# Patient Record
Sex: MALE | NOT HISPANIC OR LATINO | URBAN - METROPOLITAN AREA
[De-identification: names, ages, dates, MRNs, and addresses within clinical notes are randomized per-mention and may not be internally consistent; named-entity substitution may affect disease eponyms.]

---

## 2023-06-05 ENCOUNTER — PATIENT OUTREACH (OUTPATIENT)
Dept: ONCOLOGY | Facility: CLINIC | Age: 38
End: 2023-06-05

## 2023-06-05 ENCOUNTER — TRANSCRIBE ORDERS (OUTPATIENT)
Dept: OTHER | Age: 38
End: 2023-06-05

## 2023-06-05 DIAGNOSIS — C76.0 HEAD AND NECK CANCER (H): Primary | ICD-10-CM

## 2023-06-05 DIAGNOSIS — R59.0 CERVICAL LYMPHADENOPATHY: Primary | ICD-10-CM

## 2023-06-05 NOTE — PROGRESS NOTES
[facility]: Cancer Care                                                                   Hem/Onc  Referral reviewed  Referred By  Referred To   Pt's cousin    Diagnoses: Cervical lymphadenopathy   Order: Adult Oncology/Hematology  Referral    Medical Oncology   Comment: Ref called in per Pt's cousin Bryant/ 2nd opinion/ have paper records from  in [facility] (Telugu and English), uploading to [employer] once created/ per records, spreading quickly, needs to be seen quickly/Bx done 5/28/23/ sent caller to Fin counselor, LVM for Fin Counselor      ASSESSMENT      Clinical History (per Nurse review of records provided):    37 year old male patient referred by her cousin, see above  Records Location: No records received       INTERVENTION(S)                                                      [employer] message to pt re: need for records.    PLAN                                                        TBD    Melia Wood, RN, BSN, OCN  Hematology/Oncology New Patient Nurse Navigator   [facility]  [phone]

## 2023-06-05 NOTE — PROGRESS NOTES
I sent an IB to the ENT staff (Mimi TOVAR, RNCC, Patience () and the RNCC pool to see if they can arrange for pt to be seen/evaluated by the ENT surgeons to determine if surgery is an option.  Explained where records could be found and pointed out lack of insurance, but financial counselors have been notified.

## 2023-06-07 ENCOUNTER — PATIENT OUTREACH (OUTPATIENT)
Dept: ONCOLOGY | Facility: CLINIC | Age: 38
End: 2023-06-07

## 2023-06-07 NOTE — TELEPHONE ENCOUNTER
FUTURE VISIT INFORMATION:      FUTURE VISIT INFORMATION:    Date: 6/12/23    Time: 3:40 PM    Location: [facility]  REFERRAL INFORMATION:    Referring provider:     Referring providers clinic:     Reason for visit/diagnosis:  diagnosed head/neck cancer    RECORDS REQUESTED FROM:       Clinic name Comments Records Status Imaging Status     Records are in Media tab                          * Bx done 5/28/23  * 2nd opinion/ [facility] ([place]oven[place] and English)

## 2023-06-07 NOTE — PROGRESS NOTES
Urgent medical oncology referral rec'd yesterday for what appears to be a H&N cancer.  Pt's cousin called referral in:  Bryant:[phone] Bryant speaks English    I have left a voicemail and re-sent an IB message to ENT and Suman in Financial asking for an update. It is my understanding we cannot schedule a patient unless they have the financial piece sorted out, however, he needs to see ENT prior to medical or radiation oncology so I will let the ENT group decide.    I have asked that Suman reach out today to Bryant benton with an update.

## 2023-06-07 NOTE — PROGRESS NOTES
[facility]: Cancer Care       Follow up                                                                        Assessment:   - see prior NN intake note and Mychart message from pt, NN note re: ENT referral    Intervention(s):   - Conference call placed to pt's cousin Bryant with [email or web address] [phone]  Bryant merged our call with Thierno on his new mobile line to discuss that we do not have ENT appt yet for patient as ENT dept still has the request under escalated dept review, no updates as of now.I offered to connect him to [facility] financial counselor ([phone]) to discuss further. Pt states that his symptoms are getting much worse, described as : pain in the neck and it is swelling in the left part and I have swelling on my left ear, sore throat, night sweats every night, all sx are worsening since biopsy in [facility]. + cough, SOB, fever ocassionally or chills. Quit smoking 1 month ago. Pt states he wants to go to the hospital and his cousin wants to take him to [facility] at [address]. Pt's only medication is a BP medication. He saw an oncologist in [facility] who advised he get tx in [place]. They applied for county insurance this am with our financial counselor. Pt / cousin wants to pay for consult OOP asap.   Pt also states he would like for the scheduling and financial teams to call Bryant first and he can get pt on the phone quickly.   Chart updated with Thierno's new phone number: [phone]. Bryant is authorized to take calls as back-up as well. Pt's mother is in [facility] and Bryant is helping update the family.     Evaluation/Plan/Recommendations:  - Explained to pt that I will update our FC team that he would like to pay OOP for outpatient ENT consult asap if needed. He states that he will otherwise likely go to the [facility] ED soon for urgent evaluation.    Signature:  Melia Wood, RN, BSN, OCN  Hematology/Oncology Nurse Navigator  [facility]  [phone] /  [phone]      28 minute phone call

## 2023-06-12 ENCOUNTER — OFFICE VISIT (OUTPATIENT)
Dept: OTOLARYNGOLOGY | Facility: CLINIC | Age: 38
End: 2023-06-12

## 2023-06-12 ENCOUNTER — THERAPY VISIT (OUTPATIENT)
Dept: SPEECH THERAPY | Facility: CLINIC | Age: 38
End: 2023-06-12

## 2023-06-12 ENCOUNTER — PRE VISIT (OUTPATIENT)
Dept: OTOLARYNGOLOGY | Facility: CLINIC | Age: 38
End: 2023-06-12

## 2023-06-12 VITALS
SYSTOLIC BLOOD PRESSURE: 142 MMHG | DIASTOLIC BLOOD PRESSURE: 87 MMHG | BODY MASS INDEX: 33.32 KG/M2 | HEART RATE: 81 BPM | WEIGHT: 238 LBS | HEIGHT: 71 IN

## 2023-06-12 DIAGNOSIS — C76.0 HEAD AND NECK CANCER (H): ICD-10-CM

## 2023-06-12 DIAGNOSIS — C11.9 NASOPHARYNGEAL CANCER (H): Primary | ICD-10-CM

## 2023-06-12 DIAGNOSIS — R59.1 LYMPHADENOPATHY: Primary | ICD-10-CM

## 2023-06-12 DIAGNOSIS — C11.9 NASOPHARYNGEAL CANCER (H): ICD-10-CM

## 2023-06-12 PROCEDURE — 88305 TISSUE EXAM BY PATHOLOGIST: CPT | Mod: 26 | Performed by: PATHOLOGY

## 2023-06-12 PROCEDURE — 88365 INSITU HYBRIDIZATION (FISH): CPT | Mod: 26 | Performed by: PATHOLOGY

## 2023-06-12 PROCEDURE — 88305 TISSUE EXAM BY PATHOLOGIST: CPT | Mod: TC | Performed by: STUDENT IN AN ORGANIZED HEALTH CARE EDUCATION/TRAINING PROGRAM

## 2023-06-12 PROCEDURE — 88173 CYTOPATH EVAL FNA REPORT: CPT | Mod: 26 | Performed by: PATHOLOGY

## 2023-06-12 PROCEDURE — 99205 OFFICE O/P NEW HI 60 MIN: CPT | Mod: 25 | Performed by: STUDENT IN AN ORGANIZED HEALTH CARE EDUCATION/TRAINING PROGRAM

## 2023-06-12 PROCEDURE — 10005 FNA BX W/US GDN 1ST LES: CPT | Performed by: STUDENT IN AN ORGANIZED HEALTH CARE EDUCATION/TRAINING PROGRAM

## 2023-06-12 PROCEDURE — 88342 IMHCHEM/IMCYTCHM 1ST ANTB: CPT | Mod: 26 | Performed by: PATHOLOGY

## 2023-06-12 PROCEDURE — 31575 DIAGNOSTIC LARYNGOSCOPY: CPT | Performed by: STUDENT IN AN ORGANIZED HEALTH CARE EDUCATION/TRAINING PROGRAM

## 2023-06-12 PROCEDURE — 99207 PR NO BILLABLE SERVICE THIS VISIT: CPT | Performed by: SPEECH-LANGUAGE PATHOLOGIST

## 2023-06-12 ASSESSMENT — PAIN SCALES - GENERAL: PAINLEVEL: MODERATE PAIN (4)

## 2023-06-12 NOTE — PATIENT INSTRUCTIONS
1. Please schedule PET scan asap.   2. Please call the ENT clinic with any questions,concerns, new or worsening symptoms.    -Clinic number is [phone]   - Mimi's direct line (Dr. Fisher's nurse) [phone]  3. You will be called to schedule medical and radiation consults.   4. You will also be called to arrange dental consult.

## 2023-06-12 NOTE — LETTER
"6/12/2023       RE: Thierno Vega  [address] [address]     Dear Colleague,    Thank you for referring your patient, Thierno Vega, to the [facility]SE [facility] [place] at [facility]. Please see a copy of my visit note below.    Head and Neck Surgery  6/12/23    37M with diagnosis of nasopharyngeal carcinoma in [facility] presents for a second opinion. He noticed a left neck mass in the fall and initially had a FNA that was benign. The mass perissted and then in April underwent repeat FNA showing undifferentiated carcinoma. I do not see that EBV was tested. He has undergone neck US, MRI head and neck. We do not have these scans.     He has no symptoms asides from the neck mass.    PMH:  HTN    PSH:  None    Medications:  Antihypertensive    Social history:   smokes cigarettes  occasional etoh   No drugs    Family history:  None    ROS: 10 pt review of systems performed and negative asides from HPI    Physical Examination:  BP (!) 142/87 (BP Location: Right arm, Patient Position: Sitting, Cuff Size: Adult Large)   Pulse 81   Ht 1.81 m (5' 11.26\")   Wt 108 kg (238 lb)   BMI 32.95 kg/m    Alert, NAD  Palpable left neck adenopathy in lvl 2,3, 5. No skin involvement.  EOM intact  No lesions in oral cavity or oropharynx  CN II-XII intact    Procedure:  Fiberoptic laryngoscopy was performed. Submucosal fullness in left fossa of rosenmuller. Remainder of exam normal    US guided FNA was performed of left neck node. 23 gauge needle was used to obtain multiple specimens    A/P:  37M presenting with nasopharyngeal carcinoma diagnosed in [facility]. We do not have the imaging. I do have his pathology results. He says his MRI is arriving on a CD in the coming days. We will arrange for a PET to complete his staging. FNA was performed today for EBV status.     He will be treated with no surgical techniques. Once we understand his staging " we can discuss his treatment options. We will get him in to see dental, med onc, rad onc, SLP.     Navin Fisher MD      60 minutes spent on the date of the encounter in chart review, patient visit, review of tests, documentation and/or discussion with other providers about the issues documented above asides from time spent doing flexible laryngoscopy and US guided FNA.             Again, thank you for allowing me to participate in the care of your patient.      Sincerely,    Navin Fisher MD

## 2023-06-12 NOTE — PROGRESS NOTES
Thierno Vega was seen for allied health care provider visit for introduction of self and SLP services. Provided information on trajectory of care and benefits of SLP services during and after chemoradiation for nasopharynx tumor. Speaking is function. Swallowing is reported to be functional. Formal video swallow study evaluation indicated prior to the onset of treatment. All questions answered within scope of practice for pt and his cousin. Encouraged pt to reach out with any questions or concerns. Time spent with patient 5 minutes. No billable services provided today during our encounter.       Azucena Aranda MS, CCC-SLP  Speech-Language Pathology  [facility] [place]  Department of Otolaryngology/D&T - 4th floor  Phone: [phone]  Email: [email or web address]

## 2023-06-13 ENCOUNTER — HOSPITAL ENCOUNTER (OUTPATIENT)
Dept: PET IMAGING | Facility: CLINIC | Age: 38
Discharge: HOME | End: 2023-06-13
Attending: STUDENT IN AN ORGANIZED HEALTH CARE EDUCATION/TRAINING PROGRAM

## 2023-06-13 ENCOUNTER — PATIENT OUTREACH (OUTPATIENT)
Dept: ONCOLOGY | Facility: CLINIC | Age: 38
End: 2023-06-13

## 2023-06-13 DIAGNOSIS — C11.9 NASOPHARYNGEAL CANCER (H): ICD-10-CM

## 2023-06-13 LAB
CREAT BLD-MCNC: 0.8 MG/DL (ref 0.7–1.3)
GFR SERPL CREATININE-BSD FRML MDRD: >60 ML/MIN/1.73M2

## 2023-06-13 PROCEDURE — 70491 CT SOFT TISSUE NECK W/DYE: CPT

## 2023-06-13 PROCEDURE — 78815 PET IMAGE W/CT SKULL-THIGH: CPT | Mod: 26 | Performed by: RADIOLOGY

## 2023-06-13 PROCEDURE — A9552 F18 FDG: HCPCS | Performed by: STUDENT IN AN ORGANIZED HEALTH CARE EDUCATION/TRAINING PROGRAM

## 2023-06-13 PROCEDURE — 71260 CT THORAX DX C+: CPT | Mod: 26 | Performed by: RADIOLOGY

## 2023-06-13 PROCEDURE — 70491 CT SOFT TISSUE NECK W/DYE: CPT | Mod: 26 | Performed by: RADIOLOGY

## 2023-06-13 PROCEDURE — 343N000001 HC RX 343: Performed by: STUDENT IN AN ORGANIZED HEALTH CARE EDUCATION/TRAINING PROGRAM

## 2023-06-13 PROCEDURE — 74177 CT ABD & PELVIS W/CONTRAST: CPT | Mod: 26 | Performed by: RADIOLOGY

## 2023-06-13 PROCEDURE — 250N000011 HC RX IP 250 OP 636: Performed by: STUDENT IN AN ORGANIZED HEALTH CARE EDUCATION/TRAINING PROGRAM

## 2023-06-13 PROCEDURE — 82565 ASSAY OF CREATININE: CPT

## 2023-06-13 PROCEDURE — 74177 CT ABD & PELVIS W/CONTRAST: CPT

## 2023-06-13 PROCEDURE — 78815 PET IMAGE W/CT SKULL-THIGH: CPT | Mod: PI

## 2023-06-13 RX ORDER — IOPAMIDOL 755 MG/ML
50-135 INJECTION, SOLUTION INTRAVASCULAR ONCE
Status: COMPLETED | OUTPATIENT
Start: 2023-06-13 | End: 2023-06-13

## 2023-06-13 RX ADMIN — IOPAMIDOL 122 ML: 755 INJECTION, SOLUTION INTRAVENOUS at 09:35

## 2023-06-13 RX ADMIN — FLUDEOXYGLUCOSE F-18 14 MILLICURIE: 500 INJECTION, SOLUTION INTRAVENOUS at 08:19

## 2023-06-13 NOTE — PROGRESS NOTES
"Cousin Bryant called.  He is asking to discuss the results of the scans.  I explained we do not have all three results back yet.    I said that Dr. Parada will review on FRI at his rad onc appointment.  He is asking for someone to call today, please.  Again, I explained not all of the scans are back and that I thought someone from ENT would call to discuss the results once they are all in.  Further explained I am not sure of Dr. Fisher's schedule--he may be in surgery today?    He is still hoping someone can call so \"Murray-Ahmed\" (name he goes by) can sleep better tonight.    Sent the above message urgently to ENT.  "

## 2023-06-13 NOTE — PROGRESS NOTES
CousinBryant called to say that his dentist office cannot see and/or fit patient into their schedule.  It would be months.    I sent an urgent IB to RAPHAEL Le.

## 2023-06-13 NOTE — PROGRESS NOTES
I called and spoke with cousin, Bryant (pt with him).  I explained my role and purpose of my call.  We went over the appointment details.  I am still trying to see if I can get a sooner appointment with the medical oncology team, either Dr. Duran or Eduardo. Answered many questions.  They needed to leave to  a truck so Bryant took down the number for NPS (new patient scheduling) team to finalize the appointments.  I also gave him my direct contact information in case they need anything prior to these appointments.  I explained they will meet/connect with the doctor's RNCC's after the initial consults.  I asked and Bryant is working currently with a dentist to arrange an appointment to get pt cleared for radiation.        New Patient Oncology Nurse Navigator Note     Referring provider: Dr. Fisher     Referring Clinic/Organization: [facility] ENT     Referred to (specialty): Radiation & Medical Oncology    [facility]     Date Referral Received: 2023     Evaluation for : nasopharyngeal cancer     Clinical History (per Nurse review of records provided):    **BOOK MARKED**   NOTES:  2023:  ENT consult w/Dr. Fisher    IMAGIN2023:  CT c/a/p, PET CT & CT Soft Tissue Neck    PATHOLOGY:  2023:  Pending  5/3/2023: (located in media tab)      Clinical Assessment / Barriers to Care (Per Nurse): none noted       Records Location (Care Everywhere, Media, etc.): Epic, records from [facility]     Records Needed: working on dental evaluation     Additional testing needed prior to consult: none

## 2023-06-14 NOTE — PROGRESS NOTES
I called and spoke to Bryant about an hour ago.  I offered to move his cousin's medical oncology appointment to tomorrow due to a cancellation.  He isn't sure and was going to call me back in thirty minutes.  It has been > than an hour and I have another pt that would like tomorrow's opening.    Called Bryant but had to leave a vm.  I let him know I will leave [facility] on for TH 6/22.

## 2023-06-14 NOTE — TELEPHONE ENCOUNTER
MEDICAL RECORDS REQUEST   Radiation Oncology  [address] [place] [address]  Fax: [phone]          FUTURE VISIT INFORMATION                                                   Thierno Vega, : 1985 scheduled for future visit at [facility] Radiation Oncology    RECORDS REQUESTED FOR VISIT                                                     HEAD & NECK     OFFICE NOTE from ENT Epic 23: Dr. Navin Fisher   FIBEROPTIC ENDOSCOPY REPORT External (report in media tab): 23   MEDICATION LIST [facility]    LABS     PATHOLOGY REPORTS Report in EPIC FNA:  23: [id number]    External (report in media tab):  FNA: 23   ANYTHING RELATED TO DIAGNOSIS Epic Most recent 23   IMAGING (NEED IMAGES & REPORT)     CT SCANS PACS 23: CT CAP  23: CT Soft Tissue Neck    External (report in media tab):  23: CT CAP   MRI Report only External (report in media tab):  23: MR Nasopharynx   PET PACS 23: PET Onc Eyes to Thighs

## 2023-06-15 ENCOUNTER — PATIENT OUTREACH (OUTPATIENT)
Dept: OTOLARYNGOLOGY | Facility: CLINIC | Age: 38
End: 2023-06-15

## 2023-06-15 LAB
PATH REPORT.COMMENTS IMP SPEC: ABNORMAL
PATH REPORT.COMMENTS IMP SPEC: YES
PATH REPORT.FINAL DX SPEC: ABNORMAL
PATH REPORT.GROSS SPEC: ABNORMAL
PATH REPORT.MICROSCOPIC SPEC OTHER STN: ABNORMAL
PATH REPORT.RELEVANT HX SPEC: ABNORMAL

## 2023-06-15 NOTE — TELEPHONE ENCOUNTER
Called and spoke with patient's friend Bryant regarding the following PET scan results:                                                                     IMPRESSION: In this patient with biopsy-proven nasopharyngeal  carcinoma and cervical lymphadenopathy:  1. No suspicious uptake in the chest, abdomen, and pelvis to suggest  metastatic disease.  2. Marked focal uptake along right upper inner thigh with skin  thickening, which may represent a cutaneous infection/inflammation.  Recommend direct visualization.  3. Hypermetabolic nonenlarged right inguinal lymph node with  additional bilateral mildly hypermetabolic nonenlarged inguinal lymph  nodes, suggestive of reactive adenopathy.  4. Please see dedicated head and neck PET/CT report for head and neck  Findings    Reviewed that head/neck portion of PET is not final yet but we will call once that is completed. Reviewed with bryant that patient should see someone to take a look at the right upper thigh as there may be a skin inflammation or infection at that site. They will see PCP. Advised we will continue to monitor the right inguinal lymph nodes as they are likely reactive.     Patient will proceed with medication and radiation consults scheduled. Patient/friend advised on dental clinic phone number and they will call to arrange clearance appt.     Bryant was encouraged to call with further questions or concerns.     Mimi Maria, RN, BSN

## 2023-06-16 ENCOUNTER — PRE VISIT (OUTPATIENT)
Dept: RADIATION ONCOLOGY | Facility: CLINIC | Age: 38
End: 2023-06-16

## 2023-06-16 ENCOUNTER — OFFICE VISIT (OUTPATIENT)
Dept: RADIATION ONCOLOGY | Facility: CLINIC | Age: 38
End: 2023-06-16
Attending: STUDENT IN AN ORGANIZED HEALTH CARE EDUCATION/TRAINING PROGRAM

## 2023-06-16 VITALS
DIASTOLIC BLOOD PRESSURE: 80 MMHG | BODY MASS INDEX: 32.95 KG/M2 | SYSTOLIC BLOOD PRESSURE: 127 MMHG | HEART RATE: 68 BPM | WEIGHT: 238 LBS

## 2023-06-16 DIAGNOSIS — C11.9 NASOPHARYNGEAL CANCER (H): ICD-10-CM

## 2023-06-16 PROCEDURE — G0463 HOSPITAL OUTPT CLINIC VISIT: HCPCS | Performed by: RADIOLOGY

## 2023-06-16 PROCEDURE — 99205 OFFICE O/P NEW HI 60 MIN: CPT | Performed by: RADIOLOGY

## 2023-06-16 PROCEDURE — 99417 PROLNG OP E/M EACH 15 MIN: CPT | Performed by: RADIOLOGY

## 2023-06-16 ASSESSMENT — ENCOUNTER SYMPTOMS
NEUROLOGICAL NEGATIVE: 1
RESPIRATORY NEGATIVE: 1
EYES NEGATIVE: 1
PSYCHIATRIC NEGATIVE: 1
CARDIOVASCULAR NEGATIVE: 1
CONSTITUTIONAL NEGATIVE: 1
MUSCULOSKELETAL NEGATIVE: 1
GASTROINTESTINAL NEGATIVE: 1

## 2023-06-16 NOTE — PROGRESS NOTES
HPI    INITIAL PATIENT ASSESSMENT    Diagnosis: Cancer    Prior radiation therapy: None    Prior chemotherapy: None    Prior hormonal therapy:No    Pain Eval:  Denies    Psychosocial  Living arrangements: Lives with familyindependentFall Risk: independent   referral needs: Not needed    Advanced Directive: No  Implantable Cardiac Device? No    Review of Systems   Constitutional: Negative.    HENT: Positive for ear pain.    Eyes: Negative.    Respiratory: Negative.    Cardiovascular: Negative.    Gastrointestinal: Negative.    Genitourinary: Negative.    Musculoskeletal: Negative.    Neurological: Negative.    Endo/Heme/Allergies: Negative.    Psychiatric/Behavioral: Negative.

## 2023-06-16 NOTE — LETTER
6/16/2023         RE: Thierno Vega  [address] [address]        Dear Colleague,    Thank you for referring your patient, Thierno Vega, to the [facility] RADIATION ONCOLOGY. Please see a copy of my visit note below.    RADIATION ONCOLOGY CONSULTATION  DATE OF VISIT: Jun 16, 2023    Thierno Vega  MRN: [id number]    PROBLEM:   Mr. Jose Vega was seen for initial consultation in the Department of Radiation Oncology at the request of Dr. Fisher for newly diagnosed nasopharyngeal carcinoma.    HISTORY OF PRESENT ILLNESS:   Mr. Vega is a 37-year-old man, Romansh-speaking, who presented to his physician in [facility] in April 2023 with cervical adenopathy.  An ultrasound on April 30 demonstrated multiple bilateral necrotic nodes.  A fine-needle aspiration was performed on 5/3/2023 which demonstrated undifferentiated carcinoma.  A CT scan of the chest abdomen and pelvis on May 4, 2023 demonstrated a 7 mm pulmonary nodule without evidence of tumor metastasis.  An MRI reportedly showed bilateral cervical lymphadenopathy and retropharyngeal adenopathy.    He transferred his care to [place].  Dr. Fisher performed an FNA of the left neck node on 6/12/2023 which was hypocellular with scant clusters of atypical squamous cells suspicious for malignancy, p40 positive, GALLITO negative.  A PET CT scan on 6/13/2023 demonstrated intense uptake in the left Rosenmuller fossa with increased fullness (SUV 18.8) as well as milder FDG uptake (SUV 6.1) by the prominent right Rosenmuller fossa.  There was indeterminate bilateral intense palatine tonsillar uptake with associated fullness seen on CT scan representing either inflammatory versus infectious versus tumor.  The palatine tonsillar uptake was discontinuous with the nasopharyngeal mass.  Multiple bilateral cervical lymph nodes were involved as well as the retropharyngeal nodes.  There was a 0.3 cm nodule on the right major fissure, 0.8 cm right  inguinal node (SUV 3.9) with additional nonenlarged nodes with minimal uptake suggesting a reactive adenopathy.    Today, he describes otalgia and nasal stuffiness.  He is not experiencing any headaches, nausea, vomiting, or visual changes.  He describes minimal weight loss.  He has not had an audiogram yet.    PAST MEDICAL HISTORY:   Past Medical History:   Diagnosis Date    HTN (hypertension)      PAST SURGICAL HISTORY:   History reviewed. No pertinent surgical history.    CHEMOTHERAPY HISTORY: None    PAST RADIATION THERAPY HISTORY: None    IMPLANTABLE CARDIAC DEVICE: None    MEDICATIONS:   Current Outpatient Medications   Medication Sig Dispense Refill    amLODIPine 5 MG TABS 5 mg, valsartan 160 MG TABS 160 mg Take by mouth daily        ALLERGIES:   Allergies as of 2023    (No Known Allergies)     SOCIAL HISTORY:   He has a history of tobacco use is cut down since his cancer diagnosis.  He currently smokes approximately 1 pack/day.  He drinks alcohol occasionally.  He has 3 children, 8 years old and 4-year-old twins.    FAMILY HISTORY:   There is no known family history of cancer.    REVIEW OF SYMPTOMS:  A full 12-point review of systems was performed. All pertinent positives and negatives are noted in HPI.    FUNCTIONAL STATUS: ECO    PHYSICAL EXAMINATION:    /80   Pulse 68   Wt 108 kg (238 lb)   BMI 32.95 kg/m    Exam:  General: Alert, oriented, in no acute distress  HEENT: Normocephalic, atraumatic, boggy nasal turbinates and oropharynx without visible mass  Neck: Bilateral palpable cervical lymphadenopathy most prominent in the left levels 2 and 3  Respiratory: Breathing comfortably on room air, no wheezing  Cardiovascular: Regular rate, extremities warm, well-perfused  Abdomen: Nondistended  Extremities: Normal muscle bulk and tone  Neuro: Cranial nerves II through XII are grossly intact, hearing not tested, gait normal    IMAGING/PATH: Please refer to history of present  illness.    ASSESSMENT AND PLAN:   Mr. 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  By my review of his PET CT scan, it appears that the most inferior aspect of the left cervical lymphadenopathy ends at the level of the bottom of the cricoid cartilage thus explaining the N2 category.  He presents today with his cousin, Bryant, who acts as his .  We reviewed the status of his disease so far including the PET CT scan results.  Given the more advanced stage of his disease, I discussed the expected course of treatment inclusive of neoadjuvant chemotherapy followed by chemoradiation as definitive treatment for his nasopharyngeal carcinoma.  We will discuss his case in multidisciplinary tumor conference and finalize recommendations.  In the meantime, he is scheduled to see Dr. Clark in Medical Oncology.  If he does proceed with neoadjuvant chemotherapy, I will plan to see him back after he completes 3 cycles of chemotherapy.  I reviewed the rationale for recommending chemotherapy followed by chemoradiation as well as the expected course of radiation, the anticipated acute side effects, the potential long-term risks and expected outcome from treatment.  The patient had several questions which were answered such that he verbalized understanding.  We also discussed that he will need a dental evaluation prior to his treatment planning simulation.    We appreciate the opportunity to participate in this patient's care.  Please feel free to call with any questions or concerns.    110 minutes spent by me on the date of the encounter doing chart review, history and exam, documentation and further activities per the note.    Yessica Parada MD  Department of Radiation Oncology  [facility]    CC  Patient Care Team:  No Ref-Primary, Physician as PCP - Isaias Flores MD as Physician  (Hematology & Oncology)  Catrachita Clark MD as MD (Hematology & Oncology)  Yessica Parada MD as MD (Radiation Oncology)  Catrachita Clark MD as MD (Hematology & Oncology)  Marisa Travis AuD as Audiologist (Audiology)  Izabela Powell MD as Assigned Surgical Provider  Jamil Fitzpatrick RN as Specialty Care Coordinator (Hematology & Oncology)  IZABELA POWELL        HPI    INITIAL PATIENT ASSESSMENT    Diagnosis: Cancer    Prior radiation therapy: None    Prior chemotherapy: None    Prior hormonal therapy:No    Pain Eval:  Denies    Psychosocial  Living arrangements: Lives with familyindependentFall Risk: independent   referral needs: Not needed    Advanced Directive: No  Implantable Cardiac Device? No    Review of Systems   Constitutional: Negative.    HENT: Positive for ear pain.    Eyes: Negative.    Respiratory: Negative.    Cardiovascular: Negative.    Gastrointestinal: Negative.    Genitourinary: Negative.    Musculoskeletal: Negative.    Neurological: Negative.    Endo/Heme/Allergies: Negative.    Psychiatric/Behavioral: Negative.                    Again, thank you for allowing me to participate in the care of your patient.        Sincerely,        Yessica Parada MD

## 2023-06-16 NOTE — PROGRESS NOTES
RADIATION ONCOLOGY CONSULTATION  DATE OF VISIT: Jun 16, 2023    Thierno Vega  MRN: [id number]    PROBLEM:   Mr. Jose Vega was seen for initial consultation in the Department of Radiation Oncology at the request of Dr. Fisher for newly diagnosed nasopharyngeal carcinoma.    HISTORY OF PRESENT ILLNESS:   Mr. Vega is a 37-year-old man, Faroese-speaking, who presented to his physician in [facility] in April 2023 with cervical adenopathy.  An ultrasound on April 30 demonstrated multiple bilateral necrotic nodes.  A fine-needle aspiration was performed on 5/3/2023 which demonstrated undifferentiated carcinoma.  A CT scan of the chest abdomen and pelvis on May 4, 2023 demonstrated a 7 mm pulmonary nodule without evidence of tumor metastasis.  An MRI reportedly showed bilateral cervical lymphadenopathy and retropharyngeal adenopathy.    He transferred his care to [place].  Dr. Fisher performed an FNA of the left neck node on 6/12/2023 which was hypocellular with scant clusters of atypical squamous cells suspicious for malignancy, p40 positive, GALLITO negative.  A PET CT scan on 6/13/2023 demonstrated intense uptake in the left Rosenmuller fossa with increased fullness (SUV 18.8) as well as milder FDG uptake (SUV 6.1) by the prominent right Rosenmuller fossa.  There was indeterminate bilateral intense palatine tonsillar uptake with associated fullness seen on CT scan representing either inflammatory versus infectious versus tumor.  The palatine tonsillar uptake was discontinuous with the nasopharyngeal mass.  Multiple bilateral cervical lymph nodes were involved as well as the retropharyngeal nodes.  There was a 0.3 cm nodule on the right major fissure, 0.8 cm right inguinal node (SUV 3.9) with additional nonenlarged nodes with minimal uptake suggesting a reactive adenopathy.    Today, he describes otalgia and nasal stuffiness.  He is not experiencing any headaches, nausea, vomiting, or visual changes.  He describes minimal  weight loss.  He has not had an audiogram yet.    PAST MEDICAL HISTORY:   Past Medical History:   Diagnosis Date     HTN (hypertension)      PAST SURGICAL HISTORY:   History reviewed. No pertinent surgical history.    CHEMOTHERAPY HISTORY: None    PAST RADIATION THERAPY HISTORY: None    IMPLANTABLE CARDIAC DEVICE: None    MEDICATIONS:   Current Outpatient Medications   Medication Sig Dispense Refill     amLODIPine 5 MG TABS 5 mg, valsartan 160 MG TABS 160 mg Take by mouth daily        ALLERGIES:   Allergies as of 2023     (No Known Allergies)     SOCIAL HISTORY:   He has a history of tobacco use is cut down since his cancer diagnosis.  He currently smokes approximately 1 pack/day.  He drinks alcohol occasionally.  He has 3 children, 8 years old and 4-year-old twins.    FAMILY HISTORY:   There is no known family history of cancer.    REVIEW OF SYMPTOMS:  A full 12-point review of systems was performed. All pertinent positives and negatives are noted in HPI.    FUNCTIONAL STATUS: ECO    PHYSICAL EXAMINATION:    /80   Pulse 68   Wt 108 kg (238 lb)   BMI 32.95 kg/m    Exam:  General: Alert, oriented, in no acute distress  HEENT: Normocephalic, atraumatic, boggy nasal turbinates and oropharynx without visible mass  Neck: Bilateral palpable cervical lymphadenopathy most prominent in the left levels 2 and 3  Respiratory: Breathing comfortably on room air, no wheezing  Cardiovascular: Regular rate, extremities warm, well-perfused  Abdomen: Nondistended  Extremities: Normal muscle bulk and tone  Neuro: Cranial nerves II through XII are grossly intact, hearing not tested, gait normal    IMAGING/PATH: Please refer to history of present illness.    ASSESSMENT AND PLAN:   Mr. 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  By my review of his PET CT scan, it appears that the most inferior aspect of the left  cervical lymphadenopathy ends at the level of the bottom of the cricoid cartilage thus explaining the N2 category.  He presents today with his cousin, Bryant, who acts as his .  We reviewed the status of his disease so far including the PET CT scan results.  Given the more advanced stage of his disease, I discussed the expected course of treatment inclusive of neoadjuvant chemotherapy followed by chemoradiation as definitive treatment for his nasopharyngeal carcinoma.  We will discuss his case in multidisciplinary tumor conference and finalize recommendations.  In the meantime, he is scheduled to see Dr. Clark in Medical Oncology.  If he does proceed with neoadjuvant chemotherapy, I will plan to see him back after he completes 3 cycles of chemotherapy.  I reviewed the rationale for recommending chemotherapy followed by chemoradiation as well as the expected course of radiation, the anticipated acute side effects, the potential long-term risks and expected outcome from treatment.  The patient had several questions which were answered such that he verbalized understanding.  We also discussed that he will need a dental evaluation prior to his treatment planning simulation.    We appreciate the opportunity to participate in this patient's care.  Please feel free to call with any questions or concerns.    110 minutes spent by me on the date of the encounter doing chart review, history and exam, documentation and further activities per the note.    Yessica Parada MD  Department of Radiation Oncology  [facility]    CC  Patient Care Team:  No Ref-Primary, Physician as PCP - General  Isaias Larsen MD as Physician (Hematology & Oncology)  Catrachita Clark MD as MD (Hematology & Oncology)  Yessica Parada MD as MD (Radiation Oncology)  Catrachita Clark MD as MD (Hematology & Oncology)  Marisa Travis AuD as Audiologist (Audiology)  Navin Fisher MD as Assigned Surgical  Provider  Jamil Fitzpatrick, RN as Specialty Care Coordinator (Hematology & Oncology)  IZABELA POWELL

## 2023-06-18 NOTE — PROGRESS NOTES
"Head and Neck Surgery  6/12/23    37M with diagnosis of nasopharyngeal carcinoma in [facility] presents for a second opinion. He noticed a left neck mass in the fall and initially had a FNA that was benign. The mass perissted and then in April underwent repeat FNA showing undifferentiated carcinoma. I do not see that EBV was tested. He has undergone neck US, MRI head and neck. We do not have these scans.     He has no symptoms asides from the neck mass.    PMH:  HTN    PSH:  None    Medications:  Antihypertensive    Social history:   smokes cigarettes  occasional etoh   No drugs    Family history:  None    ROS: 10 pt review of systems performed and negative asides from HPI    Physical Examination:  BP (!) 142/87 (BP Location: Right arm, Patient Position: Sitting, Cuff Size: Adult Large)   Pulse 81   Ht 1.81 m (5' 11.26\")   Wt 108 kg (238 lb)   BMI 32.95 kg/m    Alert, NAD  Palpable left neck adenopathy in lvl 2,3, 5. No skin involvement.  EOM intact  No lesions in oral cavity or oropharynx  CN II-XII intact    Procedure:  Fiberoptic laryngoscopy was performed. Submucosal fullness in left fossa of rosenmuller. Remainder of exam normal    US guided FNA was performed of left neck node. 23 gauge needle was used to obtain multiple specimens    A/P:  37M presenting with nasopharyngeal carcinoma diagnosed in [facility]. We do not have the imaging. I do have his pathology results. He says his MRI is arriving on a CD in the coming days. We will arrange for a PET to complete his staging. FNA was performed today for EBV status.     He will be treated with no surgical techniques. Once we understand his staging we can discuss his treatment options. We will get him in to see dental, med onc, rad onc, SLP.     Navin Fisher MD      60 minutes spent on the date of the encounter in chart review, patient visit, review of tests, documentation and/or discussion with other providers about the issues documented above asides from time " spent doing flexible laryngoscopy and US guided FNA.

## 2023-06-22 ENCOUNTER — PRE VISIT (OUTPATIENT)
Dept: ONCOLOGY | Facility: CLINIC | Age: 38
End: 2023-06-22

## 2023-06-22 ENCOUNTER — ONCOLOGY VISIT (OUTPATIENT)
Dept: ONCOLOGY | Facility: CLINIC | Age: 38
End: 2023-06-22
Attending: STUDENT IN AN ORGANIZED HEALTH CARE EDUCATION/TRAINING PROGRAM

## 2023-06-22 ENCOUNTER — PATIENT OUTREACH (OUTPATIENT)
Dept: ONCOLOGY | Facility: CLINIC | Age: 38
End: 2023-06-22

## 2023-06-22 VITALS
RESPIRATION RATE: 16 BRPM | HEIGHT: 72 IN | DIASTOLIC BLOOD PRESSURE: 75 MMHG | BODY MASS INDEX: 32.37 KG/M2 | OXYGEN SATURATION: 100 % | HEART RATE: 71 BPM | SYSTOLIC BLOOD PRESSURE: 114 MMHG | TEMPERATURE: 99.1 F | WEIGHT: 239 LBS

## 2023-06-22 DIAGNOSIS — E83.42 HYPOMAGNESEMIA: ICD-10-CM

## 2023-06-22 DIAGNOSIS — C11.9 NASOPHARYNGEAL CANCER (H): ICD-10-CM

## 2023-06-22 DIAGNOSIS — Z13.29 SCREENING FOR HYPOTHYROIDISM: Primary | ICD-10-CM

## 2023-06-22 LAB
ALBUMIN SERPL BCG-MCNC: 4.6 G/DL (ref 3.5–5.2)
ALP SERPL-CCNC: 65 U/L (ref 40–129)
ALT SERPL W P-5'-P-CCNC: 20 U/L (ref 0–70)
ANION GAP SERPL CALCULATED.3IONS-SCNC: 9 MMOL/L (ref 7–15)
AST SERPL W P-5'-P-CCNC: 15 U/L (ref 0–45)
BASOPHILS # BLD AUTO: 0.1 10E3/UL (ref 0–0.2)
BASOPHILS NFR BLD AUTO: 1 %
BILIRUB SERPL-MCNC: 0.2 MG/DL
BUN SERPL-MCNC: 12.1 MG/DL (ref 6–20)
CALCIUM SERPL-MCNC: 9.4 MG/DL (ref 8.6–10)
CHLORIDE SERPL-SCNC: 105 MMOL/L (ref 98–107)
CREAT SERPL-MCNC: 0.68 MG/DL (ref 0.67–1.17)
DEPRECATED HCO3 PLAS-SCNC: 25 MMOL/L (ref 22–29)
EOSINOPHIL # BLD AUTO: 0.2 10E3/UL (ref 0–0.7)
EOSINOPHIL NFR BLD AUTO: 3 %
ERYTHROCYTE [DISTWIDTH] IN BLOOD BY AUTOMATED COUNT: 12.5 % (ref 10–15)
GFR SERPL CREATININE-BSD FRML MDRD: >90 ML/MIN/1.73M2
GLUCOSE SERPL-MCNC: 90 MG/DL (ref 70–99)
HCT VFR BLD AUTO: 44.5 % (ref 40–53)
HGB BLD-MCNC: 14.6 G/DL (ref 13.3–17.7)
IMM GRANULOCYTES # BLD: 0 10E3/UL
IMM GRANULOCYTES NFR BLD: 0 %
LYMPHOCYTES # BLD AUTO: 3 10E3/UL (ref 0.8–5.3)
LYMPHOCYTES NFR BLD AUTO: 33 %
MCH RBC QN AUTO: 28.3 PG (ref 26.5–33)
MCHC RBC AUTO-ENTMCNC: 32.8 G/DL (ref 31.5–36.5)
MCV RBC AUTO: 86 FL (ref 78–100)
MONOCYTES # BLD AUTO: 0.6 10E3/UL (ref 0–1.3)
MONOCYTES NFR BLD AUTO: 7 %
NEUTROPHILS # BLD AUTO: 5 10E3/UL (ref 1.6–8.3)
NEUTROPHILS NFR BLD AUTO: 56 %
NRBC # BLD AUTO: 0 10E3/UL
NRBC BLD AUTO-RTO: 0 /100
PLATELET # BLD AUTO: 313 10E3/UL (ref 150–450)
POTASSIUM SERPL-SCNC: 3.9 MMOL/L (ref 3.4–5.3)
PROT SERPL-MCNC: 7.6 G/DL (ref 6.4–8.3)
RBC # BLD AUTO: 5.16 10E6/UL (ref 4.4–5.9)
SODIUM SERPL-SCNC: 139 MMOL/L (ref 136–145)
TSH SERPL DL<=0.005 MIU/L-ACNC: 0.88 UIU/ML (ref 0.3–4.2)
WBC # BLD AUTO: 9 10E3/UL (ref 4–11)

## 2023-06-22 PROCEDURE — 36415 COLL VENOUS BLD VENIPUNCTURE: CPT | Performed by: INTERNAL MEDICINE

## 2023-06-22 PROCEDURE — 80053 COMPREHEN METABOLIC PANEL: CPT | Performed by: INTERNAL MEDICINE

## 2023-06-22 PROCEDURE — 99205 OFFICE O/P NEW HI 60 MIN: CPT | Performed by: INTERNAL MEDICINE

## 2023-06-22 PROCEDURE — 85025 COMPLETE CBC W/AUTO DIFF WBC: CPT | Performed by: INTERNAL MEDICINE

## 2023-06-22 PROCEDURE — G0463 HOSPITAL OUTPT CLINIC VISIT: HCPCS | Performed by: INTERNAL MEDICINE

## 2023-06-22 PROCEDURE — 87799 DETECT AGENT NOS DNA QUANT: CPT | Performed by: INTERNAL MEDICINE

## 2023-06-22 PROCEDURE — 84443 ASSAY THYROID STIM HORMONE: CPT | Performed by: INTERNAL MEDICINE

## 2023-06-22 ASSESSMENT — PAIN SCALES - GENERAL: PAINLEVEL: NO PAIN (0)

## 2023-06-22 NOTE — NURSING NOTE
"Oncology Rooming Note    June 22, 2023 1:12 PM   Thierno Vega is a 37 year old male who presents for:    Chief Complaint   Patient presents with     Oncology Clinic Visit     Nasopharyngeal cancer      Initial Vitals: /75   Pulse 71   Temp 99.1  F (37.3  C) (Oral)   Resp 16   Ht 1.83 m (6' 0.05\")   Wt 108.4 kg (239 lb)   SpO2 100%   BMI 32.37 kg/m   Estimated body mass index is 32.37 kg/m  as calculated from the following:    Height as of this encounter: 1.83 m (6' 0.05\").    Weight as of this encounter: 108.4 kg (239 lb). Body surface area is 2.35 meters squared.  No Pain (0) Comment: Data Unavailable   No LMP for male patient.  Allergies reviewed: Yes  Medications reviewed: Yes    Medications: Medication refills not needed today.  Pharmacy name entered into Pharmacopeia: [id number] - [place], [place] - [address] AT [facility] [address]    Clinical concerns: No new clinical concerns other than reason for visit today.    Alfreda Uribe, EMT    "

## 2023-06-22 NOTE — LETTER
6/22/2023         RE: Thierno Vega  [address] [facility] [address]        Dear Colleague,    Thank you for referring your patient, Thierno Vega, to the [facility]. Please see a copy of my visit note below.      [facility]    PATIENT NAME: Thierno Vega  MRN # [id number]   DATE OF VISIT: June 22, 2023  YOB: 1985     Otolaryngology: Dr. Navin Fisher  Radiation Oncology:  Dr. Yessica Praada    CANCER TYPE: SCC nasopharynx, GALLITO -jeanna  STAGE: cT2N2 vs N3 (III vs MARYSOL)  ECOG PS: 0    PD-L1:  NGS:     SUMMARY  4/30/23  US. Bilateral necrotic nodes  5/3/23  FNA cervical adenopathy - undifferentiated carcinoma  5/4/23   CT CAP. 7 mm pulmonary nodule, no metastases  5/5/23  MRI bilateral cervical and retropharyngeal adenopathy    Came to US  6/12/23  US guided FNA L neck node in clinic (Dr. Fisher). Path: hypocellular with scant clusters of atypical squamous cells suspicious for malignancy, p40 +jeanna, GALLITO -jeanna  6/13/23  PET/CT. Intense uptake L Rosenmuller fossa with increased fullness (SUV 18.8). Skull base intact. Milder FDG uptake (SUV 6.1) by the prominent R Rosenmuller fossa, inflammatory vs malignant. Indeterminate bilateral intense palatine tonsillar uptake with fullness on CT, inflammatroy vs infectious vs tumor, discontinuous with the nasopharyngeal abnormality. Bilateral nodes, including level 2a, 2b, 3, 3/5, R 1.8 cm 2A, another level 2/3 nodes, FDG avid retropharyngeal nodes. 0.3 cm nodule on the R major fissure. 0.8 cm R inguinal node (SUV 3.9) with additional nonenlarged LNs with minimal uptake, suggestive of reactive adenopathy. Marked focal uptake along the R upper inner thigh with skin thickening (SUV 16.8), which may represent cutaneous infection/inflammation, recommend direct visualization    ASSESSMENT AND PLAN  Nasopharyngeal carcinoma, cT2N2 (III): He has a good understanding of the course to date. Met with Dr. Parada earlier  in the week. Discussed typical recommendation of induction chemotherapy followed by chemoradiation. EBV was pending at the time of our visit but is detectable in the blood. Some debate about cisplatin gemcitabine as the most appropriate regimen for non-EBV-related nasopharyngeal carcinoma. Recommended cisplatin gemcitabine induction x 3 cycles, then chemoradiation with either weekly cisplatin or HD cisplatin, with curative intent. Would restage after 2 cycles. Reviewed potential side effects. Teaching was done today. Restage after 2 cycles. Discussed that overall treatment and the start of recovery would be a 5-6 month process. [place] to stay [place] the [place] for [place] duration.     Hearing changes/tinnitus: Suspect mostly driven by the cancer - didn't have prior to a couple of months ago. Audiology exam asap prior to cisplatin    Skin inflammation: No symptoms but will ask more specifically.     Fertility preservation: Will discuss in the future.     [email or web address] iPad  used throughout     60 minutes spent by me on the date of the encounter doing chart review, history and exam, documentation and further activities per the note     Catrachita Clark MD  Associate Professor of Medicine  Hematology, Oncology and Transplantation      SUBJECTIVE  Mr. Vega is a 38 yo male who presents today for newly diagnosed nasopharyngeal carcinoma. He is alone at today's visit. Doing ok physically, actually feels well. No dysphagia, pain with swallowing, pain in general. Started having some ringing in the L ear a couple of weeks ago, no other hearing changes and no hearing changes at baseline. No numbness/tingling. Breathing fine. No other problems.     PAST MEDICAL HISTORY  Nasopharyngeal carcinoma as above  HTN    CURRENT OUTPATIENT MEDICATIONS  Current Outpatient Medications   Medication Sig Dispense Refill    amLODIPine 5 MG TABS 5 mg, valsartan 160 MG TABS 160 mg Take by mouth daily       ALLERGIES  No Known Allergies  "    SOCIAL HISTORY: smokes cigarettes, has cut down since cancer diagnosis, about 1 1/2 ppd   3 children - 8, twin girls, 5 yo  occasional etoh     FAMILY HISTORY:   Family History   Problem Relation Age of Onset    Cancer No family hx of       REVIEW OF SYSTEMS  As above in the HPI, o/w complete 12-point ROS was negative.    PHYSICAL EXAM  /75   Pulse 71   Temp 99.1  F (37.3  C) (Oral)   Resp 16   Ht 1.83 m (6' 0.05\")   Wt 108.4 kg (239 lb)   SpO2 100%   BMI 32.37 kg/m    GEN: NAD  HEENT: EOMI, no icterus, injection or pallor  EXT: no edema  NEURO: alert    LABORATORY AND IMAGING STUDIES   06/22/23 14:33   Sodium 139   Potassium 3.9   Chloride 105   Carbon Dioxide (CO2) 25   Urea Nitrogen 12.1   Creatinine 0.68   GFR Estimate >90   Calcium 9.4   Anion Gap 9   Albumin 4.6   Protein Total 7.6   Alkaline Phosphatase 65   ALT 20   AST 15   Bilirubin Total 0.2   Glucose 90   TSH 0.88   WBC 9.0   Hemoglobin 14.6   Hematocrit 44.5   Platelet Count 313   RBC Count 5.16   MCV 86   MCH 28.3   MCHC 32.8   RDW 12.5   % Neutrophils 56   % Lymphocytes 33   % Monocytes 7   % Eosinophils 3   % Basophils 1   Absolute Basophils 0.1   Absolute Eosinophils 0.2   Absolute Immature Granulocytes 0.0   Absolute Lymphocytes 3.0   Absolute Monocytes 0.6   % Immature Granulocytes 0   Absolute Neutrophils 5.0   Absolute NRBCs 0.0   NRBCs per 100 WBC 0   EBV DNA Copies/mL 1,948 (H)   EBV DNA Log of Copies 3.3     Labs were independently reviewed and interpreted by me    CT Soft Tissue Neck w Contrast  Narrative: PET CT fusion examination 6/13/2023 9:44 AM  1. Neck CT with contrast  2. PET study of the neck  3. PET CT fusion study of the neck    History: 37-year-old male with history of biopsy-proven nasopharyngeal  carcinoma with cervical lymphadenopathy on 5/3/23 in [facility] who  presents for staging PET/CT.    Comparison: None.    Technique: Please refer to the accompanying whole body PET-CT for  report of the dose and whole body " PET-CT findings.  Regarding the neck, axial images were obtained after nonionic  intravenous contrast administration, with sagittal and coronal  reconstructions performed. Neck CT images were reviewed in bone, soft  tissue, and lung windows, with review of the fused PET-CT images as  well in multiple planes.    Findings:  Intense uptake along the left Rosenmuller fossa with area of increased  fullness, max SUV 18.8. This represents pathology proven  Nasopharyngeal carcinoma. Skull base is intact. Milder FDG uptake (max  SUV 6.1) by the prominent right Rosenmuller fossa, could be  inflammatory versus possibly part of the neoplasm.  In addition there is bilateral intense palatine tonsillar  uptake with  fullness on CT. This is indeterminate, possibly infiltrated by the  tumor or more likely inflammatory/infectious process. These are  discontinuous with the nasopharyngeal region abnormality.    Bilateral central hypoattenuating and peripherally enhancing  hypermetabolic lateral retropharyngeal nodes, where the left lateral  retropharyngeal node contains an eccentric peripheral enhancing  component (series 1, image 65) with SUV max 18.5.    There are multiple hypermetabolic left cervical lymph nodes some of  which are centrally hypoattenuating, including:  - 2.3 cm long axis diameter left level 2a node (series 1, image 83)  with SUV max 5.9  - 2.0 cm left level 2b node (series 1, image 82) with SUV max 6.6  - 1.7 cm left level 3 node (series 1, image 92) with SUV max 18.8  - 1.9 cm left level 3/5 node at the lateral margin of the  sternocleidomastoid muscle (series 1, image 95) with SUV max 18.8  - 1.3 cm left level 3 node (series 1, image 100) with SUV max 7.6    There is a 1.8 cm hypermetabolic right level 2A node (series 1, image  88) with SUV max 5.7. Another mildly FDG avid nodule in the right  level 2/3    The tongue base appears normal. The major salivary glands and thyroid  gland appear normal.    Limited  evaluation of the cervical vertebral column demonstrates no  evident spinal canal stenosis. Left greater than right moderate  mucosal thickening in the maxillary sinuses. Mild mucosal thickening  in the right posterior ethmoid air cell. The mastoid air cells are  clear. The major vascular structures in the neck are patent.    The visualized lung apices appear clear.  Impression: Impression:   1. Marked uptake in the left Rosenmuller pharyngeal recess with area  of increased fullness representing pathology proven Nasopharyngeal  carcinoma.   2. Bilateral hypermetabolic metastatic necrotic lateral  retropharyngeal nodes and bilateral hypermetabolic level 2a nodes.  Multiple hypermetabolic metastatic left cervical lymph nodes some of  which are necrotic extending from levels 2a, 2b, and 3/5.   3. Please refer to the whole body PET CT performed as a separate  report, for the findings of the remainder of the body.    I have personally reviewed the examination and initial interpretation  and I agree with the findings.    RUBIN BUENO MD         SYSTEM ID:  [id number]     Imaging was personally reviewed and interpreted by me         Catrachita Clark MD

## 2023-06-22 NOTE — NURSING NOTE
Labs completed via venipuncture, patient discharged.    See flow sheets.    Josie Samuels CMA ([facility])

## 2023-06-22 NOTE — PROGRESS NOTES
[facility] CANCER CLINIC    PATIENT NAME: Thierno Vega  MRN # [id number]   DATE OF VISIT: June 22, 2023  YOB: 1985     Otolaryngology: Dr. Navin Fisher  Radiation Oncology:  Dr. Yessica Parada    CANCER TYPE: SCC nasopharynx, GALLITO -jeanna  STAGE: cT2N2 vs N3 (III vs MARYSOL)  ECOG PS: 0    PD-L1:  NGS:     SUMMARY  4/30/23  US. Bilateral necrotic nodes  5/3/23  FNA cervical adenopathy - undifferentiated carcinoma  5/4/23   CT CAP. 7 mm pulmonary nodule, no metastases  5/5/23  MRI bilateral cervical and retropharyngeal adenopathy    Came to US  6/12/23  US guided FNA L neck node in clinic (Dr. Fisher). Path: hypocellular with scant clusters of atypical squamous cells suspicious for malignancy, p40 +jeanna, GALLITO -jeanna  6/13/23  PET/CT. Intense uptake L Rosenmuller fossa with increased fullness (SUV 18.8). Skull base intact. Milder FDG uptake (SUV 6.1) by the prominent R Rosenmuller fossa, inflammatory vs malignant. Indeterminate bilateral intense palatine tonsillar uptake with fullness on CT, inflammatroy vs infectious vs tumor, discontinuous with the nasopharyngeal abnormality. Bilateral nodes, including level 2a, 2b, 3, 3/5, R 1.8 cm 2A, another level 2/3 nodes, FDG avid retropharyngeal nodes. 0.3 cm nodule on the R major fissure. 0.8 cm R inguinal node (SUV 3.9) with additional nonenlarged LNs with minimal uptake, suggestive of reactive adenopathy. Marked focal uptake along the R upper inner thigh with skin thickening (SUV 16.8), which may represent cutaneous infection/inflammation, recommend direct visualization    ASSESSMENT AND PLAN  Nasopharyngeal carcinoma, cT2N2 (III): He has a good understanding of the course to date. Met with Dr. Parada earlier in the week. Discussed typical recommendation of induction chemotherapy followed by chemoradiation. EBV was pending at the time of our visit but is detectable in the blood. Some debate about cisplatin gemcitabine as the most appropriate regimen for  non-EBV-related nasopharyngeal carcinoma. Recommended cisplatin gemcitabine induction x 3 cycles, then chemoradiation with either weekly cisplatin or HD cisplatin, with curative intent. Would restage after 2 cycles. Reviewed potential side effects. Teaching was done today. Restage after 2 cycles. Discussed that overall treatment and the start of recovery would be a 5-6 month process. [place] stay [place] the [place] for [place] duration.     Hearing changes/tinnitus: Suspect mostly driven by the cancer - didn't have prior to a couple of months ago. Audiology exam asap prior to cisplatin    Skin inflammation: No symptoms but will ask more specifically.     Fertility preservation: Will discuss in the future.     [employer] iPad  used throughout     60 minutes spent by me on the date of the encounter doing chart review, history and exam, documentation and further activities per the note     Catrachita Clark MD  Associate Professor of Medicine  Hematology, Oncology and Transplantation      SUBJECTIVE  Mr. Vega is a 36 yo male who presents today for newly diagnosed nasopharyngeal carcinoma. He is alone at today's visit. Doing ok physically, actually feels well. No dysphagia, pain with swallowing, pain in general. Started having some ringing in the L ear a couple of weeks ago, no other hearing changes and no hearing changes at baseline. No numbness/tingling. Breathing fine. No other problems.     PAST MEDICAL HISTORY  Nasopharyngeal carcinoma as above  HTN    CURRENT OUTPATIENT MEDICATIONS  Current Outpatient Medications   Medication Sig Dispense Refill     amLODIPine 5 MG TABS 5 mg, valsartan 160 MG TABS 160 mg Take by mouth daily       ALLERGIES  No Known Allergies     SOCIAL HISTORY: smokes cigarettes, has cut down since cancer diagnosis, about 1 1/2 ppd   3 children - 8, twin girls, 5 yo  occasional etoh     FAMILY HISTORY:   Family History   Problem Relation Age of Onset     Cancer No family hx of      "  REVIEW OF SYSTEMS  As above in the HPI, o/w complete 12-point ROS was negative.    PHYSICAL EXAM  /75   Pulse 71   Temp 99.1  F (37.3  C) (Oral)   Resp 16   Ht 1.83 m (6' 0.05\")   Wt 108.4 kg (239 lb)   SpO2 100%   BMI 32.37 kg/m    GEN: NAD  HEENT: EOMI, no icterus, injection or pallor  EXT: no edema  NEURO: alert    LABORATORY AND IMAGING STUDIES   06/22/23 14:33   Sodium 139   Potassium 3.9   Chloride 105   Carbon Dioxide (CO2) 25   Urea Nitrogen 12.1   Creatinine 0.68   GFR Estimate >90   Calcium 9.4   Anion Gap 9   Albumin 4.6   Protein Total 7.6   Alkaline Phosphatase 65   ALT 20   AST 15   Bilirubin Total 0.2   Glucose 90   TSH 0.88   WBC 9.0   Hemoglobin 14.6   Hematocrit 44.5   Platelet Count 313   RBC Count 5.16   MCV 86   MCH 28.3   MCHC 32.8   RDW 12.5   % Neutrophils 56   % Lymphocytes 33   % Monocytes 7   % Eosinophils 3   % Basophils 1   Absolute Basophils 0.1   Absolute Eosinophils 0.2   Absolute Immature Granulocytes 0.0   Absolute Lymphocytes 3.0   Absolute Monocytes 0.6   % Immature Granulocytes 0   Absolute Neutrophils 5.0   Absolute NRBCs 0.0   NRBCs per 100 WBC 0   EBV DNA Copies/mL 1,948 (H)   EBV DNA Log of Copies 3.3     Labs were independently reviewed and interpreted by me    CT Soft Tissue Neck w Contrast  Narrative: PET CT fusion examination 6/13/2023 9:44 AM  1. Neck CT with contrast  2. PET study of the neck  3. PET CT fusion study of the neck    History: 37-year-old male with history of biopsy-proven nasopharyngeal  carcinoma with cervical lymphadenopathy on 5/3/23 in [facility] who  presents for staging PET/CT.    Comparison: None.    Technique: Please refer to the accompanying whole body PET-CT for  report of the dose and whole body PET-CT findings.  Regarding the neck, axial images were obtained after nonionic  intravenous contrast administration, with sagittal and coronal  reconstructions performed. Neck CT images were reviewed in bone, soft  tissue, and lung windows, " with review of the fused PET-CT images as  well in multiple planes.    Findings:  Intense uptake along the left Rosenmuller fossa with area of increased  fullness, max SUV 18.8. This represents pathology proven  Nasopharyngeal carcinoma. Skull base is intact. Milder FDG uptake (max  SUV 6.1) by the prominent right Rosenmuller fossa, could be  inflammatory versus possibly part of the neoplasm.  In addition there is bilateral intense palatine tonsillar  uptake with  fullness on CT. This is indeterminate, possibly infiltrated by the  tumor or more likely inflammatory/infectious process. These are  discontinuous with the nasopharyngeal region abnormality.    Bilateral central hypoattenuating and peripherally enhancing  hypermetabolic lateral retropharyngeal nodes, where the left lateral  retropharyngeal node contains an eccentric peripheral enhancing  component (series 1, image 65) with SUV max 18.5.    There are multiple hypermetabolic left cervical lymph nodes some of  which are centrally hypoattenuating, including:  - 2.3 cm long axis diameter left level 2a node (series 1, image 83)  with SUV max 5.9  - 2.0 cm left level 2b node (series 1, image 82) with SUV max 6.6  - 1.7 cm left level 3 node (series 1, image 92) with SUV max 18.8  - 1.9 cm left level 3/5 node at the lateral margin of the  sternocleidomastoid muscle (series 1, image 95) with SUV max 18.8  - 1.3 cm left level 3 node (series 1, image 100) with SUV max 7.6    There is a 1.8 cm hypermetabolic right level 2A node (series 1, image  88) with SUV max 5.7. Another mildly FDG avid nodule in the right  level 2/3    The tongue base appears normal. The major salivary glands and thyroid  gland appear normal.    Limited evaluation of the cervical vertebral column demonstrates no  evident spinal canal stenosis. Left greater than right moderate  mucosal thickening in the maxillary sinuses. Mild mucosal thickening  in the right posterior ethmoid air cell. The mastoid  air cells are  clear. The major vascular structures in the neck are patent.    The visualized lung apices appear clear.  Impression: Impression:   1. Marked uptake in the left Rosenmuller pharyngeal recess with area  of increased fullness representing pathology proven Nasopharyngeal  carcinoma.   2. Bilateral hypermetabolic metastatic necrotic lateral  retropharyngeal nodes and bilateral hypermetabolic level 2a nodes.  Multiple hypermetabolic metastatic left cervical lymph nodes some of  which are necrotic extending from levels 2a, 2b, and 3/5.   3. Please refer to the whole body PET CT performed as a separate  report, for the findings of the remainder of the body.    I have personally reviewed the examination and initial interpretation  and I agree with the findings.    RUBIN BUENO MD         SYSTEM ID:  [id number]     Imaging was personally reviewed and interpreted by me

## 2023-06-22 NOTE — PROGRESS NOTES
[facility]: Cancer Care Plan of Care Education Note                                    Discussion with Patient:                                                      I met with the patient following his consultation with Dr. Clark to provide chemotherapy education.    Assessment:                                                      Assessment completed with:: Patient    Current living arrangement  I live in a private home with family    Plan of Care Education   Yearly learning assessment completed?: Yes (see Education tab)  Diagnosis:: Nasopharyngeal Cancer  Does patient understand diagnosis?: Yes  Tx plan/regimen:: Gemzar/Cisplatin  Does patient understand treatment plan/regimen?: Yes  Preparing for treatment:: Reviewed treatment preparation information with patient (vascular access, day of chemo, visitor policy, what to bring, etc.)  Vascular access:: Peripheral IV  Side effect education:: Diarrhea/Constipation;Fatigue;Hair loss;Infection;Lab value monitoring (anemia, neutropenia, thrombocytopenia);Mouth sores;Mylosuppression;Nausea/Vomiting;Neuropathy;Urinary  Transportation means:: Regular car  Safety/self care at home reviewed with patient:: Yes  Informal Support system:: Family (Cousin Bryant)  Coping - concerns/fears reviewed with patient:: Yes  Plan of Care:: Treatment schedule  When to call provider:: Bleeding;Increased shortness of breath;New/worsening pain;Shaking chills;Temperature >100.4F;Uncontrolled diarrhea/constipation;Uncontrolled nausea/vomiting  Reasons for deferring treatment reviewed with patient:: Yes    Evaluation of Learning  Patient Education Provided: Yes  Readiness:: Acceptance  Method:: Literature;Explanation  Response:: Verbalizes understanding    No assessment indicated    Intervention/Education provided during outreach:                                                       I reviewed the common side effects, logistics and when to contact the care team while receiving  gemcitabine/cisplatin.    PLAN    Review plan at Tumor Board tomorrow    Start therapy ASAP as long as Tumor Board agrees    Obtain Audiogram    Patient to follow up as scheduled at next appt    Patient to call/zoomsquarehart message with updates    Confirmed patient has clinic and triage numbers    Signature:  Jamil Fitzpatrick DNP, RN, OCN  RN Care Coordinator   Dr. Stephanie Ashby and Dr. Catrachita Clark  [facility]

## 2023-06-23 ENCOUNTER — OFFICE VISIT (OUTPATIENT)
Dept: AUDIOLOGY | Facility: CLINIC | Age: 38
End: 2023-06-23
Attending: INTERNAL MEDICINE

## 2023-06-23 ENCOUNTER — TUMOR CONFERENCE (OUTPATIENT)
Dept: ONCOLOGY | Facility: CLINIC | Age: 38
End: 2023-06-23

## 2023-06-23 DIAGNOSIS — C11.9 NASOPHARYNGEAL CANCER (H): ICD-10-CM

## 2023-06-23 PROCEDURE — 92550 TYMPANOMETRY & REFLEX THRESH: CPT

## 2023-06-23 PROCEDURE — 92588 EVOKED AUDITORY TST COMPLETE: CPT

## 2023-06-23 PROCEDURE — 92553 AUDIOMETRY AIR & BONE: CPT

## 2023-06-23 NOTE — TUMOR CONFERENCE
Patient will proceed as planned for induction chemo followed by chemo/rads.       Mimi Maria, RN, BSN

## 2023-06-23 NOTE — TUMOR CONFERENCE
Head & Neck Tumor Conference Note        Status: New  Staff: Dr. Fisher    Tumor Site: nasopharynx   Tumor Pathology: NPC  Tumor Stage: T1N2c  Tumor Treatment: n/a    Reason for Review: Review imaging, path, and POC    Brief History: 37M with diagnosis of nasopharyngeal carcinoma in [facility] presents for a second opinion. He noticed a left neck mass in the fall and initially had a FNA that was benign. The mass perissted and then in April underwent repeat FNA showing undifferentiated carcinoma. I do not see that EBV was tested. He has undergone neck US, MRI head and neck. Scope exam showed Submucosal fullness in left fossa of rosenmuller.      He has no symptoms asides from the neck mass.     PMH:  HTN     PSH:  None     Medications:  Antihypertensive     Social history:   smokes cigarettes  occasional etoh   No drugs    Pertinent PMH:   Past Medical History:   Diagnosis Date     HTN (hypertension)         Smoking Hx:   Social History     Tobacco Use     Smoking status: Every Day     Types: Cigarettes     Smokeless tobacco: Never   Substance Use Topics     Alcohol use: Not Currently     Drug use: Never     Imaging:   PET CT:                                                                    IMPRESSION: In this patient with biopsy-proven nasopharyngeal  carcinoma and cervical lymphadenopathy:  1. No suspicious uptake in the chest, abdomen, and pelvis to suggest  metastatic disease.  2. Marked focal uptake along right upper inner thigh with skin  thickening, which may represent a cutaneous infection/inflammation.  Recommend direct visualization.  3. Hypermetabolic nonenlarged right inguinal lymph node with  additional bilateral mildly hypermetabolic nonenlarged inguinal lymph  nodes, suggestive of reactive adenopathy.  4. Please see dedicated head and neck PET/CT report for head and neck  findings.     I have personally reviewed the examination and initial interpretation  and I agree with the findings.     ERICH  MD KULWINDER     Pathology:   A. LYMPH NODE, LEFT LEVEL 5, FINE NEEDLE ASPIRATION:                 Interpretation:                   - Scant squamous cell groups suspicious for malignancy (see comment)                 Adequacy:                 Satisfactory for evaluation    Tumor Board Recommendation:   Discussion:Review of PET scan shows bilateral retropharyngeal nodes and multiple bilateral lymph nodes that are positive. Does not look like there is skull base invasion. There are some very small inguinal lymph nodes that we sometimes see in this area, possibly just inflammation, low concern for tumor.     Plan: induction chemotherapy     Darlene Hanson MD PGY-3  Otolaryngology- Head and Neck Surgery    Documentation / Disclaimer Cancer Tumor Board Note  Cancer tumor board recommendations do not override what is determined to be reasonable care and treatment, which is dependent on the circumstances of a patient's case; the patient's medical, social, and personal concerns; and the clinical judgment of the oncologist [physician].

## 2023-06-23 NOTE — PROGRESS NOTES
AUDIOLOGY REPORT    SUBJECTIVE:  Thierno Vega is a 37 year old male who was seen in the Audiology Clinic at the [facility] for audiologic evaluation, referred by Catrachita Clark M.D. The patient is here for a baseline audiogram as he will be receiving chemotherapy for nasopharyngeal cancer in two weeks. The patient denies concerns for hearing, dizziness, bilateral otalgia, bilateral drainage, bilateral aural fullness, family history of hearing loss, history of ear surgeries and history of noise exposure. Thierno does report a new onset of left-sided tinnitus that began in April. The patient notes difficulty with communication in a variety of listening situations. An  over the phone was present     OBJECTIVE:    Otoscopic exam indicated ears are clear of cerumen bilaterally     Pure Tone Thresholds assessed using conventional audiometry with good, reliability from 250-8000 Hz bilaterally using insert earphones and circumaural headphones     RIGHT:  normal hearing sensitivity at frequencies tested    LEFT:   normal hearing sensitivity at frequencies tested    High frequency audiometry from 9,000-20,000 Hz was performed and was mostly present with the exception of 18,000 & 20,000 Hz in both ears, which is within aged norms.    Distortion product otoacoustic emissions were performed from 500-75543 Hz and were present from 8702-9351 Hz & 9000 Hz in the right ear and 5748-1942 Hz & 9000 Hz in the left ear.      Tympanogram:    RIGHT: normal eardrum mobility    LEFT:  normal eardrum mobility    Reflexes (reported by stimulus ear):  RIGHT: Ipsilateral: present at normal levels  RIGHT: Contralateral: present at normal levels  LEFT:   Ipsilateral: present at normal levels  LEFT:   Contralateral: present at normal levels    Speech was not tested due to lack of in-person       ASSESSMENT:   Normal hearing was found today. Today s results were discussed with the patient in detail.      PLAN:  Patient was counseled regarding hearing loss and impact on communication. It is recommended that the patient return for testing based on physician protocol and recommendation.  Please call this clinic with questions regarding these results or recommendations.      Robert Mares, CCC-A  Licensed Audiologist  [place] #[id number]      06/23/23

## 2023-06-26 ENCOUNTER — TELEPHONE (OUTPATIENT)
Dept: ONCOLOGY | Facility: CLINIC | Age: 38
End: 2023-06-26

## 2023-06-26 LAB
EBV DNA COPIES/ML, INSTRUMENT: 1948 COPIES/ML
EBV DNA SPEC NAA+PROBE-LOG#: 3.3 {LOG_COPIES}/ML

## 2023-06-26 RX ORDER — ONDANSETRON 4 MG/1
8 TABLET, FILM COATED ORAL ONCE
Status: CANCELLED
Start: 2023-06-26 | End: 2023-06-26

## 2023-06-26 RX ORDER — DIPHENHYDRAMINE HYDROCHLORIDE 50 MG/ML
50 INJECTION INTRAMUSCULAR; INTRAVENOUS
Status: CANCELLED
Start: 2023-06-26

## 2023-06-26 RX ORDER — METHYLPREDNISOLONE SODIUM SUCCINATE 125 MG/2ML
125 INJECTION, POWDER, LYOPHILIZED, FOR SOLUTION INTRAMUSCULAR; INTRAVENOUS
Status: CANCELLED
Start: 2023-06-26

## 2023-06-26 RX ORDER — LORAZEPAM 2 MG/ML
0.5 INJECTION INTRAMUSCULAR EVERY 4 HOURS PRN
Status: CANCELLED | OUTPATIENT
Start: 2023-06-26

## 2023-06-26 RX ORDER — MEPERIDINE HYDROCHLORIDE 25 MG/ML
25 INJECTION INTRAMUSCULAR; INTRAVENOUS; SUBCUTANEOUS EVERY 30 MIN PRN
Status: CANCELLED | OUTPATIENT
Start: 2023-06-26

## 2023-06-26 RX ORDER — ONDANSETRON 2 MG/ML
8 INJECTION INTRAMUSCULAR; INTRAVENOUS ONCE
Status: CANCELLED | OUTPATIENT
Start: 2023-06-29

## 2023-06-26 RX ORDER — MEPERIDINE HYDROCHLORIDE 25 MG/ML
25 INJECTION INTRAMUSCULAR; INTRAVENOUS; SUBCUTANEOUS EVERY 30 MIN PRN
Status: CANCELLED | OUTPATIENT
Start: 2023-06-29

## 2023-06-26 RX ORDER — PALONOSETRON 0.05 MG/ML
0.25 INJECTION, SOLUTION INTRAVENOUS ONCE
Status: CANCELLED | OUTPATIENT
Start: 2023-06-26

## 2023-06-26 RX ORDER — DIPHENHYDRAMINE HYDROCHLORIDE 50 MG/ML
50 INJECTION INTRAMUSCULAR; INTRAVENOUS
Status: CANCELLED
Start: 2023-06-29

## 2023-06-26 RX ORDER — EPINEPHRINE 1 MG/ML
0.3 INJECTION, SOLUTION INTRAMUSCULAR; SUBCUTANEOUS EVERY 5 MIN PRN
Status: CANCELLED | OUTPATIENT
Start: 2023-06-26

## 2023-06-26 RX ORDER — ALBUTEROL SULFATE 0.83 MG/ML
2.5 SOLUTION RESPIRATORY (INHALATION)
Status: CANCELLED | OUTPATIENT
Start: 2023-06-29

## 2023-06-26 RX ORDER — ALBUTEROL SULFATE 90 UG/1
1-2 AEROSOL, METERED RESPIRATORY (INHALATION)
Status: CANCELLED
Start: 2023-06-26

## 2023-06-26 RX ORDER — ALBUTEROL SULFATE 0.83 MG/ML
2.5 SOLUTION RESPIRATORY (INHALATION)
Status: CANCELLED | OUTPATIENT
Start: 2023-06-26

## 2023-06-26 RX ORDER — ALBUTEROL SULFATE 90 UG/1
1-2 AEROSOL, METERED RESPIRATORY (INHALATION)
Status: CANCELLED
Start: 2023-06-29

## 2023-06-26 RX ORDER — EPINEPHRINE 1 MG/ML
0.3 INJECTION, SOLUTION INTRAMUSCULAR; SUBCUTANEOUS EVERY 5 MIN PRN
Status: CANCELLED | OUTPATIENT
Start: 2023-06-29

## 2023-06-26 RX ORDER — LORAZEPAM 2 MG/ML
0.5 INJECTION INTRAMUSCULAR EVERY 4 HOURS PRN
Status: CANCELLED | OUTPATIENT
Start: 2023-06-29

## 2023-06-26 RX ORDER — METHYLPREDNISOLONE SODIUM SUCCINATE 125 MG/2ML
125 INJECTION, POWDER, LYOPHILIZED, FOR SOLUTION INTRAMUSCULAR; INTRAVENOUS
Status: CANCELLED
Start: 2023-06-29

## 2023-06-26 NOTE — TELEPHONE ENCOUNTER
Documentation that chemotherapy is medically necessary and delay will contribute to worse outcomes so do not recommend treatment be delayed.     Catrachita Clark MD

## 2023-06-27 ENCOUNTER — INFUSION THERAPY VISIT (OUTPATIENT)
Dept: ONCOLOGY | Facility: CLINIC | Age: 38
End: 2023-06-27
Attending: INTERNAL MEDICINE

## 2023-06-27 ENCOUNTER — APPOINTMENT (OUTPATIENT)
Dept: LAB | Facility: CLINIC | Age: 38
End: 2023-06-27
Attending: INTERNAL MEDICINE

## 2023-06-27 VITALS
DIASTOLIC BLOOD PRESSURE: 87 MMHG | HEART RATE: 84 BPM | OXYGEN SATURATION: 99 % | BODY MASS INDEX: 32.01 KG/M2 | RESPIRATION RATE: 16 BRPM | TEMPERATURE: 97.9 F | WEIGHT: 236.3 LBS | SYSTOLIC BLOOD PRESSURE: 133 MMHG

## 2023-06-27 DIAGNOSIS — C11.9 NASOPHARYNGEAL CANCER (H): Primary | ICD-10-CM

## 2023-06-27 LAB
ALBUMIN SERPL BCG-MCNC: 4.6 G/DL (ref 3.5–5.2)
ALP SERPL-CCNC: 68 U/L (ref 40–129)
ALT SERPL W P-5'-P-CCNC: 21 U/L (ref 0–70)
ANION GAP SERPL CALCULATED.3IONS-SCNC: 11 MMOL/L (ref 7–15)
AST SERPL W P-5'-P-CCNC: 16 U/L (ref 0–45)
BASOPHILS # BLD AUTO: 0.1 10E3/UL (ref 0–0.2)
BASOPHILS NFR BLD AUTO: 1 %
BILIRUB SERPL-MCNC: 0.4 MG/DL
BUN SERPL-MCNC: 12.1 MG/DL (ref 6–20)
CALCIUM SERPL-MCNC: 9.3 MG/DL (ref 8.6–10)
CHLORIDE SERPL-SCNC: 104 MMOL/L (ref 98–107)
CREAT SERPL-MCNC: 0.66 MG/DL (ref 0.67–1.17)
DEPRECATED HCO3 PLAS-SCNC: 23 MMOL/L (ref 22–29)
EOSINOPHIL # BLD AUTO: 0.3 10E3/UL (ref 0–0.7)
EOSINOPHIL NFR BLD AUTO: 3 %
ERYTHROCYTE [DISTWIDTH] IN BLOOD BY AUTOMATED COUNT: 12.5 % (ref 10–15)
GFR SERPL CREATININE-BSD FRML MDRD: >90 ML/MIN/1.73M2
GLUCOSE SERPL-MCNC: 115 MG/DL (ref 70–99)
HCT VFR BLD AUTO: 44.4 % (ref 40–53)
HGB BLD-MCNC: 14.7 G/DL (ref 13.3–17.7)
IMM GRANULOCYTES # BLD: 0 10E3/UL
IMM GRANULOCYTES NFR BLD: 0 %
LYMPHOCYTES # BLD AUTO: 2.9 10E3/UL (ref 0.8–5.3)
LYMPHOCYTES NFR BLD AUTO: 31 %
MAGNESIUM SERPL-MCNC: 2.2 MG/DL (ref 1.7–2.3)
MCH RBC QN AUTO: 28.4 PG (ref 26.5–33)
MCHC RBC AUTO-ENTMCNC: 33.1 G/DL (ref 31.5–36.5)
MCV RBC AUTO: 86 FL (ref 78–100)
MONOCYTES # BLD AUTO: 0.9 10E3/UL (ref 0–1.3)
MONOCYTES NFR BLD AUTO: 9 %
NEUTROPHILS # BLD AUTO: 5.2 10E3/UL (ref 1.6–8.3)
NEUTROPHILS NFR BLD AUTO: 56 %
NRBC # BLD AUTO: 0 10E3/UL
NRBC BLD AUTO-RTO: 0 /100
PLATELET # BLD AUTO: 289 10E3/UL (ref 150–450)
POTASSIUM SERPL-SCNC: 3.8 MMOL/L (ref 3.4–5.3)
PROT SERPL-MCNC: 7.4 G/DL (ref 6.4–8.3)
RBC # BLD AUTO: 5.18 10E6/UL (ref 4.4–5.9)
SODIUM SERPL-SCNC: 138 MMOL/L (ref 136–145)
WBC # BLD AUTO: 9.3 10E3/UL (ref 4–11)

## 2023-06-27 PROCEDURE — 96367 TX/PROPH/DG ADDL SEQ IV INF: CPT

## 2023-06-27 PROCEDURE — 36415 COLL VENOUS BLD VENIPUNCTURE: CPT | Performed by: INTERNAL MEDICINE

## 2023-06-27 PROCEDURE — 96413 CHEMO IV INFUSION 1 HR: CPT

## 2023-06-27 PROCEDURE — 80053 COMPREHEN METABOLIC PANEL: CPT | Performed by: INTERNAL MEDICINE

## 2023-06-27 PROCEDURE — 83735 ASSAY OF MAGNESIUM: CPT | Performed by: INTERNAL MEDICINE

## 2023-06-27 PROCEDURE — 85025 COMPLETE CBC W/AUTO DIFF WBC: CPT | Performed by: INTERNAL MEDICINE

## 2023-06-27 PROCEDURE — 96417 CHEMO IV INFUS EACH ADDL SEQ: CPT

## 2023-06-27 PROCEDURE — 999N000248 HC STATISTIC IV INSERT WITH US BY RN

## 2023-06-27 PROCEDURE — 96415 CHEMO IV INFUSION ADDL HR: CPT

## 2023-06-27 PROCEDURE — 250N000011 HC RX IP 250 OP 636: Mod: JZ | Performed by: INTERNAL MEDICINE

## 2023-06-27 PROCEDURE — 258N000003 HC RX IP 258 OP 636: Performed by: INTERNAL MEDICINE

## 2023-06-27 RX ORDER — DEXAMETHASONE 4 MG/1
4 TABLET ORAL 2 TIMES DAILY WITH MEALS
Qty: 6 TABLET | Refills: 2 | Status: SHIPPED | OUTPATIENT
Start: 2023-06-28 | End: 2023-08-28

## 2023-06-27 RX ORDER — PROCHLORPERAZINE MALEATE 10 MG
10 TABLET ORAL EVERY 6 HOURS PRN
Qty: 30 TABLET | Refills: 5 | Status: SHIPPED | OUTPATIENT
Start: 2023-06-27 | End: 2023-10-30

## 2023-06-27 RX ORDER — ONDANSETRON 8 MG/1
8 TABLET, ORALLY DISINTEGRATING ORAL ONCE
Status: COMPLETED | OUTPATIENT
Start: 2023-06-27 | End: 2023-06-27

## 2023-06-27 RX ORDER — ONDANSETRON 8 MG/1
8 TABLET, FILM COATED ORAL EVERY 8 HOURS PRN
Qty: 30 TABLET | Refills: 5 | Status: SHIPPED | OUTPATIENT
Start: 2023-06-27 | End: 2023-10-30

## 2023-06-27 RX ADMIN — FOSAPREPITANT: 150 INJECTION, POWDER, LYOPHILIZED, FOR SOLUTION INTRAVENOUS at 08:49

## 2023-06-27 RX ADMIN — CISPLATIN 190 MG: 1 INJECTION, SOLUTION INTRAVENOUS at 10:24

## 2023-06-27 RX ADMIN — ONDANSETRON 8 MG: 8 TABLET, ORALLY DISINTEGRATING ORAL at 08:49

## 2023-06-27 RX ADMIN — MAGNESIUM SULFATE HEPTAHYDRATE: 500 INJECTION, SOLUTION INTRAMUSCULAR; INTRAVENOUS at 10:28

## 2023-06-27 RX ADMIN — GEMCITABINE 2400 MG: 38 INJECTION, SOLUTION INTRAVENOUS at 09:47

## 2023-06-27 RX ADMIN — SODIUM CHLORIDE 1000 ML: 9 INJECTION, SOLUTION INTRAVENOUS at 08:21

## 2023-06-27 ASSESSMENT — PAIN SCALES - GENERAL: PAINLEVEL: NO PAIN (0)

## 2023-06-27 NOTE — PATIENT INSTRUCTIONS
[facility] Triage and after hours / weekends / holidays:  [phone]    Please call the triage or after hours line if you experience a temperature greater than or equal to 100.5, shaking chills, have uncontrolled nausea, vomiting and/or diarrhea, dizziness, shortness of breath, chest pain, bleeding, unexplained bruising, or if you have any other new/concerning symptoms, questions or concerns.      If you are having any concerning symptoms or wish to speak to a provider before your next infusion visit, please call your care coordinator or triage to notify them so we can adequately serve you.     If you need a refill on a narcotic prescription or other medication, please call before your infusion appointment.      Medications:  -Zofran: antinausea. Take 1 pill twice a day for the next 4 days (Wednesday, Thursday, Friday, Saturday). Then as needed for nausea every 8 hours.    -Dexamethasone: steroid. Take 1 pill twice a day for the next 3 days (Wednesday, Thursday, Friday)

## 2023-06-27 NOTE — PROGRESS NOTES
Infusion Nursing Note:  Thierno Vega presents today for cycle 1 day 1 gemzar, cisplatin.    Patient seen by provider today: No   present during visit today: Yes, Language: Uruguayan via phone.     Note:   Patient is new to the infusion room today and is receiving gemzar and cisplatin for the first time.  Patient oriented to infusion room, location of bathrooms and nutrition stations, and call light.  Verified that patient recieved written chemotherapy information previously.  Verbally reviewed chemotherapy teaching, side effects, take-home medications, and follow-up schedule with patient.     Patient instructed to call triage with any questions or if he experiences a temperature >100.5, shaking chills, uncontrolled nausea/vomiting/diarrhea, dizziness, shortness of breath, bleeding not relieved with pressure, or with any other concerns.     Patient reports mild fatigue, and intermittent tinnitus in left ear which Dr. Clark is aware of. Patient denies fevers, chills, SOB, chest pain, nausea, diarrhea, and pain.    Intravenous Access:  Peripheral IV placed. Initial PIV infiltrated while flushing prior to starting IVF. New PIV placed by vascular access.    Treatment Conditions:  Lab Results   Component Value Date    HGB 14.7 06/27/2023    WBC 9.3 06/27/2023    ANEUTAUTO 5.2 06/27/2023     06/27/2023      Lab Results   Component Value Date     06/27/2023    POTASSIUM 3.8 06/27/2023    MAG 2.2 06/27/2023    CR 0.66 (L) 06/27/2023    SOTO 9.3 06/27/2023    BILITOTAL 0.4 06/27/2023    ALBUMIN 4.6 06/27/2023    ALT 21 06/27/2023    AST 16 06/27/2023     Results reviewed, labs MET treatment parameters, ok to proceed with treatment.      Post Infusion Assessment:  Patient tolerated infusion without incident.  Blood return noted pre and post infusion.  Site patent and intact, free from redness, edema or discomfort.  No evidence of extravasations.  Access discontinued per protocol.       Discharge Plan:    Prescription refills given for dexamethasone, zofran, compazine.  Discharge instructions reviewed with: Patient.  Patient and/or family verbalized understanding of discharge instructions and all questions answered.  Copy of AVS reviewed with patient and/or family.  Patient will return 7/4 for next appointment.  Patient discharged in stable condition accompanied by: self.  Departure Mode: Ambulatory.      Liset Barksdale RN

## 2023-06-27 NOTE — NURSING NOTE
Chief Complaint   Patient presents with     Blood Draw     Labs drawn with PIV placed by RN. Vitals taken.      Labs drawn from PIV placed by RN. Line flushed with saline. Vitals taken. Pt checked in for appointment(s).

## 2023-06-28 ENCOUNTER — DOCUMENTATION ONLY (OUTPATIENT)
Dept: LAB | Facility: CLINIC | Age: 38
End: 2023-06-28

## 2023-06-28 DIAGNOSIS — C11.9 NASOPHARYNGEAL CANCER (H): Primary | ICD-10-CM

## 2023-06-28 NOTE — PROGRESS NOTES
patient coming in on Monday 7/3/23 for oncology labs, the orders in epic have expected date of 8/7/23 per lab policy we cannot draw for any labs greater than 2 weeks from expected date.  Please place orders for his lab appointment on 7/3/23 thank you.    Future Appointments   Date Time Provider Department Center   7/3/2023  9:15 AM FK LAB [facility]   7/4/2023  9:00 AM  ONC INFUSION NURSE [facility]

## 2023-06-30 ENCOUNTER — PATIENT OUTREACH (OUTPATIENT)
Dept: ONCOLOGY | Facility: CLINIC | Age: 38
End: 2023-06-30

## 2023-06-30 NOTE — PROGRESS NOTES
[facility]: Cancer Care                                                                                          I called and spoke to the patient with his cousin Bryant to see how he was feeling since his first dose of chemo. Overall, he is feeling well. He has some irritation/pain at the lymph nodes area where the care was found but very mild. He does think they are smaller than before.  He is struggling with constipation. He has not has a bowel movement for 3 days. He is passing flatus. No abdominal pain or bloating. He is eating and drinking normally.    PLAN    Start Miralax daily today    Call triage on Sunday if no BM by then    Labs on Monday 7/3    Infusion on 7/4 pending labs    Signature:  Jamil Fitzpatrick, NITZA, RN, OCN  RN Care Coordinator   Dr. Stephanie Ashby and Dr. Catrachita Clark  [facility]

## 2023-07-03 ENCOUNTER — LAB (OUTPATIENT)
Dept: LAB | Facility: CLINIC | Age: 38
End: 2023-07-03

## 2023-07-03 DIAGNOSIS — C11.9 NASOPHARYNGEAL CANCER (H): ICD-10-CM

## 2023-07-03 LAB
ALBUMIN SERPL BCG-MCNC: 4.5 G/DL (ref 3.5–5.2)
ALP SERPL-CCNC: 57 U/L (ref 40–129)
ALT SERPL W P-5'-P-CCNC: 30 U/L (ref 0–70)
ANION GAP SERPL CALCULATED.3IONS-SCNC: 11 MMOL/L (ref 7–15)
AST SERPL W P-5'-P-CCNC: 19 U/L (ref 0–45)
BASOPHILS # BLD AUTO: 0 10E3/UL (ref 0–0.2)
BASOPHILS NFR BLD AUTO: 1 %
BILIRUB SERPL-MCNC: 0.6 MG/DL
BUN SERPL-MCNC: 16.8 MG/DL (ref 6–20)
CALCIUM SERPL-MCNC: 9.7 MG/DL (ref 8.6–10)
CHLORIDE SERPL-SCNC: 102 MMOL/L (ref 98–107)
CREAT SERPL-MCNC: 0.84 MG/DL (ref 0.67–1.17)
DEPRECATED HCO3 PLAS-SCNC: 24 MMOL/L (ref 22–29)
EOSINOPHIL # BLD AUTO: 0.1 10E3/UL (ref 0–0.7)
EOSINOPHIL NFR BLD AUTO: 2 %
ERYTHROCYTE [DISTWIDTH] IN BLOOD BY AUTOMATED COUNT: 11.6 % (ref 10–15)
GFR SERPL CREATININE-BSD FRML MDRD: >90 ML/MIN/1.73M2
GLUCOSE SERPL-MCNC: 90 MG/DL (ref 70–99)
HCT VFR BLD AUTO: 42.8 % (ref 40–53)
HGB BLD-MCNC: 14.5 G/DL (ref 13.3–17.7)
IMM GRANULOCYTES # BLD: 0 10E3/UL
IMM GRANULOCYTES NFR BLD: 0 %
LYMPHOCYTES # BLD AUTO: 2.8 10E3/UL (ref 0.8–5.3)
LYMPHOCYTES NFR BLD AUTO: 46 %
MAGNESIUM SERPL-MCNC: 2.2 MG/DL (ref 1.7–2.3)
MCH RBC QN AUTO: 28.5 PG (ref 26.5–33)
MCHC RBC AUTO-ENTMCNC: 33.9 G/DL (ref 31.5–36.5)
MCV RBC AUTO: 84 FL (ref 78–100)
MONOCYTES # BLD AUTO: 0.2 10E3/UL (ref 0–1.3)
MONOCYTES NFR BLD AUTO: 4 %
NEUTROPHILS # BLD AUTO: 2.9 10E3/UL (ref 1.6–8.3)
NEUTROPHILS NFR BLD AUTO: 48 %
PLATELET # BLD AUTO: 256 10E3/UL (ref 150–450)
POTASSIUM SERPL-SCNC: 4.1 MMOL/L (ref 3.4–5.3)
PROT SERPL-MCNC: 7.3 G/DL (ref 6.4–8.3)
RBC # BLD AUTO: 5.08 10E6/UL (ref 4.4–5.9)
SODIUM SERPL-SCNC: 137 MMOL/L (ref 136–145)
WBC # BLD AUTO: 6.1 10E3/UL (ref 4–11)

## 2023-07-03 PROCEDURE — 36415 COLL VENOUS BLD VENIPUNCTURE: CPT

## 2023-07-03 PROCEDURE — 80053 COMPREHEN METABOLIC PANEL: CPT

## 2023-07-03 PROCEDURE — 85025 COMPLETE CBC W/AUTO DIFF WBC: CPT

## 2023-07-03 PROCEDURE — 83735 ASSAY OF MAGNESIUM: CPT

## 2023-07-04 ENCOUNTER — INFUSION THERAPY VISIT (OUTPATIENT)
Dept: ONCOLOGY | Facility: CLINIC | Age: 38
End: 2023-07-04
Attending: INTERNAL MEDICINE

## 2023-07-04 VITALS
TEMPERATURE: 99.2 F | OXYGEN SATURATION: 98 % | SYSTOLIC BLOOD PRESSURE: 110 MMHG | RESPIRATION RATE: 16 BRPM | HEART RATE: 65 BPM | DIASTOLIC BLOOD PRESSURE: 69 MMHG

## 2023-07-04 DIAGNOSIS — C11.9 NASOPHARYNGEAL CANCER (H): Primary | ICD-10-CM

## 2023-07-04 PROCEDURE — 96375 TX/PRO/DX INJ NEW DRUG ADDON: CPT

## 2023-07-04 PROCEDURE — 96413 CHEMO IV INFUSION 1 HR: CPT

## 2023-07-04 PROCEDURE — 250N000011 HC RX IP 250 OP 636: Mod: JZ | Performed by: INTERNAL MEDICINE

## 2023-07-04 PROCEDURE — 258N000003 HC RX IP 258 OP 636: Performed by: INTERNAL MEDICINE

## 2023-07-04 RX ORDER — ONDANSETRON 4 MG/1
8 TABLET, FILM COATED ORAL ONCE
Status: CANCELLED
Start: 2023-07-04 | End: 2023-07-04

## 2023-07-04 RX ORDER — ONDANSETRON 2 MG/ML
8 INJECTION INTRAMUSCULAR; INTRAVENOUS ONCE
Status: COMPLETED | OUTPATIENT
Start: 2023-07-04 | End: 2023-07-04

## 2023-07-04 RX ADMIN — SODIUM CHLORIDE 250 ML: 9 INJECTION, SOLUTION INTRAVENOUS at 09:55

## 2023-07-04 RX ADMIN — ONDANSETRON 8 MG: 2 INJECTION INTRAMUSCULAR; INTRAVENOUS at 09:56

## 2023-07-04 RX ADMIN — GEMCITABINE 2400 MG: 38 INJECTION, SOLUTION INTRAVENOUS at 09:58

## 2023-07-04 NOTE — PROGRESS NOTES
Infusion Nursing Note:  Thierno Vega presents today for Cycle 1, day 8 Gemzar.    Patient seen by provider today: No  Indonesian  utilized    Note: Patient presents to clinic today feeling well with no questions.  Pt did not request or require any intervention for pain today.    Intravenous Access:  Peripheral IV placed.    Treatment Conditions:  Lab Results   Component Value Date    HGB 14.5 07/03/2023    WBC 6.1 07/03/2023    ANEUTAUTO 2.9 07/03/2023     07/03/2023      Lab Results   Component Value Date     07/03/2023    POTASSIUM 4.1 07/03/2023    MAG 2.2 07/03/2023    CR 0.84 07/03/2023    SOTO 9.7 07/03/2023    BILITOTAL 0.6 07/03/2023    ALBUMIN 4.5 07/03/2023    ALT 30 07/03/2023    AST 19 07/03/2023     Results reviewed, labs MET treatment parameters, ok to proceed with treatment.    Post Infusion Assessment:  Patient tolerated infusion without incident.  Blood return noted pre and post infusion.  Site patent and intact, free from redness, edema or discomfort.  No evidence of extravasations.  Access discontinued per protocol.    Discharge Plan:   Patient declined prescription refills.  Discharge instructions reviewed with: Patient.  Patient and/or family verbalized understanding of discharge instructions and all questions answered.  AVS to patient via [employer].  Patient will return 7/19/2023 for next appointment.   Patient discharged in stable condition accompanied by: self.  Departure Mode: Ambulatory.    Monica Rogel RN

## 2023-07-18 ENCOUNTER — VIRTUAL VISIT (OUTPATIENT)
Dept: ONCOLOGY | Facility: CLINIC | Age: 38
End: 2023-07-18
Attending: REGISTERED NURSE
Payer: MEDICAID

## 2023-07-18 VITALS — HEIGHT: 71 IN | WEIGHT: 228 LBS | BODY MASS INDEX: 31.92 KG/M2

## 2023-07-18 DIAGNOSIS — R11.0 CHEMOTHERAPY-INDUCED NAUSEA: ICD-10-CM

## 2023-07-18 DIAGNOSIS — T45.1X5A CHEMOTHERAPY-INDUCED NAUSEA: ICD-10-CM

## 2023-07-18 DIAGNOSIS — C11.9 NASOPHARYNGEAL CANCER (H): Primary | ICD-10-CM

## 2023-07-18 PROCEDURE — 99215 OFFICE O/P EST HI 40 MIN: CPT | Mod: 95 | Performed by: REGISTERED NURSE

## 2023-07-18 RX ORDER — ONDANSETRON 4 MG/1
8 TABLET, FILM COATED ORAL ONCE
Status: CANCELLED
Start: 2023-07-20 | End: 2023-07-20

## 2023-07-18 RX ORDER — MEPERIDINE HYDROCHLORIDE 25 MG/ML
25 INJECTION INTRAMUSCULAR; INTRAVENOUS; SUBCUTANEOUS EVERY 30 MIN PRN
Status: CANCELLED | OUTPATIENT
Start: 2023-07-20

## 2023-07-18 RX ORDER — ALBUTEROL SULFATE 0.83 MG/ML
2.5 SOLUTION RESPIRATORY (INHALATION)
Status: CANCELLED | OUTPATIENT
Start: 2023-07-18

## 2023-07-18 RX ORDER — ONDANSETRON 2 MG/ML
8 INJECTION INTRAMUSCULAR; INTRAVENOUS ONCE
Status: CANCELLED | OUTPATIENT
Start: 2023-07-20

## 2023-07-18 RX ORDER — DIPHENHYDRAMINE HYDROCHLORIDE 50 MG/ML
50 INJECTION INTRAMUSCULAR; INTRAVENOUS
Status: CANCELLED
Start: 2023-07-18

## 2023-07-18 RX ORDER — METHYLPREDNISOLONE SODIUM SUCCINATE 125 MG/2ML
125 INJECTION, POWDER, LYOPHILIZED, FOR SOLUTION INTRAMUSCULAR; INTRAVENOUS
Status: CANCELLED
Start: 2023-07-20

## 2023-07-18 RX ORDER — ONDANSETRON 4 MG/1
8 TABLET, FILM COATED ORAL ONCE
Status: CANCELLED
Start: 2023-07-18 | End: 2023-07-18

## 2023-07-18 RX ORDER — EPINEPHRINE 1 MG/ML
0.3 INJECTION, SOLUTION INTRAMUSCULAR; SUBCUTANEOUS EVERY 5 MIN PRN
Status: CANCELLED | OUTPATIENT
Start: 2023-07-20

## 2023-07-18 RX ORDER — LORAZEPAM 2 MG/ML
0.5 INJECTION INTRAMUSCULAR EVERY 4 HOURS PRN
Status: CANCELLED | OUTPATIENT
Start: 2023-07-20

## 2023-07-18 RX ORDER — LORAZEPAM 2 MG/ML
0.5 INJECTION INTRAMUSCULAR EVERY 4 HOURS PRN
Status: CANCELLED | OUTPATIENT
Start: 2023-07-18

## 2023-07-18 RX ORDER — ALBUTEROL SULFATE 90 UG/1
1-2 AEROSOL, METERED RESPIRATORY (INHALATION)
Status: CANCELLED
Start: 2023-07-18

## 2023-07-18 RX ORDER — ALBUTEROL SULFATE 0.83 MG/ML
2.5 SOLUTION RESPIRATORY (INHALATION)
Status: CANCELLED | OUTPATIENT
Start: 2023-07-20

## 2023-07-18 RX ORDER — METHYLPREDNISOLONE SODIUM SUCCINATE 125 MG/2ML
125 INJECTION, POWDER, LYOPHILIZED, FOR SOLUTION INTRAMUSCULAR; INTRAVENOUS
Status: CANCELLED
Start: 2023-07-18

## 2023-07-18 RX ORDER — EPINEPHRINE 1 MG/ML
0.3 INJECTION, SOLUTION INTRAMUSCULAR; SUBCUTANEOUS EVERY 5 MIN PRN
Status: CANCELLED | OUTPATIENT
Start: 2023-07-18

## 2023-07-18 RX ORDER — MEPERIDINE HYDROCHLORIDE 25 MG/ML
25 INJECTION INTRAMUSCULAR; INTRAVENOUS; SUBCUTANEOUS EVERY 30 MIN PRN
Status: CANCELLED | OUTPATIENT
Start: 2023-07-18

## 2023-07-18 RX ORDER — DIPHENHYDRAMINE HYDROCHLORIDE 50 MG/ML
50 INJECTION INTRAMUSCULAR; INTRAVENOUS
Status: CANCELLED
Start: 2023-07-20

## 2023-07-18 RX ORDER — ALBUTEROL SULFATE 90 UG/1
1-2 AEROSOL, METERED RESPIRATORY (INHALATION)
Status: CANCELLED
Start: 2023-07-20

## 2023-07-18 ASSESSMENT — PAIN SCALES - GENERAL: PAINLEVEL: NO PAIN (0)

## 2023-07-18 NOTE — PROGRESS NOTES
Virtual Visit Details    Type of service:  Video Visit     Originating Location (pt. Location): Home    Distant Location (provider location):  On-site  Platform used for Video Visit: [facility]    PATIENT NAME: Thierno Vega  MRN # [id number]   DATE OF VISIT: July 18, 2023  YOB: 1985     Otolaryngology: Dr. Navin Fisher  Radiation Oncology:  Dr. Yessica Parada    CANCER TYPE: SCC nasopharynx, GALLITO -jeanna  STAGE: cT2N2 vs N3 (III vs MARYSOL)  ECOG PS: 0    PD-L1:  NGS:     SUMMARY  4/30/23  US. Bilateral necrotic nodes  5/3/23  FNA ctastases  5/5/23  MRI bilateral cervical and retropharyngeervical adenopathy - undifferentiated carcinoma  5/4/23   CT CAP. 7 mm pulmonary nodule, no meal adenopathy    Came to US  6/12/23  US guided FNA L neck node in clinic (Dr. Fisher). Path: hypocellular with scant clusters of atypical squamous cells suspicious for malignancy, p40 +jeanna, GALLITO -jeanna  6/13/23  PET/CT. Intense uptake L Rosenmuller fossa with increased fullness (SUV 18.8). Skull base intact. Milder FDG uptake (SUV 6.1) by the prominent R Rosenmuller fossa, inflammatory vs malignant. Indeterminate bilateral intense palatine tonsillar uptake with fullness on CT, inflammatroy vs infectious vs tumor, discontinuous with the nasopharyngeal abnormality. Bilateral nodes, including level 2a, 2b, 3, 3/5, R 1.8 cm 2A, another level 2/3 nodes, FDG avid retropharyngeal nodes. 0.3 cm nodule on the R major fissure. 0.8 cm R inguinal node (SUV 3.9) with additional nonenlarged LNs with minimal uptake, suggestive of reactive adenopathy. Marked focal uptake along the R upper inner thigh with skin thickening (SUV 16.8), which may represent cutaneous infection/inflammation, recommend direct visualization  6/27/23-present Induction chemotherapy with gemcitabine + cisplatin     ASSESSMENT AND PLAN  Nasopharyngeal carcinoma, cT2N2 (III): Began induction chemotherapy with cisplatin/gemzar 6/27/23. Tolerated  first cycle well with minimal side effects. Confirmed he should again take dex course x 3 days starting day 2 with C2. Plan for repeat imaging following 2 cycles followed by tentative 3rd treatment cycle. He would like to go back to [facility] briefly between completion of induction and starting chemorads. Will discuss with Dr. Clark.  -Proceed with C2D1 cis/gem 7/19 pending labs  -Visit with Dr. Clark following repeat imaging requested    CASSIUS: Mild. Continue dexamethasone 4 mg BID days 2 and 3. Zofran 8 mg BID scheduled days 2-5. No aloxi, high cost d/t no insurance coverage.     Skin inflammation/thickening. R upper inner thigh, noted on PET 6/13. No subjective concerns. Recommend in-person assessment. Will try to stop by infusion tomorrow for evaluation.     Hearing changes/tinnitus: Audiogram 6/23 with normal hearing. Suspect mostly driven by the cancer - didn't have a couple of months ago.    Fertility preservation: Not discussed today.    Professional Spanish  used for visit    45 minutes spent on the date of the encounter doing chart review, review of test results, interpretation of tests, patient visit and documentation     Janis Cordova CNP    SUBJECTIVE  Patient is seen in follow-up of nasopharyngeal carcinoma. He began induction chemotherapy with cisplatin/gemcitabine 6/27/23. He feels the first cycle went well. Notes fatigue for the first few days following treatment. He confirms he took dexamethasone for 3 days starting day 2 as prescribed as well as Zofran PRN. Nausea was minimal. Denies decreased appetite. Drinks fluids frequently. Denies tinnitus or hearing changes, worsening ear pain or changes to taste/smell. Inquires about returning home to see his children for ~10-15 days between completion of induction chemotherapy and initiation of chemoradiation.    PAST MEDICAL HISTORY  Nasopharyngeal carcinoma as above  HTN    CURRENT OUTPATIENT MEDICATIONS  Current Outpatient Medications  "  Medication Sig Dispense Refill     amLODIPine 5 MG TABS 5 mg, valsartan 160 MG TABS 160 mg Take by mouth daily       dexamethasone (DECADRON) 4 MG tablet Take 1 tablet (4 mg) by mouth 2 times daily (with meals) for 2 days Take 2 tablets (8 mg) daily for 3 days. Start on Day 2 after chemotherapy. 6 tablet 2     ondansetron (ZOFRAN) 8 MG tablet Take 1 tablet (8 mg) by mouth every 8 hours as needed for nausea (vomiting) (Patient not taking: Reported on 7/18/2023) 30 tablet 5     prochlorperazine (COMPAZINE) 10 MG tablet Take 1 tablet (10 mg) by mouth every 6 hours as needed for nausea or vomiting (Patient not taking: Reported on 7/18/2023) 30 tablet 5     ALLERGIES  No Known Allergies     SOCIAL HISTORY: smokes cigarettes, has cut down since cancer diagnosis, about 1 1/2 ppd   3 children - 8, twin girls, 3 yo  occasional etoh     FAMILY HISTORY:   Family History   Problem Relation Age of Onset     Cancer No family hx of       REVIEW OF SYSTEMS  As above in the HPI, o/w complete 12-point ROS was negative.    PHYSICAL EXAM  Ht 1.81 m (5' 11.26\")   Wt 103.4 kg (228 lb)   BMI 31.57 kg/m      Video physical exam  General: Patient appears well in no acute distress.   Skin: No visualized rash or lesions on visualized skin  Eyes: EOMI, no erythema, sclera icterus or discharge noted  Resp: Appears to be breathing comfortably without accessory muscle usage, speaking in full sentences, no cough  MSK: Appears to have normal range of motion based on visualized movements  Neurologic: No apparent tremors, facial movements symmetric  Psych: affect pleasant, alert and oriented    LABORATORY AND IMAGING STUDIES  Most Recent 3 CBC's:  Recent Labs   Lab Test 07/03/23  0855 06/27/23  0714 06/22/23  1433   WBC 6.1 9.3 9.0   HGB 14.5 14.7 14.6   MCV 84 86 86    289 313   ANEUTAUTO 2.9 5.2 5.0    Most Recent 3 BMP's:  Recent Labs   Lab Test 07/03/23  0855 06/27/23  0714 06/22/23  1433    138 139   POTASSIUM 4.1 3.8 3.9 "   CHLORIDE 102 104 105   CO2 24 23 25   BUN 16.8 12.1 12.1   CR 0.84 0.66* 0.68   ANIONGAP 11 11 9   SOTO 9.7 9.3 9.4   GLC 90 115* 90   PROTTOTAL 7.3 7.4 7.6   ALBUMIN 4.5 4.6 4.6    Most Recent 2 LFT's:  Recent Labs   Lab Test 07/03/23  0855 06/27/23  0714   AST 19 16   ALT 30 21   ALKPHOS 57 68   BILITOTAL 0.6 0.4    Most Recent TSH and T4:  Recent Labs   Lab Test 06/22/23  1433   TSH 0.88     Phos/Mag:  Lab Results   Component Value Date    MAG 2.2 07/03/2023    MAG 2.2 06/27/2023      Labs were independently reviewed and interpreted by me

## 2023-07-18 NOTE — Clinical Note
7/18/2023         RE: Thierno Vega  [address]  [place] [address]        Dear Colleague,    Thank you for referring your patient, Thierno Vega, to the [facility]. Please see a copy of my visit note below.    Virtual Visit Details    Type of service:  Video Visit     Originating Location (pt. Location): Home  {PROVIDER LOCATION On-site should be selected for visits conducted from your clinic location or adjoining [facility] hospital, academic office, or other nearby [facility] building. Off-site should be selected for all other provider locations, including home:[id number]}  Distant Location (provider location):  On-site  Platform used for Video Visit: [facility]    PATIENT NAME: Thierno Vega  MRN # [id number]   DATE OF VISIT: July 18, 2023  YOB: 1985     Otolaryngology: Dr. Navin Fisher  Radiation Oncology:  Dr. Yessica Parada    CANCER TYPE: SCC nasopharynx, GALLITO -jeanna  STAGE: cT2N2 vs N3 (III vs MARYSOL)  ECOG PS: 0    PD-L1:  NGS:     SUMMARY  4/30/23  US. Bilateral necrotic nodes  5/3/23  FNA ctastases  5/5/23  MRI bilateral cervical and retropharyngeervical adenopathy - undifferentiated carcinoma  5/4/23   CT CAP. 7 mm pulmonary nodule, no meal adenopathy    Came to US  6/12/23  US guided FNA L neck node in clinic (Dr. Fisher). Path: hypocellular with scant clusters of atypical squamous cells suspicious for malignancy, p40 +jeanna, GALLITO -jeanna  6/13/23  PET/CT. Intense uptake L Rosenmuller fossa with increased fullness (SUV 18.8). Skull base intact. Milder FDG uptake (SUV 6.1) by the prominent R Rosenmuller fossa, inflammatory vs malignant. Indeterminate bilateral intense palatine tonsillar uptake with fullness on CT, inflammatroy vs infectious vs tumor, discontinuous with the nasopharyngeal abnormality. Bilateral nodes, including level 2a, 2b, 3, 3/5, R 1.8 cm 2A, another level 2/3 nodes, FDG avid retropharyngeal nodes. 0.3 cm nodule  on the R major fissure. 0.8 cm R inguinal node (SUV 3.9) with additional nonenlarged LNs with minimal uptake, suggestive of reactive adenopathy. Marked focal uptake along the R upper inner thigh with skin thickening (SUV 16.8), which may represent cutaneous infection/inflammation, recommend direct visualization  6/27/23-present Induction chemotherapy with gemcitabine + cisplatin     ASSESSMENT AND PLAN  Nasopharyngeal carcinoma, cT2N2 (III): Began induction chemotherapy with cisplatin/gemzar 6/27/23. Tolerated first cycle well with minimal side effects. Confirmed he should again take dex course x 3 days starting day 2 with C2. Plan for repeat imaging following 2 cycles followed by tentative 3rd treatment cycle. He would like to go back to [facility] briefly between completion of induction and starting chemorads. Will discuss with Dr. Clark.  -Proceed with C2D1 cis/gem 7/19 pending labs  -Visit with Dr. Clark following repeat imaging requested    Skin inflammation:     Hearing changes/tinnitus: Audiogram 6/23 with normal hearing. Suspect mostly driven by the cancer - didn't have a couple of months ago.    Fertility preservation: Not discussed today.    Professional Greek  used for visit    *** minutes spent on the date of the encounter doing {2021 E&M time in:329887}     Janis Cordova CNP    SUBJECTIVE  Patient is seen in follow-up of nasopharyngeal carcinoma. He began induction chemotherapy with cisplatin/gemcitabine 6/27/23. He feels the first cycle went well. Notes fatigue for the first few days following treatment. He confirms he took dexamethasone for 3 days starting day 2 as prescribed as well as Zofran PRN. Nausea was minimal. Denies decreased appetite. Drinks fluids frequently. Denies tinnitus or hearing changes. Inquires about returning home to see his children for ~10-15 days between completion of induction chemotherapy and initiation of chemoradiation.    PAST MEDICAL HISTORY  Nasopharyngeal  "carcinoma as above  HTN    CURRENT OUTPATIENT MEDICATIONS  Current Outpatient Medications   Medication Sig Dispense Refill     amLODIPine 5 MG TABS 5 mg, valsartan 160 MG TABS 160 mg Take by mouth daily       dexamethasone (DECADRON) 4 MG tablet Take 1 tablet (4 mg) by mouth 2 times daily (with meals) for 2 days Take 2 tablets (8 mg) daily for 3 days. Start on Day 2 after chemotherapy. 6 tablet 2     ondansetron (ZOFRAN) 8 MG tablet Take 1 tablet (8 mg) by mouth every 8 hours as needed for nausea (vomiting) (Patient not taking: Reported on 7/18/2023) 30 tablet 5     prochlorperazine (COMPAZINE) 10 MG tablet Take 1 tablet (10 mg) by mouth every 6 hours as needed for nausea or vomiting (Patient not taking: Reported on 7/18/2023) 30 tablet 5     ALLERGIES  No Known Allergies     SOCIAL HISTORY: smokes cigarettes, has cut down since cancer diagnosis, about 1 1/2 ppd   3 children - 8, twin girls, 3 yo  occasional etoh     FAMILY HISTORY:   Family History   Problem Relation Age of Onset     Cancer No family hx of       REVIEW OF SYSTEMS  As above in the HPI, o/w complete 12-point ROS was negative.    PHYSICAL EXAM  Ht 1.81 m (5' 11.26\")   Wt 103.4 kg (228 lb)   BMI 31.57 kg/m      Video physical exam  General: Patient appears well in no acute distress.   Skin: No visualized rash or lesions on visualized skin  Eyes: EOMI, no erythema, sclera icterus or discharge noted  Resp: Appears to be breathing comfortably without accessory muscle usage, speaking in full sentences, no cough  MSK: Appears to have normal range of motion based on visualized movements  Neurologic: No apparent tremors, facial movements symmetric  Psych: affect pleasant, alert and oriented    LABORATORY AND IMAGING STUDIES  Most Recent 3 CBC's:  Recent Labs   Lab Test 07/03/23  0855 06/27/23  0714 06/22/23  1433   WBC 6.1 9.3 9.0   HGB 14.5 14.7 14.6   MCV 84 86 86    289 313   ANEUTAUTO 2.9 5.2 5.0    Most Recent 3 BMP's:  Recent Labs   Lab Test " 07/03/23  0855 06/27/23  0714 06/22/23  1433    138 139   POTASSIUM 4.1 3.8 3.9   CHLORIDE 102 104 105   CO2 24 23 25   BUN 16.8 12.1 12.1   CR 0.84 0.66* 0.68   ANIONGAP 11 11 9   SOTO 9.7 9.3 9.4   GLC 90 115* 90   PROTTOTAL 7.3 7.4 7.6   ALBUMIN 4.5 4.6 4.6    Most Recent 2 LFT's:  Recent Labs   Lab Test 07/03/23  0855 06/27/23  0714   AST 19 16   ALT 30 21   ALKPHOS 57 68   BILITOTAL 0.6 0.4    Most Recent TSH and T4:  Recent Labs   Lab Test 06/22/23  1433   TSH 0.88     Phos/Mag:  Lab Results   Component Value Date    MAG 2.2 07/03/2023    MAG 2.2 06/27/2023      Labs were independently reviewed and interpreted by me         Again, thank you for allowing me to participate in the care of your patient.        Sincerely,        Janis Cordova, CNP

## 2023-07-18 NOTE — NURSING NOTE
Is the patient currently in the state of [place]? YES    Visit mode:VIDEO    If the visit is dropped, the patient can be reconnected by: VIDEO VISIT: Text to cell phone: [phone]    Will anyone else be joining the visit? NO      How would you like to obtain your AVS? MyChart    Are changes needed to the allergy or medication list? NO    Reason for visit: MONTEZ KIM, VF

## 2023-07-19 ENCOUNTER — INFUSION THERAPY VISIT (OUTPATIENT)
Dept: ONCOLOGY | Facility: CLINIC | Age: 38
End: 2023-07-19
Attending: INTERNAL MEDICINE

## 2023-07-19 ENCOUNTER — APPOINTMENT (OUTPATIENT)
Dept: LAB | Facility: CLINIC | Age: 38
End: 2023-07-19
Attending: INTERNAL MEDICINE

## 2023-07-19 VITALS
OXYGEN SATURATION: 100 % | BODY MASS INDEX: 32.07 KG/M2 | RESPIRATION RATE: 16 BRPM | TEMPERATURE: 99.6 F | WEIGHT: 231.6 LBS | HEART RATE: 64 BPM | SYSTOLIC BLOOD PRESSURE: 134 MMHG | DIASTOLIC BLOOD PRESSURE: 74 MMHG

## 2023-07-19 DIAGNOSIS — C11.9 NASOPHARYNGEAL CANCER (H): Primary | ICD-10-CM

## 2023-07-19 LAB
ALBUMIN SERPL BCG-MCNC: 4.5 G/DL (ref 3.5–5.2)
ALP SERPL-CCNC: 64 U/L (ref 40–129)
ALT SERPL W P-5'-P-CCNC: 22 U/L (ref 0–70)
ANION GAP SERPL CALCULATED.3IONS-SCNC: 12 MMOL/L (ref 7–15)
AST SERPL W P-5'-P-CCNC: 16 U/L (ref 0–45)
BASOPHILS # BLD AUTO: 0.1 10E3/UL (ref 0–0.2)
BASOPHILS NFR BLD AUTO: 1 %
BILIRUB SERPL-MCNC: 0.3 MG/DL
BUN SERPL-MCNC: 11.3 MG/DL (ref 6–20)
CALCIUM SERPL-MCNC: 9.5 MG/DL (ref 8.6–10)
CHLORIDE SERPL-SCNC: 105 MMOL/L (ref 98–107)
CREAT SERPL-MCNC: 0.81 MG/DL (ref 0.67–1.17)
DEPRECATED HCO3 PLAS-SCNC: 23 MMOL/L (ref 22–29)
EOSINOPHIL # BLD AUTO: 0.2 10E3/UL (ref 0–0.7)
EOSINOPHIL NFR BLD AUTO: 3 %
ERYTHROCYTE [DISTWIDTH] IN BLOOD BY AUTOMATED COUNT: 13.5 % (ref 10–15)
GFR SERPL CREATININE-BSD FRML MDRD: >90 ML/MIN/1.73M2
GLUCOSE SERPL-MCNC: 131 MG/DL (ref 70–99)
HCT VFR BLD AUTO: 41.8 % (ref 40–53)
HGB BLD-MCNC: 13.9 G/DL (ref 13.3–17.7)
IMM GRANULOCYTES # BLD: 0 10E3/UL
IMM GRANULOCYTES NFR BLD: 0 %
LYMPHOCYTES # BLD AUTO: 2.7 10E3/UL (ref 0.8–5.3)
LYMPHOCYTES NFR BLD AUTO: 40 %
MAGNESIUM SERPL-MCNC: 2.1 MG/DL (ref 1.7–2.3)
MCH RBC QN AUTO: 28.5 PG (ref 26.5–33)
MCHC RBC AUTO-ENTMCNC: 33.3 G/DL (ref 31.5–36.5)
MCV RBC AUTO: 86 FL (ref 78–100)
MONOCYTES # BLD AUTO: 0.8 10E3/UL (ref 0–1.3)
MONOCYTES NFR BLD AUTO: 12 %
NEUTROPHILS # BLD AUTO: 2.9 10E3/UL (ref 1.6–8.3)
NEUTROPHILS NFR BLD AUTO: 44 %
NRBC # BLD AUTO: 0 10E3/UL
NRBC BLD AUTO-RTO: 0 /100
PLATELET # BLD AUTO: 415 10E3/UL (ref 150–450)
POTASSIUM SERPL-SCNC: 3.7 MMOL/L (ref 3.4–5.3)
PROT SERPL-MCNC: 7.2 G/DL (ref 6.4–8.3)
RBC # BLD AUTO: 4.88 10E6/UL (ref 4.4–5.9)
SODIUM SERPL-SCNC: 140 MMOL/L (ref 136–145)
WBC # BLD AUTO: 6.7 10E3/UL (ref 4–11)

## 2023-07-19 PROCEDURE — 96417 CHEMO IV INFUS EACH ADDL SEQ: CPT

## 2023-07-19 PROCEDURE — 85025 COMPLETE CBC W/AUTO DIFF WBC: CPT | Performed by: NURSE PRACTITIONER

## 2023-07-19 PROCEDURE — 250N000011 HC RX IP 250 OP 636: Performed by: REGISTERED NURSE

## 2023-07-19 PROCEDURE — 258N000003 HC RX IP 258 OP 636: Performed by: REGISTERED NURSE

## 2023-07-19 PROCEDURE — 96367 TX/PROPH/DG ADDL SEQ IV INF: CPT

## 2023-07-19 PROCEDURE — 96413 CHEMO IV INFUSION 1 HR: CPT

## 2023-07-19 PROCEDURE — 36415 COLL VENOUS BLD VENIPUNCTURE: CPT | Performed by: NURSE PRACTITIONER

## 2023-07-19 PROCEDURE — 96415 CHEMO IV INFUSION ADDL HR: CPT

## 2023-07-19 PROCEDURE — 83735 ASSAY OF MAGNESIUM: CPT | Performed by: NURSE PRACTITIONER

## 2023-07-19 PROCEDURE — 80053 COMPREHEN METABOLIC PANEL: CPT | Performed by: NURSE PRACTITIONER

## 2023-07-19 RX ORDER — ONDANSETRON 8 MG/1
8 TABLET, ORALLY DISINTEGRATING ORAL ONCE
Status: COMPLETED | OUTPATIENT
Start: 2023-07-19 | End: 2023-07-19

## 2023-07-19 RX ADMIN — SODIUM CHLORIDE 1000 ML: 9 INJECTION, SOLUTION INTRAVENOUS at 07:53

## 2023-07-19 RX ADMIN — FOSAPREPITANT: 150 INJECTION, POWDER, LYOPHILIZED, FOR SOLUTION INTRAVENOUS at 08:09

## 2023-07-19 RX ADMIN — GEMCITABINE 2400 MG: 38 INJECTION, SOLUTION INTRAVENOUS at 08:56

## 2023-07-19 RX ADMIN — MAGNESIUM SULFATE HEPTAHYDRATE: 500 INJECTION, SOLUTION INTRAMUSCULAR; INTRAVENOUS at 09:50

## 2023-07-19 RX ADMIN — CISPLATIN 190 MG: 1 INJECTION, SOLUTION INTRAVENOUS at 09:47

## 2023-07-19 RX ADMIN — ONDANSETRON 8 MG: 8 TABLET, ORALLY DISINTEGRATING ORAL at 07:53

## 2023-07-19 ASSESSMENT — PAIN SCALES - GENERAL: PAINLEVEL: NO PAIN (0)

## 2023-07-19 NOTE — PATIENT INSTRUCTIONS
[facility] Triage and after hours / weekends / holidays:  [phone]    Please call the triage or after hours line if you experience a temperature greater than or equal to 100.4, shaking chills, have uncontrolled nausea, vomiting and/or diarrhea, dizziness, shortness of breath, chest pain, bleeding, unexplained bruising, or if you have any other new/concerning symptoms, questions or concerns.      If you are having any concerning symptoms or wish to speak to a provider before your next infusion visit, please call triage to notify them so we can adequately serve you.     If you need a refill on a narcotic prescription or other medication, please call before your infusion appointment.

## 2023-07-19 NOTE — NURSING NOTE
Chief Complaint   Patient presents with     Blood Draw     Labs drawn via PIV by RN in lab. VS taken.      Labs drawn via peripheral IV. Vital signs taken. Checked into next appointment.   Jennifer Dove RN

## 2023-07-19 NOTE — PROGRESS NOTES
Infusion Nursing Note:  Thierno Vega presents today for Cycle 2 Day 1 Gemzar and Cisplatin.    Patient seen by provider today: No   present during visit today: Cape Verdean via phone    Note: No new issues or concerns this morning; pt had a visit with JORDIN Cordova yesterday.       Intravenous Access:  Peripheral IV placed.    Treatment Conditions:  Lab Results   Component Value Date    HGB 13.9 07/19/2023    WBC 6.7 07/19/2023    ANEUTAUTO 2.9 07/19/2023     07/19/2023      Lab Results   Component Value Date     07/19/2023    POTASSIUM 3.7 07/19/2023    MAG 2.1 07/19/2023    CR 0.81 07/19/2023    SOTO 9.5 07/19/2023    BILITOTAL 0.3 07/19/2023    ALBUMIN 4.5 07/19/2023    ALT 22 07/19/2023    AST 16 07/19/2023     Results reviewed, labs MET treatment parameters, ok to proceed with treatment.      Post Infusion Assessment:  Patient tolerated infusion without incident.  Blood return noted pre and post infusion.  No evidence of extravasations.  Access discontinued per protocol.       Discharge Plan:   Prescription refills given for Decadron.  Discharge instructions reviewed with: Patient.  Copy of AVS reviewed with patient and/or family.  Patient will return 7/26 for next appointment.  Patient discharged in stable condition accompanied by: self.  Departure Mode: Ambulatory.      Stephanie Mace RN

## 2023-07-25 ENCOUNTER — DOCUMENTATION ONLY (OUTPATIENT)
Dept: SURGERY | Facility: CLINIC | Age: 38
End: 2023-07-25

## 2023-07-25 NOTE — PROGRESS NOTES
Dental Service Clearance Letter  Thierno Vega is cleared for radiation therapy after having received care at the [facility]. He had received treatments extractionx1, scaling and root planning, Restorations. Fluoride trays were delivered with instructions for use. [He/ She] has been given oral hygiene instructions and oral health management products to help with any potential adverse effects of [his/ her] future treatments. We do not have any time constraints in which his radiation would need to be delayed. The following medications have been prescribed for xerostomia and dental caries prevention:  -fluoride tooth paste   -peridex ( oral rinse)  -Biotene gel ( over the counter)  We will provide hin with follow-up care to assist with any concerns that may arise.  He is also notified of the existing interproximal caries at #3,4 and 20. He would like to follow up for caries control at his dentists.  Recommended to follow up every 3 months for periodontal-maintenance at his dentist [place].  Thank you for allowing us to participate in the comprehensive care of Thierno Vega.  For any questions or concerns, please contact me or the dental care coordinator.    [facility]  [facility]  [address] [place] [address]  Phone:[phone]      Matthew Hyde  PGY 2  Pager: [phone]

## 2023-07-26 ENCOUNTER — APPOINTMENT (OUTPATIENT)
Dept: LAB | Facility: CLINIC | Age: 38
End: 2023-07-26
Attending: INTERNAL MEDICINE

## 2023-07-26 ENCOUNTER — INFUSION THERAPY VISIT (OUTPATIENT)
Dept: ONCOLOGY | Facility: CLINIC | Age: 38
End: 2023-07-26
Attending: INTERNAL MEDICINE

## 2023-07-26 VITALS
SYSTOLIC BLOOD PRESSURE: 110 MMHG | BODY MASS INDEX: 31.79 KG/M2 | OXYGEN SATURATION: 100 % | DIASTOLIC BLOOD PRESSURE: 65 MMHG | WEIGHT: 229.6 LBS | TEMPERATURE: 99 F | RESPIRATION RATE: 18 BRPM | HEART RATE: 63 BPM

## 2023-07-26 DIAGNOSIS — C11.9 NASOPHARYNGEAL CANCER (H): Primary | ICD-10-CM

## 2023-07-26 DIAGNOSIS — E83.42 HYPOMAGNESEMIA: ICD-10-CM

## 2023-07-26 LAB
ALBUMIN SERPL BCG-MCNC: 4.5 G/DL (ref 3.5–5.2)
ALP SERPL-CCNC: 59 U/L (ref 40–129)
ALT SERPL W P-5'-P-CCNC: 32 U/L (ref 0–70)
ANION GAP SERPL CALCULATED.3IONS-SCNC: 10 MMOL/L (ref 7–15)
AST SERPL W P-5'-P-CCNC: 17 U/L (ref 0–45)
BASOPHILS # BLD AUTO: 0.1 10E3/UL (ref 0–0.2)
BASOPHILS NFR BLD AUTO: 2 %
BILIRUB SERPL-MCNC: 0.2 MG/DL
BUN SERPL-MCNC: 13.1 MG/DL (ref 6–20)
CALCIUM SERPL-MCNC: 9.7 MG/DL (ref 8.6–10)
CHLORIDE SERPL-SCNC: 102 MMOL/L (ref 98–107)
CREAT SERPL-MCNC: 0.81 MG/DL (ref 0.67–1.17)
DEPRECATED HCO3 PLAS-SCNC: 24 MMOL/L (ref 22–29)
EOSINOPHIL # BLD AUTO: 0.1 10E3/UL (ref 0–0.7)
EOSINOPHIL NFR BLD AUTO: 1 %
ERYTHROCYTE [DISTWIDTH] IN BLOOD BY AUTOMATED COUNT: 13 % (ref 10–15)
GFR SERPL CREATININE-BSD FRML MDRD: >90 ML/MIN/1.73M2
GLUCOSE SERPL-MCNC: 88 MG/DL (ref 70–99)
HCT VFR BLD AUTO: 41.5 % (ref 40–53)
HGB BLD-MCNC: 14.1 G/DL (ref 13.3–17.7)
IMM GRANULOCYTES # BLD: 0.1 10E3/UL
IMM GRANULOCYTES NFR BLD: 2 %
LYMPHOCYTES # BLD AUTO: 3.5 10E3/UL (ref 0.8–5.3)
LYMPHOCYTES NFR BLD AUTO: 42 %
MAGNESIUM SERPL-MCNC: 2 MG/DL (ref 1.7–2.3)
MCH RBC QN AUTO: 28.5 PG (ref 26.5–33)
MCHC RBC AUTO-ENTMCNC: 34 G/DL (ref 31.5–36.5)
MCV RBC AUTO: 84 FL (ref 78–100)
MONOCYTES # BLD AUTO: 0.8 10E3/UL (ref 0–1.3)
MONOCYTES NFR BLD AUTO: 10 %
NEUTROPHILS # BLD AUTO: 3.7 10E3/UL (ref 1.6–8.3)
NEUTROPHILS NFR BLD AUTO: 43 %
NRBC # BLD AUTO: 0 10E3/UL
NRBC BLD AUTO-RTO: 0 /100
PLATELET # BLD AUTO: 289 10E3/UL (ref 150–450)
POTASSIUM SERPL-SCNC: 4.5 MMOL/L (ref 3.4–5.3)
PROT SERPL-MCNC: 6.9 G/DL (ref 6.4–8.3)
RBC # BLD AUTO: 4.95 10E6/UL (ref 4.4–5.9)
SODIUM SERPL-SCNC: 136 MMOL/L (ref 136–145)
WBC # BLD AUTO: 8.2 10E3/UL (ref 4–11)

## 2023-07-26 PROCEDURE — 36415 COLL VENOUS BLD VENIPUNCTURE: CPT | Performed by: INTERNAL MEDICINE

## 2023-07-26 PROCEDURE — 96375 TX/PRO/DX INJ NEW DRUG ADDON: CPT

## 2023-07-26 PROCEDURE — 96413 CHEMO IV INFUSION 1 HR: CPT

## 2023-07-26 PROCEDURE — 999N000127 HC STATISTIC PERIPHERAL IV START W US GUIDANCE

## 2023-07-26 PROCEDURE — 258N000003 HC RX IP 258 OP 636: Performed by: REGISTERED NURSE

## 2023-07-26 PROCEDURE — 80053 COMPREHEN METABOLIC PANEL: CPT

## 2023-07-26 PROCEDURE — 83735 ASSAY OF MAGNESIUM: CPT

## 2023-07-26 PROCEDURE — 250N000011 HC RX IP 250 OP 636: Mod: JZ | Performed by: REGISTERED NURSE

## 2023-07-26 PROCEDURE — 85025 COMPLETE CBC W/AUTO DIFF WBC: CPT | Performed by: INTERNAL MEDICINE

## 2023-07-26 RX ORDER — ONDANSETRON 2 MG/ML
8 INJECTION INTRAMUSCULAR; INTRAVENOUS ONCE
Status: COMPLETED | OUTPATIENT
Start: 2023-07-26 | End: 2023-07-26

## 2023-07-26 RX ADMIN — ONDANSETRON 8 MG: 2 INJECTION INTRAMUSCULAR; INTRAVENOUS at 14:01

## 2023-07-26 RX ADMIN — SODIUM CHLORIDE 250 ML: 9 INJECTION, SOLUTION INTRAVENOUS at 13:45

## 2023-07-26 RX ADMIN — GEMCITABINE 2400 MG: 38 INJECTION, SOLUTION INTRAVENOUS at 14:13

## 2023-07-26 ASSESSMENT — PAIN SCALES - GENERAL: PAINLEVEL: NO PAIN (0)

## 2023-07-26 NOTE — PROGRESS NOTES
Infusion Nursing Note:  Thierno Vega presents today for cycle 2 day 8 gemzar.    Patient seen by provider today: No   present during visit today: Yes, Language: Latvian via phone.    Note:   Patient reports he's had acid reflux. He has taken a liquid OTC antacid medication which has helped (it's a white liquid antacid that he got at [employer]). He has also taken compazine with relief.    Patient reports intermittent ringing in his left ear, it occurs about every 3 days and lasts for about a minute. He reports intermittent SOB with activity.    He reports ongoing tiredness. He denies fevers, chills, chest pain, nausea, diarrhea, and pain.    Intravenous Access:  Peripheral IV placed. First PIV painful upon flushing, no blood return. New PIV placed by vascular access.    Treatment Conditions:  Lab Results   Component Value Date    HGB 14.1 07/26/2023    WBC 8.2 07/26/2023    ANEUTAUTO 3.7 07/26/2023     07/26/2023        Lab Results   Component Value Date     07/26/2023    POTASSIUM 4.5 07/26/2023    MAG 2.0 07/26/2023    CR 0.81 07/26/2023    SOTO 9.7 07/26/2023    BILITOTAL 0.2 07/26/2023    ALBUMIN 4.5 07/26/2023    ALT 32 07/26/2023    AST 17 07/26/2023     Results reviewed, labs MET treatment parameters, ok to proceed with treatment.      Post Infusion Assessment:  Patient tolerated infusion without incident.  Blood return noted pre and post infusion.  Site patent and intact, free from redness, edema or discomfort.  No evidence of extravasations.  Access discontinued per protocol.       Discharge Plan:   Patient declined prescription refills.  Discharge instructions reviewed with: Patient.  Patient and/or family verbalized understanding of discharge instructions and all questions answered.  Copy of AVS reviewed with patient and/or family.  Patient will return 8/11 for next appointment.   Patient discharged in stable condition accompanied by: self.  Departure Mode: Ambulatory.      Liset MUSA  Shamir, RN

## 2023-07-26 NOTE — PROGRESS NOTES
Chief Complaint   Patient presents with     Blood Draw     Labs drawn with PIV start by RN. Vitals taken.     Labs drawn with PIV start by RN. Pt tolerated well. Vitals taken. Pt checked into next appointment.    Shauna Lord RN

## 2023-07-26 NOTE — PATIENT INSTRUCTIONS
[facility] Triage and after hours / weekends / holidays:  [phone]    Please call the triage or after hours line if you experience a temperature greater than or equal to 100.5, shaking chills, have uncontrolled nausea, vomiting and/or diarrhea, dizziness, shortness of breath, chest pain, bleeding, unexplained bruising, or if you have any other new/concerning symptoms, questions or concerns.      If you are having any concerning symptoms or wish to speak to a provider before your next infusion visit, please call your care coordinator or triage to notify them so we can adequately serve you.     If you need a refill on a narcotic prescription or other medication, please call before your infusion appointment.

## 2023-08-04 DIAGNOSIS — C11.9 NASOPHARYNGEAL CANCER (H): Primary | ICD-10-CM

## 2023-08-07 ENCOUNTER — ANCILLARY PROCEDURE (OUTPATIENT)
Dept: CT IMAGING | Facility: CLINIC | Age: 38
End: 2023-08-07
Attending: INTERNAL MEDICINE
Payer: COMMERCIAL

## 2023-08-07 DIAGNOSIS — C11.9 NASOPHARYNGEAL CANCER (H): ICD-10-CM

## 2023-08-07 PROCEDURE — 74177 CT ABD & PELVIS W/CONTRAST: CPT | Performed by: RADIOLOGY

## 2023-08-07 PROCEDURE — 70491 CT SOFT TISSUE NECK W/DYE: CPT | Mod: GC | Performed by: RADIOLOGY

## 2023-08-07 PROCEDURE — 71260 CT THORAX DX C+: CPT | Performed by: RADIOLOGY

## 2023-08-07 RX ORDER — IOPAMIDOL 755 MG/ML
112 INJECTION, SOLUTION INTRAVASCULAR ONCE
Status: COMPLETED | OUTPATIENT
Start: 2023-08-07 | End: 2023-08-07

## 2023-08-07 RX ADMIN — IOPAMIDOL 112 ML: 755 INJECTION, SOLUTION INTRAVASCULAR at 10:05

## 2023-08-07 NOTE — DISCHARGE INSTRUCTIONS

## 2023-08-07 NOTE — DISCHARGE INSTRUCTIONS

## 2023-08-09 ENCOUNTER — ONCOLOGY VISIT (OUTPATIENT)
Dept: ONCOLOGY | Facility: CLINIC | Age: 38
End: 2023-08-09
Attending: INTERNAL MEDICINE
Payer: COMMERCIAL

## 2023-08-09 VITALS
TEMPERATURE: 98.8 F | DIASTOLIC BLOOD PRESSURE: 81 MMHG | BODY MASS INDEX: 32.27 KG/M2 | RESPIRATION RATE: 18 BRPM | WEIGHT: 233.1 LBS | SYSTOLIC BLOOD PRESSURE: 127 MMHG | OXYGEN SATURATION: 100 % | HEART RATE: 67 BPM

## 2023-08-09 DIAGNOSIS — E83.42 HYPOMAGNESEMIA: ICD-10-CM

## 2023-08-09 DIAGNOSIS — C11.9 NASOPHARYNGEAL CANCER (H): Primary | ICD-10-CM

## 2023-08-09 PROCEDURE — 99215 OFFICE O/P EST HI 40 MIN: CPT | Performed by: INTERNAL MEDICINE

## 2023-08-09 PROCEDURE — G0463 HOSPITAL OUTPT CLINIC VISIT: HCPCS | Performed by: INTERNAL MEDICINE

## 2023-08-09 RX ORDER — MEPERIDINE HYDROCHLORIDE 25 MG/ML
25 INJECTION INTRAMUSCULAR; INTRAVENOUS; SUBCUTANEOUS EVERY 30 MIN PRN
Status: CANCELLED | OUTPATIENT
Start: 2023-08-18

## 2023-08-09 RX ORDER — ALBUTEROL SULFATE 0.83 MG/ML
2.5 SOLUTION RESPIRATORY (INHALATION)
Status: CANCELLED | OUTPATIENT
Start: 2023-08-18

## 2023-08-09 RX ORDER — LORAZEPAM 2 MG/ML
0.5 INJECTION INTRAMUSCULAR EVERY 4 HOURS PRN
Status: CANCELLED | OUTPATIENT
Start: 2023-08-18

## 2023-08-09 RX ORDER — ALBUTEROL SULFATE 0.83 MG/ML
2.5 SOLUTION RESPIRATORY (INHALATION)
Status: CANCELLED | OUTPATIENT
Start: 2023-08-11

## 2023-08-09 RX ORDER — ALBUTEROL SULFATE 90 UG/1
1-2 AEROSOL, METERED RESPIRATORY (INHALATION)
Status: CANCELLED
Start: 2023-08-18

## 2023-08-09 RX ORDER — METHYLPREDNISOLONE SODIUM SUCCINATE 125 MG/2ML
125 INJECTION, POWDER, LYOPHILIZED, FOR SOLUTION INTRAMUSCULAR; INTRAVENOUS
Status: CANCELLED
Start: 2023-08-11

## 2023-08-09 RX ORDER — EPINEPHRINE 1 MG/ML
0.3 INJECTION, SOLUTION INTRAMUSCULAR; SUBCUTANEOUS EVERY 5 MIN PRN
Status: CANCELLED | OUTPATIENT
Start: 2023-08-18

## 2023-08-09 RX ORDER — EPINEPHRINE 1 MG/ML
0.3 INJECTION, SOLUTION INTRAMUSCULAR; SUBCUTANEOUS EVERY 5 MIN PRN
Status: CANCELLED | OUTPATIENT
Start: 2023-08-11

## 2023-08-09 RX ORDER — DIPHENHYDRAMINE HYDROCHLORIDE 50 MG/ML
50 INJECTION INTRAMUSCULAR; INTRAVENOUS
Status: CANCELLED
Start: 2023-08-18

## 2023-08-09 RX ORDER — LORAZEPAM 2 MG/ML
0.5 INJECTION INTRAMUSCULAR EVERY 4 HOURS PRN
Status: CANCELLED | OUTPATIENT
Start: 2023-08-11

## 2023-08-09 RX ORDER — METHYLPREDNISOLONE SODIUM SUCCINATE 125 MG/2ML
125 INJECTION, POWDER, LYOPHILIZED, FOR SOLUTION INTRAMUSCULAR; INTRAVENOUS
Status: CANCELLED
Start: 2023-08-18

## 2023-08-09 RX ORDER — ALBUTEROL SULFATE 90 UG/1
1-2 AEROSOL, METERED RESPIRATORY (INHALATION)
Status: CANCELLED
Start: 2023-08-11

## 2023-08-09 RX ORDER — DIPHENHYDRAMINE HYDROCHLORIDE 50 MG/ML
50 INJECTION INTRAMUSCULAR; INTRAVENOUS
Status: CANCELLED
Start: 2023-08-11

## 2023-08-09 RX ORDER — MEPERIDINE HYDROCHLORIDE 25 MG/ML
25 INJECTION INTRAMUSCULAR; INTRAVENOUS; SUBCUTANEOUS EVERY 30 MIN PRN
Status: CANCELLED | OUTPATIENT
Start: 2023-08-11

## 2023-08-09 RX ORDER — ONDANSETRON 2 MG/ML
8 INJECTION INTRAMUSCULAR; INTRAVENOUS ONCE
Status: CANCELLED | OUTPATIENT
Start: 2023-08-18

## 2023-08-09 ASSESSMENT — PAIN SCALES - GENERAL: PAINLEVEL: NO PAIN (0)

## 2023-08-09 NOTE — NURSING NOTE
"Oncology Rooming Note    August 9, 2023 2:23 PM   Thierno Vega is a 37 year old male who presents for:    Chief Complaint   Patient presents with    Oncology Clinic Visit     RTN for Nasopharyngeal Cancer     Initial Vitals: Blood Pressure 127/81   Pulse 67   Temperature 98.8  F (37.1  C) (Oral)   Respiration 18   Weight 105.7 kg (233 lb 1.6 oz)   Oxygen Saturation 100%   Body Mass Index 32.27 kg/m   Estimated body mass index is 32.27 kg/m  as calculated from the following:    Height as of 7/18/23: 1.81 m (5' 11.26\").    Weight as of this encounter: 105.7 kg (233 lb 1.6 oz). Body surface area is 2.31 meters squared.  No Pain (0) Comment: Data Unavailable   No LMP for male patient.  Allergies reviewed: Yes  Medications reviewed: Yes    Medications: Medication refills not needed today.  Pharmacy name entered into [facility]: [id number] - [place], [place] - [address] [facility] [address]    Clinical concerns: none       Nelly Matias MA             "

## 2023-08-09 NOTE — LETTER
8/9/2023         RE: Thierno Vega  [address]  [address]  [facility] [address]        Dear Colleague,    Thank you for referring your patient, Thierno Vega, to the [facility]CER [facility]. Please see a copy of my visit note below.       [facility] CANCER [facility]    PATIENT NAME: Thierno Vega  MRN # [id number]   DATE OF VISIT: August 9, 2023  YOB: 1985     Otolaryngology: Dr. Navin Fisher  Radiation Oncology:  Dr. Yessica Parada    CANCER TYPE: SCC nasopharynx, GALLITO -jeanna  STAGE: cT2N2 vs N3 (III vs MARYSOL)  ECOG PS: 0    PD-L1:  NGS:     SUMMARY  4/30/23  US. Bilateral necrotic nodes  5/3/23  FNA cervical adenopathy - undifferentiated carcinoma  5/4/23   CT CAP. 7 mm pulmonary nodule, no metastases  5/5/23  MRI bilateral cervical and retropharyngeal adenopathy    Came to US  6/12/23  US guided FNA L neck node in clinic (Dr. Fisher). Path: hypocellular with scant clusters of atypical squamous cells suspicious for malignancy, p40 +jeanna, GALLITO -jeanna  6/13/23  PET/CT. Intense uptake L Rosenmuller fossa with increased fullness (SUV 18.8). Skull base intact. Milder FDG uptake (SUV 6.1) by the prominent R Rosenmuller fossa, inflammatory vs malignant. Indeterminate bilateral intense palatine tonsillar uptake with fullness on CT, inflammatroy vs infectious vs tumor, discontinuous with the nasopharyngeal abnormality. Bilateral nodes, including level 2a, 2b, 3, 3/5, R 1.8 cm 2A, another level 2/3 nodes, FDG avid retropharyngeal nodes. 0.3 cm nodule on the R major fissure. 0.8 cm R inguinal node (SUV 3.9) with additional nonenlarged LNs with minimal uptake, suggestive of reactive adenopathy. Marked focal uptake along the R upper inner thigh with skin thickening (SUV 16.8), which may represent cutaneous infection/inflammation, recommend direct visualization  6/27/23~curr Cisplatin gemcitabine.    ASSESSMENT AND PLAN  Nasopharyngeal carcinoma, cT2N2 (III): Essentially SD after 2 cycles of  cisplatin gemcitabine. Tolerating with the expected toxicities. Will start C3 later this week. Had wanted to go home for a few weeks but with the lack of response, I'd rather not delay chemoradiation that much. If he can't go for a few weeks, he'd rather not go until treatment is done. Scheduled for [facility] 8/14. I'll ask Dr. Parada if we can start 8/21 or 8/22. If we can, then we'll skip D8 gemcitabine. Will use weekly cisplatin with the radiation. If radiation won't start until 8/28, then we'll go ahead and give D8 gem.     Hearing changes/tinnitus: No changes since starting cisplatin. Had normal hearing test prior to starting. Continue to monitor.     Chemo-induced nausea: Manageable    Professional Icelandic iPad  used throughout     60 minutes spent by me on the date of the encounter doing chart review, history and exam, documentation and further activities per the note     Catrachita Clark MD  Associate Professor of Medicine  Hematology, Oncology and Transplantation      SUBJECTIVE  Mr. Vega returns for follow up after 2 cycles of cisplatin gemcitabine   Doing ok  Tired for about 4 days after C2D1.   Some mild nausea  O/w ok  No numbness/tingling  Will plan to stay and go home in Nov instead - aiming for Nov 5.   Needs letter for work - name, dates of treatment, that he'll be unable to work, anticipated to not feel well enough to work for about a month after treatment is done.     PAST MEDICAL HISTORY  Nasopharyngeal carcinoma as above  HTN    CURRENT OUTPATIENT MEDICATIONS  Reviewed    ALLERGIES  No Known Allergies     PHYSICAL EXAM  There were no vitals taken for this visit.    GEN: NAD  HEENT: EOMI, no icterus, injection or pallor. Oropharynx is clear.  NECK: no cervical or supraclavicular lymphadenopathy  LUNGS: clear bilaterally  CV: regular, no murmurs, rubs, or gallops  ABDOMEN: soft, non-tender, non-distended, normal bowel sounds  EXT: warm, well perfused, no edema  NEURO: alert  SKIN: no  franc    LABORATORY AND IMAGING STUDIES    Labs since last visit with me were all independently reviewed and interpreted by me    CT Soft Tissue Neck w Contrast  Narrative: CT SOFT TISSUE NECK W CONTRAST 8/7/2023 10:23 AM    History:  nasopharyngeal carcinoma, restaging after 2 cycles of  cisplatin gemcitabine; Nasopharyngeal cancer (H)  ICD-10: Nasopharyngeal cancer (H)      Comparison:  Neck PET CT 6/13/2023, outside MRI 5/5/2023      Technique: Following intravenous administration of nonionic iodinated  contrast medium, thin section helical CT images were obtained from the  skull base down to the level of the aortic arch. Axial, coronal and  sagittal reformations were performed with 2-3 mm slice thickness  reconstruction. Images were reviewed in soft tissue, lung and bone  windows.    Contrast: Isovue 370 112cc    Findings:   Evaluation of the mucosal space demonstrates slight decrease in  masslike thickening along the left Rosenmuller pharyngeal recess.  There are multilevel necrotic lymph nodes, including bilateral  retropharyngeal nodes, such as on the right measuring 13 x 12 mm,  previously 17 x 14 mm. The left level 2A node also appears necrotic  measuring up to 2.4 x 1.9 cm, previously 2.6 x 1.7 cm. The necrotic  component appears larger. There is also increased necrotic appearance  of left level 3 and 5 lymph nodes, without significant change in  overall size. The tongue base appears normal. The major salivary  glands are unremarkable. The thyroid gland appears normal.    The fascial spaces in the neck are intact bilaterally. Mild  atherosclerotic plaque of the proximal right internal carotid artery.    Evaluation of the osseous structures demonstrate no worrisome lytic or  sclerotic lesion. No overt spinal canal or neuroforaminal stenosis.  Mild left maxillary and right ethmoidal air cell mucosal thickening.  Minimal left mastoid effusion. Clear right mastoid air cells.    Partially visualized small  thin-walled cyst in the subpleural right  upper lobe.   Impression: Impression:  1. Slightly decreased extent of nasopharyngeal carcinoma centered  along the left Rosenmuller pharyngeal recess.  2. Increased necrosis of multiple cervical lymph nodes, with overall  size not significantly changed.       I have personally reviewed the examination and initial interpretation  and I agree with the findings.    RANJAN TA MD         SYSTEM ID:  [id number]  CT Chest/Abdomen/Pelvis w Contrast  Narrative: EXAMINATION: CT CHEST/ABDOMEN/PELVIS W CONTRAST, 8/7/2023 10:23 AM    INDICATION: nasopharyngeal carcinoma, restaging after 2 cycles of  cisplatin gemcitabine; Nasopharyngeal cancer (H)    COMPARISON STUDY: PET/CT dated 6/13/2023.    TECHNIQUE: CT scan of the chest, abdomen and pelvis was performed on  multidetector CT scanner using volumetric acquisition technique and  images were reconstructed in multiple planes with variable thickness  and reviewed on dedicated workstations.     CONTRAST: Isovue 370 112cc injected IV with oral contrast    CT scan radiation dose is optimized to minimum requisite dose using  automated dose modulation techniques.    FINDINGS:    Thyroid gland: Unremarkable    Mediastinum: Normal cardiac size. No pericardial effusion. Normal  caliber of thoracic aorta and pulmonary artery. Subtle hypodensity in  the right lower lobe segmental pulmonary artery is favored to  represent flow artifact (4/190). No axillary or mediastinal  adenopathy.    Lungs: No suspicious pulmonary nodules. Unchanged 0.3 cm nodule along  the right major fissure likely represents an intrafissural node.    Pleura: No pleural effusion or pneumothorax.    Chest wall: Unremarkable.    Abdomen and pelvis:    Liver: No mass. No intrahepatic biliary ductal dilation.    Biliary System: Normal gallbladder. No extrahepatic biliary ductal  dilation.    Pancreas: No mass or pancreatic ductal dilation.    Adrenal glands: No mass or nodules      Spleen: Normal.     Kidneys: No suspicious mass, obstructing calculus or hydronephrosis.    Gastrointestinal tract :Normal appendix. Normal caliber small bowel.    Mesentery/peritoneum/retroperitoneum: No mass. No free fluid or air.    Lymph nodes: No significant lymphadenopathy.    Vasculature: Patent major abdominal vasculature.    Pelvis: Urinary bladder is normal.    Osseous structures: No aggressive or acute osseous lesion.       Soft tissues: Tiny fat-containing umbilical hernia.  Impression: IMPRESSION:   No evidence of metastatic disease in the chest, abdomen or pelvis.     ERENDIRA BORDEN MD       SYSTEM ID:  [id number]     Imaging was personally reviewed and interpreted by christopher Clark MD

## 2023-08-09 NOTE — LETTER
8/9/2023       RE: Thierno Vega  [address]  [address]  [facility] [address]     To Whom It May Concern,     I am writing this letter to certify that Mr. Mannie Vega, cannot work due to nasopharyngeal carcinoma and the treatments required to treat the disease. He has been unable to work since starting treatment 27-JUNE-2023. His treatment course will continue through mid-October 2023, and on average, it takes at least 4 additional weeks once treatment is completed for people to feel recovered enough to start working. We can readdress the duration of his leave at that time and provide another letter if needed.     Please do not hesitate to contact me with questions or concerns.    Sincerely,          Catrachita Clark MD

## 2023-08-09 NOTE — PROGRESS NOTES
[facility] CANCER CLINIC    PATIENT NAME: Thierno Vega  MRN # [id number]   DATE OF VISIT: August 9, 2023  YOB: 1985     Otolaryngology: Dr. Navin Fisher  Radiation Oncology:  Dr. Yessica Parada    CANCER TYPE: SCC nasopharynx, GALLITO -jeanna  STAGE: cT2N2 vs N3 (III vs MARYSOL)  ECOG PS: 0    PD-L1:  NGS:     SUMMARY  4/30/23  US. Bilateral necrotic nodes  5/3/23  FNA cervical adenopathy - undifferentiated carcinoma  5/4/23   CT CAP. 7 mm pulmonary nodule, no metastases  5/5/23  MRI bilateral cervical and retropharyngeal adenopathy    Came to US  6/12/23  US guided FNA L neck node in clinic (Dr. Fisher). Path: hypocellular with scant clusters of atypical squamous cells suspicious for malignancy, p40 +jeanna, GALLITO -jeanna  6/13/23  PET/CT. Intense uptake L Rosenmuller fossa with increased fullness (SUV 18.8). Skull base intact. Milder FDG uptake (SUV 6.1) by the prominent R Rosenmuller fossa, inflammatory vs malignant. Indeterminate bilateral intense palatine tonsillar uptake with fullness on CT, inflammatroy vs infectious vs tumor, discontinuous with the nasopharyngeal abnormality. Bilateral nodes, including level 2a, 2b, 3, 3/5, R 1.8 cm 2A, another level 2/3 nodes, FDG avid retropharyngeal nodes. 0.3 cm nodule on the R major fissure. 0.8 cm R inguinal node (SUV 3.9) with additional nonenlarged LNs with minimal uptake, suggestive of reactive adenopathy. Marked focal uptake along the R upper inner thigh with skin thickening (SUV 16.8), which may represent cutaneous infection/inflammation, recommend direct visualization  6/27/23~curr Cisplatin gemcitabine.    ASSESSMENT AND PLAN  Nasopharyngeal carcinoma, cT2N2 (III): Essentially SD after 2 cycles of cisplatin gemcitabine. Tolerating with the expected toxicities. Will start C3 later this week. Had wanted to go home for a few weeks but with the lack of response, I'd rather not delay chemoradiation that much. If he can't go for a few weeks, he'd rather not go until  treatment is done. Scheduled [facility] 8/14. I'll ask Dr. Parada if we can start 8/21 or 8/22. If we can, then we'll skip D8 gemcitabine. Will use weekly cisplatin with the radiation. If radiation won't start until 8/28, then we'll go ahead and give D8 gem.     Hearing changes/tinnitus: No changes since starting cisplatin. Had normal hearing test prior to starting. Continue to monitor.     Chemo-induced nausea: Manageable    Professional Italian iPad  used throughout     60 minutes spent by me on the date of the encounter doing chart review, history and exam, documentation and further activities per the note     Catrachita Clark MD  Associate Professor of Medicine  Hematology, Oncology and Transplantation      SUBJECTIVE  Mr. Vega returns for follow up after 2 cycles of cisplatin gemcitabine   Doing ok  Tired for about 4 days after C2D1.   Some mild nausea  O/w ok  No numbness/tingling  Will plan to stay and go home in Nov instead - aiming for Nov 5.   Needs letter for work - name, dates of treatment, that he'll be unable to work, anticipated to not feel well enough to work for about a month after treatment is done.     PAST MEDICAL HISTORY  Nasopharyngeal carcinoma as above  HTN    CURRENT OUTPATIENT MEDICATIONS  Reviewed    ALLERGIES  No Known Allergies     PHYSICAL EXAM  There were no vitals taken for this visit.    GEN: NAD  HEENT: EOMI, no icterus, injection or pallor. Oropharynx is clear.  NECK: no cervical or supraclavicular lymphadenopathy  LUNGS: clear bilaterally  CV: regular, no murmurs, rubs, or gallops  ABDOMEN: soft, non-tender, non-distended, normal bowel sounds  EXT: warm, well perfused, no edema  NEURO: alert  SKIN: no rashes    LABORATORY AND IMAGING STUDIES    Labs since last visit with me were all independently reviewed and interpreted by me    CT Soft Tissue Neck w Contrast  Narrative: CT SOFT TISSUE NECK W CONTRAST 8/7/2023 10:23 AM    History:  nasopharyngeal carcinoma, restaging  after 2 cycles of  cisplatin gemcitabine; Nasopharyngeal cancer (H)  ICD-10: Nasopharyngeal cancer (H)      Comparison:  Neck PET CT 6/13/2023, outside MRI 5/5/2023      Technique: Following intravenous administration of nonionic iodinated  contrast medium, thin section helical CT images were obtained from the  skull base down to the level of the aortic arch. Axial, coronal and  sagittal reformations were performed with 2-3 mm slice thickness  reconstruction. Images were reviewed in soft tissue, lung and bone  windows.    Contrast: Isovue 370 112cc    Findings:   Evaluation of the mucosal space demonstrates slight decrease in  masslike thickening along the left Rosenmuller pharyngeal recess.  There are multilevel necrotic lymph nodes, including bilateral  retropharyngeal nodes, such as on the right measuring 13 x 12 mm,  previously 17 x 14 mm. The left level 2A node also appears necrotic  measuring up to 2.4 x 1.9 cm, previously 2.6 x 1.7 cm. The necrotic  component appears larger. There is also increased necrotic appearance  of left level 3 and 5 lymph nodes, without significant change in  overall size. The tongue base appears normal. The major salivary  glands are unremarkable. The thyroid gland appears normal.    The fascial spaces in the neck are intact bilaterally. Mild  atherosclerotic plaque of the proximal right internal carotid artery.    Evaluation of the osseous structures demonstrate no worrisome lytic or  sclerotic lesion. No overt spinal canal or neuroforaminal stenosis.  Mild left maxillary and right ethmoidal air cell mucosal thickening.  Minimal left mastoid effusion. Clear right mastoid air cells.    Partially visualized small thin-walled cyst in the subpleural right  upper lobe.   Impression: Impression:  1. Slightly decreased extent of nasopharyngeal carcinoma centered  along the left Rosenmuller pharyngeal recess.  2. Increased necrosis of multiple cervical lymph nodes, with overall  size not  significantly changed.       I have personally reviewed the examination and initial interpretation  and I agree with the findings.    RANJAN TA MD         SYSTEM ID:  [id number]  CT Chest/Abdomen/Pelvis w Contrast  Narrative: EXAMINATION: CT CHEST/ABDOMEN/PELVIS W CONTRAST, 8/7/2023 10:23 AM    INDICATION: nasopharyngeal carcinoma, restaging after 2 cycles of  cisplatin gemcitabine; Nasopharyngeal cancer (H)    COMPARISON STUDY: PET/CT dated 6/13/2023.    TECHNIQUE: CT scan of the chest, abdomen and pelvis was performed on  multidetector CT scanner using volumetric acquisition technique and  images were reconstructed in multiple planes with variable thickness  and reviewed on dedicated workstations.     CONTRAST: Isovue 370 112cc injected IV with oral contrast    CT scan radiation dose is optimized to minimum requisite dose using  automated dose modulation techniques.    FINDINGS:    Thyroid gland: Unremarkable    Mediastinum: Normal cardiac size. No pericardial effusion. Normal  caliber of thoracic aorta and pulmonary artery. Subtle hypodensity in  the right lower lobe segmental pulmonary artery is favored to  represent flow artifact (4/190). No axillary or mediastinal  adenopathy.    Lungs: No suspicious pulmonary nodules. Unchanged 0.3 cm nodule along  the right major fissure likely represents an intrafissural node.    Pleura: No pleural effusion or pneumothorax.    Chest wall: Unremarkable.    Abdomen and pelvis:    Liver: No mass. No intrahepatic biliary ductal dilation.    Biliary System: Normal gallbladder. No extrahepatic biliary ductal  dilation.    Pancreas: No mass or pancreatic ductal dilation.    Adrenal glands: No mass or nodules     Spleen: Normal.     Kidneys: No suspicious mass, obstructing calculus or hydronephrosis.    Gastrointestinal tract :Normal appendix. Normal caliber small bowel.    Mesentery/peritoneum/retroperitoneum: No mass. No free fluid or air.    Lymph nodes: No significant  lymphadenopathy.    Vasculature: Patent major abdominal vasculature.    Pelvis: Urinary bladder is normal.    Osseous structures: No aggressive or acute osseous lesion.       Soft tissues: Tiny fat-containing umbilical hernia.  Impression: IMPRESSION:   No evidence of metastatic disease in the chest, abdomen or pelvis.     ERENDIRA BORDEN MD         SYSTEM ID:  [id number]       Imaging was personally reviewed and interpreted by me

## 2023-08-10 ENCOUNTER — MYC MEDICAL ADVICE (OUTPATIENT)
Dept: ONCOLOGY | Facility: CLINIC | Age: 38
End: 2023-08-10
Payer: COMMERCIAL

## 2023-08-11 ENCOUNTER — INFUSION THERAPY VISIT (OUTPATIENT)
Dept: ONCOLOGY | Facility: CLINIC | Age: 38
End: 2023-08-11
Attending: INTERNAL MEDICINE
Payer: COMMERCIAL

## 2023-08-11 ENCOUNTER — APPOINTMENT (OUTPATIENT)
Dept: LAB | Facility: CLINIC | Age: 38
End: 2023-08-11
Attending: INTERNAL MEDICINE
Payer: COMMERCIAL

## 2023-08-11 VITALS
HEART RATE: 72 BPM | BODY MASS INDEX: 31.72 KG/M2 | WEIGHT: 234.2 LBS | HEIGHT: 72 IN | DIASTOLIC BLOOD PRESSURE: 82 MMHG | SYSTOLIC BLOOD PRESSURE: 127 MMHG | OXYGEN SATURATION: 98 % | RESPIRATION RATE: 16 BRPM | TEMPERATURE: 98.3 F

## 2023-08-11 DIAGNOSIS — E83.42 HYPOMAGNESEMIA: Primary | ICD-10-CM

## 2023-08-11 DIAGNOSIS — C11.9 NASOPHARYNGEAL CANCER (H): ICD-10-CM

## 2023-08-11 LAB
ALBUMIN SERPL BCG-MCNC: 4.5 G/DL (ref 3.5–5.2)
ALP SERPL-CCNC: 59 U/L (ref 40–129)
ALT SERPL W P-5'-P-CCNC: 16 U/L (ref 0–70)
ANION GAP SERPL CALCULATED.3IONS-SCNC: 13 MMOL/L (ref 7–15)
AST SERPL W P-5'-P-CCNC: 14 U/L (ref 0–45)
BASOPHILS # BLD AUTO: 0.1 10E3/UL (ref 0–0.2)
BASOPHILS NFR BLD AUTO: 1 %
BILIRUB SERPL-MCNC: 0.2 MG/DL
BUN SERPL-MCNC: 13.5 MG/DL (ref 6–20)
CALCIUM SERPL-MCNC: 9.4 MG/DL (ref 8.6–10)
CHLORIDE SERPL-SCNC: 104 MMOL/L (ref 98–107)
CREAT SERPL-MCNC: 0.78 MG/DL (ref 0.67–1.17)
DEPRECATED HCO3 PLAS-SCNC: 23 MMOL/L (ref 22–29)
EOSINOPHIL # BLD AUTO: 0.3 10E3/UL (ref 0–0.7)
EOSINOPHIL NFR BLD AUTO: 4 %
ERYTHROCYTE [DISTWIDTH] IN BLOOD BY AUTOMATED COUNT: 15 % (ref 10–15)
GFR SERPL CREATININE-BSD FRML MDRD: >90 ML/MIN/1.73M2
GLUCOSE SERPL-MCNC: 124 MG/DL (ref 70–99)
HCT VFR BLD AUTO: 41.6 % (ref 40–53)
HGB BLD-MCNC: 13.9 G/DL (ref 13.3–17.7)
IMM GRANULOCYTES # BLD: 0.1 10E3/UL
IMM GRANULOCYTES NFR BLD: 1 %
LYMPHOCYTES # BLD AUTO: 3.7 10E3/UL (ref 0.8–5.3)
LYMPHOCYTES NFR BLD AUTO: 43 %
MAGNESIUM SERPL-MCNC: 1.8 MG/DL (ref 1.7–2.3)
MCH RBC QN AUTO: 29.1 PG (ref 26.5–33)
MCHC RBC AUTO-ENTMCNC: 33.4 G/DL (ref 31.5–36.5)
MCV RBC AUTO: 87 FL (ref 78–100)
MONOCYTES # BLD AUTO: 0.9 10E3/UL (ref 0–1.3)
MONOCYTES NFR BLD AUTO: 10 %
NEUTROPHILS # BLD AUTO: 3.5 10E3/UL (ref 1.6–8.3)
NEUTROPHILS NFR BLD AUTO: 41 %
NRBC # BLD AUTO: 0 10E3/UL
NRBC BLD AUTO-RTO: 0 /100
PLATELET # BLD AUTO: 305 10E3/UL (ref 150–450)
POTASSIUM SERPL-SCNC: 3.7 MMOL/L (ref 3.4–5.3)
PROT SERPL-MCNC: 6.9 G/DL (ref 6.4–8.3)
RBC # BLD AUTO: 4.77 10E6/UL (ref 4.4–5.9)
SODIUM SERPL-SCNC: 140 MMOL/L (ref 136–145)
WBC # BLD AUTO: 8.6 10E3/UL (ref 4–11)

## 2023-08-11 PROCEDURE — 85025 COMPLETE CBC W/AUTO DIFF WBC: CPT

## 2023-08-11 PROCEDURE — 96375 TX/PRO/DX INJ NEW DRUG ADDON: CPT

## 2023-08-11 PROCEDURE — 250N000011 HC RX IP 250 OP 636: Mod: JZ | Performed by: INTERNAL MEDICINE

## 2023-08-11 PROCEDURE — 36415 COLL VENOUS BLD VENIPUNCTURE: CPT

## 2023-08-11 PROCEDURE — 96415 CHEMO IV INFUSION ADDL HR: CPT

## 2023-08-11 PROCEDURE — 96417 CHEMO IV INFUS EACH ADDL SEQ: CPT

## 2023-08-11 PROCEDURE — 96367 TX/PROPH/DG ADDL SEQ IV INF: CPT

## 2023-08-11 PROCEDURE — 96361 HYDRATE IV INFUSION ADD-ON: CPT

## 2023-08-11 PROCEDURE — 258N000003 HC RX IP 258 OP 636: Performed by: INTERNAL MEDICINE

## 2023-08-11 PROCEDURE — 83735 ASSAY OF MAGNESIUM: CPT

## 2023-08-11 PROCEDURE — 96413 CHEMO IV INFUSION 1 HR: CPT

## 2023-08-11 PROCEDURE — 80053 COMPREHEN METABOLIC PANEL: CPT

## 2023-08-11 RX ORDER — ONDANSETRON 2 MG/ML
8 INJECTION INTRAMUSCULAR; INTRAVENOUS ONCE
Status: COMPLETED | OUTPATIENT
Start: 2023-08-11 | End: 2023-08-11

## 2023-08-11 RX ADMIN — DEXAMETHASONE SODIUM PHOSPHATE: 10 INJECTION, SOLUTION INTRAMUSCULAR; INTRAVENOUS at 08:03

## 2023-08-11 RX ADMIN — ONDANSETRON 8 MG: 2 INJECTION INTRAMUSCULAR; INTRAVENOUS at 08:37

## 2023-08-11 RX ADMIN — SODIUM CHLORIDE 1000 ML: 9 INJECTION, SOLUTION INTRAVENOUS at 07:33

## 2023-08-11 RX ADMIN — MAGNESIUM SULFATE HEPTAHYDRATE: 500 INJECTION, SOLUTION INTRAMUSCULAR; INTRAVENOUS at 10:20

## 2023-08-11 RX ADMIN — SODIUM CHLORIDE 190 MG: 9 INJECTION, SOLUTION INTRAVENOUS at 09:31

## 2023-08-11 RX ADMIN — GEMCITABINE 2400 MG: 38 INJECTION, SOLUTION INTRAVENOUS at 08:57

## 2023-08-11 ASSESSMENT — PAIN SCALES - GENERAL: PAINLEVEL: NO PAIN (0)

## 2023-08-11 NOTE — PATIENT INSTRUCTIONS
[facility] Triage and after hours / weekends / holidays:  [phone]    Please call the triage or after hours line if you experience a temperature greater than or equal to 100.4, shaking chills, have uncontrolled nausea, vomiting and/or diarrhea, dizziness, shortness of breath, chest pain, bleeding, unexplained bruising, or if you have any other new/concerning symptoms, questions or concerns.      If you are having any concerning symptoms or wish to speak to a provider before your next infusion visit, please call triage to notify them so we can adequately serve you.     If you need a refill on a narcotic prescription or other medication, please call before your infusion appointment.                 August 2023 Sunday Monday Tuesday Wednesday Thursday Friday Saturday             1     2     3     4     5       6     7    CT CHEST ABDOMEN W   9:40 AM   (60 min.)   [facility] [place] [address] [facility]    CT SOFT TISSUE NECK W  10:25 AM   (75 min.)   [facility] [place] [address] [facility] 8     9    RETURN CCSL   2:15 PM   (30 min.)   Catrachita Clark MD   [facility] 10     11    LAB PERIPHERAL   6:30 AM   (15 min.)   [facility] LAB DRAW   [facility]    ONC INFUSION 6 HR (360 MIN)   7:00 AM   (360 min.)    ONC INFUSION NURSE   [facility] 12  Happy Birthday!       13     14    CT SIM   2:00 PM   (60 min.)   Yessica Parada MD   [facility] Radiation Oncology    CT THERAPY CORRELATE   2:45 PM   (20 min.)   [id number]   [facility] Imaging 15     16     17     18     19       20     21     22     23     24     25     26       27     28    LAB PERIPHERAL   6:45 AM   (15 min.)   [facility] DRAW   [facility]    RETURN ACTIVE TREATMENT   6:45 AM   (45 min.)   Natalie Hull APRN CNP   [facility]  29 30 31 September 2023 Sunday Monday Tuesday Wednesday Thursday Friday Saturday                            1     2       3     4     5    LAB PERIPHERAL   8:15 AM   (15 min.)    [facility] DRAW   [facility]    RETURN ACTIVE TREATMENT   8:45 AM   (45 min.)   Janis Cordova CNP   [facility] 6     7     8     9       10     11    LAB PERIPHERAL   6:45 AM   (15 min.)   [facility] DRAW   [facility]    RETURN CCSL   7:00 AM   (45 min.)   Janis Cordova CNP   [facility]    ONC INFUSION 2 HR (120 MIN)   8:30 AM   (120 min.)   [facility] ONC INFUSION NURSE   [facility] 12     13     14     15     16       17     18    LAB PERIPHERAL   7:45 AM   (15 min.)   [facility] DRAW   [facility]    RETURN CCSL   8:00 AM   (45 min.)   Janis Cordova CNP   [facility]    ONC INFUSION 2 HR (120 MIN)  12:00 PM   (120 min.)    ONC INFUSION NURSE   [facility] 19     20     21     22     23       24     25     26     27     28     29     30                     Lab Results:  Recent Results (from the past 12 hour(s))   Comprehensive metabolic panel    Collection Time: 08/11/23  6:47 AM   Result Value Ref Range    Sodium 140 136 - 145 mmol/L    Potassium 3.7 3.4 - 5.3 mmol/L    Chloride 104 98 - 107 mmol/L    Carbon Dioxide (CO2) 23 22 - 29 mmol/L    Anion Gap 13 7 - 15 mmol/L    Urea Nitrogen 13.5 6.0 - 20.0 mg/dL    Creatinine 0.78 0.67 - 1.17 mg/dL    Calcium 9.4 8.6 - 10.0 mg/dL    Glucose 124 (H) 70 - 99 mg/dL    Alkaline Phosphatase 59 40 - 129 U/L    AST 14 0 - 45 U/L    ALT 16 0 - 70 U/L    Protein Total 6.9 6.4 - 8.3 g/dL    Albumin 4.5 3.5 - 5.2 g/dL    Bilirubin Total 0.2 <=1.2 mg/dL    GFR Estimate >90 >60 mL/min/1.73m2   Magnesium    Collection Time: 08/11/23   6:47 AM   Result Value Ref Range    Magnesium 1.8 1.7 - 2.3 mg/dL   CBC with platelets and differential    Collection Time: 08/11/23  6:47 AM   Result Value Ref Range    WBC Count 8.6 4.0 - 11.0 10e3/uL    RBC Count 4.77 4.40 - 5.90 10e6/uL    Hemoglobin 13.9 13.3 - 17.7 g/dL    Hematocrit 41.6 40.0 - 53.0 %    MCV 87 78 - 100 fL    MCH 29.1 26.5 - 33.0 pg    MCHC 33.4 31.5 - 36.5 g/dL    RDW 15.0 10.0 - 15.0 %    Platelet Count 305 150 - 450 10e3/uL    % Neutrophils 41 %    % Lymphocytes 43 %    % Monocytes 10 %    % Eosinophils 4 %    % Basophils 1 %    % Immature Granulocytes 1 %    NRBCs per 100 WBC 0 <1 /100    Absolute Neutrophils 3.5 1.6 - 8.3 10e3/uL    Absolute Lymphocytes 3.7 0.8 - 5.3 10e3/uL    Absolute Monocytes 0.9 0.0 - 1.3 10e3/uL    Absolute Eosinophils 0.3 0.0 - 0.7 10e3/uL    Absolute Basophils 0.1 0.0 - 0.2 10e3/uL    Absolute Immature Granulocytes 0.1 <=0.4 10e3/uL    Absolute NRBCs 0.0 10e3/uL

## 2023-08-11 NOTE — PROGRESS NOTES
"Infusion Nursing Note:  Thierno Vega presents today for Day 1 Cycle 3 Gemzar, Cisplatin.  Patient seen by provider today: No   present during visit today: Yes, Language: Lithuanian.     Note: Patient presents to infusion feeling ok. Patient denies acute discomfort and states no acute complaints or concerns not addressed by Dr. Clark on 8/9/2023. Specifically, pt denies s/s of infection such as fever, sore throat, cough, chest pain, shortness of breath, body aches, chills, headache, increased nasal congestion, or changes in taste/smell.    With Cycle 1 and 2, pt received Zofran with the IV Dex/Emend for pre-meds. Today its not ordered. Therefore, Dr. Clark made aware to clarify  TORB. 8/11/2023. 0830. . Roger Marie RN. Order 8mg IV Zofran if Aloxi is not approved.     Aloxi not approved for Cycle 1. Therefore IV Zofran ordered.     Per progress note from Dr. Calrk, \"Scheduled for sim 8/14. I'll ask Dr. Parada if we can start 8/21 or 8/22. If we can, then we'll skip D8 gemcitabine. Will use weekly cisplatin with the radiation. If radiation won't start until 8/28, then we'll go ahead and give D8 gem\". Reached out to Dr. Clark today to clarify next weeks plans. Per Dr. Clark, plans are \"still up in the air\" for now. Pt is on the waitlist for Day 8, 8/18 infusion. Patient aware of scheduling status for next week.     Intravenous Access:  Peripheral IV placed.    Treatment Conditions:  Lab Results   Component Value Date    HGB 13.9 08/11/2023    WBC 8.6 08/11/2023    ANEUTAUTO 3.5 08/11/2023     08/11/2023        Lab Results   Component Value Date     08/11/2023    POTASSIUM 3.7 08/11/2023    MAG 1.8 08/11/2023    CR 0.78 08/11/2023    SOTO 9.4 08/11/2023    BILITOTAL 0.2 08/11/2023    ALBUMIN 4.5 08/11/2023    ALT 16 08/11/2023    AST 14 08/11/2023     Results reviewed, labs MET treatment parameters, ok to proceed with treatment.      Post Infusion Assessment:  Patient " tolerated infusion without incident.  Pt voided pre-during-post Cisplatin without issues  Blood return noted pre and post infusion.  Site patent and intact, free from redness, edema or discomfort.  No evidence of extravasations.  Access discontinued per protocol.       Discharge Plan:   Prescription refills given for Zofran, Compazine, Dex.  Discharge instructions reviewed with: Patient.  Patient and/or family verbalized understanding of discharge instructions and all questions answered.  AVS to patient via [employer].  Patient will return 8/18 for next appointment. Pt is on the waitlist.  Patient discharged in stable condition accompanied by: self.  Departure Mode: Ambulatory.      Roger Marie RN

## 2023-08-13 ENCOUNTER — HEALTH MAINTENANCE LETTER (OUTPATIENT)
Age: 38
End: 2023-08-13

## 2023-08-14 ENCOUNTER — OFFICE VISIT (OUTPATIENT)
Dept: RADIATION ONCOLOGY | Facility: CLINIC | Age: 38
End: 2023-08-14
Attending: STUDENT IN AN ORGANIZED HEALTH CARE EDUCATION/TRAINING PROGRAM
Payer: COMMERCIAL

## 2023-08-14 ENCOUNTER — HOSPITAL ENCOUNTER (OUTPATIENT)
Dept: CT IMAGING | Facility: CLINIC | Age: 38
Discharge: HOME | End: 2023-08-14
Attending: RADIOLOGY | Admitting: RADIOLOGY
Payer: COMMERCIAL

## 2023-08-14 DIAGNOSIS — C11.9 NASOPHARYNGEAL CANCER (H): ICD-10-CM

## 2023-08-14 DIAGNOSIS — C11.9 NASOPHARYNGEAL CANCER (H): Primary | ICD-10-CM

## 2023-08-14 PROCEDURE — 77334 RADIATION TREATMENT AID(S): CPT | Performed by: RADIOLOGY

## 2023-08-14 PROCEDURE — 77014 CT THERAPY CORRELATE: CPT | Mod: TC

## 2023-08-14 PROCEDURE — 77470 SPECIAL RADIATION TREATMENT: CPT | Performed by: RADIOLOGY

## 2023-08-14 PROCEDURE — 77470 SPECIAL RADIATION TREATMENT: CPT | Mod: 26 | Performed by: RADIOLOGY

## 2023-08-14 PROCEDURE — 250N000011 HC RX IP 250 OP 636: Performed by: RADIOLOGY

## 2023-08-14 PROCEDURE — 77334 RADIATION TREATMENT AID(S): CPT | Mod: 26 | Performed by: RADIOLOGY

## 2023-08-14 RX ORDER — IOPAMIDOL 755 MG/ML
100 INJECTION, SOLUTION INTRAVASCULAR ONCE
Status: COMPLETED | OUTPATIENT
Start: 2023-08-14 | End: 2023-08-14

## 2023-08-14 RX ORDER — GABAPENTIN 300 MG/1
900 CAPSULE ORAL 3 TIMES DAILY
Qty: 270 CAPSULE | Refills: 4 | Status: SHIPPED | OUTPATIENT
Start: 2023-08-14 | End: 2024-01-24

## 2023-08-14 RX ADMIN — IOPAMIDOL 100 ML: 755 INJECTION, SOLUTION INTRAVENOUS at 15:31

## 2023-08-14 NOTE — LETTER
8/14/2023         RE: Thierno Vega  [address]  [address]  [place] [address]        Dear Colleague,    Thank you for referring your patient, Thierno Vega, to the [facility] RADIATION ONCOLOGY. Please see a copy of my visit note below.    Radiation Therapy Patient Education    Person involved with teaching: Patient and cousin    Patient educational needs for self management of treatment-related side effects assessment completed.  EPIC Patient Ed tab contains Patient Learning Assessment    Education Materials Given  Radiation Therapy for Head and Neck    Educational Topics Discussed  Side effects expected, Pain management, Skin care, Nutrition and weight loss, and When to call MD/RN    Response To Teaching  Verbalizes understanding    GYN Only  Vaginal Dilator-given and educated: N/A    Referrals sent: Dental, Speech and Swallowing, and Nutrition    Chemotherapy?  Yes: Notified medical oncology of start date 08/28/23       A radiation therapy treatment planning simulation was performed.  Please see the [facility] record for documentation.    Yessica Parada MD  Radiation Oncology       Again, thank you for allowing me to participate in the care of your patient.        Sincerely,        Yessica Parada MD

## 2023-08-17 ENCOUNTER — PATIENT OUTREACH (OUTPATIENT)
Dept: ONCOLOGY | Facility: CLINIC | Age: 38
End: 2023-08-17
Payer: COMMERCIAL

## 2023-08-17 NOTE — TELEPHONE ENCOUNTER
[facility]: Cancer Care                                                                                          Reached out to patient using French  to notify him that Dr. Clark is okay with him skipping D8 gemzar infusion tomorrow to provide him a break before starting chemo/radiation on 8/28. Patient agreeable to plan, had no further questions. Encouraged him to follow up with me as needed.    Jennifer Toney, RN, BSN  RN Care Coordinator  [facility]

## 2023-08-28 ENCOUNTER — INFUSION THERAPY VISIT (OUTPATIENT)
Dept: ONCOLOGY | Facility: CLINIC | Age: 38
End: 2023-08-28
Attending: INTERNAL MEDICINE
Payer: COMMERCIAL

## 2023-08-28 ENCOUNTER — APPOINTMENT (OUTPATIENT)
Dept: RADIATION ONCOLOGY | Facility: CLINIC | Age: 38
End: 2023-08-28
Attending: STUDENT IN AN ORGANIZED HEALTH CARE EDUCATION/TRAINING PROGRAM
Payer: COMMERCIAL

## 2023-08-28 ENCOUNTER — ONCOLOGY VISIT (OUTPATIENT)
Dept: ONCOLOGY | Facility: CLINIC | Age: 38
End: 2023-08-28
Attending: NURSE PRACTITIONER
Payer: COMMERCIAL

## 2023-08-28 ENCOUNTER — APPOINTMENT (OUTPATIENT)
Dept: LAB | Facility: CLINIC | Age: 38
End: 2023-08-28
Attending: NURSE PRACTITIONER
Payer: COMMERCIAL

## 2023-08-28 VITALS
HEART RATE: 68 BPM | WEIGHT: 236.8 LBS | BODY MASS INDEX: 32.07 KG/M2 | SYSTOLIC BLOOD PRESSURE: 118 MMHG | OXYGEN SATURATION: 98 % | RESPIRATION RATE: 16 BRPM | TEMPERATURE: 98.4 F | DIASTOLIC BLOOD PRESSURE: 75 MMHG

## 2023-08-28 DIAGNOSIS — C11.9 NASOPHARYNGEAL CANCER (H): ICD-10-CM

## 2023-08-28 DIAGNOSIS — E83.42 HYPOMAGNESEMIA: Primary | ICD-10-CM

## 2023-08-28 LAB
ALBUMIN SERPL BCG-MCNC: 4.5 G/DL (ref 3.5–5.2)
ALP SERPL-CCNC: 62 U/L (ref 40–129)
ALT SERPL W P-5'-P-CCNC: 14 U/L (ref 0–70)
ANION GAP SERPL CALCULATED.3IONS-SCNC: 13 MMOL/L (ref 7–15)
AST SERPL W P-5'-P-CCNC: 14 U/L (ref 0–45)
BASOPHILS # BLD AUTO: 0.1 10E3/UL (ref 0–0.2)
BASOPHILS NFR BLD AUTO: 1 %
BILIRUB SERPL-MCNC: 0.2 MG/DL
BUN SERPL-MCNC: 15.7 MG/DL (ref 6–20)
CALCIUM SERPL-MCNC: 9.5 MG/DL (ref 8.6–10)
CHLORIDE SERPL-SCNC: 105 MMOL/L (ref 98–107)
CREAT SERPL-MCNC: 0.82 MG/DL (ref 0.67–1.17)
DEPRECATED HCO3 PLAS-SCNC: 22 MMOL/L (ref 22–29)
EOSINOPHIL # BLD AUTO: 0.2 10E3/UL (ref 0–0.7)
EOSINOPHIL NFR BLD AUTO: 3 %
ERYTHROCYTE [DISTWIDTH] IN BLOOD BY AUTOMATED COUNT: 15.4 % (ref 10–15)
GFR SERPL CREATININE-BSD FRML MDRD: >90 ML/MIN/1.73M2
GLUCOSE SERPL-MCNC: 117 MG/DL (ref 70–99)
HCT VFR BLD AUTO: 39.1 % (ref 40–53)
HGB BLD-MCNC: 13 G/DL (ref 13.3–17.7)
IMM GRANULOCYTES # BLD: 0.1 10E3/UL
IMM GRANULOCYTES NFR BLD: 1 %
LYMPHOCYTES # BLD AUTO: 3.2 10E3/UL (ref 0.8–5.3)
LYMPHOCYTES NFR BLD AUTO: 41 %
MAGNESIUM SERPL-MCNC: 1.8 MG/DL (ref 1.7–2.3)
MCH RBC QN AUTO: 29.5 PG (ref 26.5–33)
MCHC RBC AUTO-ENTMCNC: 33.2 G/DL (ref 31.5–36.5)
MCV RBC AUTO: 89 FL (ref 78–100)
MONOCYTES # BLD AUTO: 0.9 10E3/UL (ref 0–1.3)
MONOCYTES NFR BLD AUTO: 11 %
NEUTROPHILS # BLD AUTO: 3.3 10E3/UL (ref 1.6–8.3)
NEUTROPHILS NFR BLD AUTO: 43 %
NRBC # BLD AUTO: 0 10E3/UL
NRBC BLD AUTO-RTO: 0 /100
PLATELET # BLD AUTO: 308 10E3/UL (ref 150–450)
POTASSIUM SERPL-SCNC: 3.8 MMOL/L (ref 3.4–5.3)
PROT SERPL-MCNC: 6.9 G/DL (ref 6.4–8.3)
RBC # BLD AUTO: 4.41 10E6/UL (ref 4.4–5.9)
SODIUM SERPL-SCNC: 140 MMOL/L (ref 136–145)
WBC # BLD AUTO: 7.8 10E3/UL (ref 4–11)

## 2023-08-28 PROCEDURE — 250N000011 HC RX IP 250 OP 636: Performed by: INTERNAL MEDICINE

## 2023-08-28 PROCEDURE — 96413 CHEMO IV INFUSION 1 HR: CPT

## 2023-08-28 PROCEDURE — 77332 RADIATION TREATMENT AID(S): CPT | Performed by: RADIOLOGY

## 2023-08-28 PROCEDURE — 36415 COLL VENOUS BLD VENIPUNCTURE: CPT

## 2023-08-28 PROCEDURE — 80053 COMPREHEN METABOLIC PANEL: CPT

## 2023-08-28 PROCEDURE — 99214 OFFICE O/P EST MOD 30 MIN: CPT | Performed by: NURSE PRACTITIONER

## 2023-08-28 PROCEDURE — 96375 TX/PRO/DX INJ NEW DRUG ADDON: CPT

## 2023-08-28 PROCEDURE — G0463 HOSPITAL OUTPT CLINIC VISIT: HCPCS | Performed by: NURSE PRACTITIONER

## 2023-08-28 PROCEDURE — 258N000003 HC RX IP 258 OP 636: Performed by: INTERNAL MEDICINE

## 2023-08-28 PROCEDURE — 77332 RADIATION TREATMENT AID(S): CPT | Mod: 26 | Performed by: RADIOLOGY

## 2023-08-28 PROCEDURE — 96367 TX/PROPH/DG ADDL SEQ IV INF: CPT

## 2023-08-28 PROCEDURE — 83735 ASSAY OF MAGNESIUM: CPT

## 2023-08-28 PROCEDURE — 96361 HYDRATE IV INFUSION ADD-ON: CPT

## 2023-08-28 PROCEDURE — 85025 COMPLETE CBC W/AUTO DIFF WBC: CPT

## 2023-08-28 PROCEDURE — 77386 HC IMRT TREATMENT DELIVERY, COMPLEX: CPT | Performed by: RADIOLOGY

## 2023-08-28 RX ORDER — ALBUTEROL SULFATE 0.83 MG/ML
2.5 SOLUTION RESPIRATORY (INHALATION)
Status: CANCELLED | OUTPATIENT
Start: 2023-08-28

## 2023-08-28 RX ORDER — DEXAMETHASONE 4 MG/1
4 TABLET ORAL 2 TIMES DAILY WITH MEALS
Qty: 6 TABLET | Refills: 2 | Status: SHIPPED | OUTPATIENT
Start: 2023-08-28 | End: 2023-08-28

## 2023-08-28 RX ORDER — PALONOSETRON 0.05 MG/ML
0.25 INJECTION, SOLUTION INTRAVENOUS ONCE
Status: CANCELLED | OUTPATIENT
Start: 2023-08-28

## 2023-08-28 RX ORDER — DEXAMETHASONE 4 MG/1
4 TABLET ORAL 2 TIMES DAILY WITH MEALS
Qty: 6 TABLET | Refills: 2 | Status: SHIPPED | OUTPATIENT
Start: 2023-08-28 | End: 2023-10-18

## 2023-08-28 RX ORDER — PALONOSETRON 0.05 MG/ML
0.25 INJECTION, SOLUTION INTRAVENOUS ONCE
Status: COMPLETED | OUTPATIENT
Start: 2023-08-28 | End: 2023-08-28

## 2023-08-28 RX ORDER — EPINEPHRINE 1 MG/ML
0.3 INJECTION, SOLUTION INTRAMUSCULAR; SUBCUTANEOUS EVERY 5 MIN PRN
Status: CANCELLED | OUTPATIENT
Start: 2023-08-28

## 2023-08-28 RX ORDER — ALBUTEROL SULFATE 90 UG/1
1-2 AEROSOL, METERED RESPIRATORY (INHALATION)
Status: CANCELLED
Start: 2023-08-28

## 2023-08-28 RX ORDER — DIPHENHYDRAMINE HYDROCHLORIDE 50 MG/ML
50 INJECTION INTRAMUSCULAR; INTRAVENOUS
Status: CANCELLED
Start: 2023-08-28

## 2023-08-28 RX ORDER — METHYLPREDNISOLONE SODIUM SUCCINATE 125 MG/2ML
125 INJECTION, POWDER, LYOPHILIZED, FOR SOLUTION INTRAMUSCULAR; INTRAVENOUS
Status: CANCELLED
Start: 2023-08-28

## 2023-08-28 RX ORDER — HEPARIN SODIUM (PORCINE) LOCK FLUSH IV SOLN 100 UNIT/ML 100 UNIT/ML
5 SOLUTION INTRAVENOUS
Status: CANCELLED | OUTPATIENT
Start: 2023-08-28

## 2023-08-28 RX ORDER — MEPERIDINE HYDROCHLORIDE 25 MG/ML
25 INJECTION INTRAMUSCULAR; INTRAVENOUS; SUBCUTANEOUS EVERY 30 MIN PRN
Status: CANCELLED | OUTPATIENT
Start: 2023-08-28

## 2023-08-28 RX ORDER — HEPARIN SODIUM,PORCINE 10 UNIT/ML
5-20 VIAL (ML) INTRAVENOUS DAILY PRN
Status: CANCELLED | OUTPATIENT
Start: 2023-08-28

## 2023-08-28 RX ORDER — LORAZEPAM 2 MG/ML
0.5 INJECTION INTRAMUSCULAR EVERY 4 HOURS PRN
Status: CANCELLED | OUTPATIENT
Start: 2023-08-28

## 2023-08-28 RX ADMIN — PALONOSETRON HYDROCHLORIDE 0.25 MG: 0.25 INJECTION INTRAVENOUS at 09:12

## 2023-08-28 RX ADMIN — DEXAMETHASONE SODIUM PHOSPHATE: 10 INJECTION, SOLUTION INTRAMUSCULAR; INTRAVENOUS at 09:08

## 2023-08-28 RX ADMIN — SODIUM CHLORIDE 90 MG: 9 INJECTION, SOLUTION INTRAVENOUS at 10:00

## 2023-08-28 RX ADMIN — MAGNESIUM SULFATE HEPTAHYDRATE: 500 INJECTION, SOLUTION INTRAMUSCULAR; INTRAVENOUS at 09:34

## 2023-08-28 ASSESSMENT — PAIN SCALES - GENERAL: PAINLEVEL: NO PAIN (0)

## 2023-08-28 NOTE — NURSING NOTE
Chief Complaint   Patient presents with    Blood Draw     Labs drawn with PIV start by RN. Pt tolerated well. Vitals taken. Patient checked into next appointment.       Goldie Lovett RN

## 2023-08-28 NOTE — PATIENT INSTRUCTIONS
-- No zofran until Thursday after 10 am  -- Take dexamethasone daily for 3 days starting tomorrow  -- Next chemo/radiation on Tuesday 9/5, clinic will call with appointment time        [facility] Triage and after hours / weekends / holidays:  [phone] option 5, option 2    Please call the triage or after hours line if you experience a temperature greater than or equal to 100.4, shaking chills, have uncontrolled nausea, vomiting and/or diarrhea, dizziness, shortness of breath, chest pain, bleeding, unexplained bruising, or if you have any other new/concerning symptoms, questions or concerns.      If you are having any concerning symptoms or wish to speak to a provider before your next infusion visit, please call triage to notify your care team so we can adequately serve you.     If you need a refill on a narcotic prescription or other medication, please call before your infusion appointment.

## 2023-08-28 NOTE — PROGRESS NOTES
Infusion Nursing Note:  Thierno Vega presents today for C1D1 Cisplatin.    Patient seen by provider today: Yes: Natalie Hull CNP prior to infusion   present during visit today: Yes, Language: New Zealander via phone .     Note: Thierno denied any questions or concerns following his visit with the provider prior to infusion today.     He is aware and in agreement with plan to change to weekly cisplatin with radiation therapy.      Intravenous Access:  Peripheral IV placed.    Treatment Conditions:   Latest Reference Range & Units 08/28/23 07:27   Sodium 136 - 145 mmol/L 140   Potassium 3.4 - 5.3 mmol/L 3.8   Chloride 98 - 107 mmol/L 105   Carbon Dioxide (CO2) 22 - 29 mmol/L 22   Urea Nitrogen 6.0 - 20.0 mg/dL 15.7   Creatinine 0.67 - 1.17 mg/dL 0.82   GFR Estimate >60 mL/min/1.73m2 >90   Calcium 8.6 - 10.0 mg/dL 9.5   Anion Gap 7 - 15 mmol/L 13   Magnesium 1.7 - 2.3 mg/dL 1.8   Albumin 3.5 - 5.2 g/dL 4.5   Protein Total 6.4 - 8.3 g/dL 6.9   Alkaline Phosphatase 40 - 129 U/L 62   ALT 0 - 70 U/L 14   AST 0 - 45 U/L 14   Bilirubin Total <=1.2 mg/dL 0.2   Glucose 70 - 99 mg/dL 117 (H)   WBC 4.0 - 11.0 10e3/uL 7.8   Hemoglobin 13.3 - 17.7 g/dL 13.0 (L)   Hematocrit 40.0 - 53.0 % 39.1 (L)   Platelet Count 150 - 450 10e3/uL 308   RBC Count 4.40 - 5.90 10e6/uL 4.41   MCV 78 - 100 fL 89   MCH 26.5 - 33.0 pg 29.5   MCHC 31.5 - 36.5 g/dL 33.2   RDW 10.0 - 15.0 % 15.4 (H)   % Neutrophils % 43   % Lymphocytes % 41   % Monocytes % 11   % Eosinophils % 3   % Basophils % 1   Absolute Basophils 0.0 - 0.2 10e3/uL 0.1   Absolute Eosinophils 0.0 - 0.7 10e3/uL 0.2   Absolute Immature Granulocytes <=0.4 10e3/uL 0.1   Absolute Lymphocytes 0.8 - 5.3 10e3/uL 3.2   Absolute Monocytes 0.0 - 1.3 10e3/uL 0.9   % Immature Granulocytes % 1   Absolute Neutrophils 1.6 - 8.3 10e3/uL 3.3   Absolute NRBCs 10e3/uL 0.0   NRBCs per 100 WBC <1 /100 0     Results reviewed, labs MET treatment parameters, ok to proceed with  treatment.      Post Infusion Assessment:  Patient tolerated infusion without incident.  Blood return noted pre and post infusion.  Site patent and intact, free from redness, edema or discomfort.  No evidence of extravasations.  Access discontinued per protocol.       Discharge Plan:   Prescription refills given for decadron. Patient stated that he has zofran and compazine at home and did not need refills today.  Discharge instructions reviewed with: Patient.  Patient and/or family verbalized understanding of discharge instructions and all questions answered.  Copy of AVS reviewed with patient and/or family.  Patient will return 9/5 for next appointment.  Patient discharged in stable condition accompanied by: self.  Departure Mode: Ambulatory.      Rosa Haq RN

## 2023-08-28 NOTE — PROGRESS NOTES
Reason for Visit: seen in follow-up of nasopharyngeal cancer    Oncology HPI:   CANCER TYPE: SCC nasopharynx, GALLITO -jeanna  STAGE: cT2N2 vs N3 (III vs MARYSOL)  ECOG PS: 0     PD-L1:  NGS:      SUMMARY  4/30/23               US. Bilateral necrotic nodes  5/3/23                 FNA cervical adenopathy - undifferentiated carcinoma  5/4/23                 CT CAP. 7 mm pulmonary nodule, no metastases  5/5/23                 MRI bilateral cervical and retropharyngeal adenopathy     Came to US  6/12/23               US guided FNA L neck node in clinic (Dr. Fisher). Path: hypocellular with scant clusters of atypical squamous cells suspicious for malignancy, p40 +jeanna, GALLITO -jeanna  6/13/23               PET/CT. Intense uptake L Rosenmuller fossa with increased fullness (SUV 18.8). Skull base intact. Milder FDG uptake (SUV 6.1) by the prominent R Rosenmuller fossa, inflammatory vs malignant. Indeterminate bilateral intense palatine tonsillar uptake with fullness on CT, inflammatroy vs infectious vs tumor, discontinuous with the nasopharyngeal abnormality. Bilateral nodes, including level 2a, 2b, 3, 3/5, R 1.8 cm 2A, another level 2/3 nodes, FDG avid retropharyngeal nodes. 0.3 cm nodule on the R major fissure. 0.8 cm R inguinal node (SUV 3.9) with additional nonenlarged LNs with minimal uptake, suggestive of reactive adenopathy. Marked focal uptake along the R upper inner thigh with skin thickening (SUV 16.8), which may represent cutaneous infection/inflammation, recommend direct visualization  6/27/23   Induction chemotherapy with cisplatin gemcitabine x 2 cycles with SD  8/28/23:  concurrent chemoradiation with weekly cisplatin    Interval history:   Thierno's visit is conducted with a professional . He is feeling well. Energy and appetite have been good. No nausea currently. Nausea was minimal when on prior chemotherapy with cisplatin/gemzar. Hearing is normal at this time. No neuropathy. Eating/drinking well. No fever or  infection concerns. No cough or shortness of breath. No headaches, vision changes. Bowel/bladder function is wnl.    Current Outpatient Medications   Medication Sig Dispense Refill    amLODIPine 5 MG TABS 5 mg, valsartan 160 MG TABS 160 mg Take by mouth daily      dexamethasone (DECADRON) 4 MG tablet Take 1 tablet (4 mg) by mouth 2 times daily (with meals) for 2 days Take 2 tablets (8 mg) daily for 3 days. Start on Day 2 after chemotherapy. 6 tablet 2    gabapentin (NEURONTIN) 300 MG capsule Take 3 capsules (900 mg) by mouth 3 times daily Taper dose up to 900 mg po tid per instructions given in Radiation Oncology clinic 270 capsule 4    ondansetron (ZOFRAN) 8 MG tablet Take 1 tablet (8 mg) by mouth every 8 hours as needed for nausea (vomiting) (Patient not taking: Reported on 8/9/2023) 30 tablet 5    prochlorperazine (COMPAZINE) 10 MG tablet Take 1 tablet (10 mg) by mouth every 6 hours as needed for nausea or vomiting (Patient not taking: Reported on 8/9/2023) 30 tablet 5        No Known Allergies      Exam: alert, appears well.  Blood pressure 118/75, pulse 68, temperature 98.4  F (36.9  C), temperature source Oral, resp. rate 16, weight 107.4 kg (236 lb 12.8 oz), SpO2 98 %.  Wt Readings from Last 4 Encounters:   08/11/23 106.2 kg (234 lb 3.2 oz)   08/09/23 105.7 kg (233 lb 1.6 oz)   07/26/23 104.1 kg (229 lb 9.6 oz)   07/19/23 105.1 kg (231 lb 9.6 oz)   Oropharynx is moist and without lesion. Neck supple, L cervical node 1.5 cm.  Lungs:CTA. Heart: RRR, no murmur or rub. Abdomen: soft, nontender, BS active, no masses or organomegaly.  Extremities: warm, no edema. Speech is clear. CN wnl. Gait/station wnl. Skin: no rash on exposed skin.       Labs:    Latest Reference Range & Units 08/28/23 07:27   Sodium 136 - 145 mmol/L 140   Potassium 3.4 - 5.3 mmol/L 3.8   Chloride 98 - 107 mmol/L 105   Carbon Dioxide (CO2) 22 - 29 mmol/L 22   Urea Nitrogen 6.0 - 20.0 mg/dL 15.7   Creatinine 0.67 - 1.17 mg/dL 0.82   GFR Estimate  >60 mL/min/1.73m2 >90   Calcium 8.6 - 10.0 mg/dL 9.5   Anion Gap 7 - 15 mmol/L 13   Magnesium 1.7 - 2.3 mg/dL 1.8   Albumin 3.5 - 5.2 g/dL 4.5   Protein Total 6.4 - 8.3 g/dL 6.9   Alkaline Phosphatase 40 - 129 U/L 62   ALT 0 - 70 U/L 14   AST 0 - 45 U/L 14   Bilirubin Total <=1.2 mg/dL 0.2   Glucose 70 - 99 mg/dL 117 (H)   WBC 4.0 - 11.0 10e3/uL 7.8   Hemoglobin 13.3 - 17.7 g/dL 13.0 (L)   Hematocrit 40.0 - 53.0 % 39.1 (L)   Platelet Count 150 - 450 10e3/uL 308   RBC Count 4.40 - 5.90 10e6/uL 4.41   MCV 78 - 100 fL 89   MCH 26.5 - 33.0 pg 29.5   MCHC 31.5 - 36.5 g/dL 33.2   RDW 10.0 - 15.0 % 15.4 (H)   % Neutrophils % 43   % Lymphocytes % 41   % Monocytes % 11   % Eosinophils % 3   % Basophils % 1   Absolute Basophils 0.0 - 0.2 10e3/uL 0.1   Absolute Eosinophils 0.0 - 0.7 10e3/uL 0.2   Absolute Immature Granulocytes <=0.4 10e3/uL 0.1   Absolute Lymphocytes 0.8 - 5.3 10e3/uL 3.2   Absolute Monocytes 0.0 - 1.3 10e3/uL 0.9   % Immature Granulocytes % 1   Absolute Neutrophils 1.6 - 8.3 10e3/uL 3.3   Absolute NRBCs 10e3/uL 0.0   NRBCs per 100 WBC <1 /100 0   (H): Data is abnormally high  (L): Data is abnormally low    Imaging: n/a    Impression/plan:   Nasopharyngeal carcinoma, cT2N2 (III):  with SD after 2 cycles of cisplatin/gemcitabine  -planning to start concurrent chemoradiation with weekly cisplatin today. Reviewed the plan including side effects and management  -tolerated the prior treatment well.   -he appears to have a good understanding of the plan and is well today. Labs reviewed and stable. Will proceed with treatment as planned.  -will continue weekly ALON follow-up through chemoradiation    CASSIUS  On po dex 4 mg bid on days 2 and 3, zofran 8 mg bid on days 2-5. No aloxi, high cost d/t no insurance coverage    Hearing changes/tinnitus: No changes since starting cisplatin. Had normal hearing test prior to starting. Continue to monitor.

## 2023-08-28 NOTE — NURSING NOTE
"Oncology Rooming Note    August 28, 2023 7:46 AM   Thierno Vega is a 38 year old male who presents for:    Chief Complaint   Patient presents with    Blood Draw     Labs drawn with PIV start by RN. Pt tolerated well. Vitals taken. Patient checked into next appointment.      Oncology Clinic Visit     [facility] RETURN - NASOPHARYNGEAL CANCER     Initial Vitals: /75 (BP Location: Right arm, Patient Position: Sitting, Cuff Size: Adult Regular)   Pulse 68   Temp 98.4  F (36.9  C) (Oral)   Resp 16   Wt 107.4 kg (236 lb 12.8 oz)   SpO2 98%   BMI 32.07 kg/m   Estimated body mass index is 32.07 kg/m  as calculated from the following:    Height as of 8/11/23: 1.83 m (6' 0.05\").    Weight as of this encounter: 107.4 kg (236 lb 12.8 oz). Body surface area is 2.34 meters squared.  No Pain (0) Comment: Data Unavailable   No LMP for male patient.  Allergies reviewed: Yes  Medications reviewed: Yes    Medications: Medication refills not needed today.  Pharmacy name entered into [email or web address]:    [id number] - [place], [place] - [address] AT [facility] [address]  [facility] - [place], [place] - [address] [facility]    Keith Corado LPN              "

## 2023-08-28 NOTE — LETTER
8/28/2023         RE: Thierno Vega  [address]  [address]  [place] [facility] [place] [address]        Dear Colleague,    Thank you for referring your patient, Thierno Vega, to the [facility] CANCER [facility]. Please see a copy of my visit note below.    Reason for Visit: seen in follow-up of nasopharyngeal cancer    Oncology HPI:   CANCER TYPE: SCC nasopharynx, GALLITO -jeanna  STAGE: cT2N2 vs N3 (III vs MARYSOL)  ECOG PS: 0     PD-L1:  NGS:      SUMMARY  4/30/23               US. Bilateral necrotic nodes  5/3/23                 FNA cervical adenopathy - undifferentiated carcinoma  5/4/23                 CT CAP. 7 mm pulmonary nodule, no metastases  5/5/23                 MRI bilateral cervical and retropharyngeal adenopathy     Came to US  6/12/23               US guided FNA L neck node in clinic (Dr. Fisher). Path: hypocellular with scant clusters of atypical squamous cells suspicious for malignancy, p40 +jeanna, GALLITO -jeanna  6/13/23               PET/CT. Intense uptake L Rosenmuller fossa with increased fullness (SUV 18.8). Skull base intact. Milder FDG uptake (SUV 6.1) by the prominent R Rosenmuller fossa, inflammatory vs malignant. Indeterminate bilateral intense palatine tonsillar uptake with fullness on CT, inflammatroy vs infectious vs tumor, discontinuous with the nasopharyngeal abnormality. Bilateral nodes, including level 2a, 2b, 3, 3/5, R 1.8 cm 2A, another level 2/3 nodes, FDG avid retropharyngeal nodes. 0.3 cm nodule on the R major fissure. 0.8 cm R inguinal node (SUV 3.9) with additional nonenlarged LNs with minimal uptake, suggestive of reactive adenopathy. Marked focal uptake along the R upper inner thigh with skin thickening (SUV 16.8), which may represent cutaneous infection/inflammation, recommend direct visualization  6/27/23   Induction chemotherapy with cisplatin gemcitabine x 2 cycles with SD  8/28/23:  concurrent chemoradiation with weekly cisplatin    Interval history:   Thierno's visit is  conducted with a professional . He is feeling well. Energy and appetite have been good. No nausea currently. Nausea was minimal when on prior chemotherapy with cisplatin/gemzar. Hearing is normal at this time. No neuropathy. Eating/drinking well. No fever or infection concerns. No cough or shortness of breath. No headaches, vision changes. Bowel/bladder function is wnl.    Current Outpatient Medications   Medication Sig Dispense Refill    amLODIPine 5 MG TABS 5 mg, valsartan 160 MG TABS 160 mg Take by mouth daily      dexamethasone (DECADRON) 4 MG tablet Take 1 tablet (4 mg) by mouth 2 times daily (with meals) for 2 days Take 2 tablets (8 mg) daily for 3 days. Start on Day 2 after chemotherapy. 6 tablet 2    gabapentin (NEURONTIN) 300 MG capsule Take 3 capsules (900 mg) by mouth 3 times daily Taper dose up to 900 mg po tid per instructions given in Radiation Oncology clinic 270 capsule 4    ondansetron (ZOFRAN) 8 MG tablet Take 1 tablet (8 mg) by mouth every 8 hours as needed for nausea (vomiting) (Patient not taking: Reported on 8/9/2023) 30 tablet 5    prochlorperazine (COMPAZINE) 10 MG tablet Take 1 tablet (10 mg) by mouth every 6 hours as needed for nausea or vomiting (Patient not taking: Reported on 8/9/2023) 30 tablet 5        No Known Allergies      Exam: alert, appears well.  Blood pressure 118/75, pulse 68, temperature 98.4  F (36.9  C), temperature source Oral, resp. rate 16, weight 107.4 kg (236 lb 12.8 oz), SpO2 98 %.  Wt Readings from Last 4 Encounters:   08/11/23 106.2 kg (234 lb 3.2 oz)   08/09/23 105.7 kg (233 lb 1.6 oz)   07/26/23 104.1 kg (229 lb 9.6 oz)   07/19/23 105.1 kg (231 lb 9.6 oz)   Oropharynx is moist and without lesion. Neck supple, L cervical node 1.5 cm.  Lungs:CTA. Heart: RRR, no murmur or rub. Abdomen: soft, nontender, BS active, no masses or organomegaly.  Extremities: warm, no edema. Speech is clear. CN wnl. Gait/station wnl. Skin: no rash on exposed skin.       Labs:     Latest Reference Range & Units 08/28/23 07:27   Sodium 136 - 145 mmol/L 140   Potassium 3.4 - 5.3 mmol/L 3.8   Chloride 98 - 107 mmol/L 105   Carbon Dioxide (CO2) 22 - 29 mmol/L 22   Urea Nitrogen 6.0 - 20.0 mg/dL 15.7   Creatinine 0.67 - 1.17 mg/dL 0.82   GFR Estimate >60 mL/min/1.73m2 >90   Calcium 8.6 - 10.0 mg/dL 9.5   Anion Gap 7 - 15 mmol/L 13   Magnesium 1.7 - 2.3 mg/dL 1.8   Albumin 3.5 - 5.2 g/dL 4.5   Protein Total 6.4 - 8.3 g/dL 6.9   Alkaline Phosphatase 40 - 129 U/L 62   ALT 0 - 70 U/L 14   AST 0 - 45 U/L 14   Bilirubin Total <=1.2 mg/dL 0.2   Glucose 70 - 99 mg/dL 117 (H)   WBC 4.0 - 11.0 10e3/uL 7.8   Hemoglobin 13.3 - 17.7 g/dL 13.0 (L)   Hematocrit 40.0 - 53.0 % 39.1 (L)   Platelet Count 150 - 450 10e3/uL 308   RBC Count 4.40 - 5.90 10e6/uL 4.41   MCV 78 - 100 fL 89   MCH 26.5 - 33.0 pg 29.5   MCHC 31.5 - 36.5 g/dL 33.2   RDW 10.0 - 15.0 % 15.4 (H)   % Neutrophils % 43   % Lymphocytes % 41   % Monocytes % 11   % Eosinophils % 3   % Basophils % 1   Absolute Basophils 0.0 - 0.2 10e3/uL 0.1   Absolute Eosinophils 0.0 - 0.7 10e3/uL 0.2   Absolute Immature Granulocytes <=0.4 10e3/uL 0.1   Absolute Lymphocytes 0.8 - 5.3 10e3/uL 3.2   Absolute Monocytes 0.0 - 1.3 10e3/uL 0.9   % Immature Granulocytes % 1   Absolute Neutrophils 1.6 - 8.3 10e3/uL 3.3   Absolute NRBCs 10e3/uL 0.0   NRBCs per 100 WBC <1 /100 0   (H): Data is abnormally high  (L): Data is abnormally low    Imaging: n/a    Impression/plan:   Nasopharyngeal carcinoma, cT2N2 (III):  with SD after 2 cycles of cisplatin/gemcitabine  -planning to start concurrent chemoradiation with weekly cisplatin today. Reviewed the plan including side effects and management  -tolerated the prior treatment well.   -he appears to have a good understanding of the plan and is well today. Labs reviewed and stable. Will proceed with treatment as planned.  -will continue weekly ALON follow-up through chemoradiation    CASSIUS  On po dex 4 mg bid on days 2 and 3, zofran 8 mg  bid on days 2-5. No aloxi, high cost d/t no insurance coverage    Hearing changes/tinnitus: No changes since starting cisplatin. Had normal hearing test prior to starting. Continue to monitor.        Sincerely,        SRI Koroma CNP

## 2023-08-29 ENCOUNTER — OFFICE VISIT (OUTPATIENT)
Dept: RADIATION ONCOLOGY | Facility: CLINIC | Age: 38
End: 2023-08-29
Attending: RADIOLOGY
Payer: COMMERCIAL

## 2023-08-29 VITALS — BODY MASS INDEX: 31.97 KG/M2 | WEIGHT: 236 LBS

## 2023-08-29 DIAGNOSIS — C11.9 NASOPHARYNGEAL CANCER (H): Primary | ICD-10-CM

## 2023-08-29 PROCEDURE — 77386 HC IMRT TREATMENT DELIVERY, COMPLEX: CPT | Performed by: RADIOLOGY

## 2023-08-29 NOTE — LETTER
2023         RE: Thierno Vega  [address]  [address]  [facility] [address]        Dear Colleague,    Thank you for referring your patient, Thierno Vega, to the [facility] RADIATION ONCOLOGY. Please see a copy of my visit note below.    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 Vega presents to clinic today for his weekly on-treatment visit.  He is tolerating treatment well so far.  He has no concerns today.    Nursing ROS:   Nutrition Alteration  Diet Type: Patient's Preference  Skin  Skin Reaction: 0 - No changes     ENT and Mouth Exam  Mucositis - Current: 0 - None      Gastrointestinal  Nausea: 0 - None     Pain Assessment  0-10 Pain Scale: 0    PEG Tube: No  Electronic Cardiac Implant: No    Objective:   Wt 107 kg (236 lb)   BMI 31.97 kg/m    Gen: Appears well, NAD  HEENT: No mucositis,  Skin: No erythema    Laboratory:  Lab Results   Component Value Date    WBC 7.8 2023    HGB 13.0 (L) 2023    HCT 39.1 (L) 2023    MCV 89 2023     2023     Lab Results   Component Value Date     2023    POTASSIUM 3.8 2023    CHLORIDE 105 2023    CO2 22 2023     (H) 2023     Magnesium   Date Value Ref Range Status   2023 1.8 1.7 - 2.3 mg/dL Final       Treatment-related toxicities (CTCAE v5.0):  Anorexia: Grade 0: No toxicity  Fatigue: Grade 0: No toxicity  Nausea: Grade 0: No toxicity  Pain: Grade 0: No toxicity  Dry mouth: Grade 0: No toxicity  Dysphagia:  Grade 0: No toxicity  Mucositis: Grade 0: No toxicity  Dermatitis: Grade 0: No toxicity    ED visits/Hospitalizations: None    Missed Treatments: None    Mosaiq chart and setup information reviewed  IGRT images reviewed    Medication Review  Med list reviewed with patient?: Yes  Med list printed and given: Yes    Assessment:    Mr. 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  He has received neoadjuvant chemotherapy and is now undergoing concurrent chemoradiation.  He is tolerating treatment well.    Plan:   1.  Continue treatment as planned      Yessica Parada  Department of Radiation Oncology  [facility]

## 2023-08-29 NOTE — PROGRESS NOTES
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 Vega presents to clinic today for his weekly on-treatment visit.  He is tolerating treatment well so far.  He has no concerns today.    Nursing ROS:   Nutrition Alteration  Diet Type: Patient's Preference  Skin  Skin Reaction: 0 - No changes     ENT and Mouth Exam  Mucositis - Current: 0 - None      Gastrointestinal  Nausea: 0 - None     Pain Assessment  0-10 Pain Scale: 0    PEG Tube: No  Electronic Cardiac Implant: No    Objective:   Wt 107 kg (236 lb)   BMI 31.97 kg/m    Gen: Appears well, NAD  HEENT: No mucositis,  Skin: No erythema    Laboratory:  Lab Results   Component Value Date    WBC 7.8 2023    HGB 13.0 (L) 2023    HCT 39.1 (L) 2023    MCV 89 2023     2023     Lab Results   Component Value Date     2023    POTASSIUM 3.8 2023    CHLORIDE 105 2023    CO2 22 2023     (H) 2023     Magnesium   Date Value Ref Range Status   2023 1.8 1.7 - 2.3 mg/dL Final       Treatment-related toxicities (CTCAE v5.0):  Anorexia: Grade 0: No toxicity  Fatigue: Grade 0: No toxicity  Nausea: Grade 0: No toxicity  Pain: Grade 0: No toxicity  Dry mouth: Grade 0: No toxicity  Dysphagia: Grade 0: No toxicity  Mucositis: Grade 0: No toxicity  Dermatitis: Grade 0: No toxicity    ED visits/Hospitalizations: None    Missed Treatments: None    Mosaiq chart and setup information reviewed  IGRT images reviewed    Medication Review  Med list reviewed with  patient?: Yes  Med list printed and given: Yes    Assessment:    Mr. 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  He has received neoadjuvant chemotherapy and is now undergoing concurrent chemoradiation.  He is tolerating treatment well.    Plan:   1.  Continue treatment as planned      Yessica Parada  Department of Radiation Oncology  [facility]

## 2023-08-30 ENCOUNTER — APPOINTMENT (OUTPATIENT)
Dept: RADIATION ONCOLOGY | Facility: CLINIC | Age: 38
End: 2023-08-30
Attending: RADIOLOGY
Payer: COMMERCIAL

## 2023-08-30 PROCEDURE — 77386 HC IMRT TREATMENT DELIVERY, COMPLEX: CPT | Performed by: RADIOLOGY

## 2023-08-31 ENCOUNTER — ALLIED HEALTH/NURSE VISIT (OUTPATIENT)
Dept: RADIATION ONCOLOGY | Facility: CLINIC | Age: 38
End: 2023-08-31
Attending: STUDENT IN AN ORGANIZED HEALTH CARE EDUCATION/TRAINING PROGRAM
Payer: COMMERCIAL

## 2023-08-31 ENCOUNTER — APPOINTMENT (OUTPATIENT)
Dept: RADIATION ONCOLOGY | Facility: CLINIC | Age: 38
End: 2023-08-31
Attending: RADIOLOGY
Payer: COMMERCIAL

## 2023-08-31 DIAGNOSIS — C11.9 NASOPHARYNGEAL CANCER (H): Primary | ICD-10-CM

## 2023-08-31 PROCEDURE — 77386 HC IMRT TREATMENT DELIVERY, COMPLEX: CPT | Performed by: RADIOLOGY

## 2023-08-31 PROCEDURE — 97802 MEDICAL NUTRITION INDIV IN: CPT | Mod: 59 | Performed by: DIETITIAN, REGISTERED

## 2023-08-31 NOTE — PROGRESS NOTES
"CLINICAL NUTRITION SERVICES - ASSESSMENT NOTE    Thierno Vega 38 year old referred for MNT related to nasopharyngeal cancer    Time Spent: 15 minutes  Visit Type: initial, seen in radiation clinic  Pt accompanied by: Azeri  via phone   Referring Physician: Tal  C11.9 (ICD-10-CM) - Nasopharyngeal cancer (H)     NUTRITION HISTORY  Factors affecting nutrition intake include: none at this time  Current diet/appetite: general diet/good appetite and intake  Chemotherapy: Cisplatin - started 8/28  Radiation: Started 8/28  PEG tube: No; not discussed at this visit    Thierno denies difficulty eating at this time.   He tells me that he has been eating a 'normal' diet.     Treatment Plan:  Oncology History   Nasopharyngeal cancer (H)   6/22/2023 Initial Diagnosis    Nasopharyngeal cancer (H)     6/27/2023 - 8/11/2023 Chemotherapy    OP ONC  Head and Neck Cancer - Gemcitabine / CISplatin then CISplatin with RT  Plan Provider: Catrachita Clark MD  Treatment goal: Curative  Line of treatment: [No plan line of treatment]     8/28/2023 -  Chemotherapy    OP ONC Head and Neck Cancer - (low dose) CISplatin + Radiation   Plan Provider: Catrachita Clark MD  Treatment goal: Curative  Line of treatment: First Line       Treatment plan has been reviewed.    ANTHROPOMETRICS  Height: 6'0\"  Weight: 236 lbs/107kg  BMI: 31  Weight Status:  Obesity Grade I BMI 30-34.9  IBW: 178 lbs (132%)  Weight History: stable   Wt Readings from Last 10 Encounters:   08/29/23 107 kg (236 lb)   08/28/23 107.4 kg (236 lb 12.8 oz)   08/11/23 106.2 kg (234 lb 3.2 oz)   08/09/23 105.7 kg (233 lb 1.6 oz)   07/26/23 104.1 kg (229 lb 9.6 oz)   07/19/23 105.1 kg (231 lb 9.6 oz)   07/18/23 103.4 kg (228 lb)   06/27/23 107.2 kg (236 lb 4.8 oz)   06/22/23 108.4 kg (239 lb)   06/16/23 108 kg (238 lb)     Dosing Weight: 87kg    Medications/vitamins/minerals/herbals:   Reviewed    Labs:   Labs reviewed    NUTRITION FOCUSED PHYSICAL ASSESSMENT FOR DIAGNOSING " MALNUTRITION:  Consult for education only      ASSESSED NUTRITION NEEDS:  Estimated Energy Needs: 2600 kcals (30 Kcal/Kg)  Justification: maintenance  Estimated Protein Needs: 100 grams protein (1.2 g pro/Kg)  Justification: increased needs with CRT  Estimated Fluid Needs: 7674-4412  mL   Justification: increased needs with Cisplatin    NUTRITION DIAGNOSIS:  Predicted suboptimal nutrient intake related to cancer treatment to head/neck region     INTERVENTIONS  Provided written & verbal education:     - Reviewed nutrition and hydration needs.   Advised pt to aim for at least 2600kcal and 100g protein daily.    Advised pt to aim for 8-10 cups non-caffeine containing beverages (water/electrolytes) daily.    - Discussed strategies to help fortify meals and snacks. Encouraged to focus on small, frequent meals.    - Reviewed sources of protein. Encouraged to have a protein source with each meal and snack.  Encouraged this for continuation of healing during cancer treatment.  - Reviewed common barriers to eating with cancer treatment.  Discussed ways to cope with dysphagia and odynphagia.     Provided pt with corresponding education materials/handouts on:  Academy of [employer] and Dietetics High vickie/High protein recipes, Academy of [employer] and Dietetics High protein list, Sources of Protein, Soft/moist High protein foods, Tips to increase calories in your diet    Pt verbalize understanding of materials provided during consult.   Patient Understanding: good  Expected patient engagement: godo    Goals  1.  Aim for 5-6 small frequent meals  2.  Aim for 2600kcal and 100g protein, 10 cups water electrolytes per day   3. Weight maintenance     Follow-Up Plans: Pt has RD contact information for questions.  Scheduled 2 week follow up on 9/19    MONITORING AND EVALUATION:  -Food/beverage intake  -Weight trends    Nae Hicks RD, , LD

## 2023-09-01 ENCOUNTER — APPOINTMENT (OUTPATIENT)
Dept: RADIATION ONCOLOGY | Facility: CLINIC | Age: 38
End: 2023-09-01
Attending: RADIOLOGY
Payer: COMMERCIAL

## 2023-09-01 PROCEDURE — 77386 HC IMRT TREATMENT DELIVERY, COMPLEX: CPT | Performed by: RADIOLOGY

## 2023-09-01 PROCEDURE — 77014 PR CT GUIDE FOR PLACEMENT RADIATION THERAPY FIELDS: CPT | Mod: 26 | Performed by: RADIOLOGY

## 2023-09-01 PROCEDURE — 77336 RADIATION PHYSICS CONSULT: CPT | Performed by: RADIOLOGY

## 2023-09-03 NOTE — PROGRESS NOTES
Oncology Progress Note  Sep 5, 2023    Reason for Visit: seen in follow-up of nasopharyngeal cancer    Oncology HPI:   CANCER TYPE: SCC nasopharynx, GALLITO -jeanna  STAGE: cT2N2 vs N3 (III vs MARYSOL)  ECOG PS: 0     PD-L1:  NGS:      SUMMARY  4/30/23               US. Bilateral necrotic nodes  5/3/23                 FNA cervical adenopathy - undifferentiated carcinoma  5/4/23                 CT CAP. 7 mm pulmonary nodule, no metastases  5/5/23                 MRI bilateral cervical and retropharyngeal adenopathy     Came to US  6/12/23               US guided FNA L neck node in clinic (Dr. Fisher). Path: hypocellular with scant clusters of atypical squamous cells suspicious for malignancy, p40 +jeanna, GALLITO -jeanna  6/13/23               PET/CT. Intense uptake L Rosenmuller fossa with increased fullness (SUV 18.8). Skull base intact. Milder FDG uptake (SUV 6.1) by the prominent R Rosenmuller fossa, inflammatory vs malignant. Indeterminate bilateral intense palatine tonsillar uptake with fullness on CT, inflammatroy vs infectious vs tumor, discontinuous with the nasopharyngeal abnormality. Bilateral nodes, including level 2a, 2b, 3, 3/5, R 1.8 cm 2A, another level 2/3 nodes, FDG avid retropharyngeal nodes. 0.3 cm nodule on the R major fissure. 0.8 cm R inguinal node (SUV 3.9) with additional nonenlarged LNs with minimal uptake, suggestive of reactive adenopathy. Marked focal uptake along the R upper inner thigh with skin thickening (SUV 16.8), which may represent cutaneous infection/inflammation, recommend direct visualization  6/27/23   Induction chemotherapy with cisplatin gemcitabine x 2 cycles with SD  8/28/23:  concurrent chemoradiation with weekly cisplatin    Interval history:   Moshanti's visit is conducted with a professional . He is seen today prior to day 8 weekly cisplatin.    Tolerated chemo last week well  Tinnitus in left ear flared, intermittent ringing  Eating well despite appetite and taste changes  No  nausea with using oral dex  Gabapentin 900 mg TID, hasn't been using every day. Was unclear of schedule and which days it should be taken with radiation    Current Outpatient Medications   Medication Sig Dispense Refill    amLODIPine 5 MG TABS 5 mg, valsartan 160 MG TABS 160 mg Take 5 mg by mouth daily      gabapentin (NEURONTIN) 300 MG capsule Take 3 capsules (900 mg) by mouth 3 times daily Taper dose up to 900 mg po tid per instructions given in Radiation Oncology clinic 270 capsule 4    dexAMETHasone (DECADRON) 4 MG tablet Take 1 tablet (4 mg) by mouth 2 times daily (with meals) Take 4 mg bid x 3 days Start on Day 2 after chemotherapy. 6 tablet 2    ondansetron (ZOFRAN) 8 MG tablet Take 1 tablet (8 mg) by mouth every 8 hours as needed for nausea (vomiting) 30 tablet 5    prochlorperazine (COMPAZINE) 10 MG tablet Take 1 tablet (10 mg) by mouth every 6 hours as needed for nausea or vomiting 30 tablet 5        No Known Allergies      Exam: Blood pressure 112/73, pulse 63, temperature 98.7  F (37.1  C), temperature source Oral, resp. rate 18, weight 108 kg (238 lb), SpO2 100 %.  Wt Readings from Last 4 Encounters:   09/05/23 108 kg (238 lb)   08/29/23 107 kg (236 lb)   08/28/23 107.4 kg (236 lb 12.8 oz)   08/11/23 106.2 kg (234 lb 3.2 oz)   Gen: NAD  Oropharynx is moist and without lesion. 1.5 cm L cervical LN  Lungs: CTA, no wheezes  Heart: RRR, no m/r   Extremities: warm, no edema. Speech is clear. CN wnl. Gait/station wnl.   Skin: no rash on exposed skin     Labs:   Most Recent 3 CBC's:  Recent Labs   Lab Test 09/05/23  0905 08/28/23  0727 08/11/23  0647   WBC 7.2 7.8 8.6   HGB 12.6* 13.0* 13.9   MCV 88 89 87    308 305   ANEUTAUTO 4.5 3.3 3.5    Most Recent 3 BMP's:  Recent Labs   Lab Test 09/05/23  0905 08/28/23  0727 08/11/23  0647    140 140   POTASSIUM 4.6 3.8 3.7   CHLORIDE 103 105 104   CO2 26 22 23   BUN 17.3 15.7 13.5   CR 0.87 0.82 0.78   ANIONGAP 9 13 13   SOTO 9.6 9.5 9.4   GLC 91 117* 124*    PROTTOTAL 7.0 6.9 6.9   ALBUMIN 4.5 4.5 4.5    Most Recent 2 LFT's:  Recent Labs   Lab Test 09/05/23  0905 08/28/23  0727   AST 12 14   ALT 17 14   ALKPHOS 57 62   BILITOTAL 0.2 0.2    Most Recent TSH and T4:  Recent Labs   Lab Test 06/22/23  1433   TSH 0.88     Phos/Mag:  Lab Results   Component Value Date    MAG 2.1 09/05/2023    MAG 1.8 08/28/2023    MAG 1.8 08/11/2023      I reviewed the above labs today.    Imaging: n/a    Impression/plan:   Nasopharyngeal carcinoma, cT2N2 (III):  with SD after 2 cycles of cisplatin/gemcitabine. Now on concurrent chemoradiation with weekly cisplatin, tolerating well with flare of L tinnitus. Labs stable.  -proceed with day 8 cisplatin today  -will continue weekly ALON follow-up through chemoradiation    CASSIUS  Well controlled with PO dex 4 mg bid on days 2 and 3. Continue with day 8 and reassess next week.    Hearing changes/tinnitus: Noticed flare of intermittent tinnitus after starting weekly cisplatin last week. Had normal hearing test prior to starting. Continue to monitor.      30 minutes spent on the date of the encounter doing chart review, review of test results, interpretation of tests, patient visit, and documentation     Janis Cordova CNP

## 2023-09-05 ENCOUNTER — ONCOLOGY VISIT (OUTPATIENT)
Dept: ONCOLOGY | Facility: CLINIC | Age: 38
End: 2023-09-05
Attending: REGISTERED NURSE
Payer: COMMERCIAL

## 2023-09-05 ENCOUNTER — APPOINTMENT (OUTPATIENT)
Dept: RADIATION ONCOLOGY | Facility: CLINIC | Age: 38
End: 2023-09-05
Attending: RADIOLOGY
Payer: COMMERCIAL

## 2023-09-05 ENCOUNTER — INFUSION THERAPY VISIT (OUTPATIENT)
Dept: ONCOLOGY | Facility: CLINIC | Age: 38
End: 2023-09-05
Attending: INTERNAL MEDICINE
Payer: COMMERCIAL

## 2023-09-05 ENCOUNTER — APPOINTMENT (OUTPATIENT)
Dept: LAB | Facility: CLINIC | Age: 38
End: 2023-09-05
Attending: REGISTERED NURSE
Payer: COMMERCIAL

## 2023-09-05 VITALS
OXYGEN SATURATION: 100 % | BODY MASS INDEX: 32.24 KG/M2 | DIASTOLIC BLOOD PRESSURE: 73 MMHG | WEIGHT: 238 LBS | SYSTOLIC BLOOD PRESSURE: 112 MMHG | TEMPERATURE: 98.7 F | HEART RATE: 63 BPM | RESPIRATION RATE: 18 BRPM

## 2023-09-05 DIAGNOSIS — E83.42 HYPOMAGNESEMIA: Primary | ICD-10-CM

## 2023-09-05 DIAGNOSIS — C11.9 NASOPHARYNGEAL CANCER (H): Primary | ICD-10-CM

## 2023-09-05 DIAGNOSIS — H93.12 TINNITUS, LEFT: ICD-10-CM

## 2023-09-05 DIAGNOSIS — E83.42 HYPOMAGNESEMIA: ICD-10-CM

## 2023-09-05 DIAGNOSIS — C11.9 NASOPHARYNGEAL CANCER (H): ICD-10-CM

## 2023-09-05 LAB
ALBUMIN SERPL BCG-MCNC: 4.5 G/DL (ref 3.5–5.2)
ALP SERPL-CCNC: 57 U/L (ref 40–129)
ALT SERPL W P-5'-P-CCNC: 17 U/L (ref 0–70)
ANION GAP SERPL CALCULATED.3IONS-SCNC: 9 MMOL/L (ref 7–15)
AST SERPL W P-5'-P-CCNC: 12 U/L (ref 0–45)
BASOPHILS # BLD AUTO: 0.1 10E3/UL (ref 0–0.2)
BASOPHILS NFR BLD AUTO: 1 %
BILIRUB SERPL-MCNC: 0.2 MG/DL
BUN SERPL-MCNC: 17.3 MG/DL (ref 6–20)
CALCIUM SERPL-MCNC: 9.6 MG/DL (ref 8.6–10)
CHLORIDE SERPL-SCNC: 103 MMOL/L (ref 98–107)
CREAT SERPL-MCNC: 0.87 MG/DL (ref 0.67–1.17)
DEPRECATED HCO3 PLAS-SCNC: 26 MMOL/L (ref 22–29)
EOSINOPHIL # BLD AUTO: 0.2 10E3/UL (ref 0–0.7)
EOSINOPHIL NFR BLD AUTO: 3 %
ERYTHROCYTE [DISTWIDTH] IN BLOOD BY AUTOMATED COUNT: 15.9 % (ref 10–15)
GFR SERPL CREATININE-BSD FRML MDRD: >90 ML/MIN/1.73M2
GLUCOSE SERPL-MCNC: 91 MG/DL (ref 70–99)
HCT VFR BLD AUTO: 37.4 % (ref 40–53)
HGB BLD-MCNC: 12.6 G/DL (ref 13.3–17.7)
IMM GRANULOCYTES # BLD: 0.3 10E3/UL
IMM GRANULOCYTES NFR BLD: 4 %
LYMPHOCYTES # BLD AUTO: 1.3 10E3/UL (ref 0.8–5.3)
LYMPHOCYTES NFR BLD AUTO: 18 %
MAGNESIUM SERPL-MCNC: 2.1 MG/DL (ref 1.7–2.3)
MCH RBC QN AUTO: 29.6 PG (ref 26.5–33)
MCHC RBC AUTO-ENTMCNC: 33.7 G/DL (ref 31.5–36.5)
MCV RBC AUTO: 88 FL (ref 78–100)
MONOCYTES # BLD AUTO: 0.9 10E3/UL (ref 0–1.3)
MONOCYTES NFR BLD AUTO: 13 %
NEUTROPHILS # BLD AUTO: 4.5 10E3/UL (ref 1.6–8.3)
NEUTROPHILS NFR BLD AUTO: 61 %
NRBC # BLD AUTO: 0 10E3/UL
NRBC BLD AUTO-RTO: 0 /100
PLATELET # BLD AUTO: 239 10E3/UL (ref 150–450)
POTASSIUM SERPL-SCNC: 4.6 MMOL/L (ref 3.4–5.3)
PROT SERPL-MCNC: 7 G/DL (ref 6.4–8.3)
RBC # BLD AUTO: 4.25 10E6/UL (ref 4.4–5.9)
SODIUM SERPL-SCNC: 138 MMOL/L (ref 136–145)
WBC # BLD AUTO: 7.2 10E3/UL (ref 4–11)

## 2023-09-05 PROCEDURE — 77386 HC IMRT TREATMENT DELIVERY, COMPLEX: CPT | Performed by: RADIOLOGY

## 2023-09-05 PROCEDURE — 96375 TX/PRO/DX INJ NEW DRUG ADDON: CPT

## 2023-09-05 PROCEDURE — 258N000003 HC RX IP 258 OP 636: Performed by: REGISTERED NURSE

## 2023-09-05 PROCEDURE — 96367 TX/PROPH/DG ADDL SEQ IV INF: CPT

## 2023-09-05 PROCEDURE — 85025 COMPLETE CBC W/AUTO DIFF WBC: CPT | Performed by: REGISTERED NURSE

## 2023-09-05 PROCEDURE — 999N000127 HC STATISTIC PERIPHERAL IV START W US GUIDANCE

## 2023-09-05 PROCEDURE — 83735 ASSAY OF MAGNESIUM: CPT | Performed by: REGISTERED NURSE

## 2023-09-05 PROCEDURE — 36415 COLL VENOUS BLD VENIPUNCTURE: CPT | Performed by: REGISTERED NURSE

## 2023-09-05 PROCEDURE — 96413 CHEMO IV INFUSION 1 HR: CPT

## 2023-09-05 PROCEDURE — G0463 HOSPITAL OUTPT CLINIC VISIT: HCPCS | Mod: 25 | Performed by: REGISTERED NURSE

## 2023-09-05 PROCEDURE — 250N000011 HC RX IP 250 OP 636: Performed by: REGISTERED NURSE

## 2023-09-05 PROCEDURE — 99214 OFFICE O/P EST MOD 30 MIN: CPT | Performed by: REGISTERED NURSE

## 2023-09-05 PROCEDURE — 80053 COMPREHEN METABOLIC PANEL: CPT | Performed by: REGISTERED NURSE

## 2023-09-05 RX ORDER — DIPHENHYDRAMINE HYDROCHLORIDE 50 MG/ML
50 INJECTION INTRAMUSCULAR; INTRAVENOUS
Status: CANCELLED
Start: 2023-09-05

## 2023-09-05 RX ORDER — PALONOSETRON 0.05 MG/ML
0.25 INJECTION, SOLUTION INTRAVENOUS ONCE
Status: COMPLETED | OUTPATIENT
Start: 2023-09-05 | End: 2023-09-05

## 2023-09-05 RX ORDER — ALBUTEROL SULFATE 0.83 MG/ML
2.5 SOLUTION RESPIRATORY (INHALATION)
Status: CANCELLED | OUTPATIENT
Start: 2023-09-05

## 2023-09-05 RX ORDER — PALONOSETRON 0.05 MG/ML
0.25 INJECTION, SOLUTION INTRAVENOUS ONCE
Status: CANCELLED | OUTPATIENT
Start: 2023-09-05

## 2023-09-05 RX ORDER — EPINEPHRINE 1 MG/ML
0.3 INJECTION, SOLUTION INTRAMUSCULAR; SUBCUTANEOUS EVERY 5 MIN PRN
Status: CANCELLED | OUTPATIENT
Start: 2023-09-05

## 2023-09-05 RX ORDER — LORAZEPAM 2 MG/ML
0.5 INJECTION INTRAMUSCULAR EVERY 4 HOURS PRN
Status: CANCELLED | OUTPATIENT
Start: 2023-09-05

## 2023-09-05 RX ORDER — HEPARIN SODIUM (PORCINE) LOCK FLUSH IV SOLN 100 UNIT/ML 100 UNIT/ML
5 SOLUTION INTRAVENOUS
Status: CANCELLED | OUTPATIENT
Start: 2023-09-05

## 2023-09-05 RX ORDER — HEPARIN SODIUM,PORCINE 10 UNIT/ML
5-20 VIAL (ML) INTRAVENOUS DAILY PRN
Status: CANCELLED | OUTPATIENT
Start: 2023-09-05

## 2023-09-05 RX ORDER — ALBUTEROL SULFATE 90 UG/1
1-2 AEROSOL, METERED RESPIRATORY (INHALATION)
Status: CANCELLED
Start: 2023-09-05

## 2023-09-05 RX ORDER — MEPERIDINE HYDROCHLORIDE 25 MG/ML
25 INJECTION INTRAMUSCULAR; INTRAVENOUS; SUBCUTANEOUS EVERY 30 MIN PRN
Status: CANCELLED | OUTPATIENT
Start: 2023-09-05

## 2023-09-05 RX ORDER — METHYLPREDNISOLONE SODIUM SUCCINATE 125 MG/2ML
125 INJECTION, POWDER, LYOPHILIZED, FOR SOLUTION INTRAMUSCULAR; INTRAVENOUS
Status: CANCELLED
Start: 2023-09-05

## 2023-09-05 RX ADMIN — DEXAMETHASONE SODIUM PHOSPHATE: 10 INJECTION, SOLUTION INTRAMUSCULAR; INTRAVENOUS at 10:40

## 2023-09-05 RX ADMIN — SODIUM CHLORIDE 90 MG: 9 INJECTION, SOLUTION INTRAVENOUS at 11:45

## 2023-09-05 RX ADMIN — PALONOSETRON HYDROCHLORIDE 0.25 MG: 0.25 INJECTION INTRAVENOUS at 10:42

## 2023-09-05 RX ADMIN — MAGNESIUM SULFATE HEPTAHYDRATE: 500 INJECTION, SOLUTION INTRAMUSCULAR; INTRAVENOUS at 11:00

## 2023-09-05 ASSESSMENT — PAIN SCALES - GENERAL: PAINLEVEL: NO PAIN (0)

## 2023-09-05 NOTE — NURSING NOTE
Chief Complaint   Patient presents with    Blood Draw     Labs drawn via  by RN in lab. VS taken.      Labs collected from venipuncture by RN. Vitals taken. Checked in for appointment(s).    Akosua Bangura RN

## 2023-09-05 NOTE — NURSING NOTE
"Oncology Rooming Note    September 5, 2023 9:22 AM   Thierno Vega is a 38 year old male who presents for:    Chief Complaint   Patient presents with    Blood Draw     Labs drawn via  by RN in lab. VS taken.     Oncology Clinic Visit     Nasopharyngeal Cancer     Initial Vitals: /73 (BP Location: Right arm, Patient Position: Sitting, Cuff Size: Adult Large)   Pulse 63   Temp 98.7  F (37.1  C) (Oral)   Resp 18   Wt 108 kg (238 lb)   SpO2 100%   BMI 32.24 kg/m   Estimated body mass index is 32.24 kg/m  as calculated from the following:    Height as of 8/11/23: 1.83 m (6' 0.05\").    Weight as of this encounter: 108 kg (238 lb). Body surface area is 2.34 meters squared.  No Pain (0) Comment: Data Unavailable   No LMP for male patient.  Allergies reviewed: Yes  Medications reviewed: Yes    Medications: Medication refills not needed today.  Pharmacy name entered into [facility]:    [id number] - [place], [place] - [address] AT [facility] - [place], [place] - [address] [facility]    Clinical concerns: None       Radha Duarte LPN  9/5/2023                "

## 2023-09-05 NOTE — Clinical Note
9/5/2023         RE: Thierno Vega  [address]  [address]  [facility] [address]        Dear Colleague,    Thank you for referring your patient, Thierno Vega, to the [facility]. Please see a copy of my visit note below.    Oncology Progress Note  Sep 5, 2023    Reason for Visit: seen in follow-up of nasopharyngeal cancer    Oncology HPI:   CANCER TYPE: SCC nasopharynx, GALLITO -jeanna  STAGE: cT2N2 vs N3 (III vs MARYSOL)  ECOG PS: 0     PD-L1:  NGS:      SUMMARY  4/30/23               US. Bilateral necrotic nodes  5/3/23                 FNA cervical adenopathy - undifferentiated carcinoma  5/4/23                 CT CAP. 7 mm pulmonary nodule, no metastases  5/5/23                 MRI bilateral cervical and retropharyngeal adenopathy     Came to US  6/12/23               US guided FNA L neck node in clinic (Dr. Fisher). Path: hypocellular with scant clusters of atypical squamous cells suspicious for malignancy, p40 +jeanna, GALLITO -jeanna  6/13/23               PET/CT. Intense uptake L Rosenmuller fossa with increased fullness (SUV 18.8). Skull base intact. Milder FDG uptake (SUV 6.1) by the prominent R Rosenmuller fossa, inflammatory vs malignant. Indeterminate bilateral intense palatine tonsillar uptake with fullness on CT, inflammatroy vs infectious vs tumor, discontinuous with the nasopharyngeal abnormality. Bilateral nodes, including level 2a, 2b, 3, 3/5, R 1.8 cm 2A, another level 2/3 nodes, FDG avid retropharyngeal nodes. 0.3 cm nodule on the R major fissure. 0.8 cm R inguinal node (SUV 3.9) with additional nonenlarged LNs with minimal uptake, suggestive of reactive adenopathy. Marked focal uptake along the R upper inner thigh with skin thickening (SUV 16.8), which may represent cutaneous infection/inflammation, recommend direct visualization  6/27/23   Induction chemotherapy with cisplatin gemcitabine x 2 cycles with SD  8/28/23:  concurrent chemoradiation with weekly  cisplatin    Interval history:   Thierno's visit is conducted with a professional .     Taste changes  More ringing in left ear    Current Outpatient Medications   Medication Sig Dispense Refill    amLODIPine 5 MG TABS 5 mg, valsartan 160 MG TABS 160 mg Take by mouth daily      dexAMETHasone (DECADRON) 4 MG tablet Take 1 tablet (4 mg) by mouth 2 times daily (with meals) Take 4 mg bid x 3 days Start on Day 2 after chemotherapy. 6 tablet 2    gabapentin (NEURONTIN) 300 MG capsule Take 3 capsules (900 mg) by mouth 3 times daily Taper dose up to 900 mg po tid per instructions given in Radiation Oncology clinic 270 capsule 4    ondansetron (ZOFRAN) 8 MG tablet Take 1 tablet (8 mg) by mouth every 8 hours as needed for nausea (vomiting) (Patient not taking: Reported on 8/9/2023) 30 tablet 5    prochlorperazine (COMPAZINE) 10 MG tablet Take 1 tablet (10 mg) by mouth every 6 hours as needed for nausea or vomiting (Patient not taking: Reported on 8/9/2023) 30 tablet 5        No Known Allergies      Exam: alert, appears well.  There were no vitals taken for this visit.  Wt Readings from Last 4 Encounters:   08/29/23 107 kg (236 lb)   08/28/23 107.4 kg (236 lb 12.8 oz)   08/11/23 106.2 kg (234 lb 3.2 oz)   08/09/23 105.7 kg (233 lb 1.6 oz)   Oropharynx is moist and without lesion. Neck supple, L cervical node 1.5 cm.  Lungs:CTA. Heart: RRR, no murmur or rub. Abdomen: soft, nontender, BS active, no masses or organomegaly.  Extremities: warm, no edema. Speech is clear. CN wnl. Gait/station wnl. Skin: no rash on exposed skin.       Labs:   Most Recent 3 CBC's:  Recent Labs   Lab Test 08/28/23  0727 08/11/23  0647 07/26/23  1301   WBC 7.8 8.6 8.2   HGB 13.0* 13.9 14.1   MCV 89 87 84    305 289   ANEUTAUTO 3.3 3.5 3.7    Most Recent 3 BMP's:  Recent Labs   Lab Test 08/28/23 0727 08/11/23  0647 07/26/23  1301    140 136   POTASSIUM 3.8 3.7 4.5   CHLORIDE 105 104 102   CO2 22 23 24   BUN 15.7 13.5 13.1   CR  0.82 0.78 0.81   ANIONGAP 13 13 10   SOTO 9.5 9.4 9.7   * 124* 88   PROTTOTAL 6.9 6.9 6.9   ALBUMIN 4.5 4.5 4.5    Most Recent 2 LFT's:  Recent Labs   Lab Test 08/28/23  0727 08/11/23  0647   AST 14 14   ALT 14 16   ALKPHOS 62 59   BILITOTAL 0.2 0.2    Most Recent TSH and T4:  Recent Labs   Lab Test 06/22/23  1433   TSH 0.88     Phos/Mag:  Lab Results   Component Value Date    MAG 1.8 08/28/2023    MAG 1.8 08/11/2023    MAG 2.0 07/26/2023      I reviewed the above labs today.    Imaging: n/a    Impression/plan:   Nasopharyngeal carcinoma, cT2N2 (III):  with SD after 2 cycles of cisplatin/gemcitabine  -planning to start concurrent chemoradiation with weekly cisplatin today. Reviewed the plan including side effects and management  -tolerated the prior treatment well.   -he appears to have a good understanding of the plan and is well today. Labs reviewed and stable. Will proceed with treatment as planned.  -will continue weekly ALON follow-up through chemoradiation    CASSIUS  On po dex 4 mg bid on days 2 and 3, zofran 8 mg bid on days 2-5. No aloxi, high cost d/t no insurance coverage    Hearing changes/tinnitus: No changes since starting cisplatin. Had normal hearing test prior to starting. Continue to monitor.      *** minutes spent on the date of the encounter doing {2021 E&M time in:353785}     Janis Cordova CNP      Oncology Progress Note  Sep 5, 2023    Reason for Visit: seen in follow-up of nasopharyngeal cancer    Oncology HPI:   CANCER TYPE: SCC nasopharynx, GALLITO -jeanna  STAGE: cT2N2 vs N3 (III vs MARYSOL)  ECOG PS: 0     PD-L1:  NGS:      SUMMARY  4/30/23               US. Bilateral necrotic nodes  5/3/23                 FNA cervical adenopathy - undifferentiated carcinoma  5/4/23                 CT CAP. 7 mm pulmonary nodule, no metastases  5/5/23                 MRI bilateral cervical and retropharyngeal adenopathy     Came to US  6/12/23               US guided FNA L neck node in clinic (Dr. Fisher). Path:  hypocellular with scant clusters of atypical squamous cells suspicious for malignancy, p40 +jeanna, GALLITO -jeanna  6/13/23               PET/CT. Intense uptake L Rosenmuller fossa with increased fullness (SUV 18.8). Skull base intact. Milder FDG uptake (SUV 6.1) by the prominent R Rosenmuller fossa, inflammatory vs malignant. Indeterminate bilateral intense palatine tonsillar uptake with fullness on CT, inflammatroy vs infectious vs tumor, discontinuous with the nasopharyngeal abnormality. Bilateral nodes, including level 2a, 2b, 3, 3/5, R 1.8 cm 2A, another level 2/3 nodes, FDG avid retropharyngeal nodes. 0.3 cm nodule on the R major fissure. 0.8 cm R inguinal node (SUV 3.9) with additional nonenlarged LNs with minimal uptake, suggestive of reactive adenopathy. Marked focal uptake along the R upper inner thigh with skin thickening (SUV 16.8), which may represent cutaneous infection/inflammation, recommend direct visualization  6/27/23   Induction chemotherapy with cisplatin gemcitabine x 2 cycles with SD  8/28/23:  concurrent chemoradiation with weekly cisplatin    Interval history:   Thierno's visit is conducted with a professional . He is seen today prior to day 8 weekly cisplatin.    Tolerated chemo last week well  Tinnitus in left ear flared, intermittent ringing  Eating well despite appetite and taste changes  No nausea with using oral dex  Gabapentin 900 mg TID, hasn't been using every day. Was unclear of schedule and which days it should be taken with radiation    Current Outpatient Medications   Medication Sig Dispense Refill     amLODIPine 5 MG TABS 5 mg, valsartan 160 MG TABS 160 mg Take 5 mg by mouth daily       gabapentin (NEURONTIN) 300 MG capsule Take 3 capsules (900 mg) by mouth 3 times daily Taper dose up to 900 mg po tid per instructions given in Radiation Oncology clinic 270 capsule 4     dexAMETHasone (DECADRON) 4 MG tablet Take 1 tablet (4 mg) by mouth 2 times daily (with meals) Take 4 mg  bid x 3 days Start on Day 2 after chemotherapy. 6 tablet 2     ondansetron (ZOFRAN) 8 MG tablet Take 1 tablet (8 mg) by mouth every 8 hours as needed for nausea (vomiting) 30 tablet 5     prochlorperazine (COMPAZINE) 10 MG tablet Take 1 tablet (10 mg) by mouth every 6 hours as needed for nausea or vomiting 30 tablet 5        No Known Allergies      Exam: Blood pressure 112/73, pulse 63, temperature 98.7  F (37.1  C), temperature source Oral, resp. rate 18, weight 108 kg (238 lb), SpO2 100 %.  Wt Readings from Last 4 Encounters:   09/05/23 108 kg (238 lb)   08/29/23 107 kg (236 lb)   08/28/23 107.4 kg (236 lb 12.8 oz)   08/11/23 106.2 kg (234 lb 3.2 oz)   Gen: NAD  Oropharynx is moist and without lesion. 1.5 cm L cervical LN  Lungs: CTA, no wheezes  Heart: RRR, no m/r   Extremities: warm, no edema. Speech is clear. CN wnl. Gait/station wnl.   Skin: no rash on exposed skin     Labs:   Most Recent 3 CBC's:  Recent Labs   Lab Test 09/05/23  0905 08/28/23  0727 08/11/23  0647   WBC 7.2 7.8 8.6   HGB 12.6* 13.0* 13.9   MCV 88 89 87    308 305   ANEUTAUTO 4.5 3.3 3.5    Most Recent 3 BMP's:  Recent Labs   Lab Test 08/28/23  0727 08/11/23  0647 07/26/23  1301    140 136   POTASSIUM 3.8 3.7 4.5   CHLORIDE 105 104 102   CO2 22 23 24   BUN 15.7 13.5 13.1   CR 0.82 0.78 0.81   ANIONGAP 13 13 10   SOTO 9.5 9.4 9.7   * 124* 88   PROTTOTAL 6.9 6.9 6.9   ALBUMIN 4.5 4.5 4.5    Most Recent 2 LFT's:  Recent Labs   Lab Test 08/28/23  0727 08/11/23  0647   AST 14 14   ALT 14 16   ALKPHOS 62 59   BILITOTAL 0.2 0.2    Most Recent TSH and T4:  Recent Labs   Lab Test 06/22/23  1433   TSH 0.88     Phos/Mag:  Lab Results   Component Value Date    MAG 1.8 08/28/2023    MAG 1.8 08/11/2023    MAG 2.0 07/26/2023      I reviewed the above labs today.    Imaging: n/a    Impression/plan:   Nasopharyngeal carcinoma, cT2N2 (III):  with SD after 2 cycles of cisplatin/gemcitabine. Now on concurrent chemoradiation with weekly  cisplatin, tolerating well with flare of L tinnitus. Labs stable.  -proceed with day 8 cisplatin today  -will continue weekly ALON follow-up through chemoradiation    CASSIUS  Well controlled with PO dex 4 mg bid on days 2 and 3. Continue with day 8 and reassess next week.    Hearing changes/tinnitus: Noticed flare of intermittent tinnitus after starting weekly cisplatin last week. Had normal hearing test prior to starting. Continue to monitor.      30 minutes spent on the date of the encounter doing chart review, review of test results, interpretation of tests, patient visit, and documentation     Janis Cordova CNP        Again, thank you for allowing me to participate in the care of your patient.        Sincerely,        Janis Cordova CNP

## 2023-09-05 NOTE — PROGRESS NOTES
Infusion Nursing Note:  Thierno Vega presents today for D8 C1 Cisplatin.    Patient seen by provider today: Yes: Jess TABOR   present during visit today: Yes, Language: Croatian via phone.     Note: N/A.      Intravenous Access:  Peripheral IV placed.    Treatment Conditions:  Lab Results   Component Value Date    HGB 12.6 (L) 09/05/2023    WBC 7.2 09/05/2023    ANEUTAUTO 4.5 09/05/2023     09/05/2023        Lab Results   Component Value Date     09/05/2023    POTASSIUM 4.6 09/05/2023    MAG 2.1 09/05/2023    CR 0.87 09/05/2023    SOTO 9.6 09/05/2023    BILITOTAL 0.2 09/05/2023    ALBUMIN 4.5 09/05/2023    ALT 17 09/05/2023    AST 12 09/05/2023       Results reviewed, labs MET treatment parameters, ok to proceed with treatment.      Post Infusion Assessment:  Patient tolerated infusion without incident.  Blood return noted pre and post infusion.  Site patent and intact, free from redness, edema or discomfort.  No evidence of extravasations.  Access discontinued per protocol.       Discharge Plan:   Patient declined prescription refills.  Discharge instructions reviewed with: Patient.  Patient and/or family verbalized understanding of discharge instructions and all questions answered.  Copy of AVS reviewed with patient and/or family.  Patient will return 9/11 for next provider and infusion appointment.  AVS to patient via [employer].  Patient will return 9/11 for next provider and infusion appointment.   Patient discharged in stable condition accompanied by: self.  Departure Mode: Ambulatory.      Ivonne Carson RN

## 2023-09-05 NOTE — PATIENT INSTRUCTIONS
[facility] Triage and after hours / weekends / holidays:  [phone]    Please call the triage or after hours line if you experience a temperature greater than or equal to 100.4, shaking chills, have uncontrolled nausea, vomiting and/or diarrhea, dizziness, shortness of breath, chest pain, bleeding, unexplained bruising, or if you have any other new/concerning symptoms, questions or concerns.      If you are having any concerning symptoms or wish to speak to a provider before your next infusion visit, please call triage to notify them so we can adequately serve you.     If you need a refill on a narcotic prescription or other medication, please call before your infusion appointment.                September 2023 Sunday Monday Tuesday Wednesday Thursday Friday Saturday                            1    TREATMENT  10:15 AM   (15 min.)   P RAD ONC IJEOMA   [facility] Radiation Oncology 2       3     4     5    LAB PERIPHERAL   8:15 AM   (15 min.)   [facility] LAB DRAW   [facility]    RETURN ACTIVE TREATMENT   8:45 AM   (45 min.)   Janis oCrdova CNP   [facility]    ONC INFUSION 2 HR (120 MIN)   9:30 AM   (120 min.)    ONC INFUSION NURSE   [facility] OUTPATIENT  10:15 AM   (15 min.)   VIDEO,    [facility]  Services    TREATMENT  10:15 AM   (15 min.)   P RAD ONC IJEOMA   [facility] Radiation Oncology 6    OTV  10:15 AM   (15 min.)   Yessica Parada MD   [facility] Radiation Oncology    TREATMENT  10:15 AM   (15 min.)   P RAD ONC IJEOMA   [facility] Radiation Oncology 7    TREATMENT  10:15 AM   (15 min.)   P RAD ONC IJEOMA   [facility] Radiation Oncology    [place] OUTPATIENT  11:00 AM   (15 min.)   VIDEO,    [facility]  Services 8    TREATMENT  10:15 AM   (15 min.)   P RAD ONC IEJOMA     [facility] Radiation Oncology    [place] OUTPATIENT  11:00 AM   (15 min.)   VIDEO,    [facility]  Services 9       10     11    LAB PERIPHERAL   6:45 AM   (15 min.)   [facility] LAB DRAW   [facility]    RETURN CCSL   7:00 AM   (45 min.)   Janis Cordova CNP   [facility]    ONC INFUSION 2 HR (120 MIN)   8:30 AM   (120 min.)   [facility] ONC INFUSION NURSE   [facility]    TREATMENT  10:15 AM   (15 min.)   [facility] RAD ONC IJEOMA   [facility] Radiation Oncology 12    TREATMENT  11:00 AM   (15 min.)   [facility] RAD ONC IJEOMA   [facility] Radiation Oncology    OTV  11:15 AM   (15 min.)   Yessica Parada MD   [facility] Radiation Oncology 13    TREATMENT  11:00 AM   (15 min.)   [facility] RAD ONC IJEOMA   [facility] Radiation Oncology 14    TREATMENT  11:00 AM   (15 min.)   [facility] RAD ONC IJEOMA   [facility] Radiation Oncology 15    TREATMENT   9:00 AM   (15 min.)   [facility] RAD ONC IJEOMA   [facility] Radiation Oncology    [place] OUTPATIENT  10:30 AM   (60 min.)   VIDEO,    [facility]  Services 16       17     18    LAB PERIPHERAL   7:45 AM   (15 min.)   [facility] LAB DRAW   [facility]    RETURN CCSL   8:00 AM   (45 min.)   Janis Cordova CNP   [facility]    TREATMENT  11:00 AM   (15 min.)   [facility] RAD ONC IJEOMA   [facility] Radiation Oncology 19    ONC INFUSION 2 HR (120 MIN)   7:30 AM   (120 min.)   [facility] ONC INFUSION NURSE   [facility]    UM NUTRITION VISIT  11:00 AM   (30 min.)   Nae Tafoya RD   M [facility] Radiation Oncology    TREATMENT  11:00 AM   (15 min.)   [facility] RAD ONC IJEOMA   [facility] Radiation Oncology    OTV  11:15 AM   (15 min.)   Yessica Parada MD   [facility]  Radiation Oncology 20    TREATMENT  11:00 AM   (15 min.)   [facility] RAD ONC IJEOMA   [facility] Radiation Oncology 21    TREATMENT  11:00 AM   (15 min.)   [facility] RAD ONC IJEOMA   [facility] Radiation Oncology 22    TREATMENT  11:00 AM   (15 min.)   [facility] RAD ONC IJEOMA   [facility] Radiation Oncology 23       24     25    LAB PERIPHERAL   7:00 AM   (15 min.)   [facility] LAB DRAW   [facility]    RETURN ACTIVE TREATMENT   7:15 AM   (45 min.)   Jennifer Gabriel CNP   [facility]    ONC INFUSION 2 HR (120 MIN)   8:30 AM   (120 min.)   [facility] ONC INFUSION NURSE   [facility]    TREATMENT  11:00 AM   (15 min.)   [facility] RAD ONC IJEOMA   [facility] Radiation Oncology 26    TREATMENT  11:00 AM   (15 min.)   [facility] RAD ONC IJEOMA   [facility] Radiation Oncology    OTV  11:15 AM   (15 min.)   Yessica Parada MD   [facility] Radiation Oncology 27    TREATMENT  11:00 AM   (15 min.)   [facility] RAD ONC IJEOMA   [facility] Radiation Oncology 28    TREATMENT  11:00 AM   (15 min.)   [facility] RAD ONC IJEOMA   [facility] Radiation Oncology 29    TREATMENT  11:00 AM   (15 min.)   [facility] RAD ONC IJEOMA   [facility] Radiation Oncology 30 October 2023 Sunday Monday Tuesday Wednesday Thursday Friday Saturday   1     2    LAB PERIPHERAL   7:45 AM   (15 min.)   [facility] LAB DRAW   [facility]    RETURN ACTIVE TREATMENT   8:00 AM   (45 min.)   Janis Cordova CNP   [facility]    ONC INFUSION 2 HR (120 MIN)   9:00 AM   (120 min.)   [facility] ONC INFUSION NURSE   [facility]    TREATMENT  11:00 AM   (15 min.)   [facility] RAD ONC IJEOMA   [facility] Radiation Oncology 3    OTV   9:30 AM   (15 min.)   Yessica Parada MD   [facility] Radiation Oncology    TREATMENT   9:30 AM   (15  min.)   [facility] RAD ONC IJEOMA   [facility] Radiation Oncology 4    TREATMENT  11:00 AM   (15 min.)   [facility] RAD ONC IJEOMA   [facility] Radiation Oncology 5    TREATMENT  11:00 AM   (15 min.)   [facility] RAD ONC IJEOMA   [facility] Radiation Oncology 6    TREATMENT  11:00 AM   (15 min.)   [facility] RAD ONC IJEOMA   [facility] Radiation Oncology 7       8     9    LAB PERIPHERAL   6:45 AM   (15 min.)    [facility] DRAW   [facility]    RETURN ACTIVE TREATMENT   7:00 AM   (45 min.)   Janis Cordova CNP   [facility]    ONC INFUSION 2 HR (120 MIN)   8:30 AM   (120 min.)    ONC INFUSION NURSE   [facility]    TREATMENT  11:00 AM   (15 min.)   [facility] RAD ONC IJEOMA   [facility] Radiation Oncology 10    TREATMENT  11:00 AM   (15 min.)   P RAD ONC IJEOMA   [facility] Radiation Oncology    OTV  11:15 AM   (15 min.)   Yessica Parada MD   [facility] Radiation Oncology 11    TREATMENT  11:00 AM   (15 min.)   [facility] RAD ONC IJEOMA   [facility] Radiation Oncology 12    RETURN CCSL  10:00 AM   (30 min.)   Catrachita Clark MD   [facility]    TREATMENT  11:00 AM   (15 min.)   [facility] RAD ONC IJEOMA   [facility] Radiation Oncology 13    TREATMENT  11:00 AM   (15 min.)   [facility] RAD ONC IJEOMA   [facility] Radiation Oncology 14       15     16    TREATMENT  11:00 AM   (15 min.)   [facility] RAD ONC IJEOMA   [facility] Radiation Oncology 17     18     19     20     21       22     23     24     25     26     27     28       29     30     31                                         Lab Results:  Recent Results (from the past 12 hour(s))   Comprehensive metabolic panel    Collection Time: 09/05/23  9:05 AM   Result Value Ref Range    Sodium 138 136 - 145 mmol/L    Potassium 4.6 3.4 - 5.3 mmol/L    Chloride 103 98 - 107 mmol/L    Carbon  Dioxide (CO2) 26 22 - 29 mmol/L    Anion Gap 9 7 - 15 mmol/L    Urea Nitrogen 17.3 6.0 - 20.0 mg/dL    Creatinine 0.87 0.67 - 1.17 mg/dL    Calcium 9.6 8.6 - 10.0 mg/dL    Glucose 91 70 - 99 mg/dL    Alkaline Phosphatase 57 40 - 129 U/L    AST 12 0 - 45 U/L    ALT 17 0 - 70 U/L    Protein Total 7.0 6.4 - 8.3 g/dL    Albumin 4.5 3.5 - 5.2 g/dL    Bilirubin Total 0.2 <=1.2 mg/dL    GFR Estimate >90 >60 mL/min/1.73m2   Magnesium    Collection Time: 09/05/23  9:05 AM   Result Value Ref Range    Magnesium 2.1 1.7 - 2.3 mg/dL   CBC with platelets and differential    Collection Time: 09/05/23  9:05 AM   Result Value Ref Range    WBC Count 7.2 4.0 - 11.0 10e3/uL    RBC Count 4.25 (L) 4.40 - 5.90 10e6/uL    Hemoglobin 12.6 (L) 13.3 - 17.7 g/dL    Hematocrit 37.4 (L) 40.0 - 53.0 %    MCV 88 78 - 100 fL    MCH 29.6 26.5 - 33.0 pg    MCHC 33.7 31.5 - 36.5 g/dL    RDW 15.9 (H) 10.0 - 15.0 %    Platelet Count 239 150 - 450 10e3/uL    % Neutrophils 61 %    % Lymphocytes 18 %    % Monocytes 13 %    % Eosinophils 3 %    % Basophils 1 %    % Immature Granulocytes 4 %    NRBCs per 100 WBC 0 <1 /100    Absolute Neutrophils 4.5 1.6 - 8.3 10e3/uL    Absolute Lymphocytes 1.3 0.8 - 5.3 10e3/uL    Absolute Monocytes 0.9 0.0 - 1.3 10e3/uL    Absolute Eosinophils 0.2 0.0 - 0.7 10e3/uL    Absolute Basophils 0.1 0.0 - 0.2 10e3/uL    Absolute Immature Granulocytes 0.3 <=0.4 10e3/uL    Absolute NRBCs 0.0 10e3/uL

## 2023-09-06 ENCOUNTER — OFFICE VISIT (OUTPATIENT)
Dept: RADIATION ONCOLOGY | Facility: CLINIC | Age: 38
End: 2023-09-06
Attending: RADIOLOGY
Payer: COMMERCIAL

## 2023-09-06 VITALS
DIASTOLIC BLOOD PRESSURE: 75 MMHG | WEIGHT: 238.1 LBS | HEART RATE: 64 BPM | SYSTOLIC BLOOD PRESSURE: 120 MMHG | BODY MASS INDEX: 32.25 KG/M2

## 2023-09-06 DIAGNOSIS — C11.9 NASOPHARYNGEAL CANCER (H): Primary | ICD-10-CM

## 2023-09-06 PROCEDURE — 77386 HC IMRT TREATMENT DELIVERY, COMPLEX: CPT | Performed by: RADIOLOGY

## 2023-09-06 NOTE — LETTER
2023         RE: Thierno Vega  [address]  [address]  [facility] [address]        Dear Colleague,    Thank you for referring your patient, Thierno Vega, to the [facility] RADIATION ONCOLOGY. Please see a copy of my visit note below.    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 Vega presents to clinic today for his weekly on-treatment visit.  He is tolerating treatment well so far.  He has no concerns today.    Nursing ROS:   Nutrition Alteration  Diet Type: Patient's Preference  Skin  Skin Reaction: 0 - No changes  Skin Progress: Aquaphor     ENT and Mouth Exam  Mucositis - Current: 0 - None   ENT/Mouth Note: S&S rinses     Gastrointestinal  Nausea: 0 - None     Pain Assessment  0-10 Pain Scale: 0  Pain Note: Gabapentin     PEG Tube: No  Electronic Cardiac Implant: No    Objective:   /75   Pulse 64   Wt 108 kg (238 lb 1.6 oz)   BMI 32.25 kg/m    Gen: Appears well, NAD  HEENT: No mucositis,  Skin: No erythema    Laboratory:  Lab Results   Component Value Date    WBC 7.2 2023    HGB 12.6 (L) 2023    HCT 37.4 (L) 2023    MCV 88 2023     2023     Lab Results   Component Value Date     2023    POTASSIUM 4.6 2023    CHLORIDE 103 2023    CO2 26 2023    GLC 91 2023     Magnesium   Date Value Ref Range Status   2023 2.1 1.7 - 2.3 mg/dL Final       Treatment-related toxicities (CTCAE v5.0):  Anorexia: Grade 0: No toxicity  Fatigue: Grade 0: No  toxicity  Nausea: Grade 0: No toxicity  Pain: Grade 0: No toxicity  Dry mouth: Grade 0: No toxicity  Dysphagia: Grade 0: No toxicity  Mucositis: Grade 0: No toxicity  Dermatitis: Grade 0: No toxicity    ED visits/Hospitalizations: None    Missed Treatments: None    Mosaiq chart and setup information reviewed  IGRT images reviewed    Medication Review  Med list reviewed with patient?: Yes  Med list printed and given: Yes    Assessment:    Mr. 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  He has received neoadjuvant chemotherapy and is now undergoing concurrent chemoradiation.  He is tolerating treatment well.    Plan:   1.  Continue treatment as planned  2.  Discussed placing prophylactic PEG given that his bilateral neck nodes will go to full dose and we anticipate he will be quite symptomatic with odynophagia and mucositis.  We discussed the pros and cons of a prophylactic PEG tube and, should he choose not to have one placed, what options were available later on in treatment.  He will think about it and get back to us with his decision.      Yessica Parada  Department of Radiation Oncology  [facility]

## 2023-09-06 NOTE — PROGRESS NOTES
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 Vega presents to clinic today for his weekly on-treatment visit.  He is tolerating treatment well so far.  He has no concerns today.    Nursing ROS:   Nutrition Alteration  Diet Type: Patient's Preference  Skin  Skin Reaction: 0 - No changes  Skin Progress: Aquaphor     ENT and Mouth Exam  Mucositis - Current: 0 - None   ENT/Mouth Note: S&S rinses     Gastrointestinal  Nausea: 0 - None     Pain Assessment  0-10 Pain Scale: 0  Pain Note: Gabapentin     PEG Tube: No  Electronic Cardiac Implant: No    Objective:   /75   Pulse 64   Wt 108 kg (238 lb 1.6 oz)   BMI 32.25 kg/m    Gen: Appears well, NAD  HEENT: No mucositis,  Skin: No erythema    Laboratory:  Lab Results   Component Value Date    WBC 7.2 2023    HGB 12.6 (L) 2023    HCT 37.4 (L) 2023    MCV 88 2023     2023     Lab Results   Component Value Date     2023    POTASSIUM 4.6 2023    CHLORIDE 103 2023    CO2 26 2023    GLC 91 2023     Magnesium   Date Value Ref Range Status   2023 2.1 1.7 - 2.3 mg/dL Final       Treatment-related toxicities (CTCAE v5.0):  Anorexia: Grade 0: No toxicity  Fatigue: Grade 0: No toxicity  Nausea: Grade 0: No toxicity  Pain: Grade 0: No toxicity  Dry mouth: Grade 0: No toxicity  Dysphagia: Grade 0: No toxicity  Mucositis: Grade 0: No toxicity  Dermatitis: Grade 0: No toxicity    ED visits/Hospitalizations: None    Missed Treatments: None    Mosaiq chart  and setup information reviewed  IGRT images reviewed    Medication Review  Med list reviewed with patient?: Yes  Med list printed and given: Yes    Assessment:    Mr. 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  He has received neoadjuvant chemotherapy and is now undergoing concurrent chemoradiation.  He is tolerating treatment well.    Plan:   1.  Continue treatment as planned  2.  Discussed placing prophylactic PEG given that his bilateral neck nodes will go to full dose and we anticipate he will be quite symptomatic with odynophagia and mucositis.  We discussed the pros and cons of a prophylactic PEG tube and, should he choose not to have one placed, what options were available later on in treatment.  He will think about it and get back to us with his decision.      Yessica Parada  Department of Radiation Oncology  [facility]

## 2023-09-07 ENCOUNTER — APPOINTMENT (OUTPATIENT)
Dept: RADIATION ONCOLOGY | Facility: CLINIC | Age: 38
End: 2023-09-07
Attending: RADIOLOGY
Payer: COMMERCIAL

## 2023-09-07 PROCEDURE — 77386 HC IMRT TREATMENT DELIVERY, COMPLEX: CPT | Performed by: RADIOLOGY

## 2023-09-08 ENCOUNTER — APPOINTMENT (OUTPATIENT)
Dept: RADIATION ONCOLOGY | Facility: CLINIC | Age: 38
End: 2023-09-08
Attending: RADIOLOGY
Payer: COMMERCIAL

## 2023-09-08 PROCEDURE — 77014 PR CT GUIDE FOR PLACEMENT RADIATION THERAPY FIELDS: CPT | Mod: 26 | Performed by: RADIOLOGY

## 2023-09-08 PROCEDURE — 77386 HC IMRT TREATMENT DELIVERY, COMPLEX: CPT | Performed by: RADIOLOGY

## 2023-09-10 NOTE — PROGRESS NOTES
Oncology Progress Note  Sep 11, 2023    Reason for Visit: seen in follow-up of nasopharyngeal cancer    Oncology HPI:   CANCER TYPE: SCC nasopharynx, GALLITO -jeanna  STAGE: cT2N2 vs N3 (III vs MARYSOL)  ECOG PS: 0     PD-L1:  NGS:      SUMMARY  4/30/23               US. Bilateral necrotic nodes  5/3/23                 FNA cervical adenopathy - undifferentiated carcinoma  5/4/23                 CT CAP. 7 mm pulmonary nodule, no metastases  5/5/23                 MRI bilateral cervical and retropharyngeal adenopathy     Came to US  6/12/23               US guided FNA L neck node in clinic (Dr. Fisher). Path: hypocellular with scant clusters of atypical squamous cells suspicious for malignancy, p40 +jeanna, GALLITO -jeanna  6/13/23               PET/CT. Intense uptake L Rosenmuller fossa with increased fullness (SUV 18.8). Skull base intact. Milder FDG uptake (SUV 6.1) by the prominent R Rosenmuller fossa, inflammatory vs malignant. Indeterminate bilateral intense palatine tonsillar uptake with fullness on CT, inflammatroy vs infectious vs tumor, discontinuous with the nasopharyngeal abnormality. Bilateral nodes, including level 2a, 2b, 3, 3/5, R 1.8 cm 2A, another level 2/3 nodes, FDG avid retropharyngeal nodes. 0.3 cm nodule on the R major fissure. 0.8 cm R inguinal node (SUV 3.9) with additional nonenlarged LNs with minimal uptake, suggestive of reactive adenopathy. Marked focal uptake along the R upper inner thigh with skin thickening (SUV 16.8), which may represent cutaneous infection/inflammation, recommend direct visualization  6/27/23   Induction chemotherapy with cisplatin gemcitabine x 2 cycles with SD  8/28/23:  concurrent chemoradiation with weekly cisplatin    Interval history:   Thierno's visit is conducted with a professional Ukrainian . He is seen today prior to day 15 weekly cisplatin.  -Tinnitus remains mild, intermittent. Lasts 10 seconds or less  -No nausea  -Continues to push himself to eat, dysgeusia is  limiting  -Taking gabapentin TID daily, confirmed dosing and schedule with Dr. Parada  -Increased pain to left neck, centered around enlarged LN    Current Outpatient Medications   Medication Sig Dispense Refill    amLODIPine 5 MG TABS 5 mg, valsartan 160 MG TABS 160 mg Take 5 mg by mouth daily      dexAMETHasone (DECADRON) 4 MG tablet Take 1 tablet (4 mg) by mouth 2 times daily (with meals) Take 4 mg bid x 3 days Start on Day 2 after chemotherapy. 6 tablet 2    gabapentin (NEURONTIN) 300 MG capsule Take 3 capsules (900 mg) by mouth 3 times daily Taper dose up to 900 mg po tid per instructions given in Radiation Oncology clinic 270 capsule 4    ondansetron (ZOFRAN) 8 MG tablet Take 1 tablet (8 mg) by mouth every 8 hours as needed for nausea (vomiting) (Patient not taking: Reported on 9/11/2023) 30 tablet 5    prochlorperazine (COMPAZINE) 10 MG tablet Take 1 tablet (10 mg) by mouth every 6 hours as needed for nausea or vomiting (Patient not taking: Reported on 9/11/2023) 30 tablet 5        No Known Allergies      Exam:   Blood pressure 105/69, pulse 76, temperature 98.7  F (37.1  C), temperature source Oral, resp. rate 18, weight 105.6 kg (232 lb 12.8 oz), SpO2 100 %.    Wt Readings from Last 4 Encounters:   09/11/23 105.6 kg (232 lb 12.8 oz)   09/06/23 108 kg (238 lb 1.6 oz)   09/05/23 108 kg (238 lb)   08/29/23 107 kg (236 lb)     Gen: NAD  Oropharynx is moist and without lesion. Mild erythema to posterior OP. ~1.5 cm L cervical LN  Lungs: CTA, no wheezes  Heart: RRR, no m/g/r  Extremities: warm, no edema. Speech is clear. CN wnl. Gait/station wnl.   Skin: no rash on exposed skin     Labs:   Most Recent 3 CBC's:  Recent Labs   Lab Test 09/11/23  0713 09/05/23  0905 08/28/23  0727   WBC 6.2 7.2 7.8   HGB 12.8* 12.6* 13.0*   MCV 89 88 89    239 308   ANEUTAUTO 4.0 4.5 3.3    Most Recent 3 BMP's:  Recent Labs   Lab Test 09/11/23  0713 09/05/23  0905 08/28/23  0727    138 140   POTASSIUM 4.3 4.6 3.8    CHLORIDE 102 103 105   CO2 26 26 22   BUN 14.4 17.3 15.7   CR 0.87 0.87 0.82   ANIONGAP 10 9 13   SOTO 9.5 9.6 9.5   * 91 117*   PROTTOTAL 7.0 7.0 6.9   ALBUMIN 4.5 4.5 4.5    Most Recent 2 LFT's:  Recent Labs   Lab Test 09/11/23  0713 09/05/23  0905   AST 12 12   ALT 14 17   ALKPHOS 51 57   BILITOTAL 0.4 0.2    Most Recent TSH and T4:  Recent Labs   Lab Test 06/22/23  1433   TSH 0.88     Phos/Mag:  Lab Results   Component Value Date    MAG 2.3 09/11/2023    MAG 2.1 09/05/2023    MAG 1.8 08/28/2023      I reviewed the above labs today.    Imaging: n/a    Impression/plan:   Nasopharyngeal carcinoma, cT2N2 (III):  with SD after 2 cycles of cisplatin/gemcitabine. Now on concurrent chemoradiation with weekly cisplatin, tolerating well with intermittent tinnitus and ongoing dysgeusia. Labs and exam acceptable to proceed with chemo today.  -proceed with day 15 cisplatin today  -will continue weekly ALON follow-up through chemoradiation    CASSIUS  No issues, discontinue PO dex    Hearing changes/tinnitus: Intermittent tinnitus on L, no change compared to last week. Lasting for a few seconds per episode. Had normal hearing test prior to starting. Continue to monitor.      30 minutes spent on the date of the encounter doing chart review, review of test results, interpretation of tests, patient visit, and documentation     Janis Cordova, LEONIE

## 2023-09-11 ENCOUNTER — LAB (OUTPATIENT)
Dept: LAB | Facility: CLINIC | Age: 38
End: 2023-09-11
Attending: INTERNAL MEDICINE
Payer: COMMERCIAL

## 2023-09-11 ENCOUNTER — ONCOLOGY VISIT (OUTPATIENT)
Dept: ONCOLOGY | Facility: CLINIC | Age: 38
End: 2023-09-11
Attending: INTERNAL MEDICINE
Payer: COMMERCIAL

## 2023-09-11 ENCOUNTER — APPOINTMENT (OUTPATIENT)
Dept: RADIATION ONCOLOGY | Facility: CLINIC | Age: 38
End: 2023-09-11
Attending: RADIOLOGY
Payer: COMMERCIAL

## 2023-09-11 VITALS
BODY MASS INDEX: 31.53 KG/M2 | OXYGEN SATURATION: 100 % | DIASTOLIC BLOOD PRESSURE: 69 MMHG | SYSTOLIC BLOOD PRESSURE: 105 MMHG | HEART RATE: 76 BPM | RESPIRATION RATE: 18 BRPM | TEMPERATURE: 98.7 F | WEIGHT: 232.8 LBS

## 2023-09-11 VITALS
HEART RATE: 70 BPM | RESPIRATION RATE: 16 BRPM | TEMPERATURE: 98.1 F | OXYGEN SATURATION: 98 % | SYSTOLIC BLOOD PRESSURE: 120 MMHG | DIASTOLIC BLOOD PRESSURE: 73 MMHG

## 2023-09-11 DIAGNOSIS — C11.9 NASOPHARYNGEAL CANCER (H): Primary | ICD-10-CM

## 2023-09-11 DIAGNOSIS — R43.2 DYSGEUSIA: ICD-10-CM

## 2023-09-11 DIAGNOSIS — E83.42 HYPOMAGNESEMIA: Primary | ICD-10-CM

## 2023-09-11 DIAGNOSIS — T45.1X5A CHEMOTHERAPY-INDUCED NAUSEA: ICD-10-CM

## 2023-09-11 DIAGNOSIS — R11.0 CHEMOTHERAPY-INDUCED NAUSEA: ICD-10-CM

## 2023-09-11 DIAGNOSIS — H93.12 TINNITUS, LEFT: ICD-10-CM

## 2023-09-11 DIAGNOSIS — E83.42 HYPOMAGNESEMIA: ICD-10-CM

## 2023-09-11 DIAGNOSIS — C11.9 NASOPHARYNGEAL CANCER (H): ICD-10-CM

## 2023-09-11 LAB
ALBUMIN SERPL BCG-MCNC: 4.5 G/DL (ref 3.5–5.2)
ALP SERPL-CCNC: 51 U/L (ref 40–129)
ALT SERPL W P-5'-P-CCNC: 14 U/L (ref 0–70)
ANION GAP SERPL CALCULATED.3IONS-SCNC: 10 MMOL/L (ref 7–15)
AST SERPL W P-5'-P-CCNC: 12 U/L (ref 0–45)
BASOPHILS # BLD AUTO: 0 10E3/UL (ref 0–0.2)
BASOPHILS NFR BLD AUTO: 1 %
BILIRUB SERPL-MCNC: 0.4 MG/DL
BUN SERPL-MCNC: 14.4 MG/DL (ref 6–20)
CALCIUM SERPL-MCNC: 9.5 MG/DL (ref 8.6–10)
CHLORIDE SERPL-SCNC: 102 MMOL/L (ref 98–107)
CREAT SERPL-MCNC: 0.87 MG/DL (ref 0.67–1.17)
DEPRECATED HCO3 PLAS-SCNC: 26 MMOL/L (ref 22–29)
EGFRCR SERPLBLD CKD-EPI 2021: >90 ML/MIN/1.73M2
EOSINOPHIL # BLD AUTO: 0.2 10E3/UL (ref 0–0.7)
EOSINOPHIL NFR BLD AUTO: 2 %
ERYTHROCYTE [DISTWIDTH] IN BLOOD BY AUTOMATED COUNT: 14.9 % (ref 10–15)
GLUCOSE SERPL-MCNC: 119 MG/DL (ref 70–99)
HCT VFR BLD AUTO: 38.8 % (ref 40–53)
HGB BLD-MCNC: 12.8 G/DL (ref 13.3–17.7)
IMM GRANULOCYTES # BLD: 0 10E3/UL
IMM GRANULOCYTES NFR BLD: 1 %
LYMPHOCYTES # BLD AUTO: 1 10E3/UL (ref 0.8–5.3)
LYMPHOCYTES NFR BLD AUTO: 17 %
MAGNESIUM SERPL-MCNC: 2.3 MG/DL (ref 1.7–2.3)
MCH RBC QN AUTO: 29.2 PG (ref 26.5–33)
MCHC RBC AUTO-ENTMCNC: 33 G/DL (ref 31.5–36.5)
MCV RBC AUTO: 89 FL (ref 78–100)
MONOCYTES # BLD AUTO: 0.9 10E3/UL (ref 0–1.3)
MONOCYTES NFR BLD AUTO: 15 %
NEUTROPHILS # BLD AUTO: 4 10E3/UL (ref 1.6–8.3)
NEUTROPHILS NFR BLD AUTO: 64 %
NRBC # BLD AUTO: 0 10E3/UL
NRBC BLD AUTO-RTO: 0 /100
PLATELET # BLD AUTO: 160 10E3/UL (ref 150–450)
POTASSIUM SERPL-SCNC: 4.3 MMOL/L (ref 3.4–5.3)
PROT SERPL-MCNC: 7 G/DL (ref 6.4–8.3)
RBC # BLD AUTO: 4.38 10E6/UL (ref 4.4–5.9)
SODIUM SERPL-SCNC: 138 MMOL/L (ref 136–145)
WBC # BLD AUTO: 6.2 10E3/UL (ref 4–11)

## 2023-09-11 PROCEDURE — 77427 RADIATION TX MANAGEMENT X5: CPT | Performed by: RADIOLOGY

## 2023-09-11 PROCEDURE — 96367 TX/PROPH/DG ADDL SEQ IV INF: CPT

## 2023-09-11 PROCEDURE — 258N000003 HC RX IP 258 OP 636: Performed by: REGISTERED NURSE

## 2023-09-11 PROCEDURE — 80053 COMPREHEN METABOLIC PANEL: CPT | Performed by: REGISTERED NURSE

## 2023-09-11 PROCEDURE — 99214 OFFICE O/P EST MOD 30 MIN: CPT | Performed by: REGISTERED NURSE

## 2023-09-11 PROCEDURE — G0463 HOSPITAL OUTPT CLINIC VISIT: HCPCS | Mod: 25 | Performed by: REGISTERED NURSE

## 2023-09-11 PROCEDURE — 96413 CHEMO IV INFUSION 1 HR: CPT

## 2023-09-11 PROCEDURE — 77336 RADIATION PHYSICS CONSULT: CPT | Performed by: RADIOLOGY

## 2023-09-11 PROCEDURE — 77386 HC IMRT TREATMENT DELIVERY, COMPLEX: CPT | Performed by: RADIOLOGY

## 2023-09-11 PROCEDURE — 85025 COMPLETE CBC W/AUTO DIFF WBC: CPT | Performed by: REGISTERED NURSE

## 2023-09-11 PROCEDURE — 36415 COLL VENOUS BLD VENIPUNCTURE: CPT | Performed by: REGISTERED NURSE

## 2023-09-11 PROCEDURE — 96375 TX/PRO/DX INJ NEW DRUG ADDON: CPT

## 2023-09-11 PROCEDURE — 83735 ASSAY OF MAGNESIUM: CPT | Performed by: REGISTERED NURSE

## 2023-09-11 PROCEDURE — 250N000011 HC RX IP 250 OP 636: Performed by: REGISTERED NURSE

## 2023-09-11 RX ORDER — MEPERIDINE HYDROCHLORIDE 25 MG/ML
25 INJECTION INTRAMUSCULAR; INTRAVENOUS; SUBCUTANEOUS EVERY 30 MIN PRN
Status: CANCELLED | OUTPATIENT
Start: 2023-09-11

## 2023-09-11 RX ORDER — DIPHENHYDRAMINE HYDROCHLORIDE 50 MG/ML
50 INJECTION INTRAMUSCULAR; INTRAVENOUS
Status: CANCELLED
Start: 2023-09-11

## 2023-09-11 RX ORDER — ALBUTEROL SULFATE 90 UG/1
1-2 AEROSOL, METERED RESPIRATORY (INHALATION)
Status: CANCELLED
Start: 2023-09-11

## 2023-09-11 RX ORDER — METHYLPREDNISOLONE SODIUM SUCCINATE 125 MG/2ML
125 INJECTION, POWDER, LYOPHILIZED, FOR SOLUTION INTRAMUSCULAR; INTRAVENOUS
Status: CANCELLED
Start: 2023-09-11

## 2023-09-11 RX ORDER — PALONOSETRON 0.05 MG/ML
0.25 INJECTION, SOLUTION INTRAVENOUS ONCE
Status: CANCELLED | OUTPATIENT
Start: 2023-09-11

## 2023-09-11 RX ORDER — PALONOSETRON 0.05 MG/ML
0.25 INJECTION, SOLUTION INTRAVENOUS ONCE
Status: COMPLETED | OUTPATIENT
Start: 2023-09-11 | End: 2023-09-11

## 2023-09-11 RX ORDER — ALBUTEROL SULFATE 0.83 MG/ML
2.5 SOLUTION RESPIRATORY (INHALATION)
Status: CANCELLED | OUTPATIENT
Start: 2023-09-11

## 2023-09-11 RX ORDER — EPINEPHRINE 1 MG/ML
0.3 INJECTION, SOLUTION INTRAMUSCULAR; SUBCUTANEOUS EVERY 5 MIN PRN
Status: CANCELLED | OUTPATIENT
Start: 2023-09-11

## 2023-09-11 RX ORDER — LORAZEPAM 2 MG/ML
0.5 INJECTION INTRAMUSCULAR EVERY 4 HOURS PRN
Status: CANCELLED | OUTPATIENT
Start: 2023-09-11

## 2023-09-11 RX ORDER — HEPARIN SODIUM,PORCINE 10 UNIT/ML
5-20 VIAL (ML) INTRAVENOUS DAILY PRN
Status: CANCELLED | OUTPATIENT
Start: 2023-09-11

## 2023-09-11 RX ORDER — HEPARIN SODIUM (PORCINE) LOCK FLUSH IV SOLN 100 UNIT/ML 100 UNIT/ML
5 SOLUTION INTRAVENOUS
Status: CANCELLED | OUTPATIENT
Start: 2023-09-11

## 2023-09-11 RX ADMIN — MAGNESIUM SULFATE HEPTAHYDRATE: 500 INJECTION, SOLUTION INTRAMUSCULAR; INTRAVENOUS at 08:44

## 2023-09-11 RX ADMIN — DEXAMETHASONE SODIUM PHOSPHATE: 10 INJECTION, SOLUTION INTRAMUSCULAR; INTRAVENOUS at 08:18

## 2023-09-11 RX ADMIN — PALONOSETRON HYDROCHLORIDE 0.25 MG: 0.25 INJECTION INTRAVENOUS at 08:04

## 2023-09-11 RX ADMIN — CISPLATIN 90 MG: 1 INJECTION, SOLUTION INTRAVENOUS at 09:27

## 2023-09-11 ASSESSMENT — PAIN SCALES - GENERAL: PAINLEVEL: NO PAIN (0)

## 2023-09-11 NOTE — Clinical Note
9/11/2023         RE: Thierno Vega  [address]  [address]  [facility] [address]        Dear Colleague,    Thank you for referring your patient, Thierno Vega, to the [facility]. Please see a copy of my visit note below.    Oncology Progress Note  Sep 11, 2023    Reason for Visit: seen in follow-up of nasopharyngeal cancer    Oncology HPI:   CANCER TYPE: SCC nasopharynx, GALLITO -jeanna  STAGE: cT2N2 vs N3 (III vs MARYSOL)  ECOG PS: 0     PD-L1:  NGS:      SUMMARY  4/30/23               US. Bilateral necrotic nodes  5/3/23                 FNA cervical adenopathy - undifferentiated carcinoma  5/4/23                 CT CAP. 7 mm pulmonary nodule, no metastases  5/5/23                 MRI bilateral cervical and retropharyngeal adenopathy     Came to US  6/12/23               US guided FNA L neck node in clinic (Dr. Fisher). Path: hypocellular with scant clusters of atypical squamous cells suspicious for malignancy, p40 +jeanna, GALLITO -jeanna  6/13/23               PET/CT. Intense uptake L Rosenmuller fossa with increased fullness (SUV 18.8). Skull base intact. Milder FDG uptake (SUV 6.1) by the prominent R Rosenmuller fossa, inflammatory vs malignant. Indeterminate bilateral intense palatine tonsillar uptake with fullness on CT, inflammatroy vs infectious vs tumor, discontinuous with the nasopharyngeal abnormality. Bilateral nodes, including level 2a, 2b, 3, 3/5, R 1.8 cm 2A, another level 2/3 nodes, FDG avid retropharyngeal nodes. 0.3 cm nodule on the R major fissure. 0.8 cm R inguinal node (SUV 3.9) with additional nonenlarged LNs with minimal uptake, suggestive of reactive adenopathy. Marked focal uptake along the R upper inner thigh with skin thickening (SUV 16.8), which may represent cutaneous infection/inflammation, recommend direct visualization  6/27/23   Induction chemotherapy with cisplatin gemcitabine x 2 cycles with SD  8/28/23:  concurrent chemoradiation with weekly  cisplatin    Interval history:   Thierno's visit is conducted with a professional French . He is seen today prior to day 15 weekly cisplatin.  -Tinnitus remains mild, intermittent. Lasts 10 seconds or less  -No nausea  -Continues to push himself to eat, dysgeusia is limiting  -Taking gabapentin TID daily, confirmed dosing and schedule with Dr. Parada  -Increased pain to left neck, centered around enlarged LN    Current Outpatient Medications   Medication Sig Dispense Refill    amLODIPine 5 MG TABS 5 mg, valsartan 160 MG TABS 160 mg Take 5 mg by mouth daily      dexAMETHasone (DECADRON) 4 MG tablet Take 1 tablet (4 mg) by mouth 2 times daily (with meals) Take 4 mg bid x 3 days Start on Day 2 after chemotherapy. 6 tablet 2    gabapentin (NEURONTIN) 300 MG capsule Take 3 capsules (900 mg) by mouth 3 times daily Taper dose up to 900 mg po tid per instructions given in Radiation Oncology clinic 270 capsule 4    ondansetron (ZOFRAN) 8 MG tablet Take 1 tablet (8 mg) by mouth every 8 hours as needed for nausea (vomiting) (Patient not taking: Reported on 9/11/2023) 30 tablet 5    prochlorperazine (COMPAZINE) 10 MG tablet Take 1 tablet (10 mg) by mouth every 6 hours as needed for nausea or vomiting (Patient not taking: Reported on 9/11/2023) 30 tablet 5        No Known Allergies      Exam:   Blood pressure 105/69, pulse 76, temperature 98.7  F (37.1  C), temperature source Oral, resp. rate 18, weight 105.6 kg (232 lb 12.8 oz), SpO2 100 %.    Wt Readings from Last 4 Encounters:   09/11/23 105.6 kg (232 lb 12.8 oz)   09/06/23 108 kg (238 lb 1.6 oz)   09/05/23 108 kg (238 lb)   08/29/23 107 kg (236 lb)     Gen: NAD  Oropharynx is moist and without lesion. Mild erythema to posterior OP. ~1.5 cm L cervical LN  Lungs: CTA, no wheezes  Heart: RRR, no m/g/r  Extremities: warm, no edema. Speech is clear. CN wnl. Gait/station wnl.   Skin: no rash on exposed skin     Labs:   Most Recent 3 CBC's:  Recent Labs   Lab Test  09/11/23  0713 09/05/23  0905 08/28/23  0727   WBC 6.2 7.2 7.8   HGB 12.8* 12.6* 13.0*   MCV 89 88 89    239 308   ANEUTAUTO 4.0 4.5 3.3    Most Recent 3 BMP's:  Recent Labs   Lab Test 09/11/23  0713 09/05/23  0905 08/28/23  0727    138 140   POTASSIUM 4.3 4.6 3.8   CHLORIDE 102 103 105   CO2 26 26 22   BUN 14.4 17.3 15.7   CR 0.87 0.87 0.82   ANIONGAP 10 9 13   SOTO 9.5 9.6 9.5   * 91 117*   PROTTOTAL 7.0 7.0 6.9   ALBUMIN 4.5 4.5 4.5    Most Recent 2 LFT's:  Recent Labs   Lab Test 09/11/23  0713 09/05/23  0905   AST 12 12   ALT 14 17   ALKPHOS 51 57   BILITOTAL 0.4 0.2    Most Recent TSH and T4:  Recent Labs   Lab Test 06/22/23  1433   TSH 0.88     Phos/Mag:  Lab Results   Component Value Date    MAG 2.3 09/11/2023    MAG 2.1 09/05/2023    MAG 1.8 08/28/2023      I reviewed the above labs today.    Imaging: n/a    Impression/plan:   Nasopharyngeal carcinoma, cT2N2 (III):  with SD after 2 cycles of cisplatin/gemcitabine. Now on concurrent chemoradiation with weekly cisplatin, tolerating well with flare of L tinnitus. Labs stable.  -proceed with day 8 cisplatin today  -will continue weekly ALON follow-up through chemoradiation    CASSIUS  Well controlled with PO dex 4 mg bid on days 2 and 3. Continue with day 8 and reassess next week.    Hearing changes/tinnitus: Noticed flare of intermittent tinnitus after starting weekly cisplatin last week. Had normal hearing test prior to starting. Continue to monitor.      *** minutes spent on the date of the encounter doing {2021 E&M time in:[id number]}     Janis Cordova CNP    Oncology Progress Note  Sep 11, 2023    Reason for Visit: seen in follow-up of nasopharyngeal cancer    Oncology HPI:   CANCER TYPE: SCC nasopharynx, GALLITO -jeanna  STAGE: cT2N2 vs N3 (III vs MARYSOL)  ECOG PS: 0     PD-L1:  NGS:      SUMMARY  4/30/23               US. Bilateral necrotic nodes  5/3/23                 FNA cervical adenopathy - undifferentiated carcinoma  5/4/23                 CT CAP.  7 mm pulmonary nodule, no metastases  5/5/23                 MRI bilateral cervical and retropharyngeal adenopathy     Came to US  6/12/23               US guided FNA L neck node in clinic (Dr. Fisher). Path: hypocellular with scant clusters of atypical squamous cells suspicious for malignancy, p40 +jeanna, GALLITO -jeanna  6/13/23               PET/CT. Intense uptake L Rosenmuller fossa with increased fullness (SUV 18.8). Skull base intact. Milder FDG uptake (SUV 6.1) by the prominent R Rosenmuller fossa, inflammatory vs malignant. Indeterminate bilateral intense palatine tonsillar uptake with fullness on CT, inflammatroy vs infectious vs tumor, discontinuous with the nasopharyngeal abnormality. Bilateral nodes, including level 2a, 2b, 3, 3/5, R 1.8 cm 2A, another level 2/3 nodes, FDG avid retropharyngeal nodes. 0.3 cm nodule on the R major fissure. 0.8 cm R inguinal node (SUV 3.9) with additional nonenlarged LNs with minimal uptake, suggestive of reactive adenopathy. Marked focal uptake along the R upper inner thigh with skin thickening (SUV 16.8), which may represent cutaneous infection/inflammation, recommend direct visualization  6/27/23   Induction chemotherapy with cisplatin gemcitabine x 2 cycles with SD  8/28/23:  concurrent chemoradiation with weekly cisplatin    Interval history:   Thierno's visit is conducted with a professional Serbian . He is seen today prior to day 15 weekly cisplatin.  -Tinnitus remains mild, intermittent. Lasts 10 seconds or less  -No nausea  -Continues to push himself to eat, dysgeusia is limiting  -Taking gabapentin TID daily, confirmed dosing and schedule with Dr. Parada  -Increased pain to left neck, centered around enlarged LN    Current Outpatient Medications   Medication Sig Dispense Refill     amLODIPine 5 MG TABS 5 mg, valsartan 160 MG TABS 160 mg Take 5 mg by mouth daily       dexAMETHasone (DECADRON) 4 MG tablet Take 1 tablet (4 mg) by mouth 2 times daily (with meals)  Take 4 mg bid x 3 days Start on Day 2 after chemotherapy. 6 tablet 2     gabapentin (NEURONTIN) 300 MG capsule Take 3 capsules (900 mg) by mouth 3 times daily Taper dose up to 900 mg po tid per instructions given in Radiation Oncology clinic 270 capsule 4     ondansetron (ZOFRAN) 8 MG tablet Take 1 tablet (8 mg) by mouth every 8 hours as needed for nausea (vomiting) (Patient not taking: Reported on 9/11/2023) 30 tablet 5     prochlorperazine (COMPAZINE) 10 MG tablet Take 1 tablet (10 mg) by mouth every 6 hours as needed for nausea or vomiting (Patient not taking: Reported on 9/11/2023) 30 tablet 5        No Known Allergies      Exam:   Blood pressure 105/69, pulse 76, temperature 98.7  F (37.1  C), temperature source Oral, resp. rate 18, weight 105.6 kg (232 lb 12.8 oz), SpO2 100 %.    Wt Readings from Last 4 Encounters:   09/11/23 105.6 kg (232 lb 12.8 oz)   09/06/23 108 kg (238 lb 1.6 oz)   09/05/23 108 kg (238 lb)   08/29/23 107 kg (236 lb)     Gen: NAD  Oropharynx is moist and without lesion. Mild erythema to posterior OP. ~1.5 cm L cervical LN  Lungs: CTA, no wheezes  Heart: RRR, no m/g/r  Extremities: warm, no edema. Speech is clear. CN wnl. Gait/station wnl.   Skin: no rash on exposed skin     Labs:   Most Recent 3 CBC's:  Recent Labs   Lab Test 09/11/23  0713 09/05/23  0905 08/28/23  0727   WBC 6.2 7.2 7.8   HGB 12.8* 12.6* 13.0*   MCV 89 88 89    239 308   ANEUTAUTO 4.0 4.5 3.3    Most Recent 3 BMP's:  Recent Labs   Lab Test 09/11/23 0713 09/05/23  0905 08/28/23  0727    138 140   POTASSIUM 4.3 4.6 3.8   CHLORIDE 102 103 105   CO2 26 26 22   BUN 14.4 17.3 15.7   CR 0.87 0.87 0.82   ANIONGAP 10 9 13   SOTO 9.5 9.6 9.5   * 91 117*   PROTTOTAL 7.0 7.0 6.9   ALBUMIN 4.5 4.5 4.5    Most Recent 2 LFT's:  Recent Labs   Lab Test 09/11/23  0713 09/05/23  0905   AST 12 12   ALT 14 17   ALKPHOS 51 57   BILITOTAL 0.4 0.2    Most Recent TSH and T4:  Recent Labs   Lab Test 06/22/23  1433   TSH 0.88      Phos/Mag:  Lab Results   Component Value Date    MAG 2.3 09/11/2023    MAG 2.1 09/05/2023    MAG 1.8 08/28/2023      I reviewed the above labs today.    Imaging: n/a    Impression/plan:   Nasopharyngeal carcinoma, cT2N2 (III):  with SD after 2 cycles of cisplatin/gemcitabine. Now on concurrent chemoradiation with weekly cisplatin, tolerating well with intermittent tinnitus and ongoing dysgeusia. Labs and exam acceptable to proceed with chemo today.  -proceed with day 15 cisplatin today  -will continue weekly ALON follow-up through chemoradiation    CASSIUS  No issues, discontinue PO dex    Hearing changes/tinnitus: Intermittent tinnitus on L, no change compared to last week. Lasting for a few seconds per episode. Had normal hearing test prior to starting. Continue to monitor.      30 minutes spent on the date of the encounter doing chart review, review of test results, interpretation of tests, patient visit, and documentation     Janis Cordova CNP      Again, thank you for allowing me to participate in the care of your patient.        Sincerely,        Janis Cordova CNP

## 2023-09-11 NOTE — NURSING NOTE
"Oncology Rooming Note    September 11, 2023 7:36 AM   Thierno Vega is a 38 year old male who presents for:    Chief Complaint   Patient presents with    Oncology Clinic Visit     Nasopharyngeal cancer     Initial Vitals: /69 (BP Location: Right arm, Patient Position: Sitting, Cuff Size: Adult Regular)   Pulse 76   Temp 98.7  F (37.1  C) (Oral)   Resp 18   Wt 105.6 kg (232 lb 12.8 oz)   SpO2 100%   BMI 31.53 kg/m   Estimated body mass index is 31.53 kg/m  as calculated from the following:    Height as of 8/11/23: 1.83 m (6' 0.05\").    Weight as of this encounter: 105.6 kg (232 lb 12.8 oz). Body surface area is 2.32 meters squared.  No Pain (0) Comment: Data Unavailable   No LMP for male patient.  Allergies reviewed: Yes  Medications reviewed: Yes    Medications: Medication refills not needed today.  Pharmacy name entered into [employer]:    [id number] - [place], [place] - [address] AT [address] [facility] - [place] - [address] [facility] [place] - [address] [place] [address] [phone]    Clinical concerns: Patient states there are no new concerns to discuss with provider.    Rosalino Mcbride              "

## 2023-09-11 NOTE — PROGRESS NOTES
Infusion Nursing Note:  Thierno Vega presents today for cycle 1, day 15 cisplatin.    Patient seen by provider today: Yes: Janis Cordova CNP   present during visit today: Yes, Language: Kazakh.     Note: N/A.      Intravenous Access:  Peripheral IV placed in lab    Treatment Conditions:  Lab Results   Component Value Date    HGB 12.8 (L) 09/11/2023    WBC 6.2 09/11/2023    ANEUTAUTO 4.0 09/11/2023     09/11/2023        Lab Results   Component Value Date     09/11/2023    POTASSIUM 4.3 09/11/2023    MAG 2.3 09/11/2023    CR 0.87 09/11/2023    SOTO 9.5 09/11/2023    BILITOTAL 0.4 09/11/2023    ALBUMIN 4.5 09/11/2023    ALT 14 09/11/2023    AST 12 09/11/2023       Results reviewed, labs MET treatment parameters, ok to proceed with treatment.      Post Infusion Assessment:  Patient tolerated infusion without incident.  Blood return noted pre and post infusion.  Site patent and intact, free from redness, edema or discomfort.  No evidence of extravasations.  Access discontinued per protocol.       Discharge Plan:   Patient declined prescription refills. Patient declined dexamethasone refill today, states he hasn't had any nausea and doesn't need it. Janis Cordova updated and in agreement with plan  Discharge instructions reviewed with: Patient.  Patient and/or family verbalized understanding of discharge instructions and all questions answered.  AVS to patient via [employer].  Patient will return 9/19 for next infusion appointment, 9/18 for next provider vist  Patient discharged in stable condition accompanied by: self.  Departure Mode: Ambulatory.      Akosua Castano RN

## 2023-09-11 NOTE — PATIENT INSTRUCTIONS
[facility] Triage and after hours / weekends / holidays:  [phone]    Please call the triage or after hours line if you experience a temperature greater than or equal to 100.4, shaking chills, have uncontrolled nausea, vomiting and/or diarrhea, dizziness, shortness of breath, chest pain, bleeding, unexplained bruising, or if you have any other new/concerning symptoms, questions or concerns.      If you are having any concerning symptoms or wish to speak to a provider before your next infusion visit, please call triage to notify them so we can adequately serve you.     If you need a refill on a narcotic prescription or other medication, please call before your infusion appointment.             September 2023 Sunday Monday Tuesday Wednesday Thursday Friday Saturday                            1    TREATMENT  10:15 AM   (15 min.)   P RAD ONC IJEOMA   [facility] Radiation Oncology 2       3     4     5    LAB PERIPHERAL   8:15 AM   (15 min.)   [facility] LAB DRAW   [facility]    RETURN ACTIVE TREATMENT   8:45 AM   (45 min.)   Janis Cordova CNP   [facility]    ONC INFUSION 2 HR (120 MIN)   9:30 AM   (120 min.)    ONC INFUSION NURSE   [facility] OUTPATIENT  10:15 AM   (15 min.)   VIDEO,    [facility]  Services    TREATMENT  10:15 AM   (15 min.)   P RAD ONC IJEOMA   [facility] Radiation Oncology 6    OTV  10:15 AM   (15 min.)   Yessica Parada MD   [facility] Radiation Oncology    TREATMENT  10:15 AM   (15 min.)   P RAD ONC IJEOMA   [facility] Radiation Oncology 7    TREATMENT  10:15 AM   (15 min.)   P RAD ONC IJEOMA   [facility] Radiation Oncology    [place] OUTPATIENT  11:00 AM   (15 min.)   VIDEO,    [facility]  Services 8    TREATMENT  10:15 AM   (15 min.)   P RAD ONC IJEOMA   [facility] Radiation Oncology    [place] OUTPATIENT  11:00 AM   (15 min.)   VIDEO,    [facility]  Services 9       10     11    LAB PERIPHERAL   6:45 AM   (15 min.)   [facility] LAB DRAW   [facility]    RETURN CCSL   7:00 AM   (45 min.)   Janis Cordova CNP   [facility]    ONC INFUSION 2 HR (120 MIN)   8:30 AM   (120 min.)   UC ONC INFUSION NURSE   [facility] OUTPATIENT  10:15 AM   (15 min.)   VIDEO,    [facility]  Services    TREATMENT  10:15 AM   (15 min.)   [facility] RAD ONC IJEOMA   [facility] Radiation Oncology 12    [place] OUTPATIENT  11:00 AM   (15 min.)   VIDEO,    [facility]  Services    TREATMENT  11:00 AM   (15 min.)   [facility] RAD ONC IJEOMA   [facility] Radiation Oncology    OTV  11:15 AM   (15 min.)   Yessica Parada MD   [facility] Radiation Oncology 13    [place] OUTPATIENT  11:00 AM   (15 min.)   VIDEO,    [facility]  Services    TREATMENT  11:00 AM   (15 min.)   [facility] RAD ONC IJEOMA   [facility] Radiation Oncology 14    [place] OUTPATIENT  11:00 AM   (15 min.)   VIDEO,    [facility]  Services    TREATMENT  11:00 AM   (15 min.)   [facility] RAD ONC IJEOMA   [facility] Radiation Oncology 15    TREATMENT   9:00 AM   (15 min.)   [facility] RAD ONC IJEOMA   [facility] Radiation Oncology    [place] OUTPATIENT  10:30 AM   (60 min.)   VIDEO,    [facility]  Services 16       17     18    LAB PERIPHERAL   7:45 AM   (15 min.)   [facility] LAB DRAW   [facility]    RETURN CCSL   8:00 AM   (45 min.)   Janis Cordova CNP   M [facility] OUTPATIENT  11:00 AM   (60 min.)   VIDEO,    [facility]   Services    TREATMENT  11:00 AM   (15 min.)   [facility] RAD ONC IJEOMA   [facility] Radiation Oncology 19    ONC INFUSION 2 HR (120 MIN)   7:30 AM   (120 min.)   UC ONC INFUSION NURSE   [facility] NUTRITION VISIT  11:00 AM   (30 min.)   Nae Tafoya RD   [facility] Radiation Oncology    TREATMENT  11:00 AM   (15 min.)   [facility] RAD ONC IJEOMA   [facility] Radiation Oncology    OTV  11:15 AM   (15 min.)   Yessica Parada MD   [facility] Radiation Oncology 20    TREATMENT  11:00 AM   (15 min.)   [facility] RAD ONC IJEOMA   [facility] Radiation Oncology 21    TREATMENT  11:00 AM   (15 min.)   [facility] RAD ONC IJEOMA   [facility] Radiation Oncology 22    TREATMENT  11:00 AM   (15 min.)   [facility] RAD ONC IJEOMA   [facility] Radiation Oncology 23       24     25    LAB PERIPHERAL   7:00 AM   (15 min.)   [facility] LAB DRAW   [facility]    RETURN ACTIVE TREATMENT   7:15 AM   (45 min.)   Jennifer Gabriel CNP   [facility]    ONC INFUSION 2 HR (120 MIN)   8:30 AM   (120 min.)   UC ONC INFUSION NURSE   [facility]    TREATMENT  11:00 AM   (15 min.)   [facility] RAD ONC IJEOMA   [facility] Radiation Oncology 26    TREATMENT  11:00 AM   (15 min.)   [facility] RAD ONC IJEOMA   [facility] Radiation Oncology    OTV  11:15 AM   (15 min.)   Yessica Parada MD   [facility] Radiation Oncology 27    TREATMENT  11:00 AM   (15 min.)   [facility] RAD ONC IJEOMA   [facility] Radiation Oncology 28    TREATMENT  11:00 AM   (15 min.)   [facility] RAD ONC IJEOMA   [facility] Radiation Oncology 29    TREATMENT  11:00 AM   (15 min.)   [facility] RAD ONC IJEOMA   [facility] Radiation Oncology 30 October 2023 Sunday Monday Tuesday Wednesday Thursday Friday Saturday   1     2    LAB PERIPHERAL   7:45 AM   (15  min.)   [facility] DRAW   [facility]    RETURN ACTIVE TREATMENT   8:00 AM   (45 min.)   Janis Cordova CNP   [facility]    ONC INFUSION 2 HR (120 MIN)   9:00 AM   (120 min.)   UC ONC INFUSION NURSE   [facility]    TREATMENT  11:00 AM   (15 min.)   [facility] RAD ONC IJEOMA   [facility] Radiation Oncology 3    OTV   9:30 AM   (15 min.)   Yessica Parada MD   [facility] Radiation Oncology    TREATMENT   9:30 AM   (15 min.)   [facility] RAD ONC IJEOMA   [facility] Radiation Oncology 4    TREATMENT  11:00 AM   (15 min.)   [facility] RAD ONC IJEOMA   [facility] Radiation Oncology 5    TREATMENT  11:00 AM   (15 min.)   [facility] RAD ONC IJEOMA   [facility] Radiation Oncology 6    TREATMENT  11:00 AM   (15 min.)   [facility] RAD ONC IJEOMA   [facility] Radiation Oncology 7       8     9    LAB PERIPHERAL   6:45 AM   (15 min.)   [facility] DRAW   [facility]    RETURN ACTIVE TREATMENT   7:00 AM   (45 min.)   Janis Cordova CNP   [facility]    ONC INFUSION 2 HR (120 MIN)   8:30 AM   (120 min.)   UC ONC INFUSION NURSE   [facility]    TREATMENT  11:00 AM   (15 min.)   [facility] RAD ONC IJEOMA   [facility] Radiation Oncology 10    TREATMENT  11:00 AM   (15 min.)   [facility] RAD ONC IJEOMA   [facility] Radiation Oncology    OTV  11:15 AM   (15 min.)   Yessica Parada MD   [facility] Radiation Oncology 11    TREATMENT  11:00 AM   (15 min.)   [facility] RAD ONC IJEOMA   [facility] Radiation Oncology 12    RETURN CCSL  10:00 AM   (30 min.)   Catrachita Clark MD   [facility]    TREATMENT  11:00 AM   (15 min.)   [facility] RAD ONC IJEOMA   [facility] Radiation Oncology 13    TREATMENT  11:00 AM   (15 min.)   [facility] RAD ONC IJEOMA   [facility] Radiation  Oncology 14       15     16    TREATMENT  11:00 AM   (15 min.)   [facility] RAD ONC [facility] Radiation Oncology 17     18     19     20     21       22     23     24     25     26     27     28       29     30     31                                        Recent Results (from the past 24 hour(s))   Comprehensive metabolic panel    Collection Time: 09/11/23  7:13 AM   Result Value Ref Range    Sodium 138 136 - 145 mmol/L    Potassium 4.3 3.4 - 5.3 mmol/L    Chloride 102 98 - 107 mmol/L    Carbon Dioxide (CO2) 26 22 - 29 mmol/L    Anion Gap 10 7 - 15 mmol/L    Urea Nitrogen 14.4 6.0 - 20.0 mg/dL    Creatinine 0.87 0.67 - 1.17 mg/dL    Calcium 9.5 8.6 - 10.0 mg/dL    Glucose 119 (H) 70 - 99 mg/dL    Alkaline Phosphatase 51 40 - 129 U/L    AST 12 0 - 45 U/L    ALT 14 0 - 70 U/L    Protein Total 7.0 6.4 - 8.3 g/dL    Albumin 4.5 3.5 - 5.2 g/dL    Bilirubin Total 0.4 <=1.2 mg/dL    GFR Estimate >90 >60 mL/min/1.73m2   Magnesium    Collection Time: 09/11/23  7:13 AM   Result Value Ref Range    Magnesium 2.3 1.7 - 2.3 mg/dL   CBC with platelets and differential    Collection Time: 09/11/23  7:13 AM   Result Value Ref Range    WBC Count 6.2 4.0 - 11.0 10e3/uL    RBC Count 4.38 (L) 4.40 - 5.90 10e6/uL    Hemoglobin 12.8 (L) 13.3 - 17.7 g/dL    Hematocrit 38.8 (L) 40.0 - 53.0 %    MCV 89 78 - 100 fL    MCH 29.2 26.5 - 33.0 pg    MCHC 33.0 31.5 - 36.5 g/dL    RDW 14.9 10.0 - 15.0 %    Platelet Count 160 150 - 450 10e3/uL    % Neutrophils 64 %    % Lymphocytes 17 %    % Monocytes 15 %    % Eosinophils 2 %    % Basophils 1 %    % Immature Granulocytes 1 %    NRBCs per 100 WBC 0 <1 /100    Absolute Neutrophils 4.0 1.6 - 8.3 10e3/uL    Absolute Lymphocytes 1.0 0.8 - 5.3 10e3/uL    Absolute Monocytes 0.9 0.0 - 1.3 10e3/uL    Absolute Eosinophils 0.2 0.0 - 0.7 10e3/uL    Absolute Basophils 0.0 0.0 - 0.2 10e3/uL    Absolute Immature Granulocytes 0.0 <=0.4 10e3/uL    Absolute NRBCs 0.0 10e3/uL

## 2023-09-12 ENCOUNTER — OFFICE VISIT (OUTPATIENT)
Dept: RADIATION ONCOLOGY | Facility: CLINIC | Age: 38
End: 2023-09-12
Attending: RADIOLOGY
Payer: COMMERCIAL

## 2023-09-12 VITALS
HEART RATE: 74 BPM | BODY MASS INDEX: 31.42 KG/M2 | WEIGHT: 232 LBS | SYSTOLIC BLOOD PRESSURE: 129 MMHG | DIASTOLIC BLOOD PRESSURE: 81 MMHG

## 2023-09-12 DIAGNOSIS — C11.9 NASOPHARYNGEAL CANCER (H): Primary | ICD-10-CM

## 2023-09-12 PROCEDURE — 77386 HC IMRT TREATMENT DELIVERY, COMPLEX: CPT | Performed by: RADIOLOGY

## 2023-09-12 NOTE — LETTER
2023         RE: Thierno Vega  [address]  [address]  [place] [address]        Dear Colleague,    Thank you for referring your patient, Thierno Vega, to the [facility] RADIATION ONCOLOGY. Please see a copy of my visit note below.    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 Vega presents to clinic today for his weekly on-treatment visit.  He is tolerating treatment well so far.  He has no concerns today.  He is still taking all nutrition by mouth.    Nursing ROS:   Nutrition Alteration  Diet Type: Patient's Preference  Skin  Skin Reaction: 1 - Faint erythema or dry desquamation  Skin Progress: Aquaphor     ENT and Mouth Exam  Mucositis - Current: 0 - None   ENT/Mouth Note: S&S rinses     Gastrointestinal  Nausea: 0 - None    Pain Assessment  0-10 Pain Scale: 0  Pain Note: Gabapentin    PEG Tube: No  Electronic Cardiac Implant: No    Objective:   /81   Pulse 74   Wt 105.2 kg (232 lb)   BMI 31.42 kg/m    Gen: Appears well, NAD  HEENT: No mucositis or thrush  Skin: Minimal erythema  Neck: Tenderness elicited with palpation over left level 5 node    Laboratory:  Lab Results   Component Value Date    WBC 6.2 2023    HGB 12.8 (L) 2023    HCT 38.8 (L) 2023    MCV 89 2023     2023     Lab Results   Component Value Date     2023    POTASSIUM 4.3 2023    CHLORIDE 102 2023    CO2 26 2023     (H) 2023     Magnesium   Date Value Ref Range Status    09/11/2023 2.3 1.7 - 2.3 mg/dL Final       Treatment-related toxicities (CTCAE v5.0):  Anorexia: Grade 1: Loss of appetite without alteration in eating habits  Fatigue: Grade 1: Fatigue relieved by rest  Nausea: Grade 0: No toxicity  Pain: Grade 1: Mild pain  Dry mouth: Grade 0: No toxicity  Dysphagia: Grade 1: Symptomatic, able to eat regular diet  Mucositis: Grade 1: Asymptomatic or mild symptoms; intervention not indicated  Dermatitis: Grade 1: Faint erythema or dry desquamation    ED visits/Hospitalizations: None    Missed Treatments: None    Mosaiq chart and setup information reviewed  IGRT images reviewed    Medication Review  Med list reviewed with patient?: Yes  Med list printed and given: Yes    Assessment:    Mr. 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  He has received neoadjuvant chemotherapy and is now undergoing concurrent chemoradiation.  He is tolerating treatment well.    Plan:   1.  Continue treatment as planned  2.  Again discussed placing prophylactic PEG given that his bilateral neck nodes will go to full dose and we anticipate he will be quite symptomatic with odynophagia and mucositis.  We discussed the pros and cons of a prophylactic PEG tube and, should he choose not to have one placed, what options were available later on in treatment.  He will think about it and get back to us with his decision.      Yessica Parada  Department of Radiation Oncology  [facility]

## 2023-09-13 ENCOUNTER — APPOINTMENT (OUTPATIENT)
Dept: RADIATION ONCOLOGY | Facility: CLINIC | Age: 38
End: 2023-09-13
Attending: RADIOLOGY
Payer: COMMERCIAL

## 2023-09-13 PROCEDURE — 77386 HC IMRT TREATMENT DELIVERY, COMPLEX: CPT | Performed by: RADIOLOGY

## 2023-09-14 ENCOUNTER — APPOINTMENT (OUTPATIENT)
Dept: RADIATION ONCOLOGY | Facility: CLINIC | Age: 38
End: 2023-09-14
Attending: RADIOLOGY
Payer: COMMERCIAL

## 2023-09-14 PROCEDURE — 77386 HC IMRT TREATMENT DELIVERY, COMPLEX: CPT | Performed by: RADIOLOGY

## 2023-09-15 ENCOUNTER — APPOINTMENT (OUTPATIENT)
Dept: RADIATION ONCOLOGY | Facility: CLINIC | Age: 38
End: 2023-09-15
Attending: RADIOLOGY
Payer: COMMERCIAL

## 2023-09-15 PROCEDURE — 77386 HC IMRT TREATMENT DELIVERY, COMPLEX: CPT | Performed by: RADIOLOGY

## 2023-09-15 NOTE — PROGRESS NOTES
Oncology Progress Note  Sep 18, 2023    Reason for Visit: seen in follow-up of nasopharyngeal cancer    Oncology HPI:   CANCER TYPE: SCC nasopharynx, GALLITO -jeanna  STAGE: cT2N2 vs N3 (III vs MARYSOL)  ECOG PS: 0     PD-L1:  NGS:      SUMMARY  4/30/23               US. Bilateral necrotic nodes  5/3/23                 FNA cervical adenopathy - undifferentiated carcinoma  5/4/23                 CT CAP. 7 mm pulmonary nodule, no metastases  5/5/23                 MRI bilateral cervical and retropharyngeal adenopathy     Came to US  6/12/23               US guided FNA L neck node in clinic (Dr. Fisher). Path: hypocellular with scant clusters of atypical squamous cells suspicious for malignancy, p40 +jeanna, GALLITO -jeanna  6/13/23               PET/CT. Intense uptake L Rosenmuller fossa with increased fullness (SUV 18.8). Skull base intact. Milder FDG uptake (SUV 6.1) by the prominent R Rosenmuller fossa, inflammatory vs malignant. Indeterminate bilateral intense palatine tonsillar uptake with fullness on CT, inflammatroy vs infectious vs tumor, discontinuous with the nasopharyngeal abnormality. Bilateral nodes, including level 2a, 2b, 3, 3/5, R 1.8 cm 2A, another level 2/3 nodes, FDG avid retropharyngeal nodes. 0.3 cm nodule on the R major fissure. 0.8 cm R inguinal node (SUV 3.9) with additional nonenlarged LNs with minimal uptake, suggestive of reactive adenopathy. Marked focal uptake along the R upper inner thigh with skin thickening (SUV 16.8), which may represent cutaneous infection/inflammation, recommend direct visualization  6/27/23   Induction chemotherapy with cisplatin gemcitabine x 2 cycles with SD  8/28/23:  concurrent chemoradiation with weekly cisplatin    Interval history:   Thierno is seen today in clinic prior to day 22 cisplatin scheduled 9/19. Visit was completed with assistance of a professional [place] .  -Continues to experience intermittent nausea following treatment. Required Zofran once.  -Appetite  significantly decreased due to dysgeusia. Solid foods are more difficult to get down. Due to the lack of taste, he feels solids come back up. Only eats soft foods, shakes, soups, etc.  -New sores on his lower lip which are painful with eating. Tried salt/soda rinses which burned in his mouth.   -Tinnitus remains intermittent. Unchanged in severity last week.  -New intermittent numbness to fingers. Hasn't disrupted function.   -Overall he is feeling run down from treatment and is looking forward to finishing. Missed his children (8 year old son and 4 year old twin girls).      Current Outpatient Medications   Medication Sig Dispense Refill    amLODIPine 5 MG TABS 5 mg, valsartan 160 MG TABS 160 mg Take 5 mg by mouth daily      gabapentin (NEURONTIN) 300 MG capsule Take 3 capsules (900 mg) by mouth 3 times daily Taper dose up to 900 mg po tid per instructions given in Radiation Oncology clinic 270 capsule 4    ondansetron (ZOFRAN) 8 MG tablet Take 1 tablet (8 mg) by mouth every 8 hours as needed for nausea (vomiting) 30 tablet 5    prochlorperazine (COMPAZINE) 10 MG tablet Take 1 tablet (10 mg) by mouth every 6 hours as needed for nausea or vomiting 30 tablet 5    dexAMETHasone (DECADRON) 4 MG tablet Take 1 tablet (4 mg) by mouth 2 times daily (with meals) Take 4 mg bid x 3 days Start on Day 2 after chemotherapy. (Patient not taking: Reported on 9/18/2023) 6 tablet 2        No Known Allergies      Exam:   Blood pressure 111/70, pulse 65, temperature 98  F (36.7  C), resp. rate 16, weight 102.6 kg (226 lb 4.8 oz), SpO2 99 %.    Wt Readings from Last 4 Encounters:   09/18/23 102.6 kg (226 lb 4.8 oz)   09/12/23 105.2 kg (232 lb)   09/11/23 105.6 kg (232 lb 12.8 oz)   09/06/23 108 kg (238 lb 1.6 oz)     Gen: NAD  Oropharynx is moist and without lesion. Mild erythema to posterior OP. L cervical LN decreasing in size with non distinct borders. Multiple aphthous ulcers to right lower lip mucosa.  Lungs: CTA, no  wheezes  Heart: RRR, no m/g/r  Extremities: warm, no edema. Speech is clear. CN wnl. Gait/station wnl.   Skin: no rash on exposed skin     Labs:   Most Recent 3 CBC's:  Recent Labs   Lab Test 09/18/23  0811 09/11/23  0713 09/05/23  0905   WBC 4.2 6.2 7.2   HGB 11.8* 12.8* 12.6*   MCV 88 89 88   * 160 239   ANEUTAUTO 3.1 4.0 4.5    Most Recent 3 BMP's:  Recent Labs   Lab Test 09/18/23  0811 09/11/23  0713 09/05/23  0905    138 138   POTASSIUM 4.0 4.3 4.6   CHLORIDE 102 102 103   CO2 24 26 26   BUN 16.0 14.4 17.3   CR 1.14 0.87 0.87   ANIONGAP 11 10 9   SOTO 9.6 9.5 9.6   * 119* 91   PROTTOTAL 7.2 7.0 7.0   ALBUMIN 4.5 4.5 4.5    Most Recent 2 LFT's:  Recent Labs   Lab Test 09/18/23  0811 09/11/23  0713   AST 16 12   ALT 16 14   ALKPHOS 53 51   BILITOTAL 0.5 0.4    Most Recent TSH and T4:  Recent Labs   Lab Test 06/22/23  1433   TSH 0.88     Phos/Mag:  Lab Results   Component Value Date    MAG 2.1 09/18/2023    MAG 2.3 09/11/2023    MAG 2.1 09/05/2023      I reviewed the above labs today.    Imaging: n/a    Impression/plan:   Nasopharyngeal carcinoma, cT2N2 (III):  with SD after 2 cycles of cisplatin/gemcitabine. Now on concurrent chemoradiation with weekly cisplatin. Intermittent tinnitus stable. Dysgeusia increasing and affecting appetite. Labs today reviewed, slight drop in platelets and hemoglobin but acceptable to continue cisplatin.  -proceed with day 22 cisplatin 9/19  -will continue weekly ALON follow-up through chemoradiation    CASSIUS  Continue PRN Zofran    Dysgeusia, anorexia  Weight down this week. Reviewed ways to increase caloric intake to liquid or soft meals.     Aphthous ulcers  Start Magic Mouthwash PRN. Continue salt/soda rinses but can reduce ratio with water.     Hearing changes/tinnitus: Intermittent tinnitus on L, no change this week. Lasting for a few seconds per episode. Had normal hearing test prior to starting. Continue to monitor.      45 minutes spent on the date of the  encounter doing chart review, review of test results, interpretation of tests, patient visit, and documentation     Janis Cordova CNP

## 2023-09-18 ENCOUNTER — ONCOLOGY VISIT (OUTPATIENT)
Dept: ONCOLOGY | Facility: CLINIC | Age: 38
End: 2023-09-18
Attending: REGISTERED NURSE
Payer: COMMERCIAL

## 2023-09-18 ENCOUNTER — APPOINTMENT (OUTPATIENT)
Dept: LAB | Facility: CLINIC | Age: 38
End: 2023-09-18
Attending: INTERNAL MEDICINE
Payer: COMMERCIAL

## 2023-09-18 ENCOUNTER — APPOINTMENT (OUTPATIENT)
Dept: RADIATION ONCOLOGY | Facility: CLINIC | Age: 38
End: 2023-09-18
Attending: RADIOLOGY
Payer: COMMERCIAL

## 2023-09-18 VITALS
DIASTOLIC BLOOD PRESSURE: 70 MMHG | HEART RATE: 65 BPM | RESPIRATION RATE: 16 BRPM | SYSTOLIC BLOOD PRESSURE: 111 MMHG | TEMPERATURE: 98 F | WEIGHT: 226.3 LBS | OXYGEN SATURATION: 99 % | BODY MASS INDEX: 30.65 KG/M2

## 2023-09-18 DIAGNOSIS — R11.0 CHEMOTHERAPY-INDUCED NAUSEA: ICD-10-CM

## 2023-09-18 DIAGNOSIS — T45.1X5A CHEMOTHERAPY-INDUCED NAUSEA: ICD-10-CM

## 2023-09-18 DIAGNOSIS — C11.9 NASOPHARYNGEAL CANCER (H): Primary | ICD-10-CM

## 2023-09-18 DIAGNOSIS — R43.2 DYSGEUSIA: ICD-10-CM

## 2023-09-18 DIAGNOSIS — E83.42 HYPOMAGNESEMIA: ICD-10-CM

## 2023-09-18 DIAGNOSIS — K12.0 APHTHOUS ULCER OF MOUTH: ICD-10-CM

## 2023-09-18 DIAGNOSIS — H93.12 TINNITUS, LEFT: ICD-10-CM

## 2023-09-18 DIAGNOSIS — K12.30 MUCOSITIS: ICD-10-CM

## 2023-09-18 LAB
ALBUMIN SERPL BCG-MCNC: 4.5 G/DL (ref 3.5–5.2)
ALP SERPL-CCNC: 53 U/L (ref 40–129)
ALT SERPL W P-5'-P-CCNC: 16 U/L (ref 0–70)
ANION GAP SERPL CALCULATED.3IONS-SCNC: 11 MMOL/L (ref 7–15)
AST SERPL W P-5'-P-CCNC: 16 U/L (ref 0–45)
BASOPHILS # BLD AUTO: 0 10E3/UL (ref 0–0.2)
BASOPHILS NFR BLD AUTO: 1 %
BILIRUB SERPL-MCNC: 0.5 MG/DL
BUN SERPL-MCNC: 16 MG/DL (ref 6–20)
CALCIUM SERPL-MCNC: 9.6 MG/DL (ref 8.6–10)
CHLORIDE SERPL-SCNC: 102 MMOL/L (ref 98–107)
CREAT SERPL-MCNC: 1.14 MG/DL (ref 0.67–1.17)
DEPRECATED HCO3 PLAS-SCNC: 24 MMOL/L (ref 22–29)
EGFRCR SERPLBLD CKD-EPI 2021: 85 ML/MIN/1.73M2
EOSINOPHIL # BLD AUTO: 0.1 10E3/UL (ref 0–0.7)
EOSINOPHIL NFR BLD AUTO: 3 %
ERYTHROCYTE [DISTWIDTH] IN BLOOD BY AUTOMATED COUNT: 14.4 % (ref 10–15)
GLUCOSE SERPL-MCNC: 113 MG/DL (ref 70–99)
HCT VFR BLD AUTO: 34.7 % (ref 40–53)
HGB BLD-MCNC: 11.8 G/DL (ref 13.3–17.7)
IMM GRANULOCYTES # BLD: 0 10E3/UL
IMM GRANULOCYTES NFR BLD: 1 %
LYMPHOCYTES # BLD AUTO: 0.6 10E3/UL (ref 0.8–5.3)
LYMPHOCYTES NFR BLD AUTO: 14 %
MAGNESIUM SERPL-MCNC: 2.1 MG/DL (ref 1.7–2.3)
MCH RBC QN AUTO: 29.8 PG (ref 26.5–33)
MCHC RBC AUTO-ENTMCNC: 34 G/DL (ref 31.5–36.5)
MCV RBC AUTO: 88 FL (ref 78–100)
MONOCYTES # BLD AUTO: 0.4 10E3/UL (ref 0–1.3)
MONOCYTES NFR BLD AUTO: 10 %
NEUTROPHILS # BLD AUTO: 3.1 10E3/UL (ref 1.6–8.3)
NEUTROPHILS NFR BLD AUTO: 71 %
NRBC # BLD AUTO: 0 10E3/UL
NRBC BLD AUTO-RTO: 0 /100
PLATELET # BLD AUTO: 138 10E3/UL (ref 150–450)
POTASSIUM SERPL-SCNC: 4 MMOL/L (ref 3.4–5.3)
PROT SERPL-MCNC: 7.2 G/DL (ref 6.4–8.3)
RBC # BLD AUTO: 3.96 10E6/UL (ref 4.4–5.9)
SODIUM SERPL-SCNC: 137 MMOL/L (ref 136–145)
WBC # BLD AUTO: 4.2 10E3/UL (ref 4–11)

## 2023-09-18 PROCEDURE — 80053 COMPREHEN METABOLIC PANEL: CPT

## 2023-09-18 PROCEDURE — G0463 HOSPITAL OUTPT CLINIC VISIT: HCPCS | Performed by: REGISTERED NURSE

## 2023-09-18 PROCEDURE — 83735 ASSAY OF MAGNESIUM: CPT

## 2023-09-18 PROCEDURE — 36415 COLL VENOUS BLD VENIPUNCTURE: CPT

## 2023-09-18 PROCEDURE — 77427 RADIATION TX MANAGEMENT X5: CPT | Performed by: RADIOLOGY

## 2023-09-18 PROCEDURE — 85025 COMPLETE CBC W/AUTO DIFF WBC: CPT

## 2023-09-18 PROCEDURE — 77386 HC IMRT TREATMENT DELIVERY, COMPLEX: CPT | Performed by: RADIOLOGY

## 2023-09-18 PROCEDURE — 99215 OFFICE O/P EST HI 40 MIN: CPT | Performed by: REGISTERED NURSE

## 2023-09-18 PROCEDURE — 77336 RADIATION PHYSICS CONSULT: CPT | Performed by: RADIOLOGY

## 2023-09-18 RX ORDER — EPINEPHRINE 1 MG/ML
0.3 INJECTION, SOLUTION INTRAMUSCULAR; SUBCUTANEOUS EVERY 5 MIN PRN
Status: CANCELLED | OUTPATIENT
Start: 2023-09-18

## 2023-09-18 RX ORDER — METHYLPREDNISOLONE SODIUM SUCCINATE 125 MG/2ML
125 INJECTION, POWDER, LYOPHILIZED, FOR SOLUTION INTRAMUSCULAR; INTRAVENOUS
Status: CANCELLED
Start: 2023-09-18

## 2023-09-18 RX ORDER — MEPERIDINE HYDROCHLORIDE 25 MG/ML
25 INJECTION INTRAMUSCULAR; INTRAVENOUS; SUBCUTANEOUS EVERY 30 MIN PRN
Status: CANCELLED | OUTPATIENT
Start: 2023-09-18

## 2023-09-18 RX ORDER — ALBUTEROL SULFATE 0.83 MG/ML
2.5 SOLUTION RESPIRATORY (INHALATION)
Status: CANCELLED | OUTPATIENT
Start: 2023-09-18

## 2023-09-18 RX ORDER — ALBUTEROL SULFATE 90 UG/1
1-2 AEROSOL, METERED RESPIRATORY (INHALATION)
Status: CANCELLED
Start: 2023-09-18

## 2023-09-18 RX ORDER — DIPHENHYDRAMINE HYDROCHLORIDE 50 MG/ML
50 INJECTION INTRAMUSCULAR; INTRAVENOUS
Status: CANCELLED
Start: 2023-09-18

## 2023-09-18 RX ORDER — HEPARIN SODIUM (PORCINE) LOCK FLUSH IV SOLN 100 UNIT/ML 100 UNIT/ML
5 SOLUTION INTRAVENOUS
Status: CANCELLED | OUTPATIENT
Start: 2023-09-18

## 2023-09-18 RX ORDER — PALONOSETRON 0.05 MG/ML
0.25 INJECTION, SOLUTION INTRAVENOUS ONCE
Status: CANCELLED | OUTPATIENT
Start: 2023-09-18

## 2023-09-18 RX ORDER — HEPARIN SODIUM,PORCINE 10 UNIT/ML
5-20 VIAL (ML) INTRAVENOUS DAILY PRN
Status: CANCELLED | OUTPATIENT
Start: 2023-09-18

## 2023-09-18 RX ORDER — LORAZEPAM 2 MG/ML
0.5 INJECTION INTRAMUSCULAR EVERY 4 HOURS PRN
Status: CANCELLED | OUTPATIENT
Start: 2023-09-18

## 2023-09-18 ASSESSMENT — PAIN SCALES - GENERAL: PAINLEVEL: NO PAIN (0)

## 2023-09-18 NOTE — NURSING NOTE
"Oncology Rooming Note    September 18, 2023 8:28 AM   Thierno Vega is a 38 year old male who presents for:    Chief Complaint   Patient presents with    Oncology Clinic Visit     Nasopharyngeal cancer    Blood Draw     Labs collected from venipuncture by RN. Vitals taken. Checked in for appointment(s).       Initial Vitals: /70   Pulse 65   Temp 98  F (36.7  C)   Resp 16   Wt 102.6 kg (226 lb 4.8 oz)   SpO2 99%   BMI 30.65 kg/m   Estimated body mass index is 30.65 kg/m  as calculated from the following:    Height as of 8/11/23: 1.83 m (6' 0.05\").    Weight as of this encounter: 102.6 kg (226 lb 4.8 oz). Body surface area is 2.28 meters squared.  No Pain (0) Comment: Data Unavailable   No LMP for male patient.  Allergies reviewed: Yes  Medications reviewed: Yes    Medications: Medication refills not needed today.  Pharmacy name entered into uromovie:    [id number] - [place], [place] - [address] AT [address] PH[address] [facility] - [place] - [address] [facility] [place], [place] - [address] [place] [address] [phone]    Clinical concerns: Patient states there are no new concerns to discuss with provider.      Rosalino Mcbride              "

## 2023-09-18 NOTE — NURSING NOTE
Chief Complaint   Patient presents with    Oncology Clinic Visit     Nasopharyngeal cancer    Blood Draw     Labs collected from venipuncture by RN. Vitals taken. Checked in for appointment(s).       Yuridia Menon RN

## 2023-09-18 NOTE — Clinical Note
9/18/2023         RE: Thierno Vega  [address]  [address]  [facility] [address]        Dear Colleague,    Thank you for referring your patient, Thierno Vega, to the [facility]. Please see a copy of my visit note below.    Oncology Progress Note  Sep 18, 2023    Reason for Visit: seen in follow-up of nasopharyngeal cancer    Oncology HPI:   CANCER TYPE: SCC nasopharynx, GALLITO -jeanna  STAGE: cT2N2 vs N3 (III vs MARYSOL)  ECOG PS: 0     PD-L1:  NGS:      SUMMARY  4/30/23               US. Bilateral necrotic nodes  5/3/23                 FNA cervical adenopathy - undifferentiated carcinoma  5/4/23                 CT CAP. 7 mm pulmonary nodule, no metastases  5/5/23                 MRI bilateral cervical and retropharyngeal adenopathy     Came to US  6/12/23               US guided FNA L neck node in clinic (Dr. Fisher). Path: hypocellular with scant clusters of atypical squamous cells suspicious for malignancy, p40 +jeanna, GALLITO -jeanna  6/13/23               PET/CT. Intense uptake L Rosenmuller fossa with increased fullness (SUV 18.8). Skull base intact. Milder FDG uptake (SUV 6.1) by the prominent R Rosenmuller fossa, inflammatory vs malignant. Indeterminate bilateral intense palatine tonsillar uptake with fullness on CT, inflammatroy vs infectious vs tumor, discontinuous with the nasopharyngeal abnormality. Bilateral nodes, including level 2a, 2b, 3, 3/5, R 1.8 cm 2A, another level 2/3 nodes, FDG avid retropharyngeal nodes. 0.3 cm nodule on the R major fissure. 0.8 cm R inguinal node (SUV 3.9) with additional nonenlarged LNs with minimal uptake, suggestive of reactive adenopathy. Marked focal uptake along the R upper inner thigh with skin thickening (SUV 16.8), which may represent cutaneous infection/inflammation, recommend direct visualization  6/27/23   Induction chemotherapy with cisplatin gemcitabine x 2 cycles with SD  8/28/23:  concurrent chemoradiation with weekly  cisplatin    Interval history:   Thierno is seen today in clinic prior to day 22 cisplatin scheduled 9/19. Visit was completed with assistance of a professional French .  -Continues to experience intermittent nausea following treatment. Required Zofran once.  -Appetite significantly decreased due to dysgeusia. Solid foods are more difficult to get down. Due to the lack of taste, he feels solids come back up. Only eats soft foods, shakes, soups, etc.  -New sores on his lower lip which are painful with eating. Tried salt/soda rinses which burned in his mouth.   -Tinnitus remains intermittent. Unchanged in severity last week.  -New intermittent numbness to fingers. Hasn't disrupted function.   -Overall he is feeling run down from treatment and is looking forward to finishing. Missed his children (8 year old son and 4 year old twin girls).      Current Outpatient Medications   Medication Sig Dispense Refill    amLODIPine 5 MG TABS 5 mg, valsartan 160 MG TABS 160 mg Take 5 mg by mouth daily      gabapentin (NEURONTIN) 300 MG capsule Take 3 capsules (900 mg) by mouth 3 times daily Taper dose up to 900 mg po tid per instructions given in Radiation Oncology clinic 270 capsule 4    ondansetron (ZOFRAN) 8 MG tablet Take 1 tablet (8 mg) by mouth every 8 hours as needed for nausea (vomiting) 30 tablet 5    prochlorperazine (COMPAZINE) 10 MG tablet Take 1 tablet (10 mg) by mouth every 6 hours as needed for nausea or vomiting 30 tablet 5    dexAMETHasone (DECADRON) 4 MG tablet Take 1 tablet (4 mg) by mouth 2 times daily (with meals) Take 4 mg bid x 3 days Start on Day 2 after chemotherapy. (Patient not taking: Reported on 9/18/2023) 6 tablet 2        No Known Allergies      Exam:   Blood pressure 111/70, pulse 65, temperature 98  F (36.7  C), resp. rate 16, weight 102.6 kg (226 lb 4.8 oz), SpO2 99 %.    Wt Readings from Last 4 Encounters:   09/18/23 102.6 kg (226 lb 4.8 oz)   09/12/23 105.2 kg (232 lb)   09/11/23 105.6  kg (232 lb 12.8 oz)   09/06/23 108 kg (238 lb 1.6 oz)     Gen: NAD  Oropharynx is moist and without lesion. Mild erythema to posterior OP. L cervical LN decreasing in size with non distinct borders. Multiple aphthous ulcers to right lower lip mucosa.  Lungs: CTA, no wheezes  Heart: RRR, no m/g/r  Extremities: warm, no edema. Speech is clear. CN wnl. Gait/station wnl.   Skin: no rash on exposed skin     Labs:   Most Recent 3 CBC's:  Recent Labs   Lab Test 09/18/23  0811 09/11/23  0713 09/05/23  0905   WBC 4.2 6.2 7.2   HGB 11.8* 12.8* 12.6*   MCV 88 89 88   * 160 239   ANEUTAUTO 3.1 4.0 4.5    Most Recent 3 BMP's:  Recent Labs   Lab Test 09/18/23  0811 09/11/23  0713 09/05/23  0905    138 138   POTASSIUM 4.0 4.3 4.6   CHLORIDE 102 102 103   CO2 24 26 26   BUN 16.0 14.4 17.3   CR 1.14 0.87 0.87   ANIONGAP 11 10 9   SOTO 9.6 9.5 9.6   * 119* 91   PROTTOTAL 7.2 7.0 7.0   ALBUMIN 4.5 4.5 4.5    Most Recent 2 LFT's:  Recent Labs   Lab Test 09/18/23  0811 09/11/23  0713   AST 16 12   ALT 16 14   ALKPHOS 53 51   BILITOTAL 0.5 0.4    Most Recent TSH and T4:  Recent Labs   Lab Test 06/22/23  1433   TSH 0.88     Phos/Mag:  Lab Results   Component Value Date    MAG 2.1 09/18/2023    MAG 2.3 09/11/2023    MAG 2.1 09/05/2023      I reviewed the above labs today.    Imaging: n/a    Impression/plan:   Nasopharyngeal carcinoma, cT2N2 (III):  with SD after 2 cycles of cisplatin/gemcitabine. Now on concurrent chemoradiation with weekly cisplatin. Intermittent tinnitus stable. Dysgeusia increasing and affecting appetite. Labs today reviewed, slight drop in platelets and hemoglobin but acceptable to continue cisplatin.  -proceed with day 22 cisplatin 9/19  -will continue weekly ALON follow-up through chemoradiation    CASSIUS  Continue PRN Zofran    Dysgeusia, anorexia  Weight down this week. Reviewed ways to increase caloric intake to liquid or soft meals.     Aphthous ulcers  Start Magic Mouthwash PRN. Continue salt/soda  rinses but can reduce ratio with water.     Hearing changes/tinnitus: Intermittent tinnitus on L, no change this week. Lasting for a few seconds per episode. Had normal hearing test prior to starting. Continue to monitor.      45 minutes spent on the date of the encounter doing chart review, review of test results, interpretation of tests, patient visit, and documentation     Janis Cordova CNP      Again, thank you for allowing me to participate in the care of your patient.        Sincerely,        Janis Cordova CNP

## 2023-09-19 ENCOUNTER — ALLIED HEALTH/NURSE VISIT (OUTPATIENT)
Dept: RADIATION ONCOLOGY | Facility: CLINIC | Age: 38
End: 2023-09-19
Attending: RADIOLOGY
Payer: COMMERCIAL

## 2023-09-19 ENCOUNTER — INFUSION THERAPY VISIT (OUTPATIENT)
Dept: ONCOLOGY | Facility: CLINIC | Age: 38
End: 2023-09-19
Attending: REGISTERED NURSE
Payer: COMMERCIAL

## 2023-09-19 VITALS
DIASTOLIC BLOOD PRESSURE: 65 MMHG | HEART RATE: 70 BPM | SYSTOLIC BLOOD PRESSURE: 110 MMHG | OXYGEN SATURATION: 99 % | TEMPERATURE: 98.8 F | RESPIRATION RATE: 16 BRPM

## 2023-09-19 VITALS — WEIGHT: 226 LBS | BODY MASS INDEX: 30.61 KG/M2

## 2023-09-19 DIAGNOSIS — C11.9 NASOPHARYNGEAL CANCER (H): Primary | ICD-10-CM

## 2023-09-19 DIAGNOSIS — C11.9 NASOPHARYNGEAL CANCER (H): ICD-10-CM

## 2023-09-19 DIAGNOSIS — E83.42 HYPOMAGNESEMIA: Primary | ICD-10-CM

## 2023-09-19 PROCEDURE — 77386 HC IMRT TREATMENT DELIVERY, COMPLEX: CPT | Performed by: RADIOLOGY

## 2023-09-19 PROCEDURE — 97803 MED NUTRITION INDIV SUBSEQ: CPT | Performed by: DIETITIAN, REGISTERED

## 2023-09-19 PROCEDURE — 250N000011 HC RX IP 250 OP 636: Mod: JZ | Performed by: REGISTERED NURSE

## 2023-09-19 PROCEDURE — 96375 TX/PRO/DX INJ NEW DRUG ADDON: CPT

## 2023-09-19 PROCEDURE — 96367 TX/PROPH/DG ADDL SEQ IV INF: CPT

## 2023-09-19 PROCEDURE — 258N000003 HC RX IP 258 OP 636: Performed by: REGISTERED NURSE

## 2023-09-19 PROCEDURE — 96413 CHEMO IV INFUSION 1 HR: CPT

## 2023-09-19 RX ORDER — PALONOSETRON 0.05 MG/ML
0.25 INJECTION, SOLUTION INTRAVENOUS ONCE
Status: COMPLETED | OUTPATIENT
Start: 2023-09-19 | End: 2023-09-19

## 2023-09-19 RX ADMIN — DEXAMETHASONE SODIUM PHOSPHATE: 10 INJECTION, SOLUTION INTRAMUSCULAR; INTRAVENOUS at 08:21

## 2023-09-19 RX ADMIN — CISPLATIN 90 MG: 1 INJECTION, SOLUTION INTRAVENOUS at 09:14

## 2023-09-19 RX ADMIN — PALONOSETRON HYDROCHLORIDE 0.25 MG: 0.25 INJECTION INTRAVENOUS at 08:21

## 2023-09-19 RX ADMIN — MAGNESIUM SULFATE HEPTAHYDRATE: 500 INJECTION, SOLUTION INTRAMUSCULAR; INTRAVENOUS at 08:44

## 2023-09-19 ASSESSMENT — PAIN SCALES - GENERAL: PAINLEVEL: NO PAIN (0)

## 2023-09-19 NOTE — PATIENT INSTRUCTIONS
[facility] Triage and after hours / weekends / holidays:  [phone]    Please call the triage or after hours line if you experience a temperature greater than or equal to 100.4, shaking chills, have uncontrolled nausea, vomiting and/or diarrhea, dizziness, shortness of breath, chest pain, bleeding, unexplained bruising, or if you have any other new/concerning symptoms, questions or concerns.      If you are having any concerning symptoms or wish to speak to a provider before your next infusion visit, please call your care coordinator or triage to notify them so we can adequately serve you.     If you need a refill on a narcotic prescription or other medication, please call before your infusion appointment.

## 2023-09-19 NOTE — PROGRESS NOTES
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 Vega presents to clinic today for his weekly on-treatment visit.  He is experiencing more fatigue and reports sleeping 10 to 12 hours/day.  This has decreased his oral intake such that he has lost weight over the past week.  He is having mild pain with swallowing but does not require any change in his pain medication management.  He is still taking all nutrition by mouth.    Nursing ROS:   Nutrition Alteration  Diet Type: Patient's Preference  Skin  Skin Reaction: 1 - Faint erythema or dry desquamation  Skin Progress: Aquaphor     ENT and Mouth Exam  Mucositis - Current: 0 - None   ENT/Mouth Note: S&S rinses     Gastrointestinal  Nausea: 0 - None        Pain Assessment  0-10 Pain Scale: 0  Pain Note: Gabapentin     PEG Tube: No  Electronic Cardiac Implant: No    Objective:   Wt 102.5 kg (226 lb)   BMI 30.61 kg/m    Gen: Appears well, NAD  HEENT: No mucositis or thrush  Skin: Minimal erythema  Neck: Tenderness elicited with palpation over left level 5 node    Laboratory:  Lab Results   Component Value Date    WBC 4.2 2023    HGB 11.8 (L) 2023    HCT 34.7 (L) 2023    MCV 88 2023     (L) 2023     Lab Results   Component Value Date     2023    POTASSIUM 4.0 2023    CHLORIDE 102 2023    CO2 24 2023     (H) 2023     Magnesium   Date Value Ref Range Status   2023 2.1 1.7 - 2.3 mg/dL Final       Treatment-related toxicities (CTCAE  v5.0):  Anorexia: Grade 2: Oral intake altered without significant weight loss or malnutrition; oral nutritional supplements indicated  Fatigue: Grade 2: Fatigue not relieved by rest; limiting instrumental ADL  Nausea: Grade 0: No toxicity  Pain: Grade 1: Mild pain  Dry mouth: Grade 1: Symptomatic without significant dietary alteration; unstimulated saliva flow >0.2 mL/min  Dysphagia: Grade 1: Symptomatic, able to eat regular diet  Mucositis: Grade 1: Asymptomatic or mild symptoms; intervention not indicated  Dermatitis: Grade 1: Faint erythema or dry desquamation    ED visits/Hospitalizations: None    Missed Treatments: None    Mosaiq chart and setup information reviewed  IGRT images reviewed    Medication Review  Med list reviewed with patient?: Yes  Med list printed and given: Yes    Assessment:    Mr. 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  He has received neoadjuvant chemotherapy and is now undergoing concurrent chemoradiation.  He is tolerating treatment as expected.    Plan:   1.  Continue treatment as planned  2.  Again discussed placing prophylactic PEG given that his bilateral neck nodes will go to full dose and we anticipate he will be quite symptomatic with odynophagia and mucositis.  We discussed the pros and cons of a prophylactic PEG tube and, should he choose not to have one placed, what options were available later on in treatment.  He will think about it and get back to us with his decision.  3.  Discussed that increasing fatigue limits the time he has to take care of himself and for nutritional intake.  Suggested setting an alarm so that he wakes up and takes in food/calories.  Cautioned to not sleep more than 12 hours/day.      Yessica Parada  Department of Radiation Oncology  [facility]

## 2023-09-19 NOTE — PROGRESS NOTES
Infusion Nursing Note:  Thierno Vega presents today for Cycle 1 Day 22 cisplatin.    Patient seen by provider today: No, visit with Janis Cordova CNP yesterday   present during visit today: Yes: [place] via phone.    Note: Thierno presents today feeling well. Endorses some discomfort in his throat/neck, but declines intervention at this visit. Denies fevers/chills overnight. Denies nausea/vomiting. Offers no concerns since visit with Janis Cordova yesterday.      Intravenous Access:  Peripheral IV placed by vascular access.    Treatment Conditions:     Latest Reference Range & Units 09/18/23 08:11   Sodium 136 - 145 mmol/L 137   Potassium 3.4 - 5.3 mmol/L 4.0   Chloride 98 - 107 mmol/L 102   Carbon Dioxide (CO2) 22 - 29 mmol/L 24   Urea Nitrogen 6.0 - 20.0 mg/dL 16.0   Creatinine 0.67 - 1.17 mg/dL 1.14   GFR Estimate >60 mL/min/1.73m2 85   Calcium 8.6 - 10.0 mg/dL 9.6   Anion Gap 7 - 15 mmol/L 11   Magnesium 1.7 - 2.3 mg/dL 2.1   Albumin 3.5 - 5.2 g/dL 4.5   Protein Total 6.4 - 8.3 g/dL 7.2   Alkaline Phosphatase 40 - 129 U/L 53   ALT 0 - 70 U/L 16   AST 0 - 45 U/L 16   Bilirubin Total <=1.2 mg/dL 0.5   Glucose 70 - 99 mg/dL 113 (H)   WBC 4.0 - 11.0 10e3/uL 4.2   Hemoglobin 13.3 - 17.7 g/dL 11.8 (L)   Hematocrit 40.0 - 53.0 % 34.7 (L)   Platelet Count 150 - 450 10e3/uL 138 (L)   RBC Count 4.40 - 5.90 10e6/uL 3.96 (L)   MCV 78 - 100 fL 88   MCH 26.5 - 33.0 pg 29.8   MCHC 31.5 - 36.5 g/dL 34.0   RDW 10.0 - 15.0 % 14.4   % Neutrophils % 71   % Lymphocytes % 14   % Monocytes % 10   % Eosinophils % 3   % Basophils % 1   Absolute Basophils 0.0 - 0.2 10e3/uL 0.0   Absolute Eosinophils 0.0 - 0.7 10e3/uL 0.1   Absolute Immature Granulocytes <=0.4 10e3/uL 0.0   Absolute Lymphocytes 0.8 - 5.3 10e3/uL 0.6 (L)   Absolute Monocytes 0.0 - 1.3 10e3/uL 0.4   % Immature Granulocytes % 1   Absolute Neutrophils 1.6 - 8.3 10e3/uL 3.1   Absolute NRBCs 10e3/uL 0.0   NRBCs per 100 WBC <1 /100 0     Results reviewed, labs MET  treatment parameters, ok to proceed with treatment.      Post Infusion Assessment:  Patient tolerated infusion without incident.  Blood return noted pre and post infusion.  Site patent and intact, free from redness, edema or discomfort.  No evidence of extravasations.  Access discontinued per protocol.       Discharge Plan:   Prescription refills given for magic mouthwash.  Discharge instructions reviewed with: Patient.  Patient and/or family verbalized understanding of discharge instructions and all questions answered.  AVS to patient via MylaT.  Patient will return 09/25 for next infusion appointment.   Patient discharged in stable condition accompanied by: friend.  Departure Mode: Ambulatory.      Peg Tirado RN

## 2023-09-19 NOTE — LETTER
2023         RE: Thierno Vega  [address]  [address]  [facility] [address]        Dear Colleague,    Thank you for referring your patient, Thierno Vega, to the [facility] RADIATION ONCOLOGY. Please see a copy of my visit note below.    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 Vega presents to clinic today for his weekly on-treatment visit.  He is experiencing more fatigue and reports sleeping 10 to 12 hours/day.  This has decreased his oral intake such that he has lost weight over the past week.  He is having mild pain with swallowing but does not require any change in his pain medication management.  He is still taking all nutrition by mouth.    Nursing ROS:   Nutrition Alteration  Diet Type: Patient's Preference  Skin  Skin Reaction: 1 - Faint erythema or dry desquamation  Skin Progress: Aquaphor     ENT and Mouth Exam  Mucositis - Current: 0 - None   ENT/Mouth Note: S&S rinses     Gastrointestinal  Nausea: 0 - None        Pain Assessment  0-10 Pain Scale: 0  Pain Note: Gabapentin     PEG Tube: No  Electronic Cardiac Implant: No    Objective:   Wt 102.5 kg (226 lb)   BMI 30.61 kg/m    Gen: Appears well, NAD  HEENT: No mucositis or thrush  Skin: Minimal erythema  Neck: Tenderness elicited with palpation over left level 5 node    Laboratory:  Lab Results   Component Value Date    WBC 4.2 2023    HGB 11.8 (L) 2023    HCT 34.7 (L) 2023    MCV 88 2023     (L) 2023     Lab Results   Component  Value Date     09/18/2023    POTASSIUM 4.0 09/18/2023    CHLORIDE 102 09/18/2023    CO2 24 09/18/2023     (H) 09/18/2023     Magnesium   Date Value Ref Range Status   09/18/2023 2.1 1.7 - 2.3 mg/dL Final       Treatment-related toxicities (CTCAE v5.0):  Anorexia: Grade 2: Oral intake altered without significant weight loss or malnutrition; oral nutritional supplements indicated  Fatigue: Grade 2: Fatigue not relieved by rest; limiting instrumental ADL  Nausea: Grade 0: No toxicity  Pain: Grade 1: Mild pain  Dry mouth: Grade 1: Symptomatic without significant dietary alteration; unstimulated saliva flow >0.2 mL/min  Dysphagia: Grade 1: Symptomatic, able to eat regular diet  Mucositis: Grade 1: Asymptomatic or mild symptoms; intervention not indicated  Dermatitis: Grade 1: Faint erythema or dry desquamation    ED visits/Hospitalizations: None    Missed Treatments: None    Mosaiq chart and setup information reviewed  IGRT images reviewed    Medication Review  Med list reviewed with patient?: Yes  Med list printed and given: Yes    Assessment:    Mr. 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  He has received neoadjuvant chemotherapy and is now undergoing concurrent chemoradiation.  He is tolerating treatment as expected.    Plan:   1.  Continue treatment as planned  2.  Again discussed placing prophylactic PEG given that his bilateral neck nodes will go to full dose and we anticipate he will be quite symptomatic with odynophagia and mucositis.  We discussed the pros and cons of a prophylactic PEG tube and, should he choose not to have one placed, what options were available later on in treatment.  He will think about it and get back to us with his decision.  3.  Discussed that increasing fatigue limits the time he has to take care of himself and for nutritional intake.  Suggested setting an alarm so that he wakes up and  takes in food/calories.  Cautioned to not sleep more than 12 hours/day.      Yessica Parada  Department of Radiation Oncology  [facility]                 Again, thank you for allowing me to participate in the care of your patient.        Sincerely,        Yessica Parada MD

## 2023-09-19 NOTE — PROGRESS NOTES
CLINICAL NUTRITION SERVICES - REASSESSMENT NOTE   EVALUATION OF PREVIOUS PLAN OF CARE:   Time Spent: 30 minutes  Visit Type: follow-up seen in radiation clinic  Pt accompanied by: [place]  via phone   Referring Physician: Tal  C11.9 (ICD-10-CM) - Nasopharyngeal cancer (H)  Previous RD visit 8/31      Current diet/appetite: general diet/good appetite and intake  Chemotherapy: Cisplatin - started 8/28  Radiation: Started 8/28  PEG tube: No; not discussed at this visit  Monitoring from previous assessment:   -Food/Fluid intake - Thierno has been focusing on eating mostly liquids and pureed foods due to odynophagia.  He has been eating mostly soups with cooked vegetables but struggling with adding meat to soups as they are difficult to swallow. He's looking for more ideas to add protein to his meals, soups etc.   -Liquid meal replacement or supplement - Boost shake, unsure which type.  He's taking 1-2 per day.   -Weight trends - down ~10 lb (5%) x past 2 weeks since previous RD vist   Wt Readings from Last 9 Encounters:   09/18/23 102.6 kg (226 lb 4.8 oz)   09/12/23 105.2 kg (232 lb)   09/11/23 105.6 kg (232 lb 12.8 oz)   09/06/23 108 kg (238 lb 1.6 oz)   09/05/23 108 kg (238 lb)   08/29/23 107 kg (236 lb)   08/28/23 107.4 kg (236 lb 12.8 oz)   08/11/23 106.2 kg (234 lb 3.2 oz)   08/09/23 105.7 kg (233 lb 1.6 oz)     Previous Goals:   1.  Aim for 5-6 small frequent meals  2.  Aim for 2600kcal and 100g protein, 10 cups water electrolytes per day   3. Weight maintenance   Evaluation: Not met   Previous Nutrition Diagnosis:   Predicted suboptimal nutrient intake related to cancer treatment to head/neck region    Evaluation: Declining with weight loss  NEW FINDINGS:   Mouth sores, nausea/gagging, odynophagia   CURRENT NUTRITION DIAGNOSIS   Inadequate oral intake related to decreased ability to consume sufficient energy due to side effects of cancer therapy as evidenced by 10 lb wt loss x past 2 weeks,   dietary intake 50-75% estimated needs.    INTERVENTIONS   Composition of Meals and Snacks and Medical Food Supplement - reviewed ways to make home made shakes/smoothies with Boost shakes and Pro Performance Bulk 1340 powder from [facility].  Reviewed calorie, protein and hydration needs with chemo and radiation. Encouraged to aim for at least 2600 calories and 100g protein per day.  Encouraged water and electrolytes, 10+ cups per day.   Provided him with an unopened, large bag of Pro Performance Bulk 1340 powder from [facility] powder today in clinic that a patient donated.  He is looking forward to utilizing this to improve her nutrition and prevent further weight loss.    Reviewed importance of weight maintenance and no more than 2 lb wt loss each week during treatment.   Goals  1.  Aim for 5-6 small frequent meals  2.  Aim for 2600kcal and 100g protein, 10 cups water electrolytes per day   3. Weight maintenance      Follow-Up Plans: Pt has RD contact information for questions.  Scheduled 2 week follow in two weeks     MONITORING AND EVALUATION:  -Food/beverage intake  -Weight trends     Nae Hicks RD, , LD

## 2023-09-20 ENCOUNTER — APPOINTMENT (OUTPATIENT)
Dept: RADIATION ONCOLOGY | Facility: CLINIC | Age: 38
End: 2023-09-20
Attending: RADIOLOGY
Payer: COMMERCIAL

## 2023-09-20 PROCEDURE — 77386 HC IMRT TREATMENT DELIVERY, COMPLEX: CPT | Performed by: RADIOLOGY

## 2023-09-21 ENCOUNTER — APPOINTMENT (OUTPATIENT)
Dept: RADIATION ONCOLOGY | Facility: CLINIC | Age: 38
End: 2023-09-21
Attending: RADIOLOGY
Payer: COMMERCIAL

## 2023-09-21 PROCEDURE — 77386 HC IMRT TREATMENT DELIVERY, COMPLEX: CPT | Performed by: RADIOLOGY

## 2023-09-22 ENCOUNTER — APPOINTMENT (OUTPATIENT)
Dept: RADIATION ONCOLOGY | Facility: CLINIC | Age: 38
End: 2023-09-22
Attending: RADIOLOGY
Payer: COMMERCIAL

## 2023-09-22 PROCEDURE — 77386 HC IMRT TREATMENT DELIVERY, COMPLEX: CPT | Performed by: RADIOLOGY

## 2023-09-23 ENCOUNTER — HOSPITAL ENCOUNTER (EMERGENCY)
Facility: CLINIC | Age: 38
Discharge: HOME | End: 2023-09-23
Attending: FAMILY MEDICINE | Admitting: FAMILY MEDICINE
Payer: COMMERCIAL

## 2023-09-23 VITALS
HEART RATE: 80 BPM | SYSTOLIC BLOOD PRESSURE: 114 MMHG | HEIGHT: 72 IN | RESPIRATION RATE: 16 BRPM | OXYGEN SATURATION: 100 % | DIASTOLIC BLOOD PRESSURE: 72 MMHG | TEMPERATURE: 98.6 F | BODY MASS INDEX: 30.65 KG/M2

## 2023-09-23 DIAGNOSIS — C11.9 NASOPHARYNGEAL CANCER (H): ICD-10-CM

## 2023-09-23 DIAGNOSIS — R11.2 NAUSEA AND VOMITING, UNSPECIFIED VOMITING TYPE: ICD-10-CM

## 2023-09-23 DIAGNOSIS — E86.0 DEHYDRATION: ICD-10-CM

## 2023-09-23 LAB
ALBUMIN SERPL BCG-MCNC: 4.8 G/DL (ref 3.5–5.2)
ALBUMIN UR-MCNC: NEGATIVE MG/DL
ALP SERPL-CCNC: 56 U/L (ref 40–129)
ALT SERPL W P-5'-P-CCNC: 17 U/L (ref 0–70)
ANION GAP SERPL CALCULATED.3IONS-SCNC: 16 MMOL/L (ref 7–15)
APPEARANCE UR: CLEAR
APTT PPP: 25 SECONDS (ref 22–38)
AST SERPL W P-5'-P-CCNC: 17 U/L (ref 0–45)
ATRIAL RATE - MUSE: 73 BPM
BASOPHILS # BLD AUTO: 0 10E3/UL (ref 0–0.2)
BASOPHILS NFR BLD AUTO: 0 %
BILIRUB SERPL-MCNC: 0.5 MG/DL
BILIRUB UR QL STRIP: NEGATIVE
BUN SERPL-MCNC: 15.1 MG/DL (ref 6–20)
CALCIUM SERPL-MCNC: 9.5 MG/DL (ref 8.6–10)
CHLORIDE SERPL-SCNC: 98 MMOL/L (ref 98–107)
COLOR UR AUTO: ABNORMAL
CREAT SERPL-MCNC: 1.15 MG/DL (ref 0.67–1.17)
CRP SERPL-MCNC: 4.77 MG/L
DEPRECATED HCO3 PLAS-SCNC: 23 MMOL/L (ref 22–29)
DIASTOLIC BLOOD PRESSURE - MUSE: NORMAL MMHG
EGFRCR SERPLBLD CKD-EPI 2021: 85 ML/MIN/1.73M2
EOSINOPHIL # BLD AUTO: 0.1 10E3/UL (ref 0–0.7)
EOSINOPHIL NFR BLD AUTO: 3 %
ERYTHROCYTE [DISTWIDTH] IN BLOOD BY AUTOMATED COUNT: 14 % (ref 10–15)
GLUCOSE SERPL-MCNC: 81 MG/DL (ref 70–99)
GLUCOSE UR STRIP-MCNC: NEGATIVE MG/DL
HCT VFR BLD AUTO: 34.2 % (ref 40–53)
HGB BLD-MCNC: 11.9 G/DL (ref 13.3–17.7)
HGB UR QL STRIP: NEGATIVE
IMM GRANULOCYTES # BLD: 0 10E3/UL
IMM GRANULOCYTES NFR BLD: 0 %
INR PPP: 1.03 (ref 0.85–1.15)
INTERPRETATION ECG - MUSE: NORMAL
KETONES UR STRIP-MCNC: 100 MG/DL
LACTATE SERPL-SCNC: 1.1 MMOL/L (ref 0.7–2)
LEUKOCYTE ESTERASE UR QL STRIP: NEGATIVE
LIPASE SERPL-CCNC: 34 U/L (ref 13–60)
LYMPHOCYTES # BLD AUTO: 0.5 10E3/UL (ref 0.8–5.3)
LYMPHOCYTES NFR BLD AUTO: 13 %
MCH RBC QN AUTO: 30.5 PG (ref 26.5–33)
MCHC RBC AUTO-ENTMCNC: 34.8 G/DL (ref 31.5–36.5)
MCV RBC AUTO: 88 FL (ref 78–100)
MONOCYTES # BLD AUTO: 0.4 10E3/UL (ref 0–1.3)
MONOCYTES NFR BLD AUTO: 11 %
MUCOUS THREADS #/AREA URNS LPF: PRESENT /LPF
NEUTROPHILS # BLD AUTO: 2.8 10E3/UL (ref 1.6–8.3)
NEUTROPHILS NFR BLD AUTO: 73 %
NITRATE UR QL: NEGATIVE
NRBC # BLD AUTO: 0 10E3/UL
NRBC BLD AUTO-RTO: 0 /100
P AXIS - MUSE: 25 DEGREES
PH UR STRIP: 5.5 [PH] (ref 5–7)
PLATELET # BLD AUTO: 201 10E3/UL (ref 150–450)
POTASSIUM SERPL-SCNC: 4.2 MMOL/L (ref 3.4–5.3)
PR INTERVAL - MUSE: 156 MS
PROT SERPL-MCNC: 7.5 G/DL (ref 6.4–8.3)
QRS DURATION - MUSE: 92 MS
QT - MUSE: 348 MS
QTC - MUSE: 383 MS
R AXIS - MUSE: 36 DEGREES
RBC # BLD AUTO: 3.9 10E6/UL (ref 4.4–5.9)
RBC URINE: 1 /HPF
SODIUM SERPL-SCNC: 137 MMOL/L (ref 136–145)
SP GR UR STRIP: 1.02 (ref 1–1.03)
SQUAMOUS EPITHELIAL: 1 /HPF
SYSTOLIC BLOOD PRESSURE - MUSE: NORMAL MMHG
T AXIS - MUSE: 11 DEGREES
TRANSITIONAL EPI: <1 /HPF
TROPONIN T SERPL HS-MCNC: 11 NG/L
TSH SERPL DL<=0.005 MIU/L-ACNC: 0.24 UIU/ML (ref 0.3–4.2)
UROBILINOGEN UR STRIP-MCNC: NORMAL MG/DL
VENTRICULAR RATE- MUSE: 73 BPM
WBC # BLD AUTO: 3.9 10E3/UL (ref 4–11)
WBC URINE: 3 /HPF

## 2023-09-23 PROCEDURE — 86140 C-REACTIVE PROTEIN: CPT | Performed by: FAMILY MEDICINE

## 2023-09-23 PROCEDURE — 85014 HEMATOCRIT: CPT | Performed by: FAMILY MEDICINE

## 2023-09-23 PROCEDURE — 250N000011 HC RX IP 250 OP 636: Mod: JZ | Performed by: FAMILY MEDICINE

## 2023-09-23 PROCEDURE — 36415 COLL VENOUS BLD VENIPUNCTURE: CPT | Performed by: FAMILY MEDICINE

## 2023-09-23 PROCEDURE — 85730 THROMBOPLASTIN TIME PARTIAL: CPT | Performed by: FAMILY MEDICINE

## 2023-09-23 PROCEDURE — 96374 THER/PROPH/DIAG INJ IV PUSH: CPT | Performed by: FAMILY MEDICINE

## 2023-09-23 PROCEDURE — 99284 EMERGENCY DEPT VISIT MOD MDM: CPT | Mod: 25 | Performed by: FAMILY MEDICINE

## 2023-09-23 PROCEDURE — 80053 COMPREHEN METABOLIC PANEL: CPT | Performed by: FAMILY MEDICINE

## 2023-09-23 PROCEDURE — 83690 ASSAY OF LIPASE: CPT | Performed by: FAMILY MEDICINE

## 2023-09-23 PROCEDURE — 81001 URINALYSIS AUTO W/SCOPE: CPT | Performed by: FAMILY MEDICINE

## 2023-09-23 PROCEDURE — 83605 ASSAY OF LACTIC ACID: CPT | Performed by: FAMILY MEDICINE

## 2023-09-23 PROCEDURE — 258N000003 HC RX IP 258 OP 636: Performed by: FAMILY MEDICINE

## 2023-09-23 PROCEDURE — 84484 ASSAY OF TROPONIN QUANT: CPT | Performed by: FAMILY MEDICINE

## 2023-09-23 PROCEDURE — 85610 PROTHROMBIN TIME: CPT | Performed by: FAMILY MEDICINE

## 2023-09-23 PROCEDURE — 93005 ELECTROCARDIOGRAM TRACING: CPT | Performed by: FAMILY MEDICINE

## 2023-09-23 PROCEDURE — 250N000013 HC RX MED GY IP 250 OP 250 PS 637: Performed by: FAMILY MEDICINE

## 2023-09-23 PROCEDURE — 96361 HYDRATE IV INFUSION ADD-ON: CPT | Performed by: FAMILY MEDICINE

## 2023-09-23 PROCEDURE — 93010 ELECTROCARDIOGRAM REPORT: CPT | Performed by: FAMILY MEDICINE

## 2023-09-23 PROCEDURE — 84443 ASSAY THYROID STIM HORMONE: CPT | Performed by: FAMILY MEDICINE

## 2023-09-23 RX ORDER — MAGNESIUM HYDROXIDE/ALUMINUM HYDROXICE/SIMETHICONE 120; 1200; 1200 MG/30ML; MG/30ML; MG/30ML
15 SUSPENSION ORAL
Status: COMPLETED | OUTPATIENT
Start: 2023-09-23 | End: 2023-09-23

## 2023-09-23 RX ORDER — ONDANSETRON 2 MG/ML
4 INJECTION INTRAMUSCULAR; INTRAVENOUS EVERY 30 MIN PRN
Status: DISCONTINUED | OUTPATIENT
Start: 2023-09-23 | End: 2023-09-23 | Stop reason: HOSPADM

## 2023-09-23 RX ORDER — LIDOCAINE 40 MG/G
CREAM TOPICAL
Status: DISCONTINUED | OUTPATIENT
Start: 2023-09-23 | End: 2023-09-23 | Stop reason: HOSPADM

## 2023-09-23 RX ADMIN — SODIUM CHLORIDE 1000 ML: 9 INJECTION, SOLUTION INTRAVENOUS at 16:59

## 2023-09-23 RX ADMIN — ALUMINUM HYDROXIDE, MAGNESIUM HYDROXIDE, AND SIMETHICONE 15 ML: 200; 200; 20 SUSPENSION ORAL at 17:29

## 2023-09-23 RX ADMIN — SODIUM CHLORIDE 1000 ML: 9 INJECTION, SOLUTION INTRAVENOUS at 15:34

## 2023-09-23 RX ADMIN — ONDANSETRON 4 MG: 2 INJECTION INTRAMUSCULAR; INTRAVENOUS at 15:34

## 2023-09-23 ASSESSMENT — ACTIVITIES OF DAILY LIVING (ADL)
ADLS_ACUITY_SCORE: 35
ADLS_ACUITY_SCORE: 35

## 2023-09-23 NOTE — ED PROVIDER NOTES
[place] EMERGENCY DEPARTMENT ([facility])    9/23/23       ED PROVIDER NOTE    History     Chief Complaint   Patient presents with    Nausea & Vomiting    Generalized Weakness     The history is provided by a relative, medical records and the patient.     Thierno Vega is a 38 year old male with past medical history of squamous cell carcinoma of the nasopharynx who presents to the emergency department for evaluation of nausea and vomiting. Patient's relative is interpreting for the patient. His relative states that he has three weeks left of radiation treatment. He had been doing well with the treatment though his relative is concerned as he hasn't been able to eat. Patient lost his sensation of taste and appetite. Patient has no desire to eat and if he does eat he gets nauseous. Patient doesn't deny any pain in his throat. He has noted some swelling. He denied any abdominal pain, shortness of breath, or chest pain. Patient is concerned for nausea and vomiting associated with eating. He has only been able to drink fluids, he reports that he drinks around 1-1.5 L of fluids per day. Patient has additionally noted some diarrhea which he describes as mild because he's mainly only drinking fluids. He has noticed some burning with urination.    Past Medical History  Past Medical History:   Diagnosis Date    HTN (hypertension)      No past surgical history on file.  amLODIPine 5 MG TABS 5 mg, valsartan 160 MG TABS 160 mg  dexAMETHasone (DECADRON) 4 MG tablet  gabapentin (NEURONTIN) 300 MG capsule  magic mouthwash (ENTER INGREDIENTS IN COMMENTS) suspension  ondansetron (ZOFRAN) 8 MG tablet  prochlorperazine (COMPAZINE) 10 MG tablet      No Known Allergies  Family History  Family History   Problem Relation Age of Onset    Cancer No family hx of      Social History   Social History     Tobacco Use    Smoking status: Some Days     Types: Cigarettes     Passive exposure: Current    Smokeless tobacco: Never    Substance Use Topics    Alcohol use: Not Currently    Drug use: Never      Past medical history, past surgical history, medications, allergies, family history, and social history were reviewed with the patient. No additional pertinent items.      A medically appropriate review of systems was performed with pertinent positives and negatives noted in the HPI, and all other systems negative.    Physical Exam     BP: 116/81  Pulse: 74  Temp: 98.4  F (36.9  C)  Resp: 16  Height: 182.9 cm (6')  SpO2: 100 %    Physical Exam  Vitals and nursing note reviewed.   Constitutional:       General: He is in acute distress.      Appearance: He is well-developed. He is not toxic-appearing or diaphoretic.      Comments: Patient alert and oriented nontoxic.  Friend is present to interpret no respiratory distress   HENT:      Head: Normocephalic and atraumatic.      Mouth/Throat:      Mouth: Mucous membranes are dry.      Pharynx: Oropharynx is clear.      Comments: No lesions intraorally  Eyes:      General: No scleral icterus.     Extraocular Movements: Extraocular movements intact.      Conjunctiva/sclera: Conjunctivae normal.      Pupils: Pupils are equal, round, and reactive to light.   Cardiovascular:      Rate and Rhythm: Normal rate and regular rhythm.   Pulmonary:      Effort: No respiratory distress.      Breath sounds: No stridor.   Abdominal:      General: There is no distension.      Tenderness: There is no abdominal tenderness.   Musculoskeletal:         General: No swelling or tenderness.      Cervical back: Normal range of motion and neck supple. No rigidity or tenderness.   Skin:     General: Skin is warm and dry.      Capillary Refill: Capillary refill takes less than 2 seconds.      Coloration: Skin is not jaundiced or pale.      Findings: No rash.   Neurological:      General: No focal deficit present.      Mental Status: He is alert and oriented to person, place, and time. Mental status is at baseline.    Psychiatric:      Comments: Appropriate in the ER             ED Course, Procedures, & Data     Reviewed patient's records with underlying cancer etc.  Treatments etc.  Radiation treatment.    Here in the ER and IV established normal saline x2 L given in the ER Zofran IV for nausea.    Urinalysis negative for any significant infection sodium 137 potassium 4.2.  Bicarb is 23 gap is 16 BUN 15 creatinine 1.15.  Calcium 9.5 glucose 81.  Troponin 11.  INR 1.03.  White count 3.9 hemoglobin 11.9.  Platelets are 201.  Lactic acid 1.1.    EKG did not show any hyperacute changes.    Patient feeling better here at this point will discharge patient has a follow-up appointment I believe on Tuesday with oncology.  Patient has Zofran at home which he will use encouraging fluids return if any concerns at all.  Agrees with plan.           Procedures            EKG Interpretation:      Interpreted by Jaydon Llanes MD  Time reviewed: 1531  Symptoms at time of EKG: nausea    Rhythm: normal sinus   Rate: normal  Axis: normal  Ectopy: none  Conduction: normal  ST Segments/ T Waves: Nonspecific ST-T wave changes  Q Waves: none  Comparison to prior: No old EKG available    Clinical Impression: normal EKG             Results for orders placed or performed during the hospital encounter of 09/23/23   Partial thromboplastin time     Status: Normal   Result Value Ref Range    aPTT 25 22 - 38 Seconds   INR     Status: Normal   Result Value Ref Range    INR 1.03 0.85 - 1.15   CRP inflammation     Status: Normal   Result Value Ref Range    CRP Inflammation 4.77 <5.00 mg/L   Comprehensive metabolic panel     Status: Abnormal   Result Value Ref Range    Sodium 137 136 - 145 mmol/L    Potassium 4.2 3.4 - 5.3 mmol/L    Chloride 98 98 - 107 mmol/L    Carbon Dioxide (CO2) 23 22 - 29 mmol/L    Anion Gap 16 (H) 7 - 15 mmol/L    Urea Nitrogen 15.1 6.0 - 20.0 mg/dL    Creatinine 1.15 0.67 - 1.17 mg/dL    Calcium 9.5 8.6 - 10.0 mg/dL    Glucose 81 70  - 99 mg/dL    Alkaline Phosphatase 56 40 - 129 U/L    AST 17 0 - 45 U/L    ALT 17 0 - 70 U/L    Protein Total 7.5 6.4 - 8.3 g/dL    Albumin 4.8 3.5 - 5.2 g/dL    Bilirubin Total 0.5 <=1.2 mg/dL    GFR Estimate 85 >60 mL/min/1.73m2   Lipase     Status: Normal   Result Value Ref Range    Lipase 34 13 - 60 U/L   Lactic acid whole blood     Status: Normal   Result Value Ref Range    Lactic Acid 1.1 0.7 - 2.0 mmol/L   Troponin T, High Sensitivity     Status: Normal   Result Value Ref Range    Troponin T, High Sensitivity 11 <=22 ng/L   TSH     Status: Abnormal   Result Value Ref Range    TSH 0.24 (L) 0.30 - 4.20 uIU/mL   UA with Microscopic reflex to Culture     Status: Abnormal    Specimen: Urine, Midstream   Result Value Ref Range    Color Urine Light Yellow Colorless, Straw, Light Yellow, Yellow    Appearance Urine Clear Clear    Glucose Urine Negative Negative mg/dL    Bilirubin Urine Negative Negative    Ketones Urine 100 (A) Negative mg/dL    Specific Gravity Urine 1.017 1.003 - 1.035    Blood Urine Negative Negative    pH Urine 5.5 5.0 - 7.0    Protein Albumin Urine Negative Negative mg/dL    Urobilinogen Urine Normal Normal, 2.0 mg/dL    Nitrite Urine Negative Negative    Leukocyte Esterase Urine Negative Negative    Mucus Urine Present (A) None Seen /LPF    RBC Urine 1 <=2 /HPF    WBC Urine 3 <=5 /HPF    Squamous Epithelials Urine 1 <=1 /HPF    Transitional Epithelials Urine <1 <=1 /HPF    Narrative    Urine Culture not indicated   CBC with platelets and differential     Status: Abnormal   Result Value Ref Range    WBC Count 3.9 (L) 4.0 - 11.0 10e3/uL    RBC Count 3.90 (L) 4.40 - 5.90 10e6/uL    Hemoglobin 11.9 (L) 13.3 - 17.7 g/dL    Hematocrit 34.2 (L) 40.0 - 53.0 %    MCV 88 78 - 100 fL    MCH 30.5 26.5 - 33.0 pg    MCHC 34.8 31.5 - 36.5 g/dL    RDW 14.0 10.0 - 15.0 %    Platelet Count 201 150 - 450 10e3/uL    % Neutrophils 73 %    % Lymphocytes 13 %    % Monocytes 11 %    % Eosinophils 3 %    % Basophils 0 %     % Immature Granulocytes 0 %    NRBCs per 100 WBC 0 <1 /100    Absolute Neutrophils 2.8 1.6 - 8.3 10e3/uL    Absolute Lymphocytes 0.5 (L) 0.8 - 5.3 10e3/uL    Absolute Monocytes 0.4 0.0 - 1.3 10e3/uL    Absolute Eosinophils 0.1 0.0 - 0.7 10e3/uL    Absolute Basophils 0.0 0.0 - 0.2 10e3/uL    Absolute Immature Granulocytes 0.0 <=0.4 10e3/uL    Absolute NRBCs 0.0 10e3/uL   EKG 12-lead, tracing only     Status: None   Result Value Ref Range    Systolic Blood Pressure  mmHg    Diastolic Blood Pressure  mmHg    Ventricular Rate 73 BPM    Atrial Rate 73 BPM    OH Interval 156 ms    QRS Duration 92 ms     ms    QTc 383 ms    P Axis 25 degrees    R AXIS 36 degrees    T Axis 11 degrees    Interpretation ECG       Sinus rhythm  Nonspecific ST and T wave abnormality  Abnormal ECG  Unconfirmed report - interpretation of this ECG is computer generated - see medical record for final interpretation  Confirmed by - EMERGENCY ROOM, PHYSICIAN ([id number]),  TYRELL MABRY ([id number]) on 9/23/2023 7:57:34 PM     CBC with platelets differential     Status: Abnormal    Narrative    The following orders were created for panel order CBC with platelets differential.  Procedure                               Abnormality         Status                     ---------                               -----------         ------                     CBC with platelets and d...[[id number]]  Abnormal            Final result                 Please view results for these tests on the individual orders.     Medications   sodium chloride 0.9% BOLUS 1,000 mL (0 mLs Intravenous Stopped 9/23/23 1638)   sodium chloride 0.9% BOLUS 1,000 mL (0 mLs Intravenous Stopped 9/23/23 1914)   alum & mag hydroxide-simethicone (MAALOX) suspension 15 mL (15 mLs Oral $Given 9/23/23 1729)     Labs Ordered and Resulted from Time of ED Arrival to Time of ED Departure   COMPREHENSIVE METABOLIC PANEL - Abnormal       Result Value    Sodium 137      Potassium 4.2      Chloride  98      Carbon Dioxide (CO2) 23      Anion Gap 16 (*)     Urea Nitrogen 15.1      Creatinine 1.15      Calcium 9.5      Glucose 81      Alkaline Phosphatase 56      AST 17      ALT 17      Protein Total 7.5      Albumin 4.8      Bilirubin Total 0.5      GFR Estimate 85     TSH - Abnormal    TSH 0.24 (*)    CBC WITH PLATELETS AND DIFFERENTIAL - Abnormal    WBC Count 3.9 (*)     RBC Count 3.90 (*)     Hemoglobin 11.9 (*)     Hematocrit 34.2 (*)     MCV 88      MCH 30.5      MCHC 34.8      RDW 14.0      Platelet Count 201      % Neutrophils 73      % Lymphocytes 13      % Monocytes 11      % Eosinophils 3      % Basophils 0      % Immature Granulocytes 0      NRBCs per 100 WBC 0      Absolute Neutrophils 2.8      Absolute Lymphocytes 0.5 (*)     Absolute Monocytes 0.4      Absolute Eosinophils 0.1      Absolute Basophils 0.0      Absolute Immature Granulocytes 0.0      Absolute NRBCs 0.0     PARTIAL THROMBOPLASTIN TIME - Normal    aPTT 25     INR - Normal    INR 1.03     CRP INFLAMMATION - Normal    CRP Inflammation 4.77     LIPASE - Normal    Lipase 34     LACTIC ACID WHOLE BLOOD - Normal    Lactic Acid 1.1     TROPONIN T, HIGH SENSITIVITY - Normal    Troponin T, High Sensitivity 11       No orders to display          Critical care was not performed.     Medical Decision Making  The patient's presentation was of moderate complexity (an acute illness with systemic symptoms).    The patient's evaluation involved:  an assessment requiring an independent historian (see separate area of note for details)  review of external note(s) from 3+ sources (see separate area of note for details)  review of 3+ test result(s) ordered prior to this encounter (see separate area of note for details)  ordering and/or review of 3+ test(s) in this encounter (see separate area of note for details)    The patient's management necessitated moderate risk (prescription drug management including medications given in the ED).    Assessment & Plan    38-year-old male being treated for nasopharyngeal cancer getting radiation treatments presented the ER with dehydration.  Patient has some nausea vomiting no hematemesis no chest pain patient notes no difficulty swallowing but decreased p.o. intake because loss of appetite and taste.  No fevers or chills or other COVID-like symptoms.  Patient here in the ER evaluated vitally stable and IV established 2 L of fluid given along with Zofran patient feeling better Labs otherwise reviewed and stable at this point we discharged with more likely dehydration now improved.  Patient will follow-up with oncologist Tuesday return any concerns at all.         I have reviewed the nursing notes. I have reviewed the findings, diagnosis, plan and need for follow up with the patient.    Discharge Medication List as of 9/23/2023  7:16 PM          Final diagnoses:   Nausea and vomiting, unspecified vomiting type   Dehydration   Nasopharyngeal cancer (H)     Jaydon Llanes MD  [facility] EMERGENCY DEPARTMENT  9/23/2023    This note was created at least in part by the use of dragon voice dictation system. Inadvertent typographical errors may still exist.  Jaydon Llanes MD.  Patient evaluated in the emergency department during the COVID-19 pandemic period. Careful attention to patients safety was addressed throughout the evaluation. Evaluation and treatment management was initiated with disposition made efficiently and appropriate as possible to minimize any risk of potential exposure to patient during this evaluation.       Jaydon Llanes MD  09/24/23 0046

## 2023-09-23 NOTE — ED TRIAGE NOTES
Pt ambulatory from home with Nausea, generalized weakness, loss of appetite. Pt unable to keep anything down for about 10 days. Hx of nasopharyngeal cancer, currently getting radiation. Family member interpreting per pt's request.     Triage Assessment       Row Name 09/23/23 3172       Triage Assessment (Adult)    Airway WDL WDL       Respiratory WDL    Respiratory WDL WDL       Skin Circulation/Temperature WDL    Skin Circulation/Temperature WDL WDL       Cardiac WDL    Cardiac WDL WDL       Peripheral/Neurovascular WDL    Peripheral Neurovascular WDL WDL       Cognitive/Neuro/Behavioral WDL    Cognitive/Neuro/Behavioral WDL WDL

## 2023-09-23 NOTE — DISCHARGE INSTRUCTIONS
Home.  Labs and EKG appear stable.  IV fluids and zofran given.  Take your zofran at home.  Push fluids and try to eat.  Call or follow up with MD Tuesday as planned.  Return if any concerns.    Results for orders placed or performed during the hospital encounter of 09/23/23   Partial thromboplastin time     Status: Normal   Result Value Ref Range    aPTT 25 22 - 38 Seconds   INR     Status: Normal   Result Value Ref Range    INR 1.03 0.85 - 1.15   CRP inflammation     Status: Normal   Result Value Ref Range    CRP Inflammation 4.77 <5.00 mg/L   Comprehensive metabolic panel     Status: Abnormal   Result Value Ref Range    Sodium 137 136 - 145 mmol/L    Potassium 4.2 3.4 - 5.3 mmol/L    Chloride 98 98 - 107 mmol/L    Carbon Dioxide (CO2) 23 22 - 29 mmol/L    Anion Gap 16 (H) 7 - 15 mmol/L    Urea Nitrogen 15.1 6.0 - 20.0 mg/dL    Creatinine 1.15 0.67 - 1.17 mg/dL    Calcium 9.5 8.6 - 10.0 mg/dL    Glucose 81 70 - 99 mg/dL    Alkaline Phosphatase 56 40 - 129 U/L    AST 17 0 - 45 U/L    ALT 17 0 - 70 U/L    Protein Total 7.5 6.4 - 8.3 g/dL    Albumin 4.8 3.5 - 5.2 g/dL    Bilirubin Total 0.5 <=1.2 mg/dL    GFR Estimate 85 >60 mL/min/1.73m2   Lipase     Status: Normal   Result Value Ref Range    Lipase 34 13 - 60 U/L   Lactic acid whole blood     Status: Normal   Result Value Ref Range    Lactic Acid 1.1 0.7 - 2.0 mmol/L   Troponin T, High Sensitivity     Status: Normal   Result Value Ref Range    Troponin T, High Sensitivity 11 <=22 ng/L   TSH     Status: Abnormal   Result Value Ref Range    TSH 0.24 (L) 0.30 - 4.20 uIU/mL   CBC with platelets and differential     Status: Abnormal   Result Value Ref Range    WBC Count 3.9 (L) 4.0 - 11.0 10e3/uL    RBC Count 3.90 (L) 4.40 - 5.90 10e6/uL    Hemoglobin 11.9 (L) 13.3 - 17.7 g/dL    Hematocrit 34.2 (L) 40.0 - 53.0 %    MCV 88 78 - 100 fL    MCH 30.5 26.5 - 33.0 pg    MCHC 34.8 31.5 - 36.5 g/dL    RDW 14.0 10.0 - 15.0 %    Platelet Count 201 150 - 450 10e3/uL    %  Neutrophils 73 %    % Lymphocytes 13 %    % Monocytes 11 %    % Eosinophils 3 %    % Basophils 0 %    % Immature Granulocytes 0 %    NRBCs per 100 WBC 0 <1 /100    Absolute Neutrophils 2.8 1.6 - 8.3 10e3/uL    Absolute Lymphocytes 0.5 (L) 0.8 - 5.3 10e3/uL    Absolute Monocytes 0.4 0.0 - 1.3 10e3/uL    Absolute Eosinophils 0.1 0.0 - 0.7 10e3/uL    Absolute Basophils 0.0 0.0 - 0.2 10e3/uL    Absolute Immature Granulocytes 0.0 <=0.4 10e3/uL    Absolute NRBCs 0.0 10e3/uL   EKG 12-lead, tracing only     Status: None (Preliminary result)   Result Value Ref Range    Systolic Blood Pressure  mmHg    Diastolic Blood Pressure  mmHg    Ventricular Rate 73 BPM    Atrial Rate 73 BPM    DE Interval 156 ms    QRS Duration 92 ms     ms    QTc 383 ms    P Axis 25 degrees    R AXIS 36 degrees    T Axis 11 degrees    Interpretation ECG       Sinus rhythm  Nonspecific ST and T wave abnormality  Abnormal ECG     CBC with platelets differential     Status: Abnormal    Narrative    The following orders were created for panel order CBC with platelets differential.  Procedure                               Abnormality         Status                     ---------                               -----------         ------                     CBC with platelets and d...[[id number]]  Abnormal            Final result                 Please view results for these tests on the individual orders.

## 2023-09-25 ENCOUNTER — ONCOLOGY VISIT (OUTPATIENT)
Dept: ONCOLOGY | Facility: CLINIC | Age: 38
End: 2023-09-25
Attending: NURSE PRACTITIONER
Payer: COMMERCIAL

## 2023-09-25 ENCOUNTER — APPOINTMENT (OUTPATIENT)
Dept: RADIATION ONCOLOGY | Facility: CLINIC | Age: 38
End: 2023-09-25
Attending: RADIOLOGY
Payer: COMMERCIAL

## 2023-09-25 ENCOUNTER — APPOINTMENT (OUTPATIENT)
Dept: LAB | Facility: CLINIC | Age: 38
End: 2023-09-25
Attending: NURSE PRACTITIONER
Payer: COMMERCIAL

## 2023-09-25 VITALS
SYSTOLIC BLOOD PRESSURE: 106 MMHG | RESPIRATION RATE: 18 BRPM | HEART RATE: 75 BPM | BODY MASS INDEX: 29.54 KG/M2 | WEIGHT: 218.1 LBS | HEIGHT: 72 IN | OXYGEN SATURATION: 99 % | TEMPERATURE: 98.8 F | DIASTOLIC BLOOD PRESSURE: 67 MMHG

## 2023-09-25 DIAGNOSIS — E83.42 HYPOMAGNESEMIA: ICD-10-CM

## 2023-09-25 DIAGNOSIS — C11.9 NASOPHARYNGEAL CANCER (H): ICD-10-CM

## 2023-09-25 DIAGNOSIS — E83.42 HYPOMAGNESEMIA: Primary | ICD-10-CM

## 2023-09-25 DIAGNOSIS — K21.9 GASTROESOPHAGEAL REFLUX DISEASE WITHOUT ESOPHAGITIS: Primary | ICD-10-CM

## 2023-09-25 LAB
ALBUMIN SERPL BCG-MCNC: 4.3 G/DL (ref 3.5–5.2)
ALP SERPL-CCNC: 51 U/L (ref 40–129)
ALT SERPL W P-5'-P-CCNC: 17 U/L (ref 0–70)
ANION GAP SERPL CALCULATED.3IONS-SCNC: 15 MMOL/L (ref 7–15)
AST SERPL W P-5'-P-CCNC: 15 U/L (ref 0–45)
BASOPHILS # BLD AUTO: 0 10E3/UL (ref 0–0.2)
BASOPHILS NFR BLD AUTO: 1 %
BILIRUB SERPL-MCNC: 0.4 MG/DL
BUN SERPL-MCNC: 8.9 MG/DL (ref 6–20)
CALCIUM SERPL-MCNC: 9.2 MG/DL (ref 8.6–10)
CHLORIDE SERPL-SCNC: 100 MMOL/L (ref 98–107)
CREAT SERPL-MCNC: 1.01 MG/DL (ref 0.67–1.17)
DEPRECATED HCO3 PLAS-SCNC: 21 MMOL/L (ref 22–29)
EGFRCR SERPLBLD CKD-EPI 2021: >90 ML/MIN/1.73M2
EOSINOPHIL # BLD AUTO: 0.1 10E3/UL (ref 0–0.7)
EOSINOPHIL NFR BLD AUTO: 3 %
ERYTHROCYTE [DISTWIDTH] IN BLOOD BY AUTOMATED COUNT: 13.3 % (ref 10–15)
GLUCOSE SERPL-MCNC: 107 MG/DL (ref 70–99)
HCT VFR BLD AUTO: 29.8 % (ref 40–53)
HGB BLD-MCNC: 10.5 G/DL (ref 13.3–17.7)
IMM GRANULOCYTES # BLD: 0 10E3/UL
IMM GRANULOCYTES NFR BLD: 1 %
LYMPHOCYTES # BLD AUTO: 0.5 10E3/UL (ref 0.8–5.3)
LYMPHOCYTES NFR BLD AUTO: 12 %
MAGNESIUM SERPL-MCNC: 1.8 MG/DL (ref 1.7–2.3)
MCH RBC QN AUTO: 30.5 PG (ref 26.5–33)
MCHC RBC AUTO-ENTMCNC: 35.2 G/DL (ref 31.5–36.5)
MCV RBC AUTO: 87 FL (ref 78–100)
MONOCYTES # BLD AUTO: 0.5 10E3/UL (ref 0–1.3)
MONOCYTES NFR BLD AUTO: 13 %
NEUTROPHILS # BLD AUTO: 2.6 10E3/UL (ref 1.6–8.3)
NEUTROPHILS NFR BLD AUTO: 70 %
NRBC # BLD AUTO: 0 10E3/UL
NRBC BLD AUTO-RTO: 0 /100
PLATELET # BLD AUTO: 202 10E3/UL (ref 150–450)
POTASSIUM SERPL-SCNC: 4 MMOL/L (ref 3.4–5.3)
PROT SERPL-MCNC: 6.8 G/DL (ref 6.4–8.3)
RBC # BLD AUTO: 3.44 10E6/UL (ref 4.4–5.9)
SODIUM SERPL-SCNC: 136 MMOL/L (ref 136–145)
WBC # BLD AUTO: 3.7 10E3/UL (ref 4–11)

## 2023-09-25 PROCEDURE — 77386 HC IMRT TREATMENT DELIVERY, COMPLEX: CPT | Performed by: RADIOLOGY

## 2023-09-25 PROCEDURE — 77427 RADIATION TX MANAGEMENT X5: CPT | Performed by: RADIOLOGY

## 2023-09-25 PROCEDURE — 77336 RADIATION PHYSICS CONSULT: CPT | Performed by: RADIOLOGY

## 2023-09-25 PROCEDURE — 85025 COMPLETE CBC W/AUTO DIFF WBC: CPT | Performed by: NURSE PRACTITIONER

## 2023-09-25 PROCEDURE — 99215 OFFICE O/P EST HI 40 MIN: CPT | Performed by: NURSE PRACTITIONER

## 2023-09-25 PROCEDURE — G0463 HOSPITAL OUTPT CLINIC VISIT: HCPCS | Mod: 25 | Performed by: NURSE PRACTITIONER

## 2023-09-25 PROCEDURE — 83735 ASSAY OF MAGNESIUM: CPT | Performed by: NURSE PRACTITIONER

## 2023-09-25 PROCEDURE — 258N000003 HC RX IP 258 OP 636: Performed by: NURSE PRACTITIONER

## 2023-09-25 PROCEDURE — 96413 CHEMO IV INFUSION 1 HR: CPT

## 2023-09-25 PROCEDURE — 36415 COLL VENOUS BLD VENIPUNCTURE: CPT | Performed by: NURSE PRACTITIONER

## 2023-09-25 PROCEDURE — 80053 COMPREHEN METABOLIC PANEL: CPT | Performed by: NURSE PRACTITIONER

## 2023-09-25 PROCEDURE — 96375 TX/PRO/DX INJ NEW DRUG ADDON: CPT

## 2023-09-25 PROCEDURE — 96367 TX/PROPH/DG ADDL SEQ IV INF: CPT

## 2023-09-25 PROCEDURE — 250N000011 HC RX IP 250 OP 636: Mod: JZ | Performed by: NURSE PRACTITIONER

## 2023-09-25 RX ORDER — METHYLPREDNISOLONE SODIUM SUCCINATE 125 MG/2ML
125 INJECTION, POWDER, LYOPHILIZED, FOR SOLUTION INTRAMUSCULAR; INTRAVENOUS
Status: CANCELLED
Start: 2023-09-25

## 2023-09-25 RX ORDER — ALBUTEROL SULFATE 90 UG/1
1-2 AEROSOL, METERED RESPIRATORY (INHALATION)
Status: CANCELLED
Start: 2023-09-25

## 2023-09-25 RX ORDER — MEPERIDINE HYDROCHLORIDE 25 MG/ML
25 INJECTION INTRAMUSCULAR; INTRAVENOUS; SUBCUTANEOUS EVERY 30 MIN PRN
Status: CANCELLED | OUTPATIENT
Start: 2023-09-25

## 2023-09-25 RX ORDER — DIPHENHYDRAMINE HYDROCHLORIDE 50 MG/ML
50 INJECTION INTRAMUSCULAR; INTRAVENOUS
Status: CANCELLED
Start: 2023-09-25

## 2023-09-25 RX ORDER — PALONOSETRON 0.05 MG/ML
0.25 INJECTION, SOLUTION INTRAVENOUS ONCE
Status: COMPLETED | OUTPATIENT
Start: 2023-09-25 | End: 2023-09-25

## 2023-09-25 RX ORDER — HEPARIN SODIUM,PORCINE 10 UNIT/ML
5-20 VIAL (ML) INTRAVENOUS DAILY PRN
Status: CANCELLED | OUTPATIENT
Start: 2023-09-25

## 2023-09-25 RX ORDER — ALBUTEROL SULFATE 0.83 MG/ML
2.5 SOLUTION RESPIRATORY (INHALATION)
Status: CANCELLED | OUTPATIENT
Start: 2023-09-25

## 2023-09-25 RX ORDER — PALONOSETRON 0.05 MG/ML
0.25 INJECTION, SOLUTION INTRAVENOUS ONCE
Status: CANCELLED | OUTPATIENT
Start: 2023-09-25

## 2023-09-25 RX ORDER — LORAZEPAM 2 MG/ML
0.5 INJECTION INTRAMUSCULAR EVERY 4 HOURS PRN
Status: CANCELLED | OUTPATIENT
Start: 2023-09-25

## 2023-09-25 RX ORDER — FAMOTIDINE 20 MG/1
20 TABLET, FILM COATED ORAL 2 TIMES DAILY
Qty: 60 TABLET | Refills: 1 | Status: SHIPPED | OUTPATIENT
Start: 2023-09-25 | End: 2023-10-30

## 2023-09-25 RX ORDER — HEPARIN SODIUM (PORCINE) LOCK FLUSH IV SOLN 100 UNIT/ML 100 UNIT/ML
5 SOLUTION INTRAVENOUS
Status: CANCELLED | OUTPATIENT
Start: 2023-09-25

## 2023-09-25 RX ORDER — EPINEPHRINE 1 MG/ML
0.3 INJECTION, SOLUTION INTRAMUSCULAR; SUBCUTANEOUS EVERY 5 MIN PRN
Status: CANCELLED | OUTPATIENT
Start: 2023-09-25

## 2023-09-25 RX ADMIN — CISPLATIN 90 MG: 1 INJECTION, SOLUTION INTRAVENOUS at 09:24

## 2023-09-25 RX ADMIN — MAGNESIUM SULFATE HEPTAHYDRATE: 500 INJECTION, SOLUTION INTRAMUSCULAR; INTRAVENOUS at 08:56

## 2023-09-25 RX ADMIN — DEXAMETHASONE SODIUM PHOSPHATE: 10 INJECTION, SOLUTION INTRAMUSCULAR; INTRAVENOUS at 08:34

## 2023-09-25 RX ADMIN — PALONOSETRON HYDROCHLORIDE 0.25 MG: 0.25 INJECTION INTRAVENOUS at 08:58

## 2023-09-25 ASSESSMENT — PAIN SCALES - GENERAL: PAINLEVEL: NO PAIN (0)

## 2023-09-25 NOTE — PATIENT INSTRUCTIONS
[facility] Triage and after hours / weekends / holidays:  [phone]    Please call the triage or after hours line if you experience a temperature greater than or equal to 100.4, shaking chills, have uncontrolled nausea, vomiting and/or diarrhea, dizziness, shortness of breath, chest pain, bleeding, unexplained bruising, or if you have any other new/concerning symptoms, questions or concerns.      If you are having any concerning symptoms or wish to speak to a provider before your next infusion visit, please call triage to notify them so we can adequately serve you.     If you need a refill on a narcotic prescription or other medication, please call before your infusion appointment.                September 2023 Sunday Monday Tuesday Wednesday Thursday Friday Saturday                            1    TREATMENT  10:15 AM   (15 min.)   P RAD ONC IJEOMA   [facility] Radiation Oncology 2       3     4     5    LAB PERIPHERAL   8:15 AM   (15 min.)   [facility] LAB DRAW   [facility]    RETURN ACTIVE TREATMENT   8:45 AM   (45 min.)   Janis Cordova CNP   [facility]    ONC INFUSION 2 HR (120 MIN)   9:30 AM   (120 min.)    ONC INFUSION NURSE   [facility] OUTPATIENT  10:15 AM   (15 min.)   VIDEO,    [facility]  Services    TREATMENT  10:15 AM   (15 min.)   P RAD ONC IJEOMA   [facility] Radiation Oncology 6    OTV  10:15 AM   (15 min.)   Yessica Parada MD   [facility] Radiation Oncology    TREATMENT  10:15 AM   (15 min.)   P RAD ONC IJEOMA   [facility] Radiation Oncology 7    TREATMENT  10:15 AM   (15 min.)   P RAD ONC IJEOMA   [facility] Radiation Oncology    [place] OUTPATIENT  11:00 AM   (15 min.)   VIDEO,    [facility]  Services 8    TREATMENT  10:15 AM   (15 min.)   P RAD ONC IJEOMA     [facility] Radiation Oncology    [place] OUTPATIENT  11:00 AM   (15 min.)   VIDEO,    [facility]  Services 9       10     11    LAB PERIPHERAL   6:45 AM   (15 min.)   [facility] LAB DRAW   [facility]    RETURN CCSL   7:00 AM   (45 min.)   Janis Cordova, CNP   [facility]    ONC INFUSION 2 HR (120 MIN)   8:30 AM   (120 min.)   UC ONC INFUSION NURSE   [facility] OUTPATIENT  10:15 AM   (15 min.)   VIDEO,    [facility]  Services    TREATMENT  10:15 AM   (15 min.)   [facility] RAD ONC IJEOMA   [facility] Radiation Oncology 12    [place] OUTPATIENT  11:00 AM   (15 min.)   VIDEO,    [facility]  Services    TREATMENT  11:00 AM   (15 min.)   UMP RAD ONC IJEOMA   [facility] Radiation Oncology    OTV  11:15 AM   (15 min.)   Yessica Parada MD   [facility] Radiation Oncology    [place] OUTPATIENT   9:15 PM   (60 min.)   VIDEO,    [facility]  Services 13    [place] OUTPATIENT  11:00 AM   (15 min.)   VIDEO,    [facility]  Services    TREATMENT  11:00 AM   (15 min.)   UMP RAD ONC IJEOMA   [facility] Radiation Oncology 14    [place] OUTPATIENT  11:00 AM   (15 min.)   VIDEO,    [facility]  Services    TREATMENT  11:00 AM   (15 min.)   UMP RAD ONC IJEOMA   [facility] Radiation Oncology 15    TREATMENT   9:00 AM   (15 min.)   UMP RAD ONC IJEOMA   [facility] Radiation Oncology    [place] OUTPATIENT  10:30 AM   (60 min.)   VIDEO,    [facility]  Services 16       17     18    LAB PERIPHERAL   7:45 AM   (15 min.)   [facility] DRAW   [facility]    RETURN CCSL   8:00 AM   (45 min.)   Janis Cordova, CNP   [facility]  Cancer Clinic    [place] OUTPATIENT  11:00 AM   (60 min.)   VIDEO,    [facility]  Services    TREATMENT  11:00 AM   (15 min.)   UMP RAD ONC IJEOMA   [facility] Radiation Oncology 19    ONC INFUSION 2 HR (120 MIN)   7:30 AM   (120 min.)   UC ONC INFUSION NURSE   [facility] OUTPATIENT  11:00 AM   (60 min.)   VIDEO,    [facility]  Services    [facility] NUTRITION VISIT  11:00 AM   (30 min.)   Nae Tafoya RD   [facility] Radiation Oncology    TREATMENT  11:00 AM   (15 min.)   UMP RAD ONC IJEOMA   [facility] Radiation Oncology    OTV  11:15 AM   (15 min.)   Yessica Parada MD   [facility] Radiation Oncology 20    [place] OUTPATIENT  11:00 AM   (15 min.)   VIDEO,    [facility]  Services    TREATMENT  11:00 AM   (15 min.)   UMP RAD ONC IJEOMA   [facility] Radiation Oncology 21    [place] OUTPATIENT  11:00 AM   (15 min.)   VIDEO,    [facility]  Services    TREATMENT  11:00 AM   (15 min.)   UMP RAD ONC IJEOMA   [facility] Radiation Oncology 22    [place] OUTPATIENT  11:00 AM   (60 min.)   VIDEO,    [facility]  Services    TREATMENT  11:00 AM   (15 min.)   UMP RAD ONC IJEOMA   [facility] Radiation Oncology 23    Admission   3:06 PM   [facility] Emergency Department   (Discharge: 9/23/2023)   24     25    LAB PERIPHERAL   7:00 AM   (15 min.)   [facility] DRAW   [facility]    RETURN ACTIVE TREATMENT   7:15 AM   (45 min.)   Jennifer Gabriel CNP   [facility]    ONC INFUSION 2 HR (120 MIN)   8:30 AM   (120 min.)   UC ONC INFUSION NURSE   [facility] OUTPATIENT  11:00 AM   (60 min.)   VIDEO,    [facility]   Services    TREATMENT  11:00 AM   (15 min.)   [facility] RAD ONC IJEOMA   [facility] Radiation Oncology 26    [place] OUTPATIENT  11:00 AM   (60 min.)   VIDEO,    [facility]  Services    TREATMENT  11:00 AM   (15 min.)   UMP RAD ONC IJEOMA   [facility] Radiation Oncology    OTV  11:15 AM   (15 min.)   Yessica Parada MD   [facility] Radiation Oncology 27    [place] OUTPATIENT  11:00 AM   (60 min.)   VIDEO,    [facility]  Services    TREATMENT  11:00 AM   (15 min.)   UMP RAD ONC IJEOMA   [facility] Radiation Oncology 28    [place] OUTPATIENT  11:00 AM   (60 min.)   VIDEO,    [facility]  Services    TREATMENT  11:00 AM   (15 min.)   [facility] RAD ONC IJEOMA   [facility] Radiation Oncology 29    [place] OUTPATIENT  11:00 AM   (60 min.)   VIDEO,    [facility]  Services    TREATMENT  11:00 AM   (15 min.)   [facility] RAD ONC IJEOMA   [facility] Radiation Oncology 30 October 2023 Sunday Monday Tuesday Wednesday Thursday Friday Saturday   1     2    LAB PERIPHERAL   7:45 AM   (15 min.)   [facility] DRAW   [facility]    RETURN ACTIVE TREATMENT   8:00 AM   (45 min.)   Janis Cordova CNP   [facility]    ONC INFUSION 2 HR (120 MIN)   9:00 AM   (120 min.)   UC ONC INFUSION NURSE   [facility]    TREATMENT  11:00 AM   (15 min.)   [facility] RAD ONC IJEOMA   [facility] Radiation Oncology 3    UMP NUTRITION VISIT   9:30 AM   (30 min.)   Nae Tafoya RD   [facility] Radiation Oncology    OTV   9:30 AM   (15 min.)   Yessica Parada MD   [facility] Radiation Oncology    TREATMENT   9:30 AM   (15 min.)   [facility] RAD ONC IJEOMA   [facility] Radiation Oncology 4    TREATMENT  11:00 AM   (15 min.)   [facility] RAD  ONC IJEOMA   [facility] Radiation Oncology 5    TREATMENT  11:00 AM   (15 min.)   [facility] RAD ONC IJEOMA   [facility] Radiation Oncology 6    TREATMENT  11:00 AM   (15 min.)   [facility] RAD ONC IJEOMA   [facility] Radiation Oncology 7       8     9    LAB PERIPHERAL   6:45 AM   (15 min.)    [facility] DRAW   [facility]    RETURN ACTIVE TREATMENT   7:00 AM   (45 min.)   Janis Cordova CNP   [facility]    ONC INFUSION 2 HR (120 MIN)   8:30 AM   (120 min.)    ONC INFUSION NURSE   [facility]    TREATMENT  11:00 AM   (15 min.)   [facility] RAD ONC IJEOMA   [facility] Radiation Oncology 10    TREATMENT  11:00 AM   (15 min.)   [facility] RAD ONC IJEOMA   [facility] Radiation Oncology    OTV  11:15 AM   (15 min.)   Yessica Parada MD   [facility] Radiation Oncology 11    TREATMENT  11:00 AM   (15 min.)   [facility] RAD ONC IJEOMA   [facility] Radiation Oncology 12    RETURN CCSL  10:00 AM   (30 min.)   Catrachita Clark MD   [facility]    TREATMENT  11:00 AM   (15 min.)   [facility] RAD ONC IJEOAM   [facility] Radiation Oncology 13    TREATMENT  11:00 AM   (15 min.)   [facility] RAD ONC IJEOMA   [facility] Radiation Oncology 14       15     16    TREATMENT  11:00 AM   (15 min.)   [facility] RAD ONC IJEOMA   [facility] Radiation Oncology 17     18     19     20     21       22     23     24     25     26     27     28       29     30     31                                         Lab Results:  Recent Results (from the past 12 hour(s))   Comprehensive metabolic panel    Collection Time: 09/25/23  7:26 AM   Result Value Ref Range    Sodium 136 136 - 145 mmol/L    Potassium 4.0 3.4 - 5.3 mmol/L    Chloride 100 98 - 107 mmol/L    Carbon Dioxide (CO2) 21 (L) 22 - 29 mmol/L    Anion Gap 15 7 - 15 mmol/L    Urea Nitrogen 8.9 6.0 - 20.0 mg/dL    Creatinine 1.01  0.67 - 1.17 mg/dL    Calcium 9.2 8.6 - 10.0 mg/dL    Glucose 107 (H) 70 - 99 mg/dL    Alkaline Phosphatase 51 40 - 129 U/L    AST 15 0 - 45 U/L    ALT 17 0 - 70 U/L    Protein Total 6.8 6.4 - 8.3 g/dL    Albumin 4.3 3.5 - 5.2 g/dL    Bilirubin Total 0.4 <=1.2 mg/dL    GFR Estimate >90 >60 mL/min/1.73m2   Magnesium    Collection Time: 09/25/23  7:26 AM   Result Value Ref Range    Magnesium 1.8 1.7 - 2.3 mg/dL   CBC with platelets and differential    Collection Time: 09/25/23  7:26 AM   Result Value Ref Range    WBC Count 3.7 (L) 4.0 - 11.0 10e3/uL    RBC Count 3.44 (L) 4.40 - 5.90 10e6/uL    Hemoglobin 10.5 (L) 13.3 - 17.7 g/dL    Hematocrit 29.8 (L) 40.0 - 53.0 %    MCV 87 78 - 100 fL    MCH 30.5 26.5 - 33.0 pg    MCHC 35.2 31.5 - 36.5 g/dL    RDW 13.3 10.0 - 15.0 %    Platelet Count 202 150 - 450 10e3/uL    % Neutrophils 70 %    % Lymphocytes 12 %    % Monocytes 13 %    % Eosinophils 3 %    % Basophils 1 %    % Immature Granulocytes 1 %    NRBCs per 100 WBC 0 <1 /100    Absolute Neutrophils 2.6 1.6 - 8.3 10e3/uL    Absolute Lymphocytes 0.5 (L) 0.8 - 5.3 10e3/uL    Absolute Monocytes 0.5 0.0 - 1.3 10e3/uL    Absolute Eosinophils 0.1 0.0 - 0.7 10e3/uL    Absolute Basophils 0.0 0.0 - 0.2 10e3/uL    Absolute Immature Granulocytes 0.0 <=0.4 10e3/uL    Absolute NRBCs 0.0 10e3/uL

## 2023-09-25 NOTE — PROGRESS NOTES
Infusion Nursing Note:  Thierno Vega presents today for cycle 1 day 29 cisplatin.    Patient seen by provider today: Yes, Jennifer Gabriel CNP   present during visit today: Yes, Cyrus via telephone .    Note: Pt presents today generally feeling well. Pt was assessed in clinic by Jennifer Gabriel CNP. Pt wishes to proceed with planned treatment.    Intravenous Access:  Peripheral IV placed.    Treatment Conditions:  Lab Results   Component Value Date    HGB 10.5 (L) 09/25/2023    WBC 3.7 (L) 09/25/2023    ANEUTAUTO 2.6 09/25/2023     09/25/2023        Lab Results   Component Value Date     09/25/2023    POTASSIUM 4.0 09/25/2023    MAG 1.8 09/25/2023    CR 1.01 09/25/2023    SOTO 9.2 09/25/2023    BILITOTAL 0.4 09/25/2023    ALBUMIN 4.3 09/25/2023    ALT 17 09/25/2023    AST 15 09/25/2023   Results reviewed, labs MET treatment parameters, ok to proceed with treatment.    Post Infusion Assessment:  Patient tolerated infusion without incident.  Blood return noted pre and post infusion.  Site patent and intact, free from redness, edema or discomfort.  No evidence of extravasations.  Access discontinued per protocol.     Discharge Plan:   Prescription refills given for pepcid.  Discharge instructions reviewed with: Patient.  Patient and/or family verbalized understanding of discharge instructions and all questions answered.  AVS to patient via [facility].  Patient will return 10/2/23 for next appointment.   Patient discharged in stable condition accompanied by: self.  Departure Mode: Ambulatory.      Emily Meese, RN

## 2023-09-25 NOTE — NURSING NOTE
"Oncology Rooming Note    September 25, 2023 7:41 AM   Thierno Vega is a 38 year old male who presents for:    Chief Complaint   Patient presents with    Blood Draw     Labs drawn with PIV start by RN. Vitals taken.    Oncology Clinic Visit     [facility] RETURN - NASOPHARYNGEAL CANCER     Initial Vitals: /67 (BP Location: Right arm, Patient Position: Sitting, Cuff Size: Adult Large)   Pulse 75   Temp 98.8  F (37.1  C) (Oral)   Resp 18   Ht 1.829 m (6' 0.01\")   Wt 98.9 kg (218 lb 1.6 oz)   SpO2 99%   BMI 29.57 kg/m   Estimated body mass index is 29.57 kg/m  as calculated from the following:    Height as of this encounter: 1.829 m (6' 0.01\").    Weight as of this encounter: 98.9 kg (218 lb 1.6 oz). Body surface area is 2.24 meters squared.  No Pain (0) Comment: Data Unavailable   No LMP for male patient.  Allergies reviewed: Yes  Medications reviewed: Yes    Medications: Medication refills not needed today.  Pharmacy name entered into [employer]:    [id number] - [place], [place] - [address] AT [facility] [address] [place] [address] PHARMACY [facility] - [place], [place] - [address] [facility] [place], [place] - [address] [facility] [phone]    Keith Corado LPN              "

## 2023-09-25 NOTE — PROGRESS NOTES
Oncology Progress Note  Sep 25, 2023    Reason for Visit: seen in follow-up of nasopharyngeal cancer    Oncology HPI:   CANCER TYPE: SCC nasopharynx, GALLITO -jeanna  STAGE: cT2N2 vs N3 (III vs MARYSOL)  ECOG PS: 0       SUMMARY  4/30/23               US. Bilateral necrotic nodes  5/3/23                 FNA cervical adenopathy - undifferentiated carcinoma  5/4/23                 CT CAP. 7 mm pulmonary nodule, no metastases  5/5/23                 MRI bilateral cervical and retropharyngeal adenopathy     Came to US  6/12/23               US guided FNA L neck node in clinic (Dr. Fisher). Path: hypocellular with scant clusters of atypical squamous cells suspicious for malignancy, p40 +jeanna, GALLITO -jeanna  6/13/23               PET/CT. Intense uptake L Rosenmuller fossa with increased fullness (SUV 18.8). Skull base intact. Milder FDG uptake (SUV 6.1) by the prominent R Rosenmuller fossa, inflammatory vs malignant. Indeterminate bilateral intense palatine tonsillar uptake with fullness on CT, inflammatroy vs infectious vs tumor, discontinuous with the nasopharyngeal abnormality. Bilateral nodes, including level 2a, 2b, 3, 3/5, R 1.8 cm 2A, another level 2/3 nodes, FDG avid retropharyngeal nodes. 0.3 cm nodule on the R major fissure. 0.8 cm R inguinal node (SUV 3.9) with additional nonenlarged LNs with minimal uptake, suggestive of reactive adenopathy. Marked focal uptake along the R upper inner thigh with skin thickening (SUV 16.8), which may represent cutaneous infection/inflammation, recommend direct visualization  6/27/23   Induction chemotherapy with cisplatin gemcitabine x 2 cycles with SD  8/28/23:  concurrent chemoradiation with weekly cisplatin    Interval history:   Thierno is seen today in clinic prior to day 29 cisplatin. Visit was completed with assistance of a professional [place]ian .  -intake decreased intake mainly due to decreased taste. Is aware of weight loss  -nausea less of an issue, taking zofran  "prn  -tinnitus still infrequent, no worse since last chemo  -a little bit of pain with swallowing but not limiting intake  -has adjusted mouth rinses which helped oral sores      Current Outpatient Medications   Medication Sig Dispense Refill    amLODIPine 5 MG TABS 5 mg, valsartan 160 MG TABS 160 mg Take 5 mg by mouth daily      dexAMETHasone (DECADRON) 4 MG tablet Take 1 tablet (4 mg) by mouth 2 times daily (with meals) Take 4 mg bid x 3 days Start on Day 2 after chemotherapy. (Patient not taking: Reported on 9/18/2023) 6 tablet 2    gabapentin (NEURONTIN) 300 MG capsule Take 3 capsules (900 mg) by mouth 3 times daily Taper dose up to 900 mg po tid per instructions given in Radiation Oncology clinic 270 capsule 4    magic mouthwash (ENTER INGREDIENTS IN COMMENTS) suspension Take 10 mLs by mouth every 4 hours as needed 240 mL 0    ondansetron (ZOFRAN) 8 MG tablet Take 1 tablet (8 mg) by mouth every 8 hours as needed for nausea (vomiting) 30 tablet 5    prochlorperazine (COMPAZINE) 10 MG tablet Take 1 tablet (10 mg) by mouth every 6 hours as needed for nausea or vomiting 30 tablet 5        No Known Allergies      Exam:   Blood pressure 106/67, pulse 75, temperature 98.8  F (37.1  C), temperature source Oral, resp. rate 18, height 1.829 m (6' 0.01\"), weight 98.9 kg (218 lb 1.6 oz), SpO2 99 %.    Wt Readings from Last 4 Encounters:   09/25/23 98.9 kg (218 lb 1.6 oz)   09/19/23 102.5 kg (226 lb)   09/18/23 102.6 kg (226 lb 4.8 oz)   09/12/23 105.2 kg (232 lb)     Gen: NAD  Oropharynx is moist and without lesion. Mild erythema to posterior OP. L cervical LN decreasing in size with non distinct borders. Multiple aphthous ulcers to right lower lip mucosa.  Lungs: CTA, no wheezes  Heart: RRR, no m/g/r  Extremities: warm, no edema. Speech is clear. CN wnl. Gait/station wnl.   Skin: no rash on exposed skin     Labs:   Most Recent 3 CBC's:  Recent Labs   Lab Test 09/25/23  0726 09/23/23  1526 09/18/23  0811   WBC 3.7* 3.9* " 4.2   HGB 10.5* 11.9* 11.8*   MCV 87 88 88    201 138*   ANEUTAUTO 2.6 2.8 3.1    Most Recent 3 BMP's:  Recent Labs   Lab Test 09/23/23  1526 09/18/23  0811 09/11/23  0713    137 138   POTASSIUM 4.2 4.0 4.3   CHLORIDE 98 102 102   CO2 23 24 26   BUN 15.1 16.0 14.4   CR 1.15 1.14 0.87   ANIONGAP 16* 11 10   SOTO 9.5 9.6 9.5   GLC 81 113* 119*   PROTTOTAL 7.5 7.2 7.0   ALBUMIN 4.8 4.5 4.5    Most Recent 2 LFT's:  Recent Labs   Lab Test 09/23/23  1526 09/18/23  0811   AST 17 16   ALT 17 16   ALKPHOS 56 53   BILITOTAL 0.5 0.5    Most Recent TSH and T4:  Recent Labs   Lab Test 09/23/23  1526   TSH 0.24*     Phos/Mag:  Lab Results   Component Value Date    MAG 2.1 09/18/2023    MAG 2.3 09/11/2023    MAG 2.1 09/05/2023      I reviewed the above labs today.    Imaging: n/a    Impression/plan:   Nasopharyngeal carcinoma, cT2N2 (III):  with SD after 2 cycles of cisplatin/gemcitabine. Now on concurrent chemoradiation with weekly cisplatin. Intermittent tinnitus stable. Dysgeusia increasing and affecting appetite, leading to modest weight loss. Labs today reviewed, acceptable to proceed with day 29 cisplatin   -will continue weekly ALON follow-up through chemoradiation, sees larisa next week for day 36    Dysgeusia, anorexia, weight loss  Weight down this week, nearly 10 lbs. Again reviewed ways to increase caloric intake to liquid or soft meals. Will request IVF later this week given recent ED visit. Defer discussion of NG feeding tube to Dr. Parada tomorrow, will message her    CASSIUS  Mild overall. Continue PRN Zofran    Aphthous ulcers  Continue salt and soda/Magic Mouthwash PRN.     Hearing changes/tinnitus: Intermittent tinnitus on L, no change this week. Lasting for a few seconds per episode. Had normal hearing test prior to starting. Continue to monitor.     40 minutes spent on the date of the encounter doing chart review, review of test results, interpretation of tests, patient visit, documentation, discussion  with other provider(s), and discussion with family    Jennifer Gabriel, CNP

## 2023-09-26 ENCOUNTER — APPOINTMENT (OUTPATIENT)
Dept: RADIATION ONCOLOGY | Facility: CLINIC | Age: 38
End: 2023-09-26
Attending: RADIOLOGY
Payer: COMMERCIAL

## 2023-09-26 ENCOUNTER — ALLIED HEALTH/NURSE VISIT (OUTPATIENT)
Dept: RADIATION ONCOLOGY | Facility: CLINIC | Age: 38
End: 2023-09-26
Attending: DIETITIAN, REGISTERED
Payer: COMMERCIAL

## 2023-09-26 ENCOUNTER — HOME INFUSION (PRE-WILLOW HOME INFUSION) (OUTPATIENT)
Dept: PHARMACY | Facility: CLINIC | Age: 38
End: 2023-09-26

## 2023-09-26 VITALS
WEIGHT: 218 LBS | BODY MASS INDEX: 29.56 KG/M2 | SYSTOLIC BLOOD PRESSURE: 104 MMHG | HEART RATE: 78 BPM | DIASTOLIC BLOOD PRESSURE: 82 MMHG

## 2023-09-26 DIAGNOSIS — C11.9 NASOPHARYNGEAL CANCER (H): Primary | ICD-10-CM

## 2023-09-26 DIAGNOSIS — R13.10 DYSPHAGIA: ICD-10-CM

## 2023-09-26 PROCEDURE — 97803 MED NUTRITION INDIV SUBSEQ: CPT | Mod: XU | Performed by: DIETITIAN, REGISTERED

## 2023-09-26 PROCEDURE — 77386 HC IMRT TREATMENT DELIVERY, COMPLEX: CPT | Performed by: RADIOLOGY

## 2023-09-26 NOTE — PROGRESS NOTES
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 Vega presents to clinic today for his weekly on-treatment visit.  He is experiencing more fatigue and reports sleeping 10 to 12 hours/day.  He is having a much more difficult time with eating and drinking over the past week and has lost more weight.  He has such significant dysgeusia that he is not interested in eating.  He is not having any pain with swallowing.    Nursing ROS:   Nutrition Alteration  Diet Type: Patient's Preference  Skin  Skin Reaction: 1 - Faint erythema or dry desquamation  Skin Progress: Aquaphor     ENT and Mouth Exam  Mucositis - Current: 0 - None   ENT/Mouth Note: S&S rinses     Gastrointestinal  Nausea: 0 - None    Pain Assessment  0-10 Pain Scale: 0  Pain Note: Gabapentin     PEG Tube: No  Electronic Cardiac Implant: No    Objective:   /82   Pulse 78   Wt 98.9 kg (218 lb)   BMI 29.56 kg/m  (initial weight 107.96 kg (238 pounds))  Gen: Appears well, NAD  HEENT: No mucositis or thrush  Skin: Moderate erythema and hyperpigmentation  Neck: Tenderness elicited with palpation over left level 5 node has resolved    Laboratory:  Lab Results   Component Value Date    WBC 3.7 (L) 2023    HGB 10.5 (L) 2023    HCT 29.8 (L) 2023    MCV 87 2023     2023     Lab Results   Component Value Date     2023    POTASSIUM 4.0 2023    CHLORIDE 100 2023    CO2 21 (L) 2023     (H) 2023     Magnesium   Date Value Ref Range Status    09/25/2023 1.8 1.7 - 2.3 mg/dL Final       Treatment-related toxicities (CTCAE v5.0):  Anorexia: Grade 3: Associated with significant weight loss or malnutrition; tube feeding or TPN indicated  Fatigue: Grade 2: Fatigue not relieved by rest; limiting instrumental ADL  Nausea: Grade 0: No toxicity  Pain: Grade 1: Mild pain  Dry mouth: Grade 1: Symptomatic without significant dietary alteration; unstimulated saliva flow >0.2 mL/min  Dysphagia: Grade 2: Symptomatic and altered eating/swallowing  Mucositis: Grade 1: Asymptomatic or mild symptoms; intervention not indicated  Dermatitis: Grade 2: Moderate to brisk erythema; patchy moist desquamation, mostly confined to skin folds and creases; moderate erythema    ED visits/Hospitalizations: None    Missed Treatments: None    Mosaiq chart and setup information reviewed  IGRT images reviewed    Medication Review  Med list reviewed with patient?: Yes  Med list printed and given: Yes    Assessment:    Mr. 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  He has received neoadjuvant chemotherapy and is now undergoing concurrent chemoradiation.  He is having more difficulty with eating and is losing weight.    Plan:   1.  Continue treatment as planned  2.  Orders were placed to have a temporary flexible nasogastric tube placed so that he can start tube feedings ASAP.    3.  An urgent (3 to 5 days) referral to interventional radiology for placement of percutaneous gastrostomy tube was placed.  After order was placed, IR stated that the earliest they could place his PEG tube was 10/19/2023 which is AFTER he is done with his chemoradiation.  After discussing with IR, they were unable to place his tube feed as requested on an urgent basis without sending him to another center which is not acceptable.  He is receiving all chemotherapy and radiation at [facility]. Given the delay in IR scheduling, we will  monitor his intake with the NG tube and determine if getting a PEG after he has completed RT is still a viable option.    Yessica Parada  Department of Radiation Oncology  [facility]

## 2023-09-26 NOTE — LETTER
2023         RE: Thierno Vega  [address]  [address]  [place] [address]        Dear Colleague,    Thank you for referring your patient, Thierno Vega, to the [facility] RADIATION ONCOLOGY. Please see a copy of my visit note below.    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 Vega presents to clinic today for his weekly on-treatment visit.  He is experiencing more fatigue and reports sleeping 10 to 12 hours/day.  He is having a much more difficult time with eating and drinking over the past week and has lost more weight.  He has such significant dysgeusia that he is not interested in eating.  He is not having any pain with swallowing.    Nursing ROS:   Nutrition Alteration  Diet Type: Patient's Preference  Skin  Skin Reaction: 1 - Faint erythema or dry desquamation  Skin Progress: Aquaphor     ENT and Mouth Exam  Mucositis - Current: 0 - None   ENT/Mouth Note: S&S rinses     Gastrointestinal  Nausea: 0 - None    Pain Assessment  0-10 Pain Scale: 0  Pain Note: Gabapentin     PEG Tube: No  Electronic Cardiac Implant: No    Objective:   /82   Pulse 78   Wt 98.9 kg (218 lb)   BMI 29.56 kg/m  (initial weight 107.96 kg (238 pounds))  Gen: Appears well, NAD  HEENT: No mucositis or thrush  Skin: Moderate erythema and hyperpigmentation  Neck: Tenderness elicited with palpation over left level 5 node has resolved    Laboratory:  Lab Results   Component Value Date    WBC 3.7 (L) 2023    HGB 10.5 (L) 2023    HCT 29.8 (L)  09/25/2023    MCV 87 09/25/2023     09/25/2023     Lab Results   Component Value Date     09/25/2023    POTASSIUM 4.0 09/25/2023    CHLORIDE 100 09/25/2023    CO2 21 (L) 09/25/2023     (H) 09/25/2023     Magnesium   Date Value Ref Range Status   09/25/2023 1.8 1.7 - 2.3 mg/dL Final       Treatment-related toxicities (CTCAE v5.0):  Anorexia: Grade 3: Associated with significant weight loss or malnutrition; tube feeding or TPN indicated  Fatigue: Grade 2: Fatigue not relieved by rest; limiting instrumental ADL  Nausea: Grade 0: No toxicity  Pain: Grade 1: Mild pain  Dry mouth: Grade 1: Symptomatic without significant dietary alteration; unstimulated saliva flow >0.2 mL/min  Dysphagia: Grade 2: Symptomatic and altered eating/swallowing  Mucositis: Grade 1: Asymptomatic or mild symptoms; intervention not indicated  Dermatitis: Grade 2: Moderate to brisk erythema; patchy moist desquamation, mostly confined to skin folds and creases; moderate erythema    ED visits/Hospitalizations: None    Missed Treatments: None    Mosaiq chart and setup information reviewed  IGRT images reviewed    Medication Review  Med list reviewed with patient?: Yes  Med list printed and given: Yes    Assessment:    Mr. 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  He has received neoadjuvant chemotherapy and is now undergoing concurrent chemoradiation.  He is having more difficulty with eating and is losing weight.    Plan:   1.  Continue treatment as planned  2.  Orders were placed to have a temporary flexible nasogastric tube placed so that he can start tube feedings ASAP.    3.  An urgent (3 to 5 days) referral to interventional radiology for placement of percutaneous gastrostomy tube was placed.  After order was placed, IR stated that the earliest they could place his PEG tube was 10/19/2023 which is AFTER he is done with his  chemoradiation.  After discussing with IR, they were unable to place his tube feed as requested on an urgent basis without sending him to another center which is not acceptable.  He is receiving all chemotherapy and radiation at [facility]. Given the delay in IR scheduling, we will monitor his intake with the NG tube and determine if getting a PEG after he has completed RT is still a viable option.    Yessica Parada  Department of Radiation Oncology  [facility]

## 2023-09-26 NOTE — PROGRESS NOTES
CLINICAL NUTRITION SERVICES - REASSESSMENT NOTE   EVALUATION OF PREVIOUS PLAN OF CARE:   Time Spent: 30 minutes  Visit Type: follow-up seen in radiation clinic  Pt accompanied by: Japanese  via phone   Referring Physician: Tal  C11.9 (ICD-10-CM) - Nasopharyngeal cancer (H)  Previous RD visit 8/31, 9/19, 9/26     Chemotherapy: Cisplatin - started 8/28  Radiation: Started 8/28  PEG tube: No; NG tube scheduled for 9/28      RECOMMENDATIONS ORDERED BY WRITER:   Enteral Nutrition   Formula: Isosource 1.5 vickie  Volume: 7 cartons/day (1750mL, 1330ml free water)  Provisions:  2625kcal (30kcal/kg), 119g protein (1.3g/kg), 308g CHO, 26g fiber  Dosing wt from 8/31: 87 kg      Tube feeding schedule via gravity bag feedings  Day 1: 1 carton formula TID spread 3-4 hours apart   Day 2: 1 1/2 cartons formula TID spread 3-4 hours apart   Day 3: 2 cartons formula TID spread 3-4 hours apart    Day 4: 2 cartons formula TID spread 3-4 hours apart; plus 1 carton add'l prn    Flush with 30-60mL water before and after each feeding  Flush with additional 120 mL water QID to meet 100% hydration        Monitoring from previous assessment:   -Food/Fluid intake - Thierno has been taking minimal to no PO intake.  He has been able to eat something soft or liquids 'every couple of days'. He has had some soft, vegetable broth soup and tried Pro [employer] Bulk 1340 powder from [facility] but cannot tolerate it.   -Weight trends - down ~20 lbs x past 3 weeks   Wt Readings from Last 10 Encounters:   09/25/23 98.9 kg (218 lb 1.6 oz)   09/19/23 102.5 kg (226 lb)   09/18/23 102.6 kg (226 lb 4.8 oz)   09/12/23 105.2 kg (232 lb)   09/11/23 105.6 kg (232 lb 12.8 oz)   09/06/23 108 kg (238 lb 1.6 oz)   09/05/23 108 kg (238 lb)   08/29/23 107 kg (236 lb)   08/28/23 107.4 kg (236 lb 12.8 oz)   08/11/23 106.2 kg (234 lb 3.2 oz)     Previous Goals:   1.  Aim for 5-6 small frequent meals  2.  Aim for 2600kcal and 100g protein, 10 cups water electrolytes  per day   3. Weight maintenance   Evaluation: Not met   Previous Nutrition Diagnosis:   Inadequate oral intake related to decreased ability to consume sufficient energy due to side effects of cancer therapy as evidenced by 10 lb wt loss x past 2 weeks,  dietary intake 50-75% estimated needs.   Evaluation: Declining   NEW FINDINGS:   Scheduled for NG tube placement   CURRENT NUTRITION DIAGNOSIS   Inadequate oral intake related to decreased ability to consume sufficient energy due to side effects of cancer therapy as evidenced by 18 lb wt loss x past 3 weeks,  dietary intake 25% estimated needs.   INTERVENTIONS   Recommendations / Nutrition Prescription   EN recommendations as above   Implementation  EN Composition, EN Schedule, Feeding Tube Flush - reviewed EN via NG tube, methods of administration, EN volume goals, water flushes etc.  Collaboration and Referral of Nutrition care - EN orders placed.  EN benefit inquiry sent over to [facility] intake  Encouraged PO intake as able to keep swallow function   Goals   EN via NG tube to provide 100% estimated nutrition needs  Aim for at least 8 cups water/electrolytes via NG and PO as able     Follow up/Monitoring: one week follow-up  Food intake, Enteral access, Enteral Nutrition intake, and Weight trends    Nae Hicks RD, , LD  [facility]  [phone]      Instructions provided to patient:   FEEDING TUBE SCHEDULE     Liquid Formula:  Isosource 1.5                                           Formula volume:  7 cartons per day or 1750 ml per day  SAMPLE MEAL SCHEDULE  Time/feeding # Water flush before  ml (oz)   Amount of formula Water flush after ml (oz)     Day # 1   30-60mL 1 carton - 3 x/day  Space out 3-4 hours     30-60mL     Day # 2   30-60mL 1   cartons - 3 x/day  Space out 3-4 hours     30-60mL     Day # 3   30-60mL 2 cartons - 3 x/day; josemanuel'l carton prn  Space out 3-4 hours     30-60mL       **Additional**  60mL water  6 x per day  throughout the day      Amount of extra water to give daily : 500-1000mL NOTE:   1oz=30ml, 2oz=60ml or   cup  This provides: 2625 calories,  119  grams of protein daily    EACH MEAL MUST TAKE AT LEAST 20 MINUTES - DO NOT GO ANY FASTER   This is for your stomach comfort. Keep the feeding tube clean.   Always flush with water to prevent the tube from becoming clogged!     EMERGENCY CONTACT INFORMATION  Home [facility]fusion Company: [email or web address]      Phone: [phone]____________    RN Coordinator:  Mimi Raines                                 Phone: [phone]____________  Dietitian: Nae Hicks  [facility]           Phone : [phone]___________

## 2023-09-26 NOTE — PROGRESS NOTES
Pt has full coverage for enteral as long as via tube through their [employer] PM[employer] plan.     ([facility]) In reference to 09/26/2023 to check for enteral coverage.     Please contact Intake with any questions, [phone] or In Basket pool, [facility] ([phone]).

## 2023-09-26 NOTE — CONSULTS
Outpatient Percutaneous Gastrotomy/Gastrojejunostomy Tube IR Referral  09/26/23    Referring Provider: Dr. Parada Radiation Oncology  IR Referral Request: To place a gastrostomy tube for nutrition.   Procedure Approved for:     Brief History:    Thierno Vega is a 38 year old male with history of HTN, tobacco use, nasopharyngeal cancer s/p chemo, now chemoradiation therapy, weight loss, anorexia. Patient no obvious abdominal surgeries.         Pertinent Imaging Reviewed:  CT 8/7/23 IMPRESSION:   No evidence of metastatic disease in the chest, abdomen or pelvis.       Case and imaging CT 8/7/23 was reviewed with Dr. Nova from IR and gastrostomy tube placement is recommended. Series 3 Image 61    IR recommends patient have a dietary consultation, learning center referral, and home care consult prior to gastrostomy tube placement.     Med holds None    Procedure order placed.    Requesting team, Dr. Parada, made aware of IR recommendations via Epic messaging.    SRI Kaur CNP  Interventional Radiology   IR on-call pager: [phone]

## 2023-09-27 ENCOUNTER — APPOINTMENT (OUTPATIENT)
Dept: RADIATION ONCOLOGY | Facility: CLINIC | Age: 38
End: 2023-09-27
Attending: RADIOLOGY
Payer: COMMERCIAL

## 2023-09-27 PROCEDURE — 77386 HC IMRT TREATMENT DELIVERY, COMPLEX: CPT | Performed by: RADIOLOGY

## 2023-09-28 ENCOUNTER — HOSPITAL ENCOUNTER (OUTPATIENT)
Dept: GENERAL RADIOLOGY | Facility: CLINIC | Age: 38
Discharge: HOME | End: 2023-09-28
Attending: RADIOLOGY
Payer: COMMERCIAL

## 2023-09-28 ENCOUNTER — APPOINTMENT (OUTPATIENT)
Dept: RADIATION ONCOLOGY | Facility: CLINIC | Age: 38
End: 2023-09-28
Attending: RADIOLOGY
Payer: COMMERCIAL

## 2023-09-28 ENCOUNTER — HOSPITAL ENCOUNTER (OUTPATIENT)
Dept: EDUCATION SERVICES | Facility: CLINIC | Age: 38
Discharge: HOME | End: 2023-09-28
Attending: RADIOLOGY
Payer: COMMERCIAL

## 2023-09-28 ENCOUNTER — PATIENT OUTREACH (OUTPATIENT)
Dept: ONCOLOGY | Facility: CLINIC | Age: 38
End: 2023-09-28
Payer: COMMERCIAL

## 2023-09-28 DIAGNOSIS — C11.9 NASOPHARYNGEAL CANCER (H): ICD-10-CM

## 2023-09-28 PROCEDURE — 77386 HC IMRT TREATMENT DELIVERY, COMPLEX: CPT | Performed by: RADIOLOGY

## 2023-09-28 PROCEDURE — 250N000009 HC RX 250: Performed by: STUDENT IN AN ORGANIZED HEALTH CARE EDUCATION/TRAINING PROGRAM

## 2023-09-28 PROCEDURE — 44500 INTRO GASTROINTESTINAL TUBE: CPT

## 2023-09-28 PROCEDURE — 74340 X-RAY GUIDE FOR GI TUBE: CPT | Mod: 26 | Performed by: STUDENT IN AN ORGANIZED HEALTH CARE EDUCATION/TRAINING PROGRAM

## 2023-09-28 PROCEDURE — 44500 INTRO GASTROINTESTINAL TUBE: CPT | Mod: GC | Performed by: STUDENT IN AN ORGANIZED HEALTH CARE EDUCATION/TRAINING PROGRAM

## 2023-09-28 RX ORDER — LIDOCAINE HYDROCHLORIDE 20 MG/ML
5 SOLUTION OROPHARYNGEAL ONCE
Status: COMPLETED | OUTPATIENT
Start: 2023-09-28 | End: 2023-09-28

## 2023-09-28 RX ADMIN — LIDOCAINE HYDROCHLORIDE 5 ML: 20 SOLUTION ORAL at 08:05

## 2023-09-28 NOTE — CONSULTS
Met with patient and 2 friends for NJ tube education and enteral tube feeding demonstration. Offered Polish  over the phone, patient declined and had friend interpret for him. Verbally reviewed basic cares and safety of the NJ tube, when to call the care team, when to call 911.    Patient demonstrated flushing demo tube with water, clamping and unclamping at the appropriate times. Patient reports he is currently taking all meds by mouth. Showed him how to give meds through the tube, if needed at some point in the future. Stressed the importance of flushing the tube with water to avoid clogs in the tube.    Patient was able to operate the enteralite infinity pump, set the rate/dose, prime the pump, administer feeding and correctly disconnect the tubing and flush demo NJ tube with water. Talked about storage of formula/feeding bag. Added a y-site extension to patient's NJ tube to make it easier for him to clamp/unclamp the tube.     Patient and caregivers were engaged in education and asking appropriate questions. Patient states he feels comfortable with doing the feedings at home. Reminded patient [facility] will call him between 3 and 4pm to discuss further.     Literature given: NG/NJ Tube Home Care Instructions, Handwashing and Skin Care, Using the Enteralite Infinity Pump for Tube Feeding.

## 2023-09-28 NOTE — TELEPHONE ENCOUNTER
[facility]: Cancer Care                                                                                          Reached out to Thierno using Libyan  to confirm his upcoming appointments, as he did not show up for infusion yesterday. He confirmed he is aware. Encouraged him to reach out to me with any questions or concerns.    Jennifer Toney, RN, BSN  RN Care Coordinator  [facility]

## 2023-09-29 ENCOUNTER — INFUSION THERAPY VISIT (OUTPATIENT)
Dept: ONCOLOGY | Facility: CLINIC | Age: 38
End: 2023-09-29
Attending: INTERNAL MEDICINE
Payer: COMMERCIAL

## 2023-09-29 ENCOUNTER — APPOINTMENT (OUTPATIENT)
Dept: RADIATION ONCOLOGY | Facility: CLINIC | Age: 38
End: 2023-09-29
Attending: RADIOLOGY
Payer: COMMERCIAL

## 2023-09-29 DIAGNOSIS — C11.9 NASOPHARYNGEAL CANCER (H): Primary | ICD-10-CM

## 2023-09-29 PROCEDURE — 77427 RADIATION TX MANAGEMENT X5: CPT | Performed by: RADIOLOGY

## 2023-09-29 PROCEDURE — 96360 HYDRATION IV INFUSION INIT: CPT

## 2023-09-29 PROCEDURE — 258N000003 HC RX IP 258 OP 636: Performed by: NURSE PRACTITIONER

## 2023-09-29 PROCEDURE — 96361 HYDRATE IV INFUSION ADD-ON: CPT

## 2023-09-29 PROCEDURE — 77386 HC IMRT TREATMENT DELIVERY, COMPLEX: CPT | Performed by: RADIOLOGY

## 2023-09-29 RX ORDER — EPINEPHRINE 1 MG/ML
0.3 INJECTION, SOLUTION INTRAMUSCULAR; SUBCUTANEOUS EVERY 5 MIN PRN
Status: CANCELLED | OUTPATIENT
Start: 2023-09-29

## 2023-09-29 RX ORDER — HEPARIN SODIUM (PORCINE) LOCK FLUSH IV SOLN 100 UNIT/ML 100 UNIT/ML
5 SOLUTION INTRAVENOUS
Status: CANCELLED | OUTPATIENT
Start: 2023-09-29

## 2023-09-29 RX ORDER — HEPARIN SODIUM,PORCINE 10 UNIT/ML
5-20 VIAL (ML) INTRAVENOUS DAILY PRN
Status: CANCELLED | OUTPATIENT
Start: 2023-09-29

## 2023-09-29 RX ORDER — MEPERIDINE HYDROCHLORIDE 25 MG/ML
25 INJECTION INTRAMUSCULAR; INTRAVENOUS; SUBCUTANEOUS EVERY 30 MIN PRN
Status: CANCELLED | OUTPATIENT
Start: 2023-09-29

## 2023-09-29 RX ORDER — ALBUTEROL SULFATE 0.83 MG/ML
2.5 SOLUTION RESPIRATORY (INHALATION)
Status: CANCELLED | OUTPATIENT
Start: 2023-09-29

## 2023-09-29 RX ORDER — DIPHENHYDRAMINE HYDROCHLORIDE 50 MG/ML
50 INJECTION INTRAMUSCULAR; INTRAVENOUS
Status: CANCELLED
Start: 2023-09-29

## 2023-09-29 RX ORDER — ALBUTEROL SULFATE 90 UG/1
1-2 AEROSOL, METERED RESPIRATORY (INHALATION)
Status: CANCELLED
Start: 2023-09-29

## 2023-09-29 RX ORDER — METHYLPREDNISOLONE SODIUM SUCCINATE 125 MG/2ML
125 INJECTION, POWDER, LYOPHILIZED, FOR SOLUTION INTRAMUSCULAR; INTRAVENOUS
Status: CANCELLED
Start: 2023-09-29

## 2023-09-29 RX ADMIN — SODIUM CHLORIDE 2000 ML: 9 INJECTION, SOLUTION INTRAVENOUS at 13:48

## 2023-09-29 NOTE — PATIENT INSTRUCTIONS
[facility] Triage and after hours / weekends / holidays:  [phone]    Please call the triage or after hours line if you experience a temperature greater than or equal to 100.4, shaking chills, have uncontrolled nausea, vomiting and/or diarrhea, dizziness, shortness of breath, chest pain, bleeding, unexplained bruising, or if you have any other new/concerning symptoms, questions or concerns.      If you are having any concerning symptoms or wish to speak to a provider before your next infusion visit, please call triage to notify them so we can adequately serve you.     If you need a refill on a narcotic prescription or other medication, please call before your infusion appointment.                September 2023 Sunday Monday Tuesday Wednesday Thursday Friday Saturday                            1    TREATMENT  10:15 AM   (15 min.)   P RAD ONC IJEOMA   [facility] Radiation Oncology 2       3     4     5    LAB PERIPHERAL   8:15 AM   (15 min.)   [facility] LAB DRAW   [facility]    RETURN ACTIVE TREATMENT   8:45 AM   (45 min.)   Janis Cordova CNP   [facility]    ONC INFUSION 2 HR (120 MIN)   9:30 AM   (120 min.)    ONC INFUSION NURSE   [facility] OUTPATIENT  10:15 AM   (15 min.)   VIDEO,    [facility]  Services    TREATMENT  10:15 AM   (15 min.)   P RAD ONC IJEOMA   [facility] Radiation Oncology 6    OTV  10:15 AM   (15 min.)   Yessica Parada MD   [facility] Radiation Oncology    TREATMENT  10:15 AM   (15 min.)   P RAD ONC IJEOMA   [facility] Radiation Oncology 7    TREATMENT  10:15 AM   (15 min.)   P RAD ONC IJEOMA   [facility] Radiation Oncology    [place] OUTPATIENT  11:00 AM   (15 min.)   VIDEO,    [facility]  Services 8    TREATMENT  10:15 AM   (15 min.)   P RAD ONC IJEOMA     [facility] Radiation Oncology    [place] OUTPATIENT  11:00 AM   (15 min.)   VIDEO,    [facility]  Services 9       10     11    LAB PERIPHERAL   6:45 AM   (15 min.)   [facility] LAB DRAW   [facility]    RETURN CCSL   7:00 AM   (45 min.)   Janis Cordova, CNP   [facility]    ONC INFUSION 2 HR (120 MIN)   8:30 AM   (120 min.)   UC ONC INFUSION NURSE   [facility] OUTPATIENT  10:15 AM   (15 min.)   VIDEO,    [facility]  Services    TREATMENT  10:15 AM   (15 min.)   UMP RAD ONC IJEOMA   [facility] Radiation Oncology 12    [place] OUTPATIENT  11:00 AM   (15 min.)   VIDEO,    [facility]  Services    TREATMENT  11:00 AM   (15 min.)   UMP RAD ONC IJEOMA   [facility] Radiation Oncology    OTV  11:15 AM   (15 min.)   Yessica Parada MD   [facility] Radiation Oncology    [place] OUTPATIENT   9:15 PM   (60 min.)   VIDEO,    [facility]  Services 13    [place] OUTPATIENT  11:00 AM   (15 min.)   VIDEO,    [facility]  Services    TREATMENT  11:00 AM   (15 min.)   UMP RAD ONC IJEOMA   [facility] Radiation Oncology 14    [place] OUTPATIENT  11:00 AM   (15 min.)   VIDEO,    [facility]  Services    TREATMENT  11:00 AM   (15 min.)   UMP RAD ONC IJEOMA   [facility] Radiation Oncology 15    TREATMENT   9:00 AM   (15 min.)   UMP RAD ONC IJEOMA   [facility] Radiation Oncology    [place] OUTPATIENT  10:30 AM   (60 min.)   VIDEO,    [facility]  Services 16       17     18    LAB PERIPHERAL   7:45 AM   (15 min.)   [facility] DRAW   [facility]    RETURN CCSL   8:00 AM   (45 min.)   Janis Cordova, CNP   [facility]  Cancer Clinic    [place] OUTPATIENT  11:00 AM   (60 min.)   VIDEO,    [facility]  Services    TREATMENT  11:00 AM   (15 min.)   UMP RAD ONC IJEOMA   [facility] Radiation Oncology 19    ONC INFUSION 2 HR (120 MIN)   7:30 AM   (120 min.)   UC ONC INFUSION NURSE   [facility] OUTPATIENT  11:00 AM   (60 min.)   VIDEO,    [facility]  Services    [facility] NUTRITION VISIT  11:00 AM   (30 min.)   Nae Tafoya RD   [facility] Radiation Oncology    TREATMENT  11:00 AM   (15 min.)   UMP RAD ONC IJEOMA   [facility] Radiation Oncology    OTV  11:15 AM   (15 min.)   Yessica Parada MD   [facility] Radiation Oncology 20    [place] OUTPATIENT  11:00 AM   (15 min.)   VIDEO,    [facility]  Services    TREATMENT  11:00 AM   (15 min.)   UMP RAD ONC IJEOMA   [facility] Radiation Oncology 21    [place] OUTPATIENT  11:00 AM   (15 min.)   VIDEO,    [facility]  Services    TREATMENT  11:00 AM   (15 min.)   UMP RAD ONC IJEOMA   [facility] Radiation Oncology 22    [place] OUTPATIENT  11:00 AM   (60 min.)   VIDEO,    [facility]  Services    TREATMENT  11:00 AM   (15 min.)   UMP RAD ONC IJEOMA   [facility] Radiation Oncology 23    Admission   3:06 PM   [facility] Emergency Department   (Discharge: 9/23/2023)   24     25    LAB PERIPHERAL   7:00 AM   (15 min.)   [facility] LAB DRAW   [facility]    RETURN ACTIVE TREATMENT   7:15 AM   (45 min.)   Jennifer Gabriel CNP   [facility]    ONC INFUSION 2 HR (120 MIN)   8:30 AM   (120 min.)   UC ONC INFUSION NURSE   [facility] OUTPATIENT  11:00 AM   (60 min.)   VIDEO,    [facility]   Services    TREATMENT  11:00 AM   (15 min.)   UMP RAD ONC IJEOMA   [facility] Radiation Oncology 26    UMP NUTRITION VISIT  11:00 AM   (30 min.)   Nae Tafoya RD   [facility] Radiation Oncology    [place] OUTPATIENT  11:00 AM   (60 min.)   VIDEO,    [facility]  Services    TREATMENT  11:00 AM   (15 min.)   UMP RAD ONC IJEOMA   [facility] Radiation Oncology    OTV  11:15 AM   (15 min.)   Yessica Parada MD   [facility] Radiation Oncology    [place] OUTPATIENT   9:30 PM   (15 min.)   VIDEO,    [facility]  Services 27    [place] OUTPATIENT  11:00 AM   (60 min.)   VIDEO,    [facility]  Services    TREATMENT  11:00 AM   (15 min.)   UMP RAD ONC IJEOMA   [facility] Radiation Oncology    ONC INFUSION 2 HR (120 MIN)   2:00 PM   (120 min.)   UC ONC INFUSION NURSE   [facility] 28    XR FEEDING TUBE PLACEMENT   8:00 AM   (60 min.)   [id number]   [facility] Imaging    [place] OUTPATIENT  11:00 AM   (60 min.)   VIDEO,    [facility]  Services    TREATMENT  11:00 AM   (15 min.)   UMP RAD ONC IJEOMA   [facility] Radiation Oncology    UU ENTERAL TUBE FEEDING   1:30 PM   (120 min.)   Jeanna Mccormick, ALAN   [facility] 29    [place] OUTPATIENT  11:00 AM   (60 min.)   VIDEO,    [facility]  Services    TREATMENT  11:00 AM   (15 min.)   UMP RAD ONC IJEOMA   [facility] Radiation Oncology    ONC INFUSION 2 HR (120 MIN)   1:30 PM   (120 min.)   UC ONC INFUSION NURSE   [facility] 30 October 2023 Sunday Monday Tuesday Wednesday Thursday Friday Saturday   1     2    LAB PERIPHERAL   7:45 AM   (15 min.)   [facility] DRAW   [facility]    RETURN  ACTIVE TREATMENT   8:00 AM   (45 min.)   Janis Cordova CNP   [facility]    ONC INFUSION 2 HR (120 MIN)   9:00 AM   (120 min.)   UC ONC INFUSION NURSE   [facility] OUTPATIENT  11:00 AM   (15 min.)   VIDEO,    [facility]  Services    TREATMENT  11:00 AM   (15 min.)   UMP RAD ONC IJEOMA   [facility] Radiation Oncology 3    [facility] NUTRITION VISIT   9:30 AM   (30 min.)   Nae Tafoya RD   [facility] Radiation Oncology    OTV   9:30 AM   (15 min.)   Yessica Parada MD   [facility] Radiation Oncology    TREATMENT   9:30 AM   (15 min.)   UMP RAD ONC IJEOMA   [facility] Radiation Oncology 4    [place] OUTPATIENT  11:00 AM   (15 min.)   VIDEO,    [facility]  Services    TREATMENT  11:00 AM   (15 min.)   UMP RAD ONC IJEOMA   [facility] Radiation Oncology    ONC INFUSION 2 HR (120 MIN)   1:30 PM   (120 min.)   UC ONC INFUSION NURSE   [facility] 5    [place] OUTPATIENT  11:00 AM   (15 min.)   VIDEO,    [facility]  Services    TREATMENT  11:00 AM   (15 min.)   UMP RAD ONC IJEOMA   [facility] Radiation Oncology 6    [place] OUTPATIENT  11:00 AM   (15 min.)   VIDEO,    [facility]  Services    TREATMENT  11:00 AM   (15 min.)   UMP RAD ONC IJEOMA   [facility] Radiation Oncology    ONC INFUSION 2 HR (120 MIN)   2:00 PM   (120 min.)   UC ONC INFUSION NURSE   [facility] 7       8     9    LAB PERIPHERAL   6:45 AM   (15 min.)   [facility] DRAW   [facility]    RETURN ACTIVE TREATMENT   7:00 AM   (45 min.)   Janis Cordova CNP   [facility]    ONC INFUSION 2 HR (120 MIN)   8:30 AM   (120 min.)   UC ONC INFUSION NURSE   [facility]  [facility]    TREATMENT  11:00 AM   (15 min.)   P RAD ONC IJEOMA   [facility] Radiation Oncology 10    TREATMENT  11:00 AM   (15 min.)   P RAD ONC IJEOMA   [facility] Radiation Oncology    OTV  11:15 AM   (15 min.)   Yessica Parada MD   [facility] Radiation Oncology 11    ONC INFUSION 2 HR (120 MIN)   8:00 AM   (120 min.)   UC ONC INFUSION NURSE   [facility]    TREATMENT  11:00 AM   (15 min.)   P RAD ONC IJEOMA   [facility] Radiation Oncology 12    RETURN CCSL  10:00 AM   (30 min.)   Catrachita Clark MD   [facility] Cancer [facility]    TREATMENT  11:00 AM   (15 min.)   P RAD ONC IJEOMA   [facility] Radiation Oncology 13    ONC INFUSION 2 HR (120 MIN)   8:00 AM   (120 min.)   UC ONC INFUSION NURSE   [facility] Cancer [facility]    TREATMENT  11:00 AM   (15 min.)   P RAD ONC IJEOMA   [facility] Radiation Oncology 14       15     16    TREATMENT  11:00 AM   (15 min.)   P RAD ONC IJEOMA   [facility] Radiation Oncology 17     18     19     20     21       22     23     24     25     26     27     28       29     30     31                                         Lab Results:  No results found for this or any previous visit (from the past 12 hour(s)).

## 2023-09-29 NOTE — PROGRESS NOTES
Infusion Nursing Note:  Thierno Vega presents today for 2L IVF.    Patient seen by provider today: No   present during visit today: Not Applicable.    Note: Pt presents today with NG tube in place. Pt states he has low appetite and is now set up with enteral feeding after [facility] appointment on 09/26/23. Pt wishes to proceed with planned IV fluid bolus.    Intravenous Access:  Peripheral IV placed.    Treatment Conditions:  Not Applicable.    Post Infusion Assessment:  Patient tolerated infusion without incident.  Blood return noted pre and post infusion.  Site patent and intact, free from redness, edema or discomfort.  No evidence of extravasations.  Access discontinued per protocol.     Discharge Plan:   Patient declined prescription refills.  Discharge instructions reviewed with: Patient.  Patient and/or family verbalized understanding of discharge instructions and all questions answered.  AVS to patient via [employer].  Patient will return 10/02/23 for next appointment. Printed copy provided as well.  Patient discharged in stable condition accompanied by: self.  Departure Mode: Ambulatory.    Emily Meese, RN

## 2023-09-29 NOTE — PROGRESS NOTES
Oncology Progress Note  Oct 2, 2023    Reason for Visit: seen in follow-up of nasopharyngeal cancer    Oncology HPI:   CANCER TYPE: SCC nasopharynx, GALLITO -jeanna  STAGE: cT2N2 vs N3 (III vs MARYSOL)  ECOG PS: 0       SUMMARY  4/30/23               US. Bilateral necrotic nodes  5/3/23                 FNA cervical adenopathy - undifferentiated carcinoma  5/4/23                 CT CAP. 7 mm pulmonary nodule, no metastases  5/5/23                 MRI bilateral cervical and retropharyngeal adenopathy     Came to US  6/12/23               US guided FNA L neck node in clinic (Dr. Fisher). Path: hypocellular with scant clusters of atypical squamous cells suspicious for malignancy, p40 +jeanna, GALLITO -jeanna  6/13/23               PET/CT. Intense uptake L Rosenmuller fossa with increased fullness (SUV 18.8). Skull base intact. Milder FDG uptake (SUV 6.1) by the prominent R Rosenmuller fossa, inflammatory vs malignant. Indeterminate bilateral intense palatine tonsillar uptake with fullness on CT, inflammatroy vs infectious vs tumor, discontinuous with the nasopharyngeal abnormality. Bilateral nodes, including level 2a, 2b, 3, 3/5, R 1.8 cm 2A, another level 2/3 nodes, FDG avid retropharyngeal nodes. 0.3 cm nodule on the R major fissure. 0.8 cm R inguinal node (SUV 3.9) with additional nonenlarged LNs with minimal uptake, suggestive of reactive adenopathy. Marked focal uptake along the R upper inner thigh with skin thickening (SUV 16.8), which may represent cutaneous infection/inflammation, recommend direct visualization  6/27/23   Induction chemotherapy with cisplatin gemcitabine x 2 cycles with SD  8/28/23:  concurrent chemoradiation with weekly cisplatin    Interval history:   Thierno is seen today in clinic prior to day 36 cisplatin. Visit was completed with assistance of a professional Moldovan .  -NG was placed last week due to severely limited oral intake 2/2 dysgeusia. IR unable to place PEG in a timely manner. So far  managing the NG okay. Notes it's somewhat bothersome but he's managing. Able to get in 4 cartons daily for the past 4 days.  -No nausea last week  -Only taking some liquids, coffee and oral meds by mouth  -IV fluids last week helpful  -Skin dry, peeling on neck  -Tinnitus slightly more pronounced this week      Current Outpatient Medications   Medication Sig Dispense Refill    amLODIPine 5 MG TABS 5 mg, valsartan 160 MG TABS 160 mg Take 5 mg by mouth daily      dexAMETHasone (DECADRON) 4 MG tablet Take 1 tablet (4 mg) by mouth 2 times daily (with meals) Take 4 mg bid x 3 days Start on Day 2 after chemotherapy. (Patient not taking: Reported on 9/18/2023) 6 tablet 2    famotidine (PEPCID) 20 MG tablet Take 1 tablet (20 mg) by mouth 2 times daily 60 tablet 1    gabapentin (NEURONTIN) 300 MG capsule Take 3 capsules (900 mg) by mouth 3 times daily Taper dose up to 900 mg po tid per instructions given in Radiation Oncology clinic 270 capsule 4    magic mouthwash (ENTER INGREDIENTS IN COMMENTS) suspension Take 10 mLs by mouth every 4 hours as needed 240 mL 0    ondansetron (ZOFRAN) 8 MG tablet Take 1 tablet (8 mg) by mouth every 8 hours as needed for nausea (vomiting) 30 tablet 5    prochlorperazine (COMPAZINE) 10 MG tablet Take 1 tablet (10 mg) by mouth every 6 hours as needed for nausea or vomiting 30 tablet 5        No Known Allergies      Exam:   There were no vitals taken for this visit.    Wt Readings from Last 4 Encounters:   09/26/23 98.9 kg (218 lb)   09/25/23 98.9 kg (218 lb 1.6 oz)   09/19/23 102.5 kg (226 lb)   09/18/23 102.6 kg (226 lb 4.8 oz)     Gen: NAD  Oropharynx: moist and without lesion. Mild erythema to posterior OP. L cervical LN decreasing in size with non distinct borders. NG feeding tube in place.   Lungs: CTA, no wheezes  Heart: RRR, no m/g/r  Extremities: Warm, no edema. Speech is clear. CN wnl. Gait/station wnl.   Skin: Radiation dermatitis to neck with areas of desquamation      Labs:   Most  Recent 3 CBC's:  Recent Labs   Lab Test 09/25/23  0726 09/23/23  1526 09/18/23  0811   WBC 3.7* 3.9* 4.2   HGB 10.5* 11.9* 11.8*   MCV 87 88 88    201 138*   ANEUTAUTO 2.6 2.8 3.1    Most Recent 3 BMP's:  Recent Labs   Lab Test 09/25/23  0726 09/23/23  1526 09/18/23  0811    137 137   POTASSIUM 4.0 4.2 4.0   CHLORIDE 100 98 102   CO2 21* 23 24   BUN 8.9 15.1 16.0   CR 1.01 1.15 1.14   ANIONGAP 15 16* 11   SOTO 9.2 9.5 9.6   * 81 113*   PROTTOTAL 6.8 7.5 7.2   ALBUMIN 4.3 4.8 4.5    Most Recent 2 LFT's:  Recent Labs   Lab Test 09/25/23  0726 09/23/23  1526   AST 15 17   ALT 17 17   ALKPHOS 51 56   BILITOTAL 0.4 0.5    Most Recent TSH and T4:  Recent Labs   Lab Test 09/23/23  1526   TSH 0.24*     Phos/Mag:  Lab Results   Component Value Date    MAG 1.8 09/25/2023    MAG 2.1 09/18/2023    MAG 2.3 09/11/2023      I reviewed the above labs today.    Imaging: n/a    Impression/plan:   Nasopharyngeal carcinoma, cT2N2 (III):  with SD after 2 cycles of cisplatin/gemcitabine. Now on concurrent chemoradiation with weekly cisplatin. Intermittent tinnitus slightly more pronounced but not lasting long periods. Dysgeusia increasing and affecting appetite, leading to increased weight loss. NG now placed for nutrition. Labs today reviewed, acceptable to proceed with final day 26 cisplatin.  -continue supplemental IVF for the next 2 weeks  -ALON visit next week (Mon), Dr. Clark visit as well (Thurs)  -Reviewed follow-up after completing chemoradiation, typically would have ALON follow-up as needed for 1-2 weeks, either with med/onc or rad/onc. Would also typically see ENT/radiation around week 6 and post-treatment PET/CT week 12. He is understandably anxious to return home but voices understanding and agreement to completed follow-up as recommended.    Dysgeusia, anorexia, weight loss  Weight down ~15 pounds over the past 2 weeks. NG feeding tube now in place. Following with RD for nutrition/formula guidance.  Continue supplemental IVF as scheduled for additional support.    CASSIUS  Mild overall. Continue PRN Zofran    Aphthous ulcers  Improved. Continue salt and soda/Magic Mouthwash PRN.     Hearing changes/tinnitus: Intermittent tinnitus on L, slightly increased. Lasting for a few seconds per episode. Had normal hearing test prior to starting. Continue to monitor.     40 minutes spent on the date of the encounter doing chart review, review of test results, interpretation of tests, patient visit, and documentation     Janis Cordova, CNP

## 2023-10-02 ENCOUNTER — HOSPITAL ENCOUNTER (OUTPATIENT)
Facility: CLINIC | Age: 38
End: 2023-10-02
Admitting: RADIOLOGY
Payer: COMMERCIAL

## 2023-10-02 ENCOUNTER — TELEPHONE (OUTPATIENT)
Dept: ONCOLOGY | Facility: CLINIC | Age: 38
End: 2023-10-02

## 2023-10-02 ENCOUNTER — ONCOLOGY VISIT (OUTPATIENT)
Dept: ONCOLOGY | Facility: CLINIC | Age: 38
End: 2023-10-02
Attending: REGISTERED NURSE
Payer: COMMERCIAL

## 2023-10-02 ENCOUNTER — APPOINTMENT (OUTPATIENT)
Dept: LAB | Facility: CLINIC | Age: 38
End: 2023-10-02
Attending: REGISTERED NURSE
Payer: COMMERCIAL

## 2023-10-02 ENCOUNTER — APPOINTMENT (OUTPATIENT)
Dept: RADIATION ONCOLOGY | Facility: CLINIC | Age: 38
End: 2023-10-02
Attending: RADIOLOGY
Payer: COMMERCIAL

## 2023-10-02 VITALS
WEIGHT: 211 LBS | TEMPERATURE: 98.9 F | DIASTOLIC BLOOD PRESSURE: 67 MMHG | RESPIRATION RATE: 16 BRPM | BODY MASS INDEX: 28.61 KG/M2 | SYSTOLIC BLOOD PRESSURE: 100 MMHG | HEART RATE: 78 BPM | OXYGEN SATURATION: 99 %

## 2023-10-02 DIAGNOSIS — R43.2 DYSGEUSIA: ICD-10-CM

## 2023-10-02 DIAGNOSIS — E83.42 HYPOMAGNESEMIA: Primary | ICD-10-CM

## 2023-10-02 DIAGNOSIS — H93.12 TINNITUS, LEFT: ICD-10-CM

## 2023-10-02 DIAGNOSIS — Z91.89 AT RISK FOR MALNUTRITION: ICD-10-CM

## 2023-10-02 DIAGNOSIS — C11.9 NASOPHARYNGEAL CANCER (H): ICD-10-CM

## 2023-10-02 DIAGNOSIS — E83.42 HYPOMAGNESEMIA: ICD-10-CM

## 2023-10-02 DIAGNOSIS — C11.9 NASOPHARYNGEAL CANCER (H): Primary | ICD-10-CM

## 2023-10-02 LAB
ALBUMIN SERPL BCG-MCNC: 4.4 G/DL (ref 3.5–5.2)
ALP SERPL-CCNC: 57 U/L (ref 40–129)
ALT SERPL W P-5'-P-CCNC: 13 U/L (ref 0–70)
ANION GAP SERPL CALCULATED.3IONS-SCNC: 11 MMOL/L (ref 7–15)
AST SERPL W P-5'-P-CCNC: 9 U/L (ref 0–45)
BASOPHILS # BLD AUTO: 0 10E3/UL (ref 0–0.2)
BASOPHILS NFR BLD AUTO: 1 %
BILIRUB SERPL-MCNC: 0.2 MG/DL
BUN SERPL-MCNC: 9.7 MG/DL (ref 6–20)
CALCIUM SERPL-MCNC: 9.5 MG/DL (ref 8.6–10)
CHLORIDE SERPL-SCNC: 103 MMOL/L (ref 98–107)
CREAT SERPL-MCNC: 1.12 MG/DL (ref 0.67–1.17)
DEPRECATED HCO3 PLAS-SCNC: 25 MMOL/L (ref 22–29)
EGFRCR SERPLBLD CKD-EPI 2021: 87 ML/MIN/1.73M2
EOSINOPHIL # BLD AUTO: 0 10E3/UL (ref 0–0.7)
EOSINOPHIL NFR BLD AUTO: 1 %
ERYTHROCYTE [DISTWIDTH] IN BLOOD BY AUTOMATED COUNT: 13.3 % (ref 10–15)
GLUCOSE SERPL-MCNC: 108 MG/DL (ref 70–99)
HCT VFR BLD AUTO: 29.7 % (ref 40–53)
HGB BLD-MCNC: 10.2 G/DL (ref 13.3–17.7)
IMM GRANULOCYTES # BLD: 0 10E3/UL
IMM GRANULOCYTES NFR BLD: 1 %
LYMPHOCYTES # BLD AUTO: 0.5 10E3/UL (ref 0.8–5.3)
LYMPHOCYTES NFR BLD AUTO: 13 %
MAGNESIUM SERPL-MCNC: 1.8 MG/DL (ref 1.7–2.3)
MCH RBC QN AUTO: 29.9 PG (ref 26.5–33)
MCHC RBC AUTO-ENTMCNC: 34.3 G/DL (ref 31.5–36.5)
MCV RBC AUTO: 87 FL (ref 78–100)
MONOCYTES # BLD AUTO: 0.7 10E3/UL (ref 0–1.3)
MONOCYTES NFR BLD AUTO: 18 %
NEUTROPHILS # BLD AUTO: 2.5 10E3/UL (ref 1.6–8.3)
NEUTROPHILS NFR BLD AUTO: 66 %
NRBC # BLD AUTO: 0 10E3/UL
NRBC BLD AUTO-RTO: 0 /100
PLATELET # BLD AUTO: 208 10E3/UL (ref 150–450)
POTASSIUM SERPL-SCNC: 4.1 MMOL/L (ref 3.4–5.3)
PROT SERPL-MCNC: 7.1 G/DL (ref 6.4–8.3)
RBC # BLD AUTO: 3.41 10E6/UL (ref 4.4–5.9)
SODIUM SERPL-SCNC: 139 MMOL/L (ref 135–145)
WBC # BLD AUTO: 3.7 10E3/UL (ref 4–11)

## 2023-10-02 PROCEDURE — 99215 OFFICE O/P EST HI 40 MIN: CPT | Performed by: REGISTERED NURSE

## 2023-10-02 PROCEDURE — G0463 HOSPITAL OUTPT CLINIC VISIT: HCPCS | Mod: 25 | Performed by: REGISTERED NURSE

## 2023-10-02 PROCEDURE — 80053 COMPREHEN METABOLIC PANEL: CPT | Performed by: REGISTERED NURSE

## 2023-10-02 PROCEDURE — 83735 ASSAY OF MAGNESIUM: CPT | Performed by: REGISTERED NURSE

## 2023-10-02 PROCEDURE — 85025 COMPLETE CBC W/AUTO DIFF WBC: CPT | Performed by: REGISTERED NURSE

## 2023-10-02 PROCEDURE — 96367 TX/PROPH/DG ADDL SEQ IV INF: CPT

## 2023-10-02 PROCEDURE — 77386 HC IMRT TREATMENT DELIVERY, COMPLEX: CPT | Performed by: RADIOLOGY

## 2023-10-02 PROCEDURE — 96413 CHEMO IV INFUSION 1 HR: CPT

## 2023-10-02 PROCEDURE — 96375 TX/PRO/DX INJ NEW DRUG ADDON: CPT

## 2023-10-02 PROCEDURE — 77336 RADIATION PHYSICS CONSULT: CPT | Performed by: RADIOLOGY

## 2023-10-02 PROCEDURE — 36415 COLL VENOUS BLD VENIPUNCTURE: CPT | Performed by: REGISTERED NURSE

## 2023-10-02 PROCEDURE — 258N000003 HC RX IP 258 OP 636: Performed by: REGISTERED NURSE

## 2023-10-02 PROCEDURE — 250N000011 HC RX IP 250 OP 636: Mod: JZ | Performed by: REGISTERED NURSE

## 2023-10-02 RX ORDER — HEPARIN SODIUM (PORCINE) LOCK FLUSH IV SOLN 100 UNIT/ML 100 UNIT/ML
5 SOLUTION INTRAVENOUS
Status: CANCELLED | OUTPATIENT
Start: 2023-10-02

## 2023-10-02 RX ORDER — PALONOSETRON 0.05 MG/ML
0.25 INJECTION, SOLUTION INTRAVENOUS ONCE
Status: COMPLETED | OUTPATIENT
Start: 2023-10-02 | End: 2023-10-02

## 2023-10-02 RX ORDER — EPINEPHRINE 1 MG/ML
0.3 INJECTION, SOLUTION INTRAMUSCULAR; SUBCUTANEOUS EVERY 5 MIN PRN
Status: CANCELLED | OUTPATIENT
Start: 2023-10-02

## 2023-10-02 RX ORDER — MEPERIDINE HYDROCHLORIDE 25 MG/ML
25 INJECTION INTRAMUSCULAR; INTRAVENOUS; SUBCUTANEOUS EVERY 30 MIN PRN
Status: CANCELLED | OUTPATIENT
Start: 2023-10-02

## 2023-10-02 RX ORDER — ALBUTEROL SULFATE 90 UG/1
1-2 AEROSOL, METERED RESPIRATORY (INHALATION)
Status: CANCELLED
Start: 2023-10-02

## 2023-10-02 RX ORDER — ALBUTEROL SULFATE 0.83 MG/ML
2.5 SOLUTION RESPIRATORY (INHALATION)
Status: CANCELLED | OUTPATIENT
Start: 2023-10-02

## 2023-10-02 RX ORDER — METHYLPREDNISOLONE SODIUM SUCCINATE 125 MG/2ML
125 INJECTION, POWDER, LYOPHILIZED, FOR SOLUTION INTRAMUSCULAR; INTRAVENOUS
Status: CANCELLED
Start: 2023-10-02

## 2023-10-02 RX ORDER — DIPHENHYDRAMINE HYDROCHLORIDE 50 MG/ML
50 INJECTION INTRAMUSCULAR; INTRAVENOUS
Status: CANCELLED
Start: 2023-10-02

## 2023-10-02 RX ORDER — LORAZEPAM 2 MG/ML
0.5 INJECTION INTRAMUSCULAR EVERY 4 HOURS PRN
Status: CANCELLED | OUTPATIENT
Start: 2023-10-02

## 2023-10-02 RX ORDER — HEPARIN SODIUM,PORCINE 10 UNIT/ML
5-20 VIAL (ML) INTRAVENOUS DAILY PRN
Status: CANCELLED | OUTPATIENT
Start: 2023-10-02

## 2023-10-02 RX ORDER — PALONOSETRON 0.05 MG/ML
0.25 INJECTION, SOLUTION INTRAVENOUS ONCE
Status: CANCELLED | OUTPATIENT
Start: 2023-10-02

## 2023-10-02 RX ADMIN — PALONOSETRON HYDROCHLORIDE 0.25 MG: 0.25 INJECTION INTRAVENOUS at 09:41

## 2023-10-02 RX ADMIN — CISPLATIN 90 MG: 1 INJECTION, SOLUTION INTRAVENOUS at 10:45

## 2023-10-02 RX ADMIN — DEXAMETHASONE SODIUM PHOSPHATE: 10 INJECTION, SOLUTION INTRAMUSCULAR; INTRAVENOUS at 09:43

## 2023-10-02 RX ADMIN — MAGNESIUM SULFATE HEPTAHYDRATE: 500 INJECTION, SOLUTION INTRAMUSCULAR; INTRAVENOUS at 10:08

## 2023-10-02 ASSESSMENT — PAIN SCALES - GENERAL: PAINLEVEL: NO PAIN (0)

## 2023-10-02 NOTE — TELEPHONE ENCOUNTER
Nutrition Services - Tube Feeding Instruction via Grenadian :    Met with patient during his infusion today in [place] [address] with his cousin to provide instruction on increasing his tube feeding rate.  Writer had Grenadian  via phone.     Thierno tells me that he has been getting 4 cartons of tube feeding each day for the past several days.     He has his rate set at 40m/hr x ~23 hours.    He expresses his desire to increase his feeding rate to help get to his goal volume of tube feedings as well as reduce the duration of his feedings from 23 hours, around the clock.      Writer provided the following written instructions:  Reviewed goal of 7 cartons/day to meet 100% estimated nutrition needs.  Discussed increasing rate by 20mL every day until goal rate of 100-120 is reaced.   Discussed signs and symptoms of intolerance ie gas, bloating, diarrhea, nausea, vomiting etc.  If noted s/sx of intolerance, decrease by 10mL, and increase towards goal as tolerated.   Today: increase to 60mL/hr x 24 hours  Tuesday 10/3: increase rate to 80mL/hr x 20 hours  Wednesday 10/4: increase rate to 100mL/hr x 17 hours  Thursday 10/5: increase rate to 120mL/hr x 14 hours (max rate with ND tube)    Noted patient will be getting his PEG tube on 10/23.    Unsure why he received an ND tube versus NG tube as the G tube was ordered by MD.     RD will follow-up in one day.     Nae Hicks RD, , LD  [facility]  [phone]

## 2023-10-02 NOTE — PROGRESS NOTES
Infusion Nursing Note:  Thierno Vega presents today for C1D36 Cisplatin.    Patient seen by provider today: Yes: Janis Cordova NP   present during visit today: Not Applicable.    Note: No complaints.      Intravenous Access:  Peripheral IV placed.    Treatment Conditions:  Lab Results   Component Value Date    HGB 10.2 (L) 10/02/2023    WBC 3.7 (L) 10/02/2023    ANEUTAUTO 2.5 10/02/2023     10/02/2023        Lab Results   Component Value Date     10/02/2023    POTASSIUM 4.1 10/02/2023    MAG 1.8 10/02/2023    CR 1.12 10/02/2023    SOTO 9.5 10/02/2023    BILITOTAL 0.2 10/02/2023    ALBUMIN 4.4 10/02/2023    ALT 13 10/02/2023    AST 9 10/02/2023       Results reviewed, labs MET treatment parameters, ok to proceed with treatment.      Post Infusion Assessment:  Patient tolerated infusion without incident.  Blood return noted pre and post infusion.  Site patent and intact, free from redness, edema or discomfort.  No evidence of extravasations.  Access discontinued per protocol.       Discharge Plan:   Prescription refills given for Gabapentin.  Discharge instructions reviewed with: Patient and Family.  Patient and/or family verbalized understanding of discharge instructions and all questions answered.  AVS to patient via [employer].  Patient will return 10/4/23 for next appointment.   Patient discharged in stable condition accompanied by: self.  Departure Mode: Ambulatory.      PITA CARLOS RN

## 2023-10-02 NOTE — NURSING NOTE
Chief Complaint   Patient presents with    Oncology Clinic Visit     Nasopharyngeal cancer    Blood Draw     Labs drawn with PIV start by RN. Pt tolerated well. Patient checked into next appointment. Vitals to be drawn in clinic.       Goldie Lovett RN

## 2023-10-02 NOTE — NURSING NOTE
"Oncology Rooming Note    October 2, 2023 8:47 AM   Thierno Vega is a 38 year old male who presents for:    Chief Complaint   Patient presents with    Oncology Clinic Visit     Nasopharyngeal cancer    Blood Draw     Labs drawn with PIV start by RN. Pt tolerated well. Patient checked into next appointment. Vitals to be drawn in clinic.       Initial Vitals: /67 (BP Location: Right arm, Cuff Size: Adult Regular)   Pulse 78   Temp 98.9  F (37.2  C) (Oral)   Resp 16   Wt 95.7 kg (211 lb)   SpO2 99%   BMI 28.61 kg/m   Estimated body mass index is 28.61 kg/m  as calculated from the following:    Height as of 9/25/23: 1.829 m (6' 0.01\").    Weight as of this encounter: 95.7 kg (211 lb). Body surface area is 2.21 meters squared.  No Pain (0) Comment: Data Unavailable   No LMP for male patient.  Allergies reviewed: Yes  Medications reviewed: Yes    Medications: MEDICATION REFILLS NEEDED TODAY. Provider was NOT notified.  Pharmacy name entered into [email or web address]:    [id number] - [place], [place] - [address] AT [facility] OF [address] [place] [address] PHARMACY [facility] - [place], [place] - [address] [facility] [place], [place] - [address] [facility] [phone]    Clinical concerns: Refills needed: Gabapentin.   Janis  was NOT notified.      Rosalino Mcbride"

## 2023-10-02 NOTE — Clinical Note
10/2/2023         RE: Thierno Vega  [address]  [address]  [facility] [id number]        Dear Colleague,    Thank you for referring your patient, Thierno Vega, to the [facility]. Please see a copy of my visit note below.    Oncology Progress Note  Oct 2, 2023    Reason for Visit: seen in follow-up of nasopharyngeal cancer    Oncology HPI:   CANCER TYPE: SCC nasopharynx, GALLITO -jeanna  STAGE: cT2N2 vs N3 (III vs MARYSOL)  ECOG PS: 0       SUMMARY  4/30/23               US. Bilateral necrotic nodes  5/3/23                 FNA cervical adenopathy - undifferentiated carcinoma  5/4/23                 CT CAP. 7 mm pulmonary nodule, no metastases  5/5/23                 MRI bilateral cervical and retropharyngeal adenopathy     Came to US  6/12/23               US guided FNA L neck node in clinic (Dr. Fisher). Path: hypocellular with scant clusters of atypical squamous cells suspicious for malignancy, p40 +jeanna, GALLITO -jeanna  6/13/23               PET/CT. Intense uptake L Rosenmuller fossa with increased fullness (SUV 18.8). Skull base intact. Milder FDG uptake (SUV 6.1) by the prominent R Rosenmuller fossa, inflammatory vs malignant. Indeterminate bilateral intense palatine tonsillar uptake with fullness on CT, inflammatroy vs infectious vs tumor, discontinuous with the nasopharyngeal abnormality. Bilateral nodes, including level 2a, 2b, 3, 3/5, R 1.8 cm 2A, another level 2/3 nodes, FDG avid retropharyngeal nodes. 0.3 cm nodule on the R major fissure. 0.8 cm R inguinal node (SUV 3.9) with additional nonenlarged LNs with minimal uptake, suggestive of reactive adenopathy. Marked focal uptake along the R upper inner thigh with skin thickening (SUV 16.8), which may represent cutaneous infection/inflammation, recommend direct visualization  6/27/23   Induction chemotherapy with cisplatin gemcitabine x 2 cycles with SD  8/28/23:  concurrent chemoradiation with weekly cisplatin    Interval history:    Thierno is seen today in clinic prior to day 36 cisplatin. Visit was completed with assistance of a professional French .  ***      Current Outpatient Medications   Medication Sig Dispense Refill    amLODIPine 5 MG TABS 5 mg, valsartan 160 MG TABS 160 mg Take 5 mg by mouth daily      dexAMETHasone (DECADRON) 4 MG tablet Take 1 tablet (4 mg) by mouth 2 times daily (with meals) Take 4 mg bid x 3 days Start on Day 2 after chemotherapy. (Patient not taking: Reported on 9/18/2023) 6 tablet 2    famotidine (PEPCID) 20 MG tablet Take 1 tablet (20 mg) by mouth 2 times daily 60 tablet 1    gabapentin (NEURONTIN) 300 MG capsule Take 3 capsules (900 mg) by mouth 3 times daily Taper dose up to 900 mg po tid per instructions given in Radiation Oncology clinic 270 capsule 4    magic mouthwash (ENTER INGREDIENTS IN COMMENTS) suspension Take 10 mLs by mouth every 4 hours as needed 240 mL 0    ondansetron (ZOFRAN) 8 MG tablet Take 1 tablet (8 mg) by mouth every 8 hours as needed for nausea (vomiting) 30 tablet 5    prochlorperazine (COMPAZINE) 10 MG tablet Take 1 tablet (10 mg) by mouth every 6 hours as needed for nausea or vomiting 30 tablet 5        No Known Allergies      Exam:   There were no vitals taken for this visit.    Wt Readings from Last 4 Encounters:   09/26/23 98.9 kg (218 lb)   09/25/23 98.9 kg (218 lb 1.6 oz)   09/19/23 102.5 kg (226 lb)   09/18/23 102.6 kg (226 lb 4.8 oz)     Gen: NAD  Oropharynx is moist and without lesion. Mild erythema to posterior OP. L cervical LN decreasing in size with non distinct borders. Multiple aphthous ulcers to right lower lip mucosa.  Lungs: CTA, no wheezes  Heart: RRR, no m/g/r  Extremities: warm, no edema. Speech is clear. CN wnl. Gait/station wnl.   Skin: no rash on exposed skin     Labs:   Most Recent 3 CBC's:  Recent Labs   Lab Test 09/25/23  0726 09/23/23  1526 09/18/23  0811   WBC 3.7* 3.9* 4.2   HGB 10.5* 11.9* 11.8*   MCV 87 88 88    201 138*   ANEUTAUTO  2.6 2.8 3.1    Most Recent 3 BMP's:  Recent Labs   Lab Test 09/25/23  0726 09/23/23  1526 09/18/23  0811    137 137   POTASSIUM 4.0 4.2 4.0   CHLORIDE 100 98 102   CO2 21* 23 24   BUN 8.9 15.1 16.0   CR 1.01 1.15 1.14   ANIONGAP 15 16* 11   SOTO 9.2 9.5 9.6   * 81 113*   PROTTOTAL 6.8 7.5 7.2   ALBUMIN 4.3 4.8 4.5    Most Recent 2 LFT's:  Recent Labs   Lab Test 09/25/23  0726 09/23/23  1526   AST 15 17   ALT 17 17   ALKPHOS 51 56   BILITOTAL 0.4 0.5    Most Recent TSH and T4:  Recent Labs   Lab Test 09/23/23  1526   TSH 0.24*     Phos/Mag:  Lab Results   Component Value Date    MAG 1.8 09/25/2023    MAG 2.1 09/18/2023    MAG 2.3 09/11/2023      I reviewed the above labs today.    Imaging: n/a    Impression/plan:   Nasopharyngeal carcinoma, cT2N2 (III):  with SD after 2 cycles of cisplatin/gemcitabine. Now on concurrent chemoradiation with weekly cisplatin. Intermittent tinnitus stable. Dysgeusia increasing and affecting appetite, leading to modest weight loss. Labs today reviewed, acceptable to proceed with day 29 cisplatin   -will continue weekly ALON follow-up through chemoradiation, sees larisa next week for day 36    Dysgeusia, anorexia, weight loss  Weight down this week, nearly 10 lbs. Again reviewed ways to increase caloric intake to liquid or soft meals. Will request IVF later this week given recent ED visit. Defer discussion of NG feeding tube to Dr. Parada tomorrow, will message her    CASSIUS  Mild overall. Continue PRN Zofran    Aphthous ulcers  Continue salt and soda/Magic Mouthwash PRN.     Hearing changes/tinnitus: Intermittent tinnitus on L, no change this week. Lasting for a few seconds per episode. Had normal hearing test prior to starting. Continue to monitor.     *** minutes spent on the date of the encounter doing {2021 E&M time in:425740}     Larisa Cordova, CNP            Again, thank you for allowing me to participate in the care of your patient.        Sincerely,        Larisa Cordova  CNP

## 2023-10-03 ENCOUNTER — ALLIED HEALTH/NURSE VISIT (OUTPATIENT)
Dept: RADIATION ONCOLOGY | Facility: CLINIC | Age: 38
End: 2023-10-03
Attending: DIETITIAN, REGISTERED
Payer: COMMERCIAL

## 2023-10-03 ENCOUNTER — OFFICE VISIT (OUTPATIENT)
Dept: RADIATION ONCOLOGY | Facility: CLINIC | Age: 38
End: 2023-10-03
Attending: RADIOLOGY
Payer: COMMERCIAL

## 2023-10-03 VITALS
WEIGHT: 213 LBS | SYSTOLIC BLOOD PRESSURE: 115 MMHG | BODY MASS INDEX: 28.88 KG/M2 | DIASTOLIC BLOOD PRESSURE: 77 MMHG | HEART RATE: 82 BPM

## 2023-10-03 DIAGNOSIS — C11.9 NASOPHARYNGEAL CANCER (H): Primary | ICD-10-CM

## 2023-10-03 PROCEDURE — 97803 MED NUTRITION INDIV SUBSEQ: CPT | Performed by: DIETITIAN, REGISTERED

## 2023-10-03 PROCEDURE — 77386 HC IMRT TREATMENT DELIVERY, COMPLEX: CPT | Performed by: RADIOLOGY

## 2023-10-03 NOTE — LETTER
10/3/2023         RE: Thierno Vega  [address]  [address]  [place]. \[facility].\"" [id number]        Dear Colleague,    Thank you for referring your patient, Thierno Vega, to the [facility] RADIATION ONCOLOGY. Please see a copy of my visit note below.    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 Vega presents to clinic today for his weekly on-treatment visit.  He is experiencing more fatigue and reports sleeping 10 to 12 hours/day.  He is having a much more difficult time with eating and drinking cevimeline nasal jejunostomy tube was recently placed.  He has been on continuous feedings at 60 mils/hour since late last week.  He is swallowing water.  He has such significant dysgeusia that he is not interested in eating.  He is not having any pain with swallowing.    Nursing ROS:   Nutrition Alteration  Diet Type: NG Tube Feedings  Nutrition Note: Pump/6 cans  Skin  Skin Reaction: 1 - Faint erythema or dry desquamation  Skin Progress: Aquaphor     ENT and Mouth Exam  Mucositis - Current: 0 - None   ENT/Mouth Note: 8 S&S rinses     Gastrointestinal  Nausea: 0 - None      Pain Assessment  0-10 Pain Scale: 2  Pain Note: Gabapentin     PEG Tube: Gastrojejunostomy tube placed on 2023  Electronic Cardiac Implant: No    Objective:   /77   Pulse 82   Wt 96.6 kg (213 lb)   BMI 28.88 kg/m  (initial weight 107.96 kg (238 pounds))  Gen: Appears well, NAD  HEENT: Minimal mucositis, no thrush  Skin: Moderate erythema and hyperpigmentation without  desquamation  Neck: Left level 5 lymph node is no longer palpable.    Laboratory:  Lab Results   Component Value Date    WBC 3.7 (L) 10/02/2023    HGB 10.2 (L) 10/02/2023    HCT 29.7 (L) 10/02/2023    MCV 87 10/02/2023     10/02/2023     Lab Results   Component Value Date     10/02/2023    POTASSIUM 4.1 10/02/2023    CHLORIDE 103 10/02/2023    CO2 25 10/02/2023     (H) 10/02/2023     Magnesium   Date Value Ref Range Status   10/02/2023 1.8 1.7 - 2.3 mg/dL Final       Treatment-related toxicities (CTCAE v5.0):  Anorexia: Grade 3: Associated with significant weight loss or malnutrition; tube feeding or TPN indicated  Fatigue: Grade 2: Fatigue not relieved by rest; limiting instrumental ADL  Nausea: Grade 0: No toxicity  Pain: Grade 1: Mild pain  Dry mouth: Grade 1: Symptomatic without significant dietary alteration; unstimulated saliva flow >0.2 mL/min  Dysphagia: Grade 2: Symptomatic and altered eating/swallowing  Mucositis: Grade 2: Moderate pain; not interfering with oral intake; modified diet indicated  Dermatitis: Grade 2: Moderate to brisk erythema; patchy moist desquamation, mostly confined to skin folds and creases; moderate erythema    ED visits/Hospitalizations: None    Missed Treatments: None    Mosaiq chart and setup information reviewed  IGRT images reviewed    Medication Review  Med list reviewed with patient?: Yes  Med list printed and given: Yes    Assessment:    Mr. 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  He has received neoadjuvant chemotherapy and is now undergoing concurrent chemoradiation.  He now has a feeding tube and is receiving continuous feedings.    Plan:   1.  Continue treatment as planned  2.  He is now scheduled to have a percutaneous gastrostomy tube well after he completes his course of radiation.  We will cancel this procedure and continue with the jejunostomy  dago.      Yessica Parada  Department of Radiation Oncology  [facility]

## 2023-10-03 NOTE — PROGRESS NOTES
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 Vega presents to clinic today for his weekly on-treatment visit.  He is experiencing more fatigue and reports sleeping 10 to 12 hours/day.  He is having a much more difficult time with eating and drinking cevimeline nasal jejunostomy tube was recently placed.  He has been on continuous feedings at 60 mils/hour since late last week.  He is swallowing water.  He has such significant dysgeusia that he is not interested in eating.  He is not having any pain with swallowing.    Nursing ROS:   Nutrition Alteration  Diet Type: NG Tube Feedings  Nutrition Note: Pump/6 cans  Skin  Skin Reaction: 1 - Faint erythema or dry desquamation  Skin Progress: Aquaphor     ENT and Mouth Exam  Mucositis - Current: 0 - None   ENT/Mouth Note: 8 S&S rinses     Gastrointestinal  Nausea: 0 - None      Pain Assessment  0-10 Pain Scale: 2  Pain Note: Gabapentin     PEG Tube: Gastrojejunostomy tube placed on 2023  Electronic Cardiac Implant: No    Objective:   /77   Pulse 82   Wt 96.6 kg (213 lb)   BMI 28.88 kg/m  (initial weight 107.96 kg (238 pounds))  Gen: Appears well, NAD  HEENT: Minimal mucositis, no thrush  Skin: Moderate erythema and hyperpigmentation without desquamation  Neck: Left level 5 lymph node is no longer palpable.    Laboratory:  Lab Results   Component Value Date    WBC 3.7 (L) 10/02/2023    HGB 10.2 (L) 10/02/2023    HCT 29.7 (L) 10/02/2023    MCV 87 10/02/2023     10/02/2023     Lab Results   Component  Value Date     10/02/2023    POTASSIUM 4.1 10/02/2023    CHLORIDE 103 10/02/2023    CO2 25 10/02/2023     (H) 10/02/2023     Magnesium   Date Value Ref Range Status   10/02/2023 1.8 1.7 - 2.3 mg/dL Final       Treatment-related toxicities (CTCAE v5.0):  Anorexia: Grade 3: Associated with significant weight loss or malnutrition; tube feeding or TPN indicated  Fatigue: Grade 2: Fatigue not relieved by rest; limiting instrumental ADL  Nausea: Grade 0: No toxicity  Pain: Grade 1: Mild pain  Dry mouth: Grade 1: Symptomatic without significant dietary alteration; unstimulated saliva flow >0.2 mL/min  Dysphagia: Grade 2: Symptomatic and altered eating/swallowing  Mucositis: Grade 2: Moderate pain; not interfering with oral intake; modified diet indicated  Dermatitis: Grade 2: Moderate to brisk erythema; patchy moist desquamation, mostly confined to skin folds and creases; moderate erythema    ED visits/Hospitalizations: None    Missed Treatments: None    Mosaiq chart and setup information reviewed  IGRT images reviewed    Medication Review  Med list reviewed with patient?: Yes  Med list printed and given: Yes    Assessment:    Mr. 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  He has received neoadjuvant chemotherapy and is now undergoing concurrent chemoradiation.  He now has a feeding tube and is receiving continuous feedings.    Plan:   1.  Continue treatment as planned  2.  He is now scheduled to have a percutaneous gastrostomy tube well after he completes his course of radiation.  We will cancel this procedure and continue with the jejunostomy tube.      Yessica Parada  Department of Radiation Oncology  [facility]

## 2023-10-03 NOTE — PROGRESS NOTES
CLINICAL NUTRITION SERVICES - REASSESSMENT NOTE   EVALUATION OF PREVIOUS PLAN OF CARE:   Time Spent: 15 minutes  Visit Type: follow-up seen in radiation clinic  Pt accompanied by: self   Referring Physician: Tal  C11.9 (ICD-10-CM) - Nasopharyngeal cancer (H)  Previous RD visit 8/31, 9/19, 9/26, 10/2, 10/3     Chemotherapy: Cisplatin - started 8/28  Radiation: Started 8/28  PEG tube: No; ND tube scheduled for 9/28        RECOMMENDATIONS ORDERED BY WRITER:   Enteral Nutrition   Formula: Isosource 1.5 vickie  Volume: 7 cartons/day (1750mL, 1330ml free water)  Cycle rate: 60-100mL/hr x 17-24 hours daily  Provisions:  2625kcal (30kcal/kg), 119g protein (1.3g/kg), 308g CHO  Monitoring from previous assessment:   -Food/Fluid intake - Thierno has not been taking any food by mouth at this time. His is drinking by mouth, taking 1 1/2 to 2 L of water/day.   -EN intake/tolerance - Thierno has been tolerating his EN formula thus far.  He continues to strive for his goal of 7 cartons/day as he transitions his feedings cycle rate upward.  He is currently at 6 cartons/day, 60mL/hr.  He plans to increase his feeding rate to 80mL/hr today.   -Weight trends - down 23 lbs, ~10 % x past one month  Wt Readings from Last 10 Encounters:   10/02/23 95.7 kg (211 lb)   09/26/23 98.9 kg (218 lb)   09/25/23 98.9 kg (218 lb 1.6 oz)   09/19/23 102.5 kg (226 lb)   09/18/23 102.6 kg (226 lb 4.8 oz)   09/12/23 105.2 kg (232 lb)   09/11/23 105.6 kg (232 lb 12.8 oz)   09/06/23 108 kg (238 lb 1.6 oz)   09/05/23 108 kg (238 lb)   08/29/23 107 kg (236 lb)     Previous Goals:   EN via NG tube to provide 100% estimated nutrition needs  Aim for at least 8 cups water/electrolytes via NG and PO as able   Evaluation: Not met   Previous Nutrition Diagnosis:   Inadequate oral intake related to decreased ability to consume sufficient energy due to side effects of cancer therapy as evidenced by 18 lb wt loss x past 3 weeks,  dietary intake 25% estimated needs.     Evaluation: No change   NEW FINDINGS:   EN via ND tube   10/2 Mag and Potassium wnl  CURRENT NUTRITION DIAGNOSIS   Inadequate oral intake related to dysphagia/odynophagia as evidenced by pt dependent upon EN via ND tube to meet >75% estimated nutrition needs, PO intake <25% estimated nutrition needs.    INTERVENTIONS   Recommendations / Nutrition Prescription   Today 10/3: increase rate to 80mL/hr x 20 hours  Wednesday 10/4: increase rate to 100mL/hr x 17 hours  Thursday 10/5: increase rate to 120mL/hr x 14 hours (max rate with ND tube)  Implementation  EN Composition, EN Schedule, and Feeding Tube Flush  -  Reviewed goal of 7 cartons/day to meet 100% estimated nutrition needs. Discussed increasing rate by 20mL every day until goal rate of 100-120 is reached.  Reviewed signs and symptoms of intolerance ie gas, bloating, diarrhea, nausea, vomiting etc. If noted s/sx of intolerance, decrease by 10mL, and increase towards goal as tolerated.   Encouraged PO intake of soft foods and ONS to keep swallow function going.  Discussed potential timeline of having his feeding tube and potential G tube.  Encouraged to start taking PO to prevent having to get PEG tube placed.  [place] and is hoping to be eating sole PO at that time.   Goals   EN via ND tube to meet 100% estimated nutrition needs (~7 cartons/day)  Weight stability, >210 lbs    Follow up/Monitoring: one week follow up, 10/10  Fluid/beverage intake  Enteral Nutrition intake  Weight trends   Biochemical data    Nae Hicks RD, , LD  [facility]  [phone]

## 2023-10-04 ENCOUNTER — INFUSION THERAPY VISIT (OUTPATIENT)
Dept: ONCOLOGY | Facility: CLINIC | Age: 38
End: 2023-10-04
Attending: INTERNAL MEDICINE
Payer: COMMERCIAL

## 2023-10-04 ENCOUNTER — APPOINTMENT (OUTPATIENT)
Dept: RADIATION ONCOLOGY | Facility: CLINIC | Age: 38
End: 2023-10-04
Attending: RADIOLOGY
Payer: COMMERCIAL

## 2023-10-04 VITALS
TEMPERATURE: 99.5 F | DIASTOLIC BLOOD PRESSURE: 71 MMHG | HEART RATE: 69 BPM | SYSTOLIC BLOOD PRESSURE: 107 MMHG | OXYGEN SATURATION: 100 % | RESPIRATION RATE: 16 BRPM

## 2023-10-04 DIAGNOSIS — C11.9 NASOPHARYNGEAL CANCER (H): Primary | ICD-10-CM

## 2023-10-04 PROCEDURE — 96361 HYDRATE IV INFUSION ADD-ON: CPT

## 2023-10-04 PROCEDURE — 96360 HYDRATION IV INFUSION INIT: CPT

## 2023-10-04 PROCEDURE — 77386 HC IMRT TREATMENT DELIVERY, COMPLEX: CPT | Performed by: RADIOLOGY

## 2023-10-04 PROCEDURE — 258N000003 HC RX IP 258 OP 636: Performed by: NURSE PRACTITIONER

## 2023-10-04 RX ORDER — MEPERIDINE HYDROCHLORIDE 25 MG/ML
25 INJECTION INTRAMUSCULAR; INTRAVENOUS; SUBCUTANEOUS EVERY 30 MIN PRN
Status: CANCELLED | OUTPATIENT
Start: 2023-10-04

## 2023-10-04 RX ORDER — EPINEPHRINE 1 MG/ML
0.3 INJECTION, SOLUTION INTRAMUSCULAR; SUBCUTANEOUS EVERY 5 MIN PRN
Status: CANCELLED | OUTPATIENT
Start: 2023-10-04

## 2023-10-04 RX ORDER — HEPARIN SODIUM,PORCINE 10 UNIT/ML
5-20 VIAL (ML) INTRAVENOUS DAILY PRN
Status: CANCELLED | OUTPATIENT
Start: 2023-10-04

## 2023-10-04 RX ORDER — ALBUTEROL SULFATE 90 UG/1
1-2 AEROSOL, METERED RESPIRATORY (INHALATION)
Status: CANCELLED
Start: 2023-10-04

## 2023-10-04 RX ORDER — METHYLPREDNISOLONE SODIUM SUCCINATE 125 MG/2ML
125 INJECTION, POWDER, LYOPHILIZED, FOR SOLUTION INTRAMUSCULAR; INTRAVENOUS
Status: CANCELLED
Start: 2023-10-04

## 2023-10-04 RX ORDER — DIPHENHYDRAMINE HYDROCHLORIDE 50 MG/ML
50 INJECTION INTRAMUSCULAR; INTRAVENOUS
Status: CANCELLED
Start: 2023-10-04

## 2023-10-04 RX ORDER — ALBUTEROL SULFATE 0.83 MG/ML
2.5 SOLUTION RESPIRATORY (INHALATION)
Status: CANCELLED | OUTPATIENT
Start: 2023-10-04

## 2023-10-04 RX ORDER — HEPARIN SODIUM (PORCINE) LOCK FLUSH IV SOLN 100 UNIT/ML 100 UNIT/ML
5 SOLUTION INTRAVENOUS
Status: CANCELLED | OUTPATIENT
Start: 2023-10-04

## 2023-10-04 RX ADMIN — SODIUM CHLORIDE 2000 ML: 9 INJECTION, SOLUTION INTRAVENOUS at 13:19

## 2023-10-04 ASSESSMENT — PAIN SCALES - GENERAL: PAINLEVEL: MILD PAIN (3)

## 2023-10-04 NOTE — PROGRESS NOTES
Infusion Nursing Note:  Thierno Vega presents today for 2L IVF.    Patient seen by provider today: No   present during visit today: Yes, [place]ayan via telephone.    Note:   Patient reports feeling tired today. He reports 3/10 headache, and throat discomfort when swallowing, he declines interventions. He denies fevers, chills, SOB, chest pain, nausea, and diarrhea.    Intravenous Access:  Peripheral IV placed by vascular access.    Treatment Conditions:  Not Applicable.      Post Infusion Assessment:  Patient tolerated infusion without incident.  Site patent and intact, free from redness, edema or discomfort.  No evidence of extravasations.  Access discontinued per protocol.       Discharge Plan:   Patient declined prescription refills.  Discharge instructions reviewed with: Patient.  Patient and/or family verbalized understanding of discharge instructions and all questions answered.  AVS to patient via [employer].  Patient will return 10/6 for next appointment.   Patient discharged in stable condition accompanied by: self.  Departure Mode: Ambulatory.      Liset Barksdale RN

## 2023-10-04 NOTE — PATIENT INSTRUCTIONS
[facility] Triage and after hours / weekends / holidays:  [phone]    Please call the triage or after hours line if you experience a temperature greater than or equal to 100.5, shaking chills, have uncontrolled nausea, vomiting and/or diarrhea, dizziness, shortness of breath, chest pain, bleeding, unexplained bruising, or if you have any other new/concerning symptoms, questions or concerns.      If you are having any concerning symptoms or wish to speak to a provider before your next infusion visit, please call your care coordinator or triage to notify them so we can adequately serve you.     If you need a refill on a narcotic prescription or other medication, please call before your infusion appointment.

## 2023-10-05 ENCOUNTER — APPOINTMENT (OUTPATIENT)
Dept: RADIATION ONCOLOGY | Facility: CLINIC | Age: 38
End: 2023-10-05
Attending: RADIOLOGY
Payer: COMMERCIAL

## 2023-10-05 PROCEDURE — 77386 HC IMRT TREATMENT DELIVERY, COMPLEX: CPT | Performed by: RADIOLOGY

## 2023-10-06 ENCOUNTER — APPOINTMENT (OUTPATIENT)
Dept: RADIATION ONCOLOGY | Facility: CLINIC | Age: 38
End: 2023-10-06
Attending: RADIOLOGY
Payer: COMMERCIAL

## 2023-10-06 ENCOUNTER — INFUSION THERAPY VISIT (OUTPATIENT)
Dept: ONCOLOGY | Facility: CLINIC | Age: 38
End: 2023-10-06
Attending: INTERNAL MEDICINE
Payer: COMMERCIAL

## 2023-10-06 VITALS
TEMPERATURE: 98.2 F | OXYGEN SATURATION: 100 % | RESPIRATION RATE: 18 BRPM | SYSTOLIC BLOOD PRESSURE: 110 MMHG | HEART RATE: 76 BPM | DIASTOLIC BLOOD PRESSURE: 73 MMHG

## 2023-10-06 DIAGNOSIS — C11.9 NASOPHARYNGEAL CANCER (H): Primary | ICD-10-CM

## 2023-10-06 DIAGNOSIS — E83.42 HYPOMAGNESEMIA: ICD-10-CM

## 2023-10-06 PROCEDURE — 258N000003 HC RX IP 258 OP 636: Performed by: NURSE PRACTITIONER

## 2023-10-06 PROCEDURE — 77386 HC IMRT TREATMENT DELIVERY, COMPLEX: CPT | Performed by: RADIOLOGY

## 2023-10-06 PROCEDURE — 96360 HYDRATION IV INFUSION INIT: CPT

## 2023-10-06 PROCEDURE — 96361 HYDRATE IV INFUSION ADD-ON: CPT

## 2023-10-06 RX ORDER — HEPARIN SODIUM,PORCINE 10 UNIT/ML
5-20 VIAL (ML) INTRAVENOUS DAILY PRN
Status: CANCELLED | OUTPATIENT
Start: 2023-10-06

## 2023-10-06 RX ORDER — ALBUTEROL SULFATE 0.83 MG/ML
2.5 SOLUTION RESPIRATORY (INHALATION)
Status: CANCELLED | OUTPATIENT
Start: 2023-10-06

## 2023-10-06 RX ORDER — HEPARIN SODIUM (PORCINE) LOCK FLUSH IV SOLN 100 UNIT/ML 100 UNIT/ML
5 SOLUTION INTRAVENOUS
Status: CANCELLED | OUTPATIENT
Start: 2023-10-06

## 2023-10-06 RX ORDER — ALBUTEROL SULFATE 90 UG/1
1-2 AEROSOL, METERED RESPIRATORY (INHALATION)
Status: CANCELLED
Start: 2023-10-06

## 2023-10-06 RX ORDER — MEPERIDINE HYDROCHLORIDE 25 MG/ML
25 INJECTION INTRAMUSCULAR; INTRAVENOUS; SUBCUTANEOUS EVERY 30 MIN PRN
Status: CANCELLED | OUTPATIENT
Start: 2023-10-06

## 2023-10-06 RX ORDER — METHYLPREDNISOLONE SODIUM SUCCINATE 125 MG/2ML
125 INJECTION, POWDER, LYOPHILIZED, FOR SOLUTION INTRAMUSCULAR; INTRAVENOUS
Status: CANCELLED
Start: 2023-10-06

## 2023-10-06 RX ORDER — EPINEPHRINE 1 MG/ML
0.3 INJECTION, SOLUTION INTRAMUSCULAR; SUBCUTANEOUS EVERY 5 MIN PRN
Status: CANCELLED | OUTPATIENT
Start: 2023-10-06

## 2023-10-06 RX ORDER — DIPHENHYDRAMINE HYDROCHLORIDE 50 MG/ML
50 INJECTION INTRAMUSCULAR; INTRAVENOUS
Status: CANCELLED
Start: 2023-10-06

## 2023-10-06 RX ADMIN — SODIUM CHLORIDE 2000 ML: 9 INJECTION, SOLUTION INTRAVENOUS at 13:57

## 2023-10-06 ASSESSMENT — PAIN SCALES - GENERAL: PAINLEVEL: NO PAIN (0)

## 2023-10-06 NOTE — PROGRESS NOTES
Infusion Nursing Note:  Thierno Vega presents today for IV fluids.    Patient seen by provider today: No   present during visit today: Yes, Language: Tajik via phone.     Note: Thierno presents today feeling fatigued, but otherwise well. Denies pain or nausea/vomiting. Continues on tube feeding, only PO intake is liquids. He reports drinking 1-1.5 L water daily, but feels the IV fluids are still helpful. Denies fevers/chills. Denies SOB, cough, chest pain, or dizziness/lightheadedness. Denies constipation/diarrhea. Denies urinary issues. Denies neuropathy. Offers no new concerns at this appointment.      Intravenous Access:  Peripheral IV placed by vascular access.    Treatment Conditions:  Not Applicable.      Post Infusion Assessment:  Patient tolerated infusion without incident.  Blood return noted pre and post infusion.  Site patent and intact, free from redness, edema or discomfort.  No evidence of extravasations.  Access discontinued per protocol.       Discharge Plan:   Patient declined prescription refills.  Discharge instructions reviewed with: Patient.  Patient and/or family verbalized understanding of discharge instructions and all questions answered.  AVS to patient via [employer].  Patient will return 10/09 for next appointment.   Patient discharged in stable condition accompanied by: self.  Departure Mode: Ambulatory.      Peg Tirado RN

## 2023-10-06 NOTE — PATIENT INSTRUCTIONS
[facility] Triage and after hours / weekends / holidays:  [phone]    Please call the triage or after hours line if you experience a temperature greater than or equal to 100.4, shaking chills, have uncontrolled nausea, vomiting and/or diarrhea, dizziness, shortness of breath, chest pain, bleeding, unexplained bruising, or if you have any other new/concerning symptoms, questions or concerns.      If you are having any concerning symptoms or wish to speak to a provider before your next infusion visit, please call your care coordinator or triage to notify them so we can adequately serve you.     If you need a refill on a narcotic prescription or other medication, please call before your infusion appointment.

## 2023-10-08 NOTE — PROGRESS NOTES
Oncology Progress Note  Oct 9, 2023    Reason for Visit: seen in follow-up of nasopharyngeal cancer    Oncology HPI:   CANCER TYPE: SCC nasopharynx, GALLITO -jeanna  STAGE: cT2N2 vs N3 (III vs MARYSOL)  ECOG PS: 0       SUMMARY  4/30/23               US. Bilateral necrotic nodes  5/3/23                 FNA cervical adenopathy - undifferentiated carcinoma  5/4/23                 CT CAP. 7 mm pulmonary nodule, no metastases  5/5/23                 MRI bilateral cervical and retropharyngeal adenopathy     Came to US  6/12/23               US guided FNA L neck node in clinic (Dr. Fisher). Path: hypocellular with scant clusters of atypical squamous cells suspicious for malignancy, p40 +jeanna, GALLITO -jeanna  6/13/23               PET/CT. Intense uptake L Rosenmuller fossa with increased fullness (SUV 18.8). Skull base intact. Milder FDG uptake (SUV 6.1) by the prominent R Rosenmuller fossa, inflammatory vs malignant. Indeterminate bilateral intense palatine tonsillar uptake with fullness on CT, inflammatroy vs infectious vs tumor, discontinuous with the nasopharyngeal abnormality. Bilateral nodes, including level 2a, 2b, 3, 3/5, R 1.8 cm 2A, another level 2/3 nodes, FDG avid retropharyngeal nodes. 0.3 cm nodule on the R major fissure. 0.8 cm R inguinal node (SUV 3.9) with additional nonenlarged LNs with minimal uptake, suggestive of reactive adenopathy. Marked focal uptake along the R upper inner thigh with skin thickening (SUV 16.8), which may represent cutaneous infection/inflammation, recommend direct visualization  6/27/23   Induction chemotherapy with cisplatin gemcitabine x 2 cycles with SD  8/28/23:  concurrent chemoradiation with weekly cisplatin    Interval history:   Thierno is seen for close follow-up today with IVF. Now completed chemotherapy. Continues to receive radiation. Visit was completed with assistance of a professional Hebrew .   -Taking in 7 cartons in a 24 hour period. No issues with NG feedings.  -No  "nausea  -Throat pain increased. Using Magic Mouthwash but is discouraged it only lasts about 30 minutes. Still able to drink fluids but not swallowing any food. Taking pills okay. Feels IVF are helpful but make him feel fatigued.  -Experiencing intermittent ear ringing and right headache. Taking Tylenol 1-2 times per day. Is out of pills.      Current Outpatient Medications   Medication Sig Dispense Refill    acetaminophen (TYLENOL) 500 MG tablet Take 2 tablets (1,000 mg) by mouth every 8 hours as needed for mild pain 60 tablet 1    amLODIPine 5 MG TABS 5 mg, valsartan 160 MG TABS 160 mg Take 5 mg by mouth daily      famotidine (PEPCID) 20 MG tablet Take 1 tablet (20 mg) by mouth 2 times daily 60 tablet 1    gabapentin (NEURONTIN) 300 MG capsule Take 3 capsules (900 mg) by mouth 3 times daily Taper dose up to 900 mg po tid per instructions given in Radiation Oncology clinic 270 capsule 4    magic mouthwash (ENTER INGREDIENTS IN COMMENTS) suspension Take 10 mLs by mouth every 4 hours as needed 240 mL 0    dexAMETHasone (DECADRON) 4 MG tablet Take 1 tablet (4 mg) by mouth 2 times daily (with meals) Take 4 mg bid x 3 days Start on Day 2 after chemotherapy. (Patient not taking: Reported on 9/18/2023) 6 tablet 2    ondansetron (ZOFRAN) 8 MG tablet Take 1 tablet (8 mg) by mouth every 8 hours as needed for nausea (vomiting) (Patient not taking: Reported on 10/9/2023) 30 tablet 5    prochlorperazine (COMPAZINE) 10 MG tablet Take 1 tablet (10 mg) by mouth every 6 hours as needed for nausea or vomiting (Patient not taking: Reported on 10/9/2023) 30 tablet 5        No Known Allergies      Exam:   Blood pressure 116/73, pulse 77, temperature 99.2  F (37.3  C), temperature source Oral, resp. rate 18, height 1.829 m (6' 0.01\"), weight 96.6 kg (212 lb 14.4 oz), SpO2 100 %.    Wt Readings from Last 4 Encounters:   10/09/23 96.6 kg (212 lb 14.4 oz)   10/03/23 96.6 kg (213 lb)   10/02/23 95.7 kg (211 lb)   09/26/23 98.9 kg (218 lb) "     Gen: NAD  Oropharynx: moist and without lesion. Mild erythema to posterior OP. L cervical LN decreasing in size with non distinct borders. NG feeding tube in place.   Lungs: CTA, no wheezes  Heart: RRR, no m/g/r  Extremities: Warm, no edema. Speech is clear. CN wnl. Gait/station wnl.   Skin: Radiation dermatitis to neck with scattered areas of desquamation.    Labs:   Most Recent 3 CBC's:  Recent Labs   Lab Test 10/09/23  0801 10/02/23  0830 09/25/23  0726   WBC 4.2 3.7* 3.7*   HGB 9.6* 10.2* 10.5*   MCV 87 87 87   * 208 202   ANEUTAUTO 3.1 2.5 2.6    Most Recent 3 BMP's:  Recent Labs   Lab Test 10/09/23  0801 10/02/23  0830 09/25/23  0726    139 136   POTASSIUM 4.7 4.1 4.0   CHLORIDE 99 103 100   CO2 26 25 21*   BUN 25.4* 9.7 8.9   CR 1.21* 1.12 1.01   ANIONGAP 12 11 15   SOTO 9.1 9.5 9.2   * 108* 107*   PROTTOTAL 7.2 7.1 6.8   ALBUMIN 4.3 4.4 4.3    Most Recent 2 LFT's:  Recent Labs   Lab Test 10/09/23  0801 10/02/23  0830   AST 11 9   ALT 15 13   ALKPHOS 64 57   BILITOTAL 0.3 0.2    Most Recent TSH and T4:  Recent Labs   Lab Test 09/23/23  1526   TSH 0.24*     Phos/Mag:  Lab Results   Component Value Date    MAG 1.7 10/09/2023    MAG 1.8 10/02/2023    MAG 1.8 09/25/2023      I reviewed the above labs today.    Imaging: n/a    Impression/plan:   Nasopharyngeal carcinoma, cT2N2 (III):  with SD after 2 cycles of cisplatin/gemcitabine. Receiving concurrent chemoradiation with weekly cisplatin. Chemo now complete.Continues to experience intermittent tinnitus, dysgeusia, headaches. NG placed for nutrition. Labs today reveal decreased hgb and plt consistent with chemo effect. WBC okay. Mild SMILEY with creatinine 1.21. Will proceed with IVF M/W/F this week. Can determine on Thursday if further hydration is needed as he is able to drink fluids. Repeat labs on Friday with IVF. Scheduled to see Dr. Clark later this week as well.   -RTC 10/12 with Dr. Clark  -IVF M/W/F this week. Recheck labs  10/13.  -Reviewed follow-up after completing chemoradiation, typically would have ALON follow-up as needed for 1-2 weeks, either with med/onc or rad/onc. Would also typically see ENT/radiation around week 6 and post-treatment PET/CT week 12. Previously discussed last week. He plans to adhere to this follow-up plan.    SMILEY   Creat 1.21 today 2/2 creatinine. IVF with 1L NS as above. Recheck Friday.     Dysgeusia, anorexia, weight loss  Weight stable this week. NG feeding tube now in place. Following with RD for nutrition/formula guidance.  Follow-up with Nae scheduled 10/10.     CASSIUS  Resolved. Zofran PRN.    Aphthous ulcers  Resolved    Hearing changes/tinnitus: Intermittent tinnitus bilaterally. Had normal hearing test prior to starting.     40 minutes spent on the date of the encounter doing chart review, review of test results, interpretation of tests, patient visit, and documentation     Janis Cordova, CNP

## 2023-10-09 ENCOUNTER — INFUSION THERAPY VISIT (OUTPATIENT)
Dept: ONCOLOGY | Facility: CLINIC | Age: 38
End: 2023-10-09
Attending: REGISTERED NURSE
Payer: COMMERCIAL

## 2023-10-09 ENCOUNTER — LAB (OUTPATIENT)
Dept: LAB | Facility: CLINIC | Age: 38
End: 2023-10-09
Attending: REGISTERED NURSE
Payer: COMMERCIAL

## 2023-10-09 ENCOUNTER — APPOINTMENT (OUTPATIENT)
Dept: RADIATION ONCOLOGY | Facility: CLINIC | Age: 38
End: 2023-10-09
Attending: RADIOLOGY
Payer: COMMERCIAL

## 2023-10-09 VITALS
HEIGHT: 72 IN | HEART RATE: 77 BPM | TEMPERATURE: 99.2 F | SYSTOLIC BLOOD PRESSURE: 116 MMHG | WEIGHT: 212.9 LBS | RESPIRATION RATE: 18 BRPM | BODY MASS INDEX: 28.84 KG/M2 | OXYGEN SATURATION: 100 % | DIASTOLIC BLOOD PRESSURE: 73 MMHG

## 2023-10-09 DIAGNOSIS — N17.9 AKI (ACUTE KIDNEY INJURY) (H): ICD-10-CM

## 2023-10-09 DIAGNOSIS — E83.42 HYPOMAGNESEMIA: ICD-10-CM

## 2023-10-09 DIAGNOSIS — Z91.89 AT RISK FOR MALNUTRITION: ICD-10-CM

## 2023-10-09 DIAGNOSIS — C11.9 NASOPHARYNGEAL CANCER (H): Primary | ICD-10-CM

## 2023-10-09 DIAGNOSIS — H93.12 TINNITUS, LEFT: ICD-10-CM

## 2023-10-09 DIAGNOSIS — K12.30 MUCOSITIS: ICD-10-CM

## 2023-10-09 DIAGNOSIS — R43.2 DYSGEUSIA: ICD-10-CM

## 2023-10-09 DIAGNOSIS — R13.10 ODYNOPHAGIA: ICD-10-CM

## 2023-10-09 LAB
ALBUMIN SERPL BCG-MCNC: 4.3 G/DL (ref 3.5–5.2)
ALP SERPL-CCNC: 64 U/L (ref 40–129)
ALT SERPL W P-5'-P-CCNC: 15 U/L (ref 0–70)
ANION GAP SERPL CALCULATED.3IONS-SCNC: 12 MMOL/L (ref 7–15)
AST SERPL W P-5'-P-CCNC: 11 U/L (ref 0–45)
BASO+EOS+MONOS # BLD AUTO: ABNORMAL 10*3/UL
BASO+EOS+MONOS NFR BLD AUTO: ABNORMAL %
BASOPHILS # BLD AUTO: 0 10E3/UL (ref 0–0.2)
BASOPHILS NFR BLD AUTO: 1 %
BILIRUB SERPL-MCNC: 0.3 MG/DL
BUN SERPL-MCNC: 25.4 MG/DL (ref 6–20)
CALCIUM SERPL-MCNC: 9.1 MG/DL (ref 8.6–10)
CHLORIDE SERPL-SCNC: 99 MMOL/L (ref 98–107)
CREAT SERPL-MCNC: 1.21 MG/DL (ref 0.67–1.17)
DEPRECATED HCO3 PLAS-SCNC: 26 MMOL/L (ref 22–29)
EGFRCR SERPLBLD CKD-EPI 2021: 80 ML/MIN/1.73M2
EOSINOPHIL # BLD AUTO: 0 10E3/UL (ref 0–0.7)
EOSINOPHIL NFR BLD AUTO: 1 %
ERYTHROCYTE [DISTWIDTH] IN BLOOD BY AUTOMATED COUNT: 12.9 % (ref 10–15)
GLUCOSE SERPL-MCNC: 122 MG/DL (ref 70–99)
HCT VFR BLD AUTO: 27.4 % (ref 40–53)
HGB BLD-MCNC: 9.6 G/DL (ref 13.3–17.7)
IMM GRANULOCYTES # BLD: 0 10E3/UL
IMM GRANULOCYTES NFR BLD: 0 %
LYMPHOCYTES # BLD AUTO: 0.3 10E3/UL (ref 0.8–5.3)
LYMPHOCYTES NFR BLD AUTO: 8 %
MAGNESIUM SERPL-MCNC: 1.7 MG/DL (ref 1.7–2.3)
MCH RBC QN AUTO: 30.5 PG (ref 26.5–33)
MCHC RBC AUTO-ENTMCNC: 35 G/DL (ref 31.5–36.5)
MCV RBC AUTO: 87 FL (ref 78–100)
MONOCYTES # BLD AUTO: 0.6 10E3/UL (ref 0–1.3)
MONOCYTES NFR BLD AUTO: 15 %
NEUTROPHILS # BLD AUTO: 3.1 10E3/UL (ref 1.6–8.3)
NEUTROPHILS NFR BLD AUTO: 75 %
NRBC # BLD AUTO: 0 10E3/UL
NRBC BLD AUTO-RTO: 0 /100
PLATELET # BLD AUTO: 129 10E3/UL (ref 150–450)
POTASSIUM SERPL-SCNC: 4.7 MMOL/L (ref 3.4–5.3)
PROT SERPL-MCNC: 7.2 G/DL (ref 6.4–8.3)
RBC # BLD AUTO: 3.15 10E6/UL (ref 4.4–5.9)
SODIUM SERPL-SCNC: 137 MMOL/L (ref 135–145)
WBC # BLD AUTO: 4.2 10E3/UL (ref 4–11)

## 2023-10-09 PROCEDURE — 77336 RADIATION PHYSICS CONSULT: CPT | Performed by: RADIOLOGY

## 2023-10-09 PROCEDURE — 258N000003 HC RX IP 258 OP 636: Performed by: NURSE PRACTITIONER

## 2023-10-09 PROCEDURE — 96360 HYDRATION IV INFUSION INIT: CPT

## 2023-10-09 PROCEDURE — 83735 ASSAY OF MAGNESIUM: CPT

## 2023-10-09 PROCEDURE — 77386 HC IMRT TREATMENT DELIVERY, COMPLEX: CPT | Performed by: RADIOLOGY

## 2023-10-09 PROCEDURE — 36415 COLL VENOUS BLD VENIPUNCTURE: CPT

## 2023-10-09 PROCEDURE — 99215 OFFICE O/P EST HI 40 MIN: CPT | Performed by: REGISTERED NURSE

## 2023-10-09 PROCEDURE — 96361 HYDRATE IV INFUSION ADD-ON: CPT

## 2023-10-09 PROCEDURE — 77427 RADIATION TX MANAGEMENT X5: CPT | Performed by: RADIOLOGY

## 2023-10-09 PROCEDURE — 85025 COMPLETE CBC W/AUTO DIFF WBC: CPT

## 2023-10-09 PROCEDURE — 80053 COMPREHEN METABOLIC PANEL: CPT

## 2023-10-09 PROCEDURE — G0463 HOSPITAL OUTPT CLINIC VISIT: HCPCS | Mod: 25 | Performed by: REGISTERED NURSE

## 2023-10-09 RX ORDER — HEPARIN SODIUM (PORCINE) LOCK FLUSH IV SOLN 100 UNIT/ML 100 UNIT/ML
5 SOLUTION INTRAVENOUS
Status: CANCELLED | OUTPATIENT
Start: 2023-10-09

## 2023-10-09 RX ORDER — MEPERIDINE HYDROCHLORIDE 25 MG/ML
25 INJECTION INTRAMUSCULAR; INTRAVENOUS; SUBCUTANEOUS EVERY 30 MIN PRN
Status: CANCELLED | OUTPATIENT
Start: 2023-10-09

## 2023-10-09 RX ORDER — ALBUTEROL SULFATE 90 UG/1
1-2 AEROSOL, METERED RESPIRATORY (INHALATION)
Status: CANCELLED
Start: 2023-10-09

## 2023-10-09 RX ORDER — EPINEPHRINE 1 MG/ML
0.3 INJECTION, SOLUTION INTRAMUSCULAR; SUBCUTANEOUS EVERY 5 MIN PRN
Status: CANCELLED | OUTPATIENT
Start: 2023-10-09

## 2023-10-09 RX ORDER — ACETAMINOPHEN 500 MG
1000 TABLET ORAL EVERY 8 HOURS PRN
Qty: 60 TABLET | Refills: 1 | Status: SHIPPED | OUTPATIENT
Start: 2023-10-09 | End: 2024-07-31

## 2023-10-09 RX ORDER — HEPARIN SODIUM,PORCINE 10 UNIT/ML
5-20 VIAL (ML) INTRAVENOUS DAILY PRN
Status: CANCELLED | OUTPATIENT
Start: 2023-10-09

## 2023-10-09 RX ORDER — METHYLPREDNISOLONE SODIUM SUCCINATE 125 MG/2ML
125 INJECTION, POWDER, LYOPHILIZED, FOR SOLUTION INTRAMUSCULAR; INTRAVENOUS
Status: CANCELLED
Start: 2023-10-09

## 2023-10-09 RX ORDER — DIPHENHYDRAMINE HYDROCHLORIDE 50 MG/ML
50 INJECTION INTRAMUSCULAR; INTRAVENOUS
Status: CANCELLED
Start: 2023-10-09

## 2023-10-09 RX ORDER — ALBUTEROL SULFATE 0.83 MG/ML
2.5 SOLUTION RESPIRATORY (INHALATION)
Status: CANCELLED | OUTPATIENT
Start: 2023-10-09

## 2023-10-09 RX ADMIN — SODIUM CHLORIDE 2000 ML: 9 INJECTION, SOLUTION INTRAVENOUS at 09:02

## 2023-10-09 ASSESSMENT — PAIN SCALES - GENERAL: PAINLEVEL: MILD PAIN (3)

## 2023-10-09 NOTE — Clinical Note
10/9/2023         RE: Thierno Vega  [address]  [address]  [facility] [address]        Dear Colleague,    Thank you for referring your patient, Thierno Vega, to the [facility]. Please see a copy of my visit note below.    Oncology Progress Note  Oct 9, 2023    Reason for Visit: seen in follow-up of nasopharyngeal cancer    Oncology HPI:   CANCER TYPE: SCC nasopharynx, GALLITO -jeanna  STAGE: cT2N2 vs N3 (III vs MARYSOL)  ECOG PS: 0       SUMMARY  4/30/23               US. Bilateral necrotic nodes  5/3/23                 FNA cervical adenopathy - undifferentiated carcinoma  5/4/23                 CT CAP. 7 mm pulmonary nodule, no metastases  5/5/23                 MRI bilateral cervical and retropharyngeal adenopathy     Came to US  6/12/23               US guided FNA L neck node in clinic (Dr. Fisher). Path: hypocellular with scant clusters of atypical squamous cells suspicious for malignancy, p40 +jeanna, GALLITO -jeanna  6/13/23               PET/CT. Intense uptake L Rosenmuller fossa with increased fullness (SUV 18.8). Skull base intact. Milder FDG uptake (SUV 6.1) by the prominent R Rosenmuller fossa, inflammatory vs malignant. Indeterminate bilateral intense palatine tonsillar uptake with fullness on CT, inflammatroy vs infectious vs tumor, discontinuous with the nasopharyngeal abnormality. Bilateral nodes, including level 2a, 2b, 3, 3/5, R 1.8 cm 2A, another level 2/3 nodes, FDG avid retropharyngeal nodes. 0.3 cm nodule on the R major fissure. 0.8 cm R inguinal node (SUV 3.9) with additional nonenlarged LNs with minimal uptake, suggestive of reactive adenopathy. Marked focal uptake along the R upper inner thigh with skin thickening (SUV 16.8), which may represent cutaneous infection/inflammation, recommend direct visualization  6/27/23   Induction chemotherapy with cisplatin gemcitabine x 2 cycles with SD  8/28/23:  concurrent chemoradiation with weekly cisplatin    Interval history:    Thierno is seen for close follow-up today with IVF. Now completed chemotherapy. Continues to receive radiation. Visit was completed with assistance of a professional French .   -Taking in 7 cartons in a 24 hour period. No issues with NG feedings.  -No nausea  -Throat pain increased. Using Magic Mouthwash but is discouraged it only lasts about 30 minutes. Still able to drink fluids but not swallowing any food. Taking pills okay. Feels IVF are helpful but make him feel fatigued.  -Experiencing intermittent ear ringing and right headache. Taking Tylenol 1-2 times per day. Is out of pills.      Current Outpatient Medications   Medication Sig Dispense Refill    amLODIPine 5 MG TABS 5 mg, valsartan 160 MG TABS 160 mg Take 5 mg by mouth daily      dexAMETHasone (DECADRON) 4 MG tablet Take 1 tablet (4 mg) by mouth 2 times daily (with meals) Take 4 mg bid x 3 days Start on Day 2 after chemotherapy. (Patient not taking: Reported on 9/18/2023) 6 tablet 2    famotidine (PEPCID) 20 MG tablet Take 1 tablet (20 mg) by mouth 2 times daily 60 tablet 1    gabapentin (NEURONTIN) 300 MG capsule Take 3 capsules (900 mg) by mouth 3 times daily Taper dose up to 900 mg po tid per instructions given in Radiation Oncology clinic 270 capsule 4    magic mouthwash (ENTER INGREDIENTS IN COMMENTS) suspension Take 10 mLs by mouth every 4 hours as needed (Patient not taking: Reported on 10/2/2023) 240 mL 0    ondansetron (ZOFRAN) 8 MG tablet Take 1 tablet (8 mg) by mouth every 8 hours as needed for nausea (vomiting) 30 tablet 5    prochlorperazine (COMPAZINE) 10 MG tablet Take 1 tablet (10 mg) by mouth every 6 hours as needed for nausea or vomiting 30 tablet 5        No Known Allergies      Exam:   There were no vitals taken for this visit.    Wt Readings from Last 4 Encounters:   10/03/23 96.6 kg (213 lb)   10/02/23 95.7 kg (211 lb)   09/26/23 98.9 kg (218 lb)   09/25/23 98.9 kg (218 lb 1.6 oz)     Gen: NAD  Oropharynx: moist and  without lesion. Mild erythema to posterior OP. L cervical LN decreasing in size with non distinct borders. NG feeding tube in place.   Lungs: CTA, no wheezes  Heart: RRR, no m/g/r  Extremities: Warm, no edema. Speech is clear. CN wnl. Gait/station wnl.   Skin: Radiation dermatitis to neck with scattered areas of desquamation.    Labs:   Most Recent 3 CBC's:  Recent Labs   Lab Test 10/02/23  0830 09/25/23  0726 09/23/23  1526   WBC 3.7* 3.7* 3.9*   HGB 10.2* 10.5* 11.9*   MCV 87 87 88    202 201   ANEUTAUTO 2.5 2.6 2.8    Most Recent 3 BMP's:  Recent Labs   Lab Test 10/02/23  0830 09/25/23  0726 09/23/23  1526    136 137   POTASSIUM 4.1 4.0 4.2   CHLORIDE 103 100 98   CO2 25 21* 23   BUN 9.7 8.9 15.1   CR 1.12 1.01 1.15   ANIONGAP 11 15 16*   SOTO 9.5 9.2 9.5   * 107* 81   PROTTOTAL 7.1 6.8 7.5   ALBUMIN 4.4 4.3 4.8    Most Recent 2 LFT's:  Recent Labs   Lab Test 10/02/23  0830 09/25/23  0726   AST 9 15   ALT 13 17   ALKPHOS 57 51   BILITOTAL 0.2 0.4    Most Recent TSH and T4:  Recent Labs   Lab Test 09/23/23  1526   TSH 0.24*     Phos/Mag:  Lab Results   Component Value Date    MAG 1.8 10/02/2023    MAG 1.8 09/25/2023    MAG 2.1 09/18/2023      I reviewed the above labs today.    Imaging: n/a    Impression/plan:   Nasopharyngeal carcinoma, cT2N2 (III):  with SD after 2 cycles of cisplatin/gemcitabine. Receiving concurrent chemoradiation with weekly cisplatin. Chemo now complete.Continues to experience intermittent tinnitus, dysgeusia, headaches. NG placed for nutrition. Labs today reveal decreased hgb and plt consistent with chemo effect. WBC okay. Mild SMILEY with creatinine 1.21. Will proceed with IVF M/W/F this week. Can determine on Thursday if further hydration is needed as he is able to drink fluids. Repeat labs on Friday with IVF. Scheduled to see Dr. Clark later this week as well.   -RTC 10/12 with Dr. Clark  -Reviewed follow-up after completing chemoradiation, typically would have ALON  follow-up as needed for 1-2 weeks, either with med/onc or rad/onc. Would also typically see ENT/radiation around week 6 and post-treatment PET/CT week 12. Previously discussed last week. He plans to adhere to this follow-up plan.    Dysgeusia, anorexia, weight loss  Weight down ~15 pounds over the past 2 weeks. NG feeding tube now in place. Following with RD for nutrition/formula guidance. Follow-up with Nae scheduled 10/10. Continue supplemental IVF as scheduled for additional support, M/W/F this week.    CASSIUS  Mild overall. Continue PRN Zofran    Aphthous ulcers  Resolved    Hearing changes/tinnitus: Intermittent tinnitus on L, slightly increased. Lasting for a few seconds per episode. Had normal hearing test prior to starting. Continue to monitor.     *** minutes spent on the date of the encounter doing {2021 E&M time in:399523}       Janis Corodva CNP        Oncology Progress Note  Oct 9, 2023    Reason for Visit: seen in follow-up of nasopharyngeal cancer    Oncology HPI:   CANCER TYPE: SCC nasopharynx, GALLITO -jeanna  STAGE: cT2N2 vs N3 (III vs MARYSOL)  ECOG PS: 0       SUMMARY  4/30/23               US. Bilateral necrotic nodes  5/3/23                 FNA cervical adenopathy - undifferentiated carcinoma  5/4/23                 CT CAP. 7 mm pulmonary nodule, no metastases  5/5/23                 MRI bilateral cervical and retropharyngeal adenopathy     Came to US  6/12/23               US guided FNA L neck node in clinic (Dr. Fisher). Path: hypocellular with scant clusters of atypical squamous cells suspicious for malignancy, p40 +jeanna, GALLITO -jeanna  6/13/23               PET/CT. Intense uptake L Rosenmuller fossa with increased fullness (SUV 18.8). Skull base intact. Milder FDG uptake (SUV 6.1) by the prominent R Rosenmuller fossa, inflammatory vs malignant. Indeterminate bilateral intense palatine tonsillar uptake with fullness on CT, inflammatroy vs infectious vs tumor, discontinuous with the nasopharyngeal  abnormality. Bilateral nodes, including level 2a, 2b, 3, 3/5, R 1.8 cm 2A, another level 2/3 nodes, FDG avid retropharyngeal nodes. 0.3 cm nodule on the R major fissure. 0.8 cm R inguinal node (SUV 3.9) with additional nonenlarged LNs with minimal uptake, suggestive of reactive adenopathy. Marked focal uptake along the R upper inner thigh with skin thickening (SUV 16.8), which may represent cutaneous infection/inflammation, recommend direct visualization  6/27/23   Induction chemotherapy with cisplatin gemcitabine x 2 cycles with SD  8/28/23:  concurrent chemoradiation with weekly cisplatin    Interval history:   Thierno is seen for close follow-up today with IVF. Now completed chemotherapy. Continues to receive radiation. Visit was completed with assistance of a professional French .   -Taking in 7 cartons in a 24 hour period. No issues with NG feedings.  -No nausea  -Throat pain increased. Using Magic Mouthwash but is discouraged it only lasts about 30 minutes. Still able to drink fluids but not swallowing any food. Taking pills okay. Feels IVF are helpful but make him feel fatigued.  -Experiencing intermittent ear ringing and right headache. Taking Tylenol 1-2 times per day. Is out of pills.      Current Outpatient Medications   Medication Sig Dispense Refill     acetaminophen (TYLENOL) 500 MG tablet Take 2 tablets (1,000 mg) by mouth every 8 hours as needed for mild pain 60 tablet 1     amLODIPine 5 MG TABS 5 mg, valsartan 160 MG TABS 160 mg Take 5 mg by mouth daily       famotidine (PEPCID) 20 MG tablet Take 1 tablet (20 mg) by mouth 2 times daily 60 tablet 1     gabapentin (NEURONTIN) 300 MG capsule Take 3 capsules (900 mg) by mouth 3 times daily Taper dose up to 900 mg po tid per instructions given in Radiation Oncology clinic 270 capsule 4     magic mouthwash (ENTER INGREDIENTS IN COMMENTS) suspension Take 10 mLs by mouth every 4 hours as needed 240 mL 0     dexAMETHasone (DECADRON) 4 MG tablet  "Take 1 tablet (4 mg) by mouth 2 times daily (with meals) Take 4 mg bid x 3 days Start on Day 2 after chemotherapy. (Patient not taking: Reported on 9/18/2023) 6 tablet 2     ondansetron (ZOFRAN) 8 MG tablet Take 1 tablet (8 mg) by mouth every 8 hours as needed for nausea (vomiting) (Patient not taking: Reported on 10/9/2023) 30 tablet 5     prochlorperazine (COMPAZINE) 10 MG tablet Take 1 tablet (10 mg) by mouth every 6 hours as needed for nausea or vomiting (Patient not taking: Reported on 10/9/2023) 30 tablet 5        No Known Allergies      Exam:   Blood pressure 116/73, pulse 77, temperature 99.2  F (37.3  C), temperature source Oral, resp. rate 18, height 1.829 m (6' 0.01\"), weight 96.6 kg (212 lb 14.4 oz), SpO2 100 %.    Wt Readings from Last 4 Encounters:   10/09/23 96.6 kg (212 lb 14.4 oz)   10/03/23 96.6 kg (213 lb)   10/02/23 95.7 kg (211 lb)   09/26/23 98.9 kg (218 lb)     Gen: NAD  Oropharynx: moist and without lesion. Mild erythema to posterior OP. L cervical LN decreasing in size with non distinct borders. NG feeding tube in place.   Lungs: CTA, no wheezes  Heart: RRR, no m/g/r  Extremities: Warm, no edema. Speech is clear. CN wnl. Gait/station wnl.   Skin: Radiation dermatitis to neck with scattered areas of desquamation.    Labs:   Most Recent 3 CBC's:  Recent Labs   Lab Test 10/09/23  0801 10/02/23  0830 09/25/23  0726   WBC 4.2 3.7* 3.7*   HGB 9.6* 10.2* 10.5*   MCV 87 87 87   * 208 202   ANEUTAUTO 3.1 2.5 2.6    Most Recent 3 BMP's:  Recent Labs   Lab Test 10/09/23  0801 10/02/23  0830 09/25/23  0726    139 136   POTASSIUM 4.7 4.1 4.0   CHLORIDE 99 103 100   CO2 26 25 21*   BUN 25.4* 9.7 8.9   CR 1.21* 1.12 1.01   ANIONGAP 12 11 15   SOTO 9.1 9.5 9.2   * 108* 107*   PROTTOTAL 7.2 7.1 6.8   ALBUMIN 4.3 4.4 4.3    Most Recent 2 LFT's:  Recent Labs   Lab Test 10/09/23  0801 10/02/23  0830   AST 11 9   ALT 15 13   ALKPHOS 64 57   BILITOTAL 0.3 0.2    Most Recent TSH and T4:  Recent " Labs   Lab Test 09/23/23  1526   TSH 0.24*     Phos/Mag:  Lab Results   Component Value Date    MAG 1.7 10/09/2023    MAG 1.8 10/02/2023    MAG 1.8 09/25/2023      I reviewed the above labs today.    Imaging: n/a    Impression/plan:   Nasopharyngeal carcinoma, cT2N2 (III):  with SD after 2 cycles of cisplatin/gemcitabine. Receiving concurrent chemoradiation with weekly cisplatin. Chemo now complete.Continues to experience intermittent tinnitus, dysgeusia, headaches. NG placed for nutrition. Labs today reveal decreased hgb and plt consistent with chemo effect. WBC okay. Mild SMILEY with creatinine 1.21. Will proceed with IVF M/W/F this week. Can determine on Thursday if further hydration is needed as he is able to drink fluids. Repeat labs on Friday with IVF. Scheduled to see Dr. Clark later this week as well.   -RTC 10/12 with Dr. Clark  -IVF M/W/F this week. Recheck labs 10/13.  -Reviewed follow-up after completing chemoradiation, typically would have ALON follow-up as needed for 1-2 weeks, either with med/onc or rad/onc. Would also typically see ENT/radiation around week 6 and post-treatment PET/CT week 12. Previously discussed last week. He plans to adhere to this follow-up plan.    SMILEY   Creat 1.21 today 2/2 creatinine. IVF with 1L NS as above. Recheck Friday.     Dysgeusia, anorexia, weight loss  Weight stable this week. NG feeding tube now in place. Following with RD for nutrition/formula guidance.  Follow-up with Nae scheduled 10/10.     CASSIUS  Resolved. Zofran PRN.    Aphthous ulcers  Resolved    Hearing changes/tinnitus: Intermittent tinnitus bilaterally. Had normal hearing test prior to starting.     40 minutes spent on the date of the encounter doing chart review, review of test results, interpretation of tests, patient visit, and documentation     Janis Cordova CNP          Again, thank you for allowing me to participate in the care of your patient.        Sincerely,        Janis Cordova CNP

## 2023-10-09 NOTE — NURSING NOTE
"Oncology Rooming Note    October 9, 2023 8:36 AM   Thierno Vega is a 38 year old male who presents for:    Chief Complaint   Patient presents with    Blood Draw     Labs drawn via piv placed by VAT. VS taken.    Oncology Clinic Visit     [facility] RETURN - NASOPHARYNGEAL CANCER     Initial Vitals: /73 (BP Location: Right arm, Patient Position: Sitting, Cuff Size: Adult Large)   Pulse 77   Temp 99.2  F (37.3  C) (Oral)   Resp 18   Ht 1.829 m (6' 0.01\")   Wt 96.6 kg (212 lb 14.4 oz)   SpO2 100%   BMI 28.87 kg/m   Estimated body mass index is 28.87 kg/m  as calculated from the following:    Height as of this encounter: 1.829 m (6' 0.01\").    Weight as of this encounter: 96.6 kg (212 lb 14.4 oz). Body surface area is 2.22 meters squared.  Mild Pain (3) Comment: Data Unavailable   No LMP for male patient.  Allergies reviewed: Yes  Medications reviewed: Yes    Medications: Medication refills not needed today.  Pharmacy name entered into [email or web address]:    [id number] - [place], [place] - [address] AT [facility] OF [address] [place] [address] PHARMACY [facility] - [place], [place] - [address] [facility] [place], [place] - [address] [facility] [phone]    Keith Corado LPN              "

## 2023-10-09 NOTE — PATIENT INSTRUCTIONS
[facility] Triage and after hours / weekends / holidays:  [phone]    Please call the triage or after hours line if you experience a temperature greater than or equal to 100.4, shaking chills, have uncontrolled nausea, vomiting and/or diarrhea, dizziness, shortness of breath, chest pain, bleeding, unexplained bruising, or if you have any other new/concerning symptoms, questions or concerns.      If you are having any concerning symptoms or wish to speak to a provider before your next infusion visit, please call triage to notify them so we can adequately serve you.     If you need a refill on a narcotic prescription or other medication, please call before your infusion appointment.

## 2023-10-09 NOTE — NURSING NOTE
Chief Complaint   Patient presents with    Blood Draw     Labs drawn via piv placed by VAT. VS taken.     Labs drawn from PIV placed by VAT. Line flushed with saline. Vitals taken. Pt checked in for appointment(s).     Aida Juarez RN

## 2023-10-09 NOTE — PROGRESS NOTES
Infusion Nursing Note:  Thierno Vega presents today for 2 Liters NS   Patient seen by provider today: Yes: JORDIN Cordova NP      Note:   Reviewed creatinine with JORDIN Cordova; no changes in plan today- she will request lab appointment be added prior to infusion on Friday. Pt will return for IVF on 10/11 - no labs needed with visit this day.   MMW and Tylenol provided to patient by retail pharmacy.        Intravenous Access:  Peripheral IV placed.    Treatment Conditions:  Lab Results   Component Value Date     10/09/2023    POTASSIUM 4.7 10/09/2023    MAG 1.7 10/09/2023    CR 1.21 (H) 10/09/2023    SOTO 9.1 10/09/2023    BILITOTAL 0.3 10/09/2023    ALBUMIN 4.3 10/09/2023    ALT 15 10/09/2023    AST 11 10/09/2023         Post Infusion Assessment:  Patient tolerated infusion without incident.  Blood return noted pre and post infusion.  No evidence of extravasations.  Access discontinued per protocol.       Discharge Plan:   Patient and/or family verbalized understanding of discharge instructions and all questions answered.  AVS given to patient. Patient will return 10/11 for next appointment.   Departure Mode: Ambulatory.      Stephanie Mace RN

## 2023-10-10 ENCOUNTER — ALLIED HEALTH/NURSE VISIT (OUTPATIENT)
Dept: RADIATION ONCOLOGY | Facility: CLINIC | Age: 38
End: 2023-10-10
Attending: RADIOLOGY
Payer: COMMERCIAL

## 2023-10-10 VITALS
BODY MASS INDEX: 28.75 KG/M2 | WEIGHT: 212 LBS | SYSTOLIC BLOOD PRESSURE: 122 MMHG | HEART RATE: 76 BPM | DIASTOLIC BLOOD PRESSURE: 79 MMHG

## 2023-10-10 DIAGNOSIS — C11.9 NASOPHARYNGEAL CANCER (H): Primary | ICD-10-CM

## 2023-10-10 PROCEDURE — 77386 HC IMRT TREATMENT DELIVERY, COMPLEX: CPT | Performed by: RADIOLOGY

## 2023-10-10 PROCEDURE — 97803 MED NUTRITION INDIV SUBSEQ: CPT | Performed by: DIETITIAN, REGISTERED

## 2023-10-10 NOTE — LETTER
10/10/2023         RE: Thierno Vega  [address]  [address]  [facility] [address]        Dear Colleague,    Thank you for referring your patient, Thierno Vega, to the [facility] RADIATION ONCOLOGY. Please see a copy of my visit note below.    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 Vega presents to clinic today for his weekly on-treatment visit.  He is experiencing more fatigue and reports sleeping 10 to 12 hours/day.  He is having a much more difficult time with eating and drinking. A nasal jejunostomy tube was recently placed.  He has been on continuous feedings at 60 mils/hour since late last week.  He is swallowing water.  He has such significant dysgeusia that he is not interested in eating.  He is not having any pain with swallowing but does describe pain in the back of his mouth.    Nursing ROS:   Nutrition Alteration  Diet Type: NG Tube Feedings  Nutrition Note: Pump/6 cans/Encouraged eating solid foods  Skin  Skin Reaction: 2 - Moderate to brisk erythema, patchy moist desquamation, mostly confined to skin folds and creases, moderate edema  Skin Progress: Aquaphor     ENT and Mouth Exam  Mucositis - Current: 1 - Generalized erythema  ENT/Mouth Note: 8 S&S rinses     Gastrointestinal  Nausea: 0 - None     Pain Assessment  0-10 Pain Scale: 2  Pain Note: Gabapentin     PEG Tube: Gastrojejunostomy tube placed on 2023  Electronic Cardiac Implant: No    Objective:   /79   Pulse 76   Wt 96.2 kg (212 lb)   BMI 28.75 kg/m   (initial weight 107.96 kg (238 pounds))  Gen: Appears well, NAD  HEENT: Moderate mucositis of the posterior oropharynx, no thrush  Skin: Moderate erythema and hyperpigmentation with dry desquamation with a small patch of moist desquamation along the left lower lateral neck.  Neck: Left level 5 lymph node is no longer palpable.    Laboratory:  Lab Results   Component Value Date    WBC 4.2 10/09/2023    HGB 9.6 (L) 10/09/2023    HCT 27.4 (L) 10/09/2023    MCV 87 10/09/2023     (L) 10/09/2023     Lab Results   Component Value Date     10/09/2023    POTASSIUM 4.7 10/09/2023    CHLORIDE 99 10/09/2023    CO2 26 10/09/2023     (H) 10/09/2023     Magnesium   Date Value Ref Range Status   10/09/2023 1.7 1.7 - 2.3 mg/dL Final       Treatment-related toxicities (CTCAE v5.0):  Anorexia: Grade 3: Associated with significant weight loss or malnutrition; tube feeding or TPN indicated  Fatigue: Grade 2: Fatigue not relieved by rest; limiting instrumental ADL  Nausea: Grade 0: No toxicity  Pain: Grade 1: Mild pain  Dry mouth: Grade 1: Symptomatic without significant dietary alteration; unstimulated saliva flow >0.2 mL/min  Dysphagia: Grade 2: Symptomatic and altered eating/swallowing  Mucositis: Grade 2: Moderate pain; not interfering with oral intake; modified diet indicated  Dermatitis: Grade 2: Moderate to brisk erythema; patchy moist desquamation, mostly confined to skin folds and creases; moderate erythema    ED visits/Hospitalizations: None    Missed Treatments: None    Mosaiq chart and setup information reviewed  IGRT images reviewed    Medication Review  Med list reviewed with patient?: Yes  Med list printed and given: Yes    Assessment:    Mr. 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  He has received neoadjuvant chemotherapy and is now undergoing concurrent chemoradiation.  He now has a feeding tube and is receiving  continuous feedings.    Plan:   1.  Continue treatment as planned  2.  He is now scheduled to have a percutaneous gastrostomy tube well after he completes his course of radiation.  We will cancel this procedure and continue with the jejunostomy tube.  3.  He completes radiation treatment early next week.  4.  Discussed weaning off of feeding tube.  He has to leave at the end of November due to his visa and will return approximately 4 weeks later so he needs to be off of his feeding tube and able to maintain his weight with an oral diet by that time.      Yessica Parada  Department of Radiation Oncology  [facility]                 Again, thank you for allowing me to participate in the care of your patient.        Sincerely,        Yessica Parada MD

## 2023-10-10 NOTE — PROGRESS NOTES
CLINICAL NUTRITION SERVICES - REASSESSMENT NOTE   EVALUATION OF PREVIOUS PLAN OF CARE:   Time Spent: 15 minutes  Visit Type: follow-up seen in radiation clinic  Pt accompanied by: Georgian    Referring Physician: Tal  C11.9 (ICD-10-CM) - Nasopharyngeal cancer (H)  Previous RD visit 8/31, 9/19, 9/26, 10/2, 10/3     Chemotherapy: Cisplatin - started 8/28  Radiation: Started 8/28  PEG tube: No; ND tube scheduled for 9/28  Monitoring from previous assessment:   -Food/Fluid intake - Nothing at this time; sips of water  -EN intake- Motiffrory tells me that he has been taking 7 cartons of formula daily.  He is running his pump at 80mL/hr x 24 hours  Enteral Nutrition   Formula: Isosource 1.5 vickie  Volume: 7 cartons/day (1750mL, 1330ml free water)  Cycle rate: 80-100mL/hr x 21-24 hours daily  Provisions:  2625kcal (30kcal/kg), 119g protein (1.3g/kg), 308g CHO  -Weight trends - stable x past one week  Wt Readings from Last 10 Encounters:   10/10/23 96.2 kg (212 lb)   10/09/23 96.6 kg (212 lb 14.4 oz)   10/03/23 96.6 kg (213 lb)   10/02/23 95.7 kg (211 lb)   09/26/23 98.9 kg (218 lb)   09/25/23 98.9 kg (218 lb 1.6 oz)   09/19/23 102.5 kg (226 lb)   09/18/23 102.6 kg (226 lb 4.8 oz)   09/12/23 105.2 kg (232 lb)   09/11/23 105.6 kg (232 lb 12.8 oz)       Previous Goals:   EN via ND tube to meet 100% estimated nutrition needs (~7 cartons/day)  Weight stability, >210 lbs  Evaluation: Met   Previous Nutrition Diagnosis:   Inadequate oral intake related to dysphagia/odynophagia as evidenced by pt dependent upon EN via ND tube to meet >75% estimated nutrition needs, PO intake <25% estimated nutrition needs.     Evaluation: No change   NEW FINDINGS:   No new findings   CURRENT NUTRITION DIAGNOSIS   Inadequate oral intake related to dysphagia/odynophagia as evidenced by pt dependent upon EN via ND tube to meet >75% estimated nutrition needs, PO intake <25% estimated nutrition needs.     INTERVENTIONS   EN Composition, EN  Schedule, and Feeding Tube Flush  -  Reviewed goal of 7 cartons/day to meet 100% estimated nutrition needs. Advised to increase feeding rate to 100-120 ml/hr to help reduce the time on his pump.  He is receptive to this suggestion.  Encouraged PO intake of soft foods and ONS to keep swallow function going.  Discussed potential timeline of having his feeding tube post treatment.  Encouraged to start taking PO to help improve swallow and food tolerance.  [place] and is hoping to be eating sole PO at that time to get his feeding tube removed.   Reviewed strategies for increasing PO through liquids and pureed foods.  Reviewed PO strategies and goals to get tube feeding removed before he goes back to his [place].   Goals   EN via ND tube to meet 100% estimated nutrition needs (~7 cartons/day)  Weight stability, >210 lbs     Follow up/Monitoring: one week follow up, 10/10  -Fluid/beverage intake  -Enteral Nutrition intake  -Weight trends   Nae Hicks RD, , LD  [facility]  [phone]

## 2023-10-10 NOTE — PROGRESS NOTES
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 Vega presents to clinic today for his weekly on-treatment visit.  He is experiencing more fatigue and reports sleeping 10 to 12 hours/day.  He is having a much more difficult time with eating and drinking. A nasal jejunostomy tube was recently placed.  He has been on continuous feedings at 60 mils/hour since late last week.  He is swallowing water.  He has such significant dysgeusia that he is not interested in eating.  He is not having any pain with swallowing but does describe pain in the back of his mouth.    Nursing ROS:   Nutrition Alteration  Diet Type: NG Tube Feedings  Nutrition Note: Pump/6 cans/Encouraged eating solid foods  Skin  Skin Reaction: 2 - Moderate to brisk erythema, patchy moist desquamation, mostly confined to skin folds and creases, moderate edema  Skin Progress: Aquaphor     ENT and Mouth Exam  Mucositis - Current: 1 - Generalized erythema  ENT/Mouth Note: 8 S&S rinses     Gastrointestinal  Nausea: 0 - None     Pain Assessment  0-10 Pain Scale: 2  Pain Note: Gabapentin     PEG Tube: Gastrojejunostomy tube placed on 2023  Electronic Cardiac Implant: No    Objective:   /79   Pulse 76   Wt 96.2 kg (212 lb)   BMI 28.75 kg/m  (initial weight 107.96 kg (238 pounds))  Gen: Appears well, NAD  HEENT: Moderate mucositis of the posterior oropharynx, no thrush  Skin: Moderate erythema and hyperpigmentation with dry desquamation with a small patch of moist desquamation along the left lower  lateral neck.  Neck: Left level 5 lymph node is no longer palpable.    Laboratory:  Lab Results   Component Value Date    WBC 4.2 10/09/2023    HGB 9.6 (L) 10/09/2023    HCT 27.4 (L) 10/09/2023    MCV 87 10/09/2023     (L) 10/09/2023     Lab Results   Component Value Date     10/09/2023    POTASSIUM 4.7 10/09/2023    CHLORIDE 99 10/09/2023    CO2 26 10/09/2023     (H) 10/09/2023     Magnesium   Date Value Ref Range Status   10/09/2023 1.7 1.7 - 2.3 mg/dL Final       Treatment-related toxicities (CTCAE v5.0):  Anorexia: Grade 3: Associated with significant weight loss or malnutrition; tube feeding or TPN indicated  Fatigue: Grade 2: Fatigue not relieved by rest; limiting instrumental ADL  Nausea: Grade 0: No toxicity  Pain: Grade 1: Mild pain  Dry mouth: Grade 1: Symptomatic without significant dietary alteration; unstimulated saliva flow >0.2 mL/min  Dysphagia: Grade 2: Symptomatic and altered eating/swallowing  Mucositis: Grade 2: Moderate pain; not interfering with oral intake; modified diet indicated  Dermatitis: Grade 2: Moderate to brisk erythema; patchy moist desquamation, mostly confined to skin folds and creases; moderate erythema    ED visits/Hospitalizations: None    Missed Treatments: None    Mosaiq chart and setup information reviewed  IGRT images reviewed    Medication Review  Med list reviewed with patient?: Yes  Med list printed and given: Yes    Assessment:    Mr. 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  He has received neoadjuvant chemotherapy and is now undergoing concurrent chemoradiation.  He now has a feeding tube and is receiving continuous feedings.    Plan:   1.  Continue treatment as planned  2.  He is now scheduled to have a percutaneous gastrostomy tube well after he completes his course of radiation.  We will cancel this procedure and continue with the jejunostomy tube.  3.  He  completes radiation treatment early next week.  4.  Discussed weaning off of feeding tube.  He has to leave at the end of November due to his visa and will return approximately 4 weeks later so he needs to be off of his feeding tube and able to maintain his weight with an oral diet by that time.      Yessica Parada  Department of Radiation Oncology  [facility]

## 2023-10-11 ENCOUNTER — APPOINTMENT (OUTPATIENT)
Dept: RADIATION ONCOLOGY | Facility: CLINIC | Age: 38
End: 2023-10-11
Attending: RADIOLOGY
Payer: COMMERCIAL

## 2023-10-11 ENCOUNTER — INFUSION THERAPY VISIT (OUTPATIENT)
Dept: ONCOLOGY | Facility: CLINIC | Age: 38
End: 2023-10-11
Attending: INTERNAL MEDICINE
Payer: COMMERCIAL

## 2023-10-11 VITALS
OXYGEN SATURATION: 98 % | RESPIRATION RATE: 16 BRPM | HEART RATE: 69 BPM | DIASTOLIC BLOOD PRESSURE: 72 MMHG | SYSTOLIC BLOOD PRESSURE: 119 MMHG | TEMPERATURE: 98.5 F

## 2023-10-11 DIAGNOSIS — C11.9 NASOPHARYNGEAL CANCER (H): Primary | ICD-10-CM

## 2023-10-11 PROCEDURE — 96360 HYDRATION IV INFUSION INIT: CPT

## 2023-10-11 PROCEDURE — 258N000003 HC RX IP 258 OP 636: Performed by: NURSE PRACTITIONER

## 2023-10-11 PROCEDURE — 96361 HYDRATE IV INFUSION ADD-ON: CPT

## 2023-10-11 PROCEDURE — 77386 HC IMRT TREATMENT DELIVERY, COMPLEX: CPT | Performed by: RADIOLOGY

## 2023-10-11 RX ORDER — DIPHENHYDRAMINE HYDROCHLORIDE 50 MG/ML
50 INJECTION INTRAMUSCULAR; INTRAVENOUS
Status: CANCELLED
Start: 2023-10-11

## 2023-10-11 RX ORDER — EPINEPHRINE 1 MG/ML
0.3 INJECTION, SOLUTION INTRAMUSCULAR; SUBCUTANEOUS EVERY 5 MIN PRN
Status: CANCELLED | OUTPATIENT
Start: 2023-10-11

## 2023-10-11 RX ORDER — ALBUTEROL SULFATE 0.83 MG/ML
2.5 SOLUTION RESPIRATORY (INHALATION)
Status: CANCELLED | OUTPATIENT
Start: 2023-10-11

## 2023-10-11 RX ORDER — HEPARIN SODIUM (PORCINE) LOCK FLUSH IV SOLN 100 UNIT/ML 100 UNIT/ML
5 SOLUTION INTRAVENOUS
Status: CANCELLED | OUTPATIENT
Start: 2023-10-11

## 2023-10-11 RX ORDER — ALBUTEROL SULFATE 90 UG/1
1-2 AEROSOL, METERED RESPIRATORY (INHALATION)
Status: CANCELLED
Start: 2023-10-11

## 2023-10-11 RX ORDER — METHYLPREDNISOLONE SODIUM SUCCINATE 125 MG/2ML
125 INJECTION, POWDER, LYOPHILIZED, FOR SOLUTION INTRAMUSCULAR; INTRAVENOUS
Status: CANCELLED
Start: 2023-10-11

## 2023-10-11 RX ORDER — HEPARIN SODIUM,PORCINE 10 UNIT/ML
5-20 VIAL (ML) INTRAVENOUS DAILY PRN
Status: CANCELLED | OUTPATIENT
Start: 2023-10-11

## 2023-10-11 RX ORDER — MEPERIDINE HYDROCHLORIDE 25 MG/ML
25 INJECTION INTRAMUSCULAR; INTRAVENOUS; SUBCUTANEOUS EVERY 30 MIN PRN
Status: CANCELLED | OUTPATIENT
Start: 2023-10-11

## 2023-10-11 RX ADMIN — SODIUM CHLORIDE 2000 ML: 9 INJECTION, SOLUTION INTRAVENOUS at 08:41

## 2023-10-11 NOTE — PROGRESS NOTES
Infusion Nursing Note:  Thierno Vega presents today for IV Fluids.    Patient seen by provider today: No   present during visit today: Yes, Language: Korean via phone     Note: Patient presents to infusion today doing well. Denies any pain, fevers, chills, SOB, chest pains or cough. Continues to be on NG tube feedings, but still able to drink fluids orally. Feels this is still going okay. No dizziness noted. Offers no new issues or concerns today.     2L IV fluids given per orders.     Intravenous Access:  Peripheral IV placed by Vascular Access.    Treatment Conditions:  Not Applicable.    Post Infusion Assessment:  Patient tolerated infusion without incident.  Blood return noted pre and post infusion.  Site patent and intact, free from redness, edema or discomfort.  No evidence of extravasations.  Access discontinued per protocol.     Discharge Plan:   Patient declined prescription refills.  Discharge instructions reviewed with: Patient.  Patient and/or family verbalized understanding of discharge instructions and all questions answered.  AVS to patient via [employer].  Patient will return tomorrow 10/12 for next appointment with Dr. Clark.   Patient discharged in stable condition accompanied by: self.  Departure Mode: Ambulatory.      Britta Frank RN

## 2023-10-11 NOTE — PATIENT INSTRUCTIONS
Contact Numbers  [facility]: [phone] (for symptom and scheduling needs)    Please call the [facility] Triage line if you experience a temperature greater than or equal to 100.4, shaking chills, have uncontrolled nausea, vomiting and/or diarrhea, dizziness, shortness of breath, chest pain, bleeding, unexplained bruising, or if you have any other new/concerning symptoms, questions or concerns.     If you are having any concerning symptoms or wish to speak to a provider before your next infusion visit, please call your care coordinator or triage to notify them so we can adequately serve you.     If you need a refill on a narcotic prescription or other medication, please call triage before your infusion appointment.

## 2023-10-11 NOTE — PROGRESS NOTES
[facility] CANCER [facility]    PATIENT NAME: Thierno Vega  MRN # [id number]   DATE OF VISIT: October 12, 2023  YOB: 1985     Otolaryngology: Dr. Navin Fisher  Radiation Oncology:  Dr. Yessica Parada    CANCER TYPE: SCC nasopharynx, GALLITO -jeanna  STAGE: cT2N2 vs N3 (III vs MARYSOL)  ECOG PS: 1-2    PD-L1:  NGS:     SUMMARY  4/30/23  US. Bilateral necrotic nodes  5/3/23  FNA cervical adenopathy - undifferentiated carcinoma  5/4/23   CT CAP. 7 mm pulmonary nodule, no metastases  5/5/23  MRI bilateral cervical and retropharyngeal adenopathy    Came to US  6/12/23  US guided FNA L neck node in clinic (Dr. Fisher). Path: hypocellular with scant clusters of atypical squamous cells suspicious for malignancy, p40 +jeanna, GALLITO -jeanna  6/13/23  PET/CT. Intense uptake L Rosenmuller fossa with increased fullness (SUV 18.8). Skull base intact. Milder FDG uptake (SUV 6.1) by the prominent R Rosenmuller fossa, inflammatory vs malignant. Indeterminate bilateral intense palatine tonsillar uptake with fullness on CT, inflammatroy vs infectious vs tumor, discontinuous with the nasopharyngeal abnormality. Bilateral nodes, including level 2a, 2b, 3, 3/5, R 1.8 cm 2A, another level 2/3 nodes, FDG avid retropharyngeal nodes. 0.3 cm nodule on the R major fissure. 0.8 cm R inguinal node (SUV 3.9) with additional nonenlarged LNs with minimal uptake, suggestive of reactive adenopathy. Marked focal uptake along the R upper inner thigh with skin thickening (SUV 16.8), which may represent cutaneous infection/inflammation, recommend direct visualization  6/27~8/11/23 Cisplatin gemcitabine x 3 cycles. Skipped C3D8 to give a short break prior to chemorads  8/28~10/16/23 Chemoradiation with weekly cisplatin.    ASSESSMENT AND PLAN  Nasopharyngeal carcinoma, cT2N2 (III): Last cisplatin last week and finished radiation on Monday. Is having the expected toxicities. Will ask for one more visit , can be video if he wants, with Janis ARAUJO next week to  make sure he's on the right track since he's going to be leaving for home at the end of Nov. Otherwise will see Dr. Parada. Discussed anticipated course for recovery. He is fine returning for the 3 month PET/CT. I encouraged him to discuss the 6-7 week post-treatment follow up - he could probably try to get those visits in right before he leaves at the end of Nov.  Will check EBV PCR next week and again in 3 months.     Anorexia, malnutrition, dehydration: NG, continuous TF. IVF M-W-F. Can try to give more water through FT but he wants to continue IVF next week, then anticipate he'll be doing ok enough to stop.     Mucositis: Can start oxycodone 5 mg q6h prn for this and FT discomfort in part, continue MMW and salt/soda rinses. Discussed anticipation that he wont' need oxycodone after another week or two. Discussed avoiding constipation and strategies should he not have a daily BM.     SMILEY: Resolving with IVF and finishing cisplatin. Labs next week when he's here for IVF.     Hearing changes/tinnitus: No changes since starting cisplatin. Had normal hearing test prior to starting. Not discussed today.     Chemo-induced nausea: Manageable    Professional [employer] iPad  used throughout    40 minutes spent by me on the date of the encounter doing chart review, history and exam, documentation and further activities per the note     Catrachita Clark MD  Associate Professor of Medicine  Hematology, Oncology and Transplantation      SUBJECTIVE  Mr. Vega returns for follow up at the end of chemoradiation.  Pain - is dealing with it he says. Using MMW and salt/soda rinses  A lot of phlegm - nausea and vomiting with gagging on them.  No problems with TF thus far  O/w tired but ok   Trying to swallow water. Not eating food     PAST MEDICAL HISTORY  Nasopharyngeal carcinoma as above  HTN    CURRENT OUTPATIENT MEDICATIONS  Reviewed    ALLERGIES  No Known Allergies     PHYSICAL EXAM  There were no vitals taken for this  visit.  GEN: NAD  HEENT: EOMI, no icterus, injection or pallor. NG in place  EXT: no edema  NEURO: alert    LABORATORY AND IMAGING STUDIES    Labs 10/9, 9/25, 10/2, 9/23 were independently reviewed and interpreted by me

## 2023-10-12 ENCOUNTER — ONCOLOGY VISIT (OUTPATIENT)
Dept: ONCOLOGY | Facility: CLINIC | Age: 38
End: 2023-10-12
Attending: INTERNAL MEDICINE
Payer: COMMERCIAL

## 2023-10-12 ENCOUNTER — APPOINTMENT (OUTPATIENT)
Dept: RADIATION ONCOLOGY | Facility: CLINIC | Age: 38
End: 2023-10-12
Attending: RADIOLOGY
Payer: COMMERCIAL

## 2023-10-12 DIAGNOSIS — G89.3 CANCER RELATED PAIN: ICD-10-CM

## 2023-10-12 DIAGNOSIS — E83.42 HYPOMAGNESEMIA: Primary | ICD-10-CM

## 2023-10-12 DIAGNOSIS — Z13.29 SCREENING FOR HYPOTHYROIDISM: ICD-10-CM

## 2023-10-12 DIAGNOSIS — C11.9 NASOPHARYNGEAL CANCER (H): ICD-10-CM

## 2023-10-12 PROCEDURE — 77386 HC IMRT TREATMENT DELIVERY, COMPLEX: CPT | Performed by: RADIOLOGY

## 2023-10-12 PROCEDURE — 99215 OFFICE O/P EST HI 40 MIN: CPT | Performed by: INTERNAL MEDICINE

## 2023-10-12 RX ORDER — OXYCODONE HYDROCHLORIDE 5 MG/1
5 TABLET ORAL EVERY 6 HOURS PRN
Qty: 20 TABLET | Refills: 0 | Status: SHIPPED | OUTPATIENT
Start: 2023-10-12 | End: 2024-07-31

## 2023-10-12 NOTE — LETTER
10/12/2023         RE: Thierno Vega  [address]  [address]  [place] [address]        Dear Colleague,    Thank you for referring your patient, Thierno Vega, to the [facility]. Please see a copy of my visit note below.       [facility] CANCER [facility]    PATIENT NAME: Thierno Vega  MRN # [id number]   DATE OF VISIT: October 12, 2023  YOB: 1985     Otolaryngology: Dr. Navin Fisher  Radiation Oncology:  Dr. Yessica Parada    CANCER TYPE: SCC nasopharynx, GALLITO -jeanna  STAGE: cT2N2 vs N3 (III vs MARYSOL)  ECOG PS: 1-2    PD-L1:  NGS:     SUMMARY  4/30/23  US. Bilateral necrotic nodes  5/3/23  FNA cervical adenopathy - undifferentiated carcinoma  5/4/23   CT CAP. 7 mm pulmonary nodule, no metastases  5/5/23  MRI bilateral cervical and retropharyngeal adenopathy    Came to US  6/12/23  US guided FNA L neck node in clinic (Dr. Fisher). Path: hypocellular with scant clusters of atypical squamous cells suspicious for malignancy, p40 +jeanna, GALLITO -jeanna  6/13/23  PET/CT. Intense uptake L Rosenmuller fossa with increased fullness (SUV 18.8). Skull base intact. Milder FDG uptake (SUV 6.1) by the prominent R Rosenmuller fossa, inflammatory vs malignant. Indeterminate bilateral intense palatine tonsillar uptake with fullness on CT, inflammatroy vs infectious vs tumor, discontinuous with the nasopharyngeal abnormality. Bilateral nodes, including level 2a, 2b, 3, 3/5, R 1.8 cm 2A, another level 2/3 nodes, FDG avid retropharyngeal nodes. 0.3 cm nodule on the R major fissure. 0.8 cm R inguinal node (SUV 3.9) with additional nonenlarged LNs with minimal uptake, suggestive of reactive adenopathy. Marked focal uptake along the R upper inner thigh with skin thickening (SUV 16.8), which may represent cutaneous infection/inflammation, recommend direct visualization  6/27~8/11/23 Cisplatin gemcitabine x 3 cycles. Skipped C3D8 to give a short break prior to chemorads  8/28~10/16/23 Chemoradiation  with weekly cisplatin.    ASSESSMENT AND PLAN  Nasopharyngeal carcinoma, cT2N2 (III): Last cisplatin last week and finished radiation on Monday. Is having the expected toxicities. Will ask for one more visit , can be video if he wants, with Janis ARAUJO next week to make sure he's on the right track since he's going to be leaving for home at the end of Nov. Otherwise will see Dr. Parada. Discussed anticipated course for recovery. He is fine returning for the 3 month PET/CT. I encouraged him to discuss the 6-7 week post-treatment follow up - he could probably try to get those visits in right before he leaves at the end of Nov.  Will check EBV PCR next week and again in 3 months.     Anorexia, malnutrition, dehydration: NG, continuous TF. IVF M-W-F. Can try to give more water through FT but he wants to continue IVF next week, then anticipate he'll be doing ok enough to stop.     Mucositis: Can start oxycodone 5 mg q6h prn for this and FT discomfort in part, continue MMW and salt/soda rinses. Discussed anticipation that he wont' need oxycodone after another week or two. Discussed avoiding constipation and strategies should he not have a daily BM.     SMILEY: Resolving with IVF and finishing cisplatin. Labs next week when he's here for IVF.     Hearing changes/tinnitus: No changes since starting cisplatin. Had normal hearing test prior to starting. Not discussed today.     Chemo-induced nausea: Manageable    [employer] iPad  used throughout    40 minutes spent by me on the date of the encounter doing chart review, history and exam, documentation and further activities per the note     Catrachita Clark MD  Associate Professor of Medicine  Hematology, Oncology and Transplantation      SUBJECTIVE  Mr. Vega returns for follow up at the end of chemoradiation.  Pain - is dealing with it he says. Using MMW and salt/soda rinses  A lot of phlegm - nausea and vomiting with gagging on them.  No problems with TF thus  far  O/w tired but ok   Trying to swallow water. Not eating food     PAST MEDICAL HISTORY  Nasopharyngeal carcinoma as above  HTN    CURRENT OUTPATIENT MEDICATIONS  Reviewed    ALLERGIES  No Known Allergies     PHYSICAL EXAM  There were no vitals taken for this visit.  GEN: NAD  HEENT: EOMI, no icterus, injection or pallor. NG in place  EXT: no edema  NEURO: alert    LABORATORY AND IMAGING STUDIES    Labs 10/9, 9/25, 10/2, 9/23 were independently reviewed and interpreted by christopher Clark MD

## 2023-10-13 ENCOUNTER — APPOINTMENT (OUTPATIENT)
Dept: RADIATION ONCOLOGY | Facility: CLINIC | Age: 38
End: 2023-10-13
Attending: RADIOLOGY
Payer: COMMERCIAL

## 2023-10-13 ENCOUNTER — INFUSION THERAPY VISIT (OUTPATIENT)
Dept: ONCOLOGY | Facility: CLINIC | Age: 38
End: 2023-10-13
Attending: INTERNAL MEDICINE
Payer: COMMERCIAL

## 2023-10-13 VITALS
HEART RATE: 75 BPM | DIASTOLIC BLOOD PRESSURE: 70 MMHG | OXYGEN SATURATION: 99 % | RESPIRATION RATE: 18 BRPM | SYSTOLIC BLOOD PRESSURE: 109 MMHG | TEMPERATURE: 99 F

## 2023-10-13 DIAGNOSIS — C11.9 NASOPHARYNGEAL CANCER (H): Primary | ICD-10-CM

## 2023-10-13 PROCEDURE — 258N000003 HC RX IP 258 OP 636: Performed by: NURSE PRACTITIONER

## 2023-10-13 PROCEDURE — 96361 HYDRATE IV INFUSION ADD-ON: CPT

## 2023-10-13 PROCEDURE — 77386 HC IMRT TREATMENT DELIVERY, COMPLEX: CPT | Performed by: RADIOLOGY

## 2023-10-13 PROCEDURE — 96360 HYDRATION IV INFUSION INIT: CPT

## 2023-10-13 RX ORDER — MEPERIDINE HYDROCHLORIDE 25 MG/ML
25 INJECTION INTRAMUSCULAR; INTRAVENOUS; SUBCUTANEOUS EVERY 30 MIN PRN
Status: CANCELLED | OUTPATIENT
Start: 2023-10-13

## 2023-10-13 RX ORDER — ALBUTEROL SULFATE 90 UG/1
1-2 AEROSOL, METERED RESPIRATORY (INHALATION)
Status: CANCELLED
Start: 2023-10-13

## 2023-10-13 RX ORDER — HEPARIN SODIUM (PORCINE) LOCK FLUSH IV SOLN 100 UNIT/ML 100 UNIT/ML
5 SOLUTION INTRAVENOUS
Status: CANCELLED | OUTPATIENT
Start: 2023-10-13

## 2023-10-13 RX ORDER — ALBUTEROL SULFATE 0.83 MG/ML
2.5 SOLUTION RESPIRATORY (INHALATION)
Status: CANCELLED | OUTPATIENT
Start: 2023-10-13

## 2023-10-13 RX ORDER — METHYLPREDNISOLONE SODIUM SUCCINATE 125 MG/2ML
125 INJECTION, POWDER, LYOPHILIZED, FOR SOLUTION INTRAMUSCULAR; INTRAVENOUS
Status: CANCELLED
Start: 2023-10-13

## 2023-10-13 RX ORDER — HEPARIN SODIUM,PORCINE 10 UNIT/ML
5-20 VIAL (ML) INTRAVENOUS DAILY PRN
Status: CANCELLED | OUTPATIENT
Start: 2023-10-13

## 2023-10-13 RX ORDER — DIPHENHYDRAMINE HYDROCHLORIDE 50 MG/ML
50 INJECTION INTRAMUSCULAR; INTRAVENOUS
Status: CANCELLED
Start: 2023-10-13

## 2023-10-13 RX ORDER — EPINEPHRINE 1 MG/ML
0.3 INJECTION, SOLUTION INTRAMUSCULAR; SUBCUTANEOUS EVERY 5 MIN PRN
Status: CANCELLED | OUTPATIENT
Start: 2023-10-13

## 2023-10-13 RX ADMIN — SODIUM CHLORIDE 2000 ML: 9 INJECTION, SOLUTION INTRAVENOUS at 08:53

## 2023-10-13 ASSESSMENT — PAIN SCALES - GENERAL: PAINLEVEL: SEVERE PAIN (7)

## 2023-10-13 NOTE — PROGRESS NOTES
Infusion Nursing Note:  Thierno Vega presents today for 2L IVF.    Patient seen by provider today: No, Dr. Clark yesterday.   present during visit today: Not Applicable.    Note: Pt presents to infusion feeling ok. He reports 7/10 pain with swallowing/talking, which he is trying to manage with salt/soda rinses and magic mouthwash. Dr. Clark prescribed Oxycodone yesterday, and he plans to start this when he arrives home today (as he drove himself to this appt and not yet sure how he tolerates narcotics). His last day of Radiation is on Monday. He continues with TF via NG and is drinking PO fluids. He denies fevers/chills, SOB, chest pain, bowel/bladder concerns or further concerns today.    2L NS infused per therapy plan.    Intravenous Access:  Peripheral IV placed by Vascular.    Post Infusion Assessment:  Patient tolerated infusion without incident.  Blood return noted pre and post infusion.  Site patent and intact, free from redness, edema or discomfort.  No evidence of extravasations.  Access discontinued per protocol.     Discharge Plan:   Prescription refills given for Oxycodone.  Discharge instructions reviewed with: Patient.  Patient and/or family verbalized understanding of discharge instructions and all questions answered.  AVS to patient via [employer]. Patient will return 10/16/23 for next appointment per Eduardo checkout orders - not scheduled by end of visit. IB sent to scheduling and RNCC to follow up on checkout orders.   Patient discharged in stable condition accompanied by: self.  Departure Mode: Ambulatory.      Leeanna Leija RN

## 2023-10-13 NOTE — PATIENT INSTRUCTIONS
[facility] Triage and after hours / weekends / holidays:  [phone]    Please call the triage or after hours line if you experience a temperature greater than or equal to 100.4, shaking chills, have uncontrolled nausea, vomiting and/or diarrhea, dizziness, shortness of breath, chest pain, bleeding, unexplained bruising, or if you have any other new/concerning symptoms, questions or concerns.      If you are having any concerning symptoms or wish to speak to a provider before your next infusion visit, please call triage to notify them so we can adequately serve you.     If you need a refill on a narcotic prescription or other medication, please call before your infusion appointment.                October 2023 Sunday Monday Tuesday Wednesday Thursday Friday Saturday   1     2    LAB PERIPHERAL   7:45 AM   (15 min.)   [facility] LAB DRAW   [facility]    RETURN ACTIVE TREATMENT   8:00 AM   (45 min.)   Janis Cordova CNP   [facility]    ONC INFUSION 2 HR (120 MIN)   9:00 AM   (120 min.)   CARMEN ONC INFUSION NURSE   [facility] OUTPATIENT  11:00 AM   (15 min.)   VIDEO,    M [facility]  Services    TREATMENT  11:00 AM   (15 min.)   [facility] RAD ONC IJEOMA   [facility] Radiation Oncology 3    [facility] NUTRITION VISIT   9:30 AM   (30 min.)   Nae Tafoya RD   [facility] Radiation Oncology    OTV   9:30 AM   (15 min.)   Yessica Parada MD   [facility] Radiation Oncology    TREATMENT   9:30 AM   (15 min.)   P RAD ONC IJEOMA   [facility] Radiation Oncology 4    [place] OUTPATIENT  11:00 AM   (15 min.)   VIDEO,    [facility]  Services    TREATMENT  11:00 AM   (15 min.)   P RAD ONC IJEOMA   [facility] Radiation Oncology    ONC INFUSION 2 HR (120 MIN)   1:30 PM   (120 min.)   UC ONC INFUSION NURSE   M  [facility] 5    [place] OUTPATIENT  11:00 AM   (15 min.)   VIDEO,    M [facility]  Services    TREATMENT  11:00 AM   (15 min.)   [facility] RAD ONC IJEOMA   M [facility] Radiation Oncology 6    [place] OUTPATIENT  11:00 AM   (15 min.)   VIDEO,    M [facility]  Services    TREATMENT  11:00 AM   (15 min.)   UMP RAD ONC IJEOMA   M [facility] Radiation Oncology    ONC INFUSION 2 HR (120 MIN)   2:00 PM   (120 min.)   UC ONC INFUSION NURSE   [facility] 7       8     9    LAB PERIPHERAL   6:45 AM   (15 min.)   [facility] DRAW   [facility]    RETURN ACTIVE TREATMENT   7:00 AM   (45 min.)   Janis Cordova CNP   M [facility]    ONC INFUSION 2 HR (120 MIN)   8:30 AM   (120 min.)   UC ONC INFUSION NURSE   [facility] OUTPATIENT  11:00 AM   (15 min.)   VIDEO,    M [facility]  Services    TREATMENT  11:00 AM   (15 min.)   UMP RAD ONC IJEOMA   [facility] Radiation Oncology 10    [facility] NUTRITION VISIT  11:00 AM   (30 min.)   Nae Tafoya RD   [facility] Radiation Oncology    [place] OUTPATIENT  11:00 AM   (15 min.)   VIDEO,    M [facility]  Services    TREATMENT  11:00 AM   (15 min.)   [facility] RAD ONC IJEOMA   [facility] Radiation Oncology    OTV  11:15 AM   (15 min.)   Yessica Parada MD   [facility] Radiation Oncology 11    ONC INFUSION 2 HR (120 MIN)   8:00 AM   (120 min.)   UC ONC INFUSION NURSE   [facility] OUTPATIENT  11:00 AM   (15 min.)   VIDEO,    M [facility]  Services    TREATMENT  11:00 AM   (15 min.)   UMP RAD ONC IJEOMA   [facility] Radiation Oncology 12    RETURN CCSL  10:00 AM   (30 min.)   Catrachita Clark,  MD [facility] OUTPATIENT  11:00 AM   (15 min.)   VIDEO,    M [facility]  Services    TREATMENT  11:00 AM   (15 min.)   UMP RAD ONC IJEOMA   [facility] Radiation Oncology 13    ONC INFUSION 2 HR (120 MIN)   8:00 AM   (120 min.)   UC ONC INFUSION NURSE   [facility] OUTPATIENT  11:00 AM   (15 min.)   VIDEO,    M [facility]  Services    TREATMENT  11:00 AM   (15 min.)   UMP RAD ONC IJEOMA   [facility] Radiation Oncology 14       15     16    [place] OUTPATIENT  11:00 AM   (15 min.)   VIDEO,    [facility]  Services    TREATMENT  11:00 AM   (15 min.)   [facility] RAD ONC IJEOMA   [facility] Radiation Oncology    OTV  11:15 AM   (15 min.)   Yessica Parada MD   [facility] Radiation Oncology 17     18     19     20     21       22     23     24     25     26     27     28       29     30     31 November 2023 Sunday Monday Tuesday Wednesday Thursday Friday Saturday                  1     2     3     4       5     6     7     8     9     10     11       12     13     14     15     16     17     18       19     20     21     22     23     24     25       26     27     28     29    RETURN RADIATION ONCOLOGY  11:30 AM   (30 min.)   Yessica Parada MD   [facility] Radiation Oncology 30                               Lab Results:  No results found for this or any previous visit (from the past 12 hour(s)).

## 2023-10-16 ENCOUNTER — APPOINTMENT (OUTPATIENT)
Dept: RADIATION ONCOLOGY | Facility: CLINIC | Age: 38
End: 2023-10-16
Attending: RADIOLOGY
Payer: COMMERCIAL

## 2023-10-16 ENCOUNTER — INFUSION THERAPY VISIT (OUTPATIENT)
Dept: ONCOLOGY | Facility: CLINIC | Age: 38
End: 2023-10-16
Attending: INTERNAL MEDICINE
Payer: COMMERCIAL

## 2023-10-16 VITALS
HEART RATE: 78 BPM | DIASTOLIC BLOOD PRESSURE: 83 MMHG | WEIGHT: 210 LBS | SYSTOLIC BLOOD PRESSURE: 127 MMHG | BODY MASS INDEX: 28.47 KG/M2

## 2023-10-16 VITALS
DIASTOLIC BLOOD PRESSURE: 70 MMHG | SYSTOLIC BLOOD PRESSURE: 108 MMHG | TEMPERATURE: 99.5 F | HEART RATE: 83 BPM | RESPIRATION RATE: 16 BRPM | OXYGEN SATURATION: 97 %

## 2023-10-16 DIAGNOSIS — C11.9 NASOPHARYNGEAL CANCER (H): Primary | ICD-10-CM

## 2023-10-16 PROCEDURE — 96361 HYDRATE IV INFUSION ADD-ON: CPT

## 2023-10-16 PROCEDURE — 96360 HYDRATION IV INFUSION INIT: CPT

## 2023-10-16 PROCEDURE — 77386 HC IMRT TREATMENT DELIVERY, COMPLEX: CPT | Performed by: RADIOLOGY

## 2023-10-16 PROCEDURE — 258N000003 HC RX IP 258 OP 636: Performed by: NURSE PRACTITIONER

## 2023-10-16 PROCEDURE — 77336 RADIATION PHYSICS CONSULT: CPT | Performed by: RADIOLOGY

## 2023-10-16 PROCEDURE — 77427 RADIATION TX MANAGEMENT X5: CPT | Performed by: RADIOLOGY

## 2023-10-16 RX ORDER — ALBUTEROL SULFATE 90 UG/1
1-2 AEROSOL, METERED RESPIRATORY (INHALATION)
Status: CANCELLED
Start: 2023-10-16

## 2023-10-16 RX ORDER — HEPARIN SODIUM (PORCINE) LOCK FLUSH IV SOLN 100 UNIT/ML 100 UNIT/ML
5 SOLUTION INTRAVENOUS
Status: CANCELLED | OUTPATIENT
Start: 2023-10-16

## 2023-10-16 RX ORDER — DIPHENHYDRAMINE HYDROCHLORIDE 50 MG/ML
50 INJECTION INTRAMUSCULAR; INTRAVENOUS
Status: CANCELLED
Start: 2023-10-16

## 2023-10-16 RX ORDER — MEPERIDINE HYDROCHLORIDE 25 MG/ML
25 INJECTION INTRAMUSCULAR; INTRAVENOUS; SUBCUTANEOUS EVERY 30 MIN PRN
Status: CANCELLED | OUTPATIENT
Start: 2023-10-16

## 2023-10-16 RX ORDER — METHYLPREDNISOLONE SODIUM SUCCINATE 125 MG/2ML
125 INJECTION, POWDER, LYOPHILIZED, FOR SOLUTION INTRAMUSCULAR; INTRAVENOUS
Status: CANCELLED
Start: 2023-10-16

## 2023-10-16 RX ORDER — ALBUTEROL SULFATE 0.83 MG/ML
2.5 SOLUTION RESPIRATORY (INHALATION)
Status: CANCELLED | OUTPATIENT
Start: 2023-10-16

## 2023-10-16 RX ORDER — HEPARIN SODIUM,PORCINE 10 UNIT/ML
5-20 VIAL (ML) INTRAVENOUS DAILY PRN
Status: CANCELLED | OUTPATIENT
Start: 2023-10-16

## 2023-10-16 RX ORDER — EPINEPHRINE 1 MG/ML
0.3 INJECTION, SOLUTION INTRAMUSCULAR; SUBCUTANEOUS EVERY 5 MIN PRN
Status: CANCELLED | OUTPATIENT
Start: 2023-10-16

## 2023-10-16 RX ADMIN — SODIUM CHLORIDE 1000 ML: 9 INJECTION, SOLUTION INTRAVENOUS at 14:32

## 2023-10-16 RX ADMIN — SODIUM CHLORIDE 1000 ML: 9 INJECTION, SOLUTION INTRAVENOUS at 13:19

## 2023-10-16 NOTE — PROGRESS NOTES
Infusion Nursing Note:  Thierno Vega presents today for 2L NS.    Patient seen by provider today: No   present during visit today: Yes, Language: Northern Irish-phone .     Note: Patient presents to infusion feeling ok.  Patient denies new acute discomfort stating 5/10 pain via mouth. Pt states no acute complaints or concerns needing to be addressed today. Specifically, pt denies s/s of infection such as fever, sore throat, cough, chest pain, shortness of breath, body aches, chills, headache, increased nasal congestion, or changes in taste/smell. Continuous tube feedings infusing via corpak with patient stating 1-1.5L water consumed orally        Intravenous Access:  Peripheral IV placed.    Treatment Conditions:  Not Applicable.      Post Infusion Assessment:  Patient tolerated infusion without incident.  Blood return noted pre and post infusion.  Site patent and intact, free from redness, edema or discomfort.  No evidence of extravasations.  Access discontinued per protocol.       Discharge Plan:   Patient declined prescription refills.  Discharge instructions reviewed with: Patient.  Patient and/or family verbalized understanding of discharge instructions and all questions answered.  AVS to patient via [employer].  Patient will return 10/18 for next appointment.   Patient discharged in stable condition accompanied by: self.  Departure Mode: Ambulatory.      Roger Marie RN

## 2023-10-16 NOTE — LETTER
10/16/2023         RE: Thierno Vega  [address]  [address]  [place] [address]        Dear Colleague,    Thank you for referring your patient, Thierno Vega, to the [facility] RADIATION ONCOLOGY. Please see a copy of my visit note below.    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 Vega presents to clinic today for his weekly on-treatment visit.  He is experiencing more fatigue and reports sleeping 10 to 12 hours/day.  He is having a much more difficult time with eating and drinking. A nasal jejunostomy tube was placed.  He has been on continuous feedings at 60 mils/hour since late last week.  He is swallowing water.  He has such significant dysgeusia that he is not interested in eating.  He is not having any pain with swallowing but does describe pain in the back of his mouth.  He is taking gabapentin, oxycodone as needed and acetaminophen for pain.    Nursing ROS:   Nutrition Alteration  Diet Type: NG Tube Feedings  Nutrition Note: Pump/6 cans/Encouraged eating solid foods  Skin  Skin Reaction: 2 - Moderate to brisk erythema, patchy moist desquamation, mostly confined to skin folds and creases, moderate edema  Skin Progress: Aquaphor     ENT and Mouth Exam  Mucositis - Current: 1 - Generalized erythema  ENT/Mouth Note: 8 S&S rinses     Gastrointestinal  Nausea: 0 - None    Pain Assessment  0-10 Pain Scale: 2  Pain Note: Gabapentin     PEG Tube: Gastrojejunostomy tube placed on 2023  Electronic Cardiac Implant: No    Objective:    /83   Pulse 78   Wt 95.3 kg (210 lb)   BMI 28.47 kg/m  (initial weight 107.96 kg (238 pounds))  Gen: Appears fatigued, in no acute distress  HEENT: Moderate mucositis of the posterior oropharynx, no thrush, thick secretions  Skin: Moderate erythema and hyperpigmentation with dry desquamation with a small patch of moist desquamation along the left lower lateral neck.  Neck: Left level 5 lymph node is no longer palpable.    Laboratory:  Lab Results   Component Value Date    WBC 4.2 10/09/2023    HGB 9.6 (L) 10/09/2023    HCT 27.4 (L) 10/09/2023    MCV 87 10/09/2023     (L) 10/09/2023     Lab Results   Component Value Date     10/09/2023    POTASSIUM 4.7 10/09/2023    CHLORIDE 99 10/09/2023    CO2 26 10/09/2023     (H) 10/09/2023     Magnesium   Date Value Ref Range Status   10/09/2023 1.7 1.7 - 2.3 mg/dL Final       Treatment-related toxicities (CTCAE v5.0):  Anorexia: Grade 3: Associated with significant weight loss or malnutrition; tube feeding or TPN indicated  Fatigue: Grade 2: Fatigue not relieved by rest; limiting instrumental ADL  Nausea: Grade 0: No toxicity  Pain: Grade 1: Mild pain  Dry mouth: Grade 1: Symptomatic without significant dietary alteration; unstimulated saliva flow >0.2 mL/min  Dysphagia: Grade 2: Symptomatic and altered eating/swallowing  Mucositis: Grade 2: Moderate pain; not interfering with oral intake; modified diet indicated  Dermatitis: Grade 2: Moderate to brisk erythema; patchy moist desquamation, mostly confined to skin folds and creases; moderate erythema    ED visits/Hospitalizations: None    Missed Treatments: None    Mosaiq chart and setup information reviewed  IGRT images reviewed    Medication Review  Med list reviewed with patient?: Yes  Med list printed and given: Yes    Assessment:    Mr. 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  He has received neoadjuvant  chemotherapy and is now undergoing concurrent chemoradiation.  He now has a feeding tube and is receiving continuous feedings.    Plan:   1.  He completes treatment today.  2.  Follow-up Radiation Oncology on November 1 and again on November 15th  3.  Continue IV fluids 2-3 times weekly  4.  Discussed weaning off of feeding tube.  He plans to leave on November 20 and will be back in late January.  This will place his PET CT scan at about 14 weeks post completion of chemoradiation.  5.  Xerostomia-discussed using Biotene products.  6.  Discussed expectations for weaning off of gabapentin in a few weeks.  He may need some oxycodone intermittently.  7.  Follow-up with Medical Oncology and ENT as scheduled    Yessica Parada  Department of Radiation Oncology  [facility]

## 2023-10-16 NOTE — PATIENT INSTRUCTIONS
[facility] Triage and after hours / weekends / holidays:  [phone]    Please call the triage or after hours line if you experience a temperature greater than or equal to 100.4, shaking chills, have uncontrolled nausea, vomiting and/or diarrhea, dizziness, shortness of breath, chest pain, bleeding, unexplained bruising, or if you have any other new/concerning symptoms, questions or concerns.      If you are having any concerning symptoms or wish to speak to a provider before your next infusion visit, please call triage to notify them so we can adequately serve you.     If you need a refill on a narcotic prescription or other medication, please call before your infusion appointment.                 October 2023 Sunday Monday Tuesday Wednesday Thursday Friday Saturday   1     2    LAB PERIPHERAL   7:45 AM   (15 min.)   [facility] LAB DRAW   [facility]    RETURN ACTIVE TREATMENT   8:00 AM   (45 min.)   Janis Cordova CNP   [facility]    ONC INFUSION 2 HR (120 MIN)   9:00 AM   (120 min.)   CARMEN ONC INFUSION NURSE   [facility] OUTPATIENT  11:00 AM   (15 min.)   VIDEO,    M [facility]  Services    TREATMENT  11:00 AM   (15 min.)   [facility] RAD ONC IJEOMA   [facility] Radiation Oncology 3    [facility] NUTRITION VISIT   9:30 AM   (30 min.)   Nae Tafoya RD   [facility] Radiation Oncology    OTV   9:30 AM   (15 min.)   Yessica Parada MD   [facility] Radiation Oncology    TREATMENT   9:30 AM   (15 min.)   P RAD ONC IJEOMA   [facility] Radiation Oncology 4    [place] OUTPATIENT  11:00 AM   (15 min.)   VIDEO,    [facility]  Services    TREATMENT  11:00 AM   (15 min.)   P RAD ONC IJEOMA   [facility] Radiation Oncology    ONC INFUSION 2 HR (120 MIN)   1:30 PM   (120 min.)   UC ONC INFUSION NURSE   M  [facility] 5    [place] OUTPATIENT  11:00 AM   (15 min.)   VIDEO,    M [facility]  Services    TREATMENT  11:00 AM   (15 min.)   [facility] RAD ONC IJEOMA   M [facility] Radiation Oncology 6    [place] OUTPATIENT  11:00 AM   (15 min.)   VIDEO,    M [facility]  Services    TREATMENT  11:00 AM   (15 min.)   UMP RAD ONC IJEOMA   M [facility] Radiation Oncology    ONC INFUSION 2 HR (120 MIN)   2:00 PM   (120 min.)   UC ONC INFUSION NURSE   [facility] 7       8     9    LAB PERIPHERAL   6:45 AM   (15 min.)   [facility] DRAW   [facility]    RETURN ACTIVE TREATMENT   7:00 AM   (45 min.)   Janis Cordova CNP   [facility]    ONC INFUSION 2 HR (120 MIN)   8:30 AM   (120 min.)   UC ONC INFUSION NURSE   [facility] OUTPATIENT  11:00 AM   (15 min.)   VIDEO,    M [facility]  Services    TREATMENT  11:00 AM   (15 min.)   UMP RAD ONC IJEOMA   [facility] Radiation Oncology 10    [facility] NUTRITION VISIT  11:00 AM   (30 min.)   Nae Tafoya RD   [facility] Radiation Oncology    [place] OUTPATIENT  11:00 AM   (15 min.)   VIDEO,    M [facility]  Services    TREATMENT  11:00 AM   (15 min.)   [facility] RAD ONC IJEOMA   [facility] Radiation Oncology    OTV  11:15 AM   (15 min.)   Yessica Parada MD   [facility] Radiation Oncology 11    ONC INFUSION 2 HR (120 MIN)   8:00 AM   (120 min.)   UC ONC INFUSION NURSE   [facility] OUTPATIENT  11:00 AM   (15 min.)   VIDEO,    M [facility]  Services    TREATMENT  11:00 AM   (15 min.)   [facility] RAD ONC IJEOMA   [facility] Radiation Oncology 12    RETURN CCSL  10:00 AM   (30 min.)   Catrachita Clark,  MD URIOSTEGUI [facility] Cancer [facility] OUTPATIENT  11:00 AM   (15 min.)   VIDEO,    M [facility]  Services    TREATMENT  11:00 AM   (15 min.)   UMP RAD ONC IJEOMA   [facility] Radiation Oncology 13    ONC INFUSION 2 HR (120 MIN)   8:00 AM   (120 min.)   UC ONC INFUSION NURSE   [facility] OUTPATIENT  11:00 AM   (15 min.)   VIDEO,    M [facility]  Services    TREATMENT  11:00 AM   (15 min.)   UMP RAD ONC IJEOMA   M [facility] Radiation Oncology 14       15     16    [place] OUTPATIENT  11:00 AM   (15 min.)   VIDEO,    M [facility]  Services    TREATMENT  11:00 AM   (15 min.)   UMP RAD ONC IJEOMA   [facility] Radiation Oncology    OTV  11:15 AM   (15 min.)   Yessica Parada MD   [facility] Radiation Oncology    ONC INFUSION 2 HR (120 MIN)   1:00 PM   (120 min.)   UC ONC INFUSION NURSE   [facility] 17     18    LAB PERIPHERAL   6:45 AM   (15 min.)   [facility] LAB DRAW   [facility]    RETURN CCSL   7:00 AM   (45 min.)   Jennifer Gabriel CNP   [facility]    ONC INFUSION 2 HR (120 MIN)   8:00 AM   (120 min.)   UC ONC INFUSION NURSE   [facility] 19     20    ONC INFUSION 2 HR (120 MIN)   7:30 AM   (120 min.)   UC ONC INFUSION NURSE   [facility] 21       22     23     24     25    ONC INFUSION 2 HR (120 MIN)   8:30 AM   (120 min.)   UC ONC INFUSION NURSE   [facility] 26     27    ONC INFUSION 2 HR (120 MIN)   2:30 PM   (120 min.)   UC ONC INFUSION NURSE   [facility] 28       29 30 31 November 2023 Sunday Monday Tuesday Wednesday Thursday Friday Saturday                  1    RETURN  RADIATION ONCOLOGY  11:30 AM   (30 min.)   Yessica Parada MD   [facility] Radiation Oncology 2     3     4       5     6     7     8     9     10     11       12     13     14     15    RETURN RADIATION ONCOLOGY   3:00 PM   (30 min.)   Yessica Parada MD   [facility] Radiation Oncology 16     17     18       19     20     21     22     23     24     25       26     27     28     29     30                               Lab Results:  No results found for this or any previous visit (from the past 12 hour(s)).

## 2023-10-16 NOTE — PROGRESS NOTES
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 Vega presents to clinic today for his weekly on-treatment visit.  He is experiencing more fatigue and reports sleeping 10 to 12 hours/day.  He is having a much more difficult time with eating and drinking. A nasal jejunostomy tube was placed.  He has been on continuous feedings at 60 mils/hour since late last week.  He is swallowing water.  He has such significant dysgeusia that he is not interested in eating.  He is not having any pain with swallowing but does describe pain in the back of his mouth.  He is taking gabapentin, oxycodone as needed and acetaminophen for pain.    Nursing ROS:   Nutrition Alteration  Diet Type: NG Tube Feedings  Nutrition Note: Pump/6 cans/Encouraged eating solid foods  Skin  Skin Reaction: 2 - Moderate to brisk erythema, patchy moist desquamation, mostly confined to skin folds and creases, moderate edema  Skin Progress: Aquaphor     ENT and Mouth Exam  Mucositis - Current: 1 - Generalized erythema  ENT/Mouth Note: 8 S&S rinses     Gastrointestinal  Nausea: 0 - None    Pain Assessment  0-10 Pain Scale: 2  Pain Note: Gabapentin     PEG Tube: Gastrojejunostomy tube placed on 2023  Electronic Cardiac Implant: No    Objective:   /83   Pulse 78   Wt 95.3 kg (210 lb)   BMI 28.47 kg/m  (initial weight 107.96 kg (238 pounds))  Gen: Appears fatigued, in no acute distress  HEENT: Moderate mucositis of the posterior oropharynx, no thrush, thick secretions  Skin: Moderate erythema and  hyperpigmentation with dry desquamation with a small patch of moist desquamation along the left lower lateral neck.  Neck: Left level 5 lymph node is no longer palpable.    Laboratory:  Lab Results   Component Value Date    WBC 4.2 10/09/2023    HGB 9.6 (L) 10/09/2023    HCT 27.4 (L) 10/09/2023    MCV 87 10/09/2023     (L) 10/09/2023     Lab Results   Component Value Date     10/09/2023    POTASSIUM 4.7 10/09/2023    CHLORIDE 99 10/09/2023    CO2 26 10/09/2023     (H) 10/09/2023     Magnesium   Date Value Ref Range Status   10/09/2023 1.7 1.7 - 2.3 mg/dL Final       Treatment-related toxicities (CTCAE v5.0):  Anorexia: Grade 3: Associated with significant weight loss or malnutrition; tube feeding or TPN indicated  Fatigue: Grade 2: Fatigue not relieved by rest; limiting instrumental ADL  Nausea: Grade 0: No toxicity  Pain: Grade 1: Mild pain  Dry mouth: Grade 1: Symptomatic without significant dietary alteration; unstimulated saliva flow >0.2 mL/min  Dysphagia: Grade 2: Symptomatic and altered eating/swallowing  Mucositis: Grade 2: Moderate pain; not interfering with oral intake; modified diet indicated  Dermatitis: Grade 2: Moderate to brisk erythema; patchy moist desquamation, mostly confined to skin folds and creases; moderate erythema    ED visits/Hospitalizations: None    Missed Treatments: None    Mosaiq chart and setup information reviewed  IGRT images reviewed    Medication Review  Med list reviewed with patient?: Yes  Med list printed and given: Yes    Assessment:    Mr. 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  He has received neoadjuvant chemotherapy and is now undergoing concurrent chemoradiation.  He now has a feeding tube and is receiving continuous feedings.    Plan:   1.  He completes treatment today.  2.  Follow-up Radiation Oncology on November 1 and again on November 15th  3.  Continue IV  fluids 2-3 times weekly  4.  Discussed weaning off of feeding tube.  He plans to leave on November 20 and will be back in late January.  This will place his PET CT scan at about 14 weeks post completion of chemoradiation.  5.  Xerostomia-discussed using Biotene products.  6.  Discussed expectations for weaning off of gabapentin in a few weeks.  He may need some oxycodone intermittently.  7.  Follow-up with Medical Oncology and ENT as scheduled    Yessica Parada  Department of Radiation Oncology  [facility]

## 2023-10-17 DIAGNOSIS — C11.9 NASOPHARYNGEAL CANCER (H): Primary | ICD-10-CM

## 2023-10-17 NOTE — PROGRESS NOTES
[facility] CANCER CLINIC    PATIENT NAME: Thierno Vega  MRN # [id number]   DATE OF VISIT: October 18, 2023  YOB: 1985     Otolaryngology: Dr. Navin Fisher  Radiation Oncology:  Dr. Yessica Parada    CANCER TYPE: SCC nasopharynx, GALLITO -jeanna  STAGE: cT2N2 vs N3 (III vs MARYSOL)  ECOG PS: 1-2    SUMMARY  4/30/23  US. Bilateral necrotic nodes  5/3/23  FNA cervical adenopathy - undifferentiated carcinoma  5/4/23   CT CAP. 7 mm pulmonary nodule, no metastases  5/5/23  MRI bilateral cervical and retropharyngeal adenopathy    Came to US  6/12/23  US guided FNA L neck node in clinic (Dr. Fisher). Path: hypocellular with scant clusters of atypical squamous cells suspicious for malignancy, p40 +jeanna, GALLITO -jeanna  6/13/23  PET/CT. Intense uptake L Rosenmuller fossa with increased fullness (SUV 18.8). Skull base intact. Milder FDG uptake (SUV 6.1) by the prominent R Rosenmuller fossa, inflammatory vs malignant. Indeterminate bilateral intense palatine tonsillar uptake with fullness on CT, inflammatroy vs infectious vs tumor, discontinuous with the nasopharyngeal abnormality. Bilateral nodes, including level 2a, 2b, 3, 3/5, R 1.8 cm 2A, another level 2/3 nodes, FDG avid retropharyngeal nodes. 0.3 cm nodule on the R major fissure. 0.8 cm R inguinal node (SUV 3.9) with additional nonenlarged LNs with minimal uptake, suggestive of reactive adenopathy. Marked focal uptake along the R upper inner thigh with skin thickening (SUV 16.8), which may represent cutaneous infection/inflammation, recommend direct visualization  6/27~8/11/23 Cisplatin gemcitabine x 3 cycles. Skipped C3D8 to give a short break prior to chemorads  8/28~10/16/23 Chemoradiation with weekly cisplatin.      SUBJECTIVE  Mr. Vega returns for follow up, finished radiation a couple days ago  Going back end of November  Using both oxycodone (about 1x per day) + gabapentin  Bowels are moving still   Also using s/s rinses  Uses nausea meds infrequently  Fatigue  main SE  No problems with TF thus far  Trying to swallow water. Not eating food     PAST MEDICAL HISTORY  Nasopharyngeal carcinoma as above  HTN    CURRENT OUTPATIENT MEDICATIONS  Reviewed    ALLERGIES  No Known Allergies     PHYSICAL EXAM  /72 (BP Location: Right arm, Patient Position: Sitting, Cuff Size: Adult Large)   Pulse 96   Temp 98.7  F (37.1  C) (Oral)   Resp 16   Wt 97.4 kg (214 lb 11.2 oz)   SpO2 93%   BMI 29.11 kg/m    GEN: NAD  HEENT: EOMI, no icterus, injection or pallor. NG in place  EXT: no edema  NEURO: alert    LABORATORY AND IMAGING STUDIES    Most Recent 3 CBC's:  Recent Labs   Lab Test 10/09/23  0801 10/02/23  0830 09/25/23  0726   WBC 4.2 3.7* 3.7*   HGB 9.6* 10.2* 10.5*   MCV 87 87 87   * 208 202   ANEUTAUTO 3.1 2.5 2.6     Most Recent 3 BMP's:  Recent Labs   Lab Test 10/09/23  0801 10/02/23  0830 09/25/23  0726    139 136   POTASSIUM 4.7 4.1 4.0   CHLORIDE 99 103 100   CO2 26 25 21*   BUN 25.4* 9.7 8.9   CR 1.21* 1.12 1.01   ANIONGAP 12 11 15   SOTO 9.1 9.5 9.2   * 108* 107*   PROTTOTAL 7.2 7.1 6.8   ALBUMIN 4.3 4.4 4.3    Most Recent 3 LFT's:  Recent Labs   Lab Test 10/09/23  0801 10/02/23  0830 09/25/23  0726   AST 11 9 15   ALT 15 13 17   ALKPHOS 64 57 51   BILITOTAL 0.3 0.2 0.4    Most Recent 2 TSH and T4:  Recent Labs   Lab Test 09/23/23  1526 06/22/23  1433   TSH 0.24* 0.88     I reviewed the above labs today.        ASSESSMENT AND PLAN  Nasopharyngeal carcinoma, cT2N2 (III): Last cisplatin 10/2 and finished radiation 10/16.  Is having the expected toxicities. Leaving for home at the end of Nov. Reviewed Dr. Tal perkins with plans for two interval rad onc follows ups before then. Discussed anticipated course for recovery.   -RTC with Dr. Clark 1/24 for PET/CT, labs prior    Anorexia, malnutrition, dehydration: NG, continuous TF. Weight stable today. He is aware of how to wean as able  -IVF M-W-F and W-F next week . hopefully he'll be doing ok enough  then to stop.     Mucositis: Continue oxycodone 5 mg q6h; continue MMW and salt/soda rinses; continue gabapentin and wean as directed. Should improve with time which we discussed    SMILEY: persistent, less likely cisplatin effect at this point. continue IVF, recheck Fri and next Wed to help us follow trend     Hearing changes/tinnitus: No changes since starting cisplatin. Had normal hearing test prior to starting. Not discussed today.     Chemo-induced nausea: Manageable    40 minutes spent on the date of the encounter doing chart review, review of test results, interpretation of tests, patient visit, documentation, and discussion with other provider(s)    Jennifer Gabriel, CNP

## 2023-10-18 ENCOUNTER — INFUSION THERAPY VISIT (OUTPATIENT)
Dept: ONCOLOGY | Facility: CLINIC | Age: 38
End: 2023-10-18
Attending: REGISTERED NURSE
Payer: COMMERCIAL

## 2023-10-18 ENCOUNTER — APPOINTMENT (OUTPATIENT)
Dept: LAB | Facility: CLINIC | Age: 38
End: 2023-10-18
Attending: REGISTERED NURSE
Payer: COMMERCIAL

## 2023-10-18 VITALS
RESPIRATION RATE: 16 BRPM | BODY MASS INDEX: 29.11 KG/M2 | WEIGHT: 214.7 LBS | SYSTOLIC BLOOD PRESSURE: 114 MMHG | OXYGEN SATURATION: 93 % | TEMPERATURE: 98.7 F | HEART RATE: 96 BPM | DIASTOLIC BLOOD PRESSURE: 72 MMHG

## 2023-10-18 DIAGNOSIS — E83.42 HYPOMAGNESEMIA: ICD-10-CM

## 2023-10-18 DIAGNOSIS — K12.1 STOMATITIS AND MUCOSITIS: ICD-10-CM

## 2023-10-18 DIAGNOSIS — C11.9 NASOPHARYNGEAL CANCER (H): Primary | ICD-10-CM

## 2023-10-18 DIAGNOSIS — R13.10 ODYNOPHAGIA: ICD-10-CM

## 2023-10-18 DIAGNOSIS — K12.30 STOMATITIS AND MUCOSITIS: ICD-10-CM

## 2023-10-18 LAB
ANION GAP SERPL CALCULATED.3IONS-SCNC: 12 MMOL/L (ref 7–15)
BASO+EOS+MONOS # BLD AUTO: ABNORMAL 10*3/UL
BASO+EOS+MONOS NFR BLD AUTO: ABNORMAL %
BASOPHILS # BLD AUTO: 0 10E3/UL (ref 0–0.2)
BASOPHILS NFR BLD AUTO: 0 %
BUN SERPL-MCNC: 33.9 MG/DL (ref 6–20)
CALCIUM SERPL-MCNC: 9.6 MG/DL (ref 8.6–10)
CHLORIDE SERPL-SCNC: 101 MMOL/L (ref 98–107)
CREAT SERPL-MCNC: 1.41 MG/DL (ref 0.67–1.17)
DEPRECATED HCO3 PLAS-SCNC: 26 MMOL/L (ref 22–29)
EGFRCR SERPLBLD CKD-EPI 2021: 66 ML/MIN/1.73M2
EOSINOPHIL # BLD AUTO: 0.2 10E3/UL (ref 0–0.7)
EOSINOPHIL NFR BLD AUTO: 5 %
ERYTHROCYTE [DISTWIDTH] IN BLOOD BY AUTOMATED COUNT: 13.4 % (ref 10–15)
GLUCOSE SERPL-MCNC: 143 MG/DL (ref 70–99)
HCT VFR BLD AUTO: 26 % (ref 40–53)
HGB BLD-MCNC: 8.9 G/DL (ref 13.3–17.7)
IMM GRANULOCYTES # BLD: 0 10E3/UL
IMM GRANULOCYTES NFR BLD: 0 %
LYMPHOCYTES # BLD AUTO: 0.3 10E3/UL (ref 0.8–5.3)
LYMPHOCYTES NFR BLD AUTO: 8 %
MAGNESIUM SERPL-MCNC: 2 MG/DL (ref 1.7–2.3)
MCH RBC QN AUTO: 30.7 PG (ref 26.5–33)
MCHC RBC AUTO-ENTMCNC: 34.2 G/DL (ref 31.5–36.5)
MCV RBC AUTO: 90 FL (ref 78–100)
MONOCYTES # BLD AUTO: 0.4 10E3/UL (ref 0–1.3)
MONOCYTES NFR BLD AUTO: 9 %
NEUTROPHILS # BLD AUTO: 3.2 10E3/UL (ref 1.6–8.3)
NEUTROPHILS NFR BLD AUTO: 78 %
NRBC # BLD AUTO: 0 10E3/UL
NRBC BLD AUTO-RTO: 0 /100
PLATELET # BLD AUTO: 179 10E3/UL (ref 150–450)
POTASSIUM SERPL-SCNC: 4.6 MMOL/L (ref 3.4–5.3)
RBC # BLD AUTO: 2.9 10E6/UL (ref 4.4–5.9)
SODIUM SERPL-SCNC: 139 MMOL/L (ref 135–145)
WBC # BLD AUTO: 4.1 10E3/UL (ref 4–11)

## 2023-10-18 PROCEDURE — G0463 HOSPITAL OUTPT CLINIC VISIT: HCPCS | Mod: 25 | Performed by: NURSE PRACTITIONER

## 2023-10-18 PROCEDURE — 96361 HYDRATE IV INFUSION ADD-ON: CPT

## 2023-10-18 PROCEDURE — 85025 COMPLETE CBC W/AUTO DIFF WBC: CPT

## 2023-10-18 PROCEDURE — 80048 BASIC METABOLIC PNL TOTAL CA: CPT

## 2023-10-18 PROCEDURE — 36415 COLL VENOUS BLD VENIPUNCTURE: CPT

## 2023-10-18 PROCEDURE — 258N000003 HC RX IP 258 OP 636: Performed by: NURSE PRACTITIONER

## 2023-10-18 PROCEDURE — 83735 ASSAY OF MAGNESIUM: CPT

## 2023-10-18 PROCEDURE — 99215 OFFICE O/P EST HI 40 MIN: CPT | Performed by: NURSE PRACTITIONER

## 2023-10-18 PROCEDURE — 96360 HYDRATION IV INFUSION INIT: CPT

## 2023-10-18 PROCEDURE — 87799 DETECT AGENT NOS DNA QUANT: CPT

## 2023-10-18 RX ORDER — METHYLPREDNISOLONE SODIUM SUCCINATE 125 MG/2ML
125 INJECTION, POWDER, LYOPHILIZED, FOR SOLUTION INTRAMUSCULAR; INTRAVENOUS
Status: CANCELLED
Start: 2023-10-18

## 2023-10-18 RX ORDER — DIPHENHYDRAMINE HYDROCHLORIDE 50 MG/ML
50 INJECTION INTRAMUSCULAR; INTRAVENOUS
Status: CANCELLED
Start: 2023-10-18

## 2023-10-18 RX ORDER — ALBUTEROL SULFATE 90 UG/1
1-2 AEROSOL, METERED RESPIRATORY (INHALATION)
Status: CANCELLED
Start: 2023-10-18

## 2023-10-18 RX ORDER — HEPARIN SODIUM (PORCINE) LOCK FLUSH IV SOLN 100 UNIT/ML 100 UNIT/ML
5 SOLUTION INTRAVENOUS
Status: CANCELLED | OUTPATIENT
Start: 2023-10-18

## 2023-10-18 RX ORDER — EPINEPHRINE 1 MG/ML
0.3 INJECTION, SOLUTION, CONCENTRATE INTRAVENOUS EVERY 5 MIN PRN
Status: CANCELLED | OUTPATIENT
Start: 2023-10-18

## 2023-10-18 RX ORDER — ALBUTEROL SULFATE 0.83 MG/ML
2.5 SOLUTION RESPIRATORY (INHALATION)
Status: CANCELLED | OUTPATIENT
Start: 2023-10-18

## 2023-10-18 RX ORDER — MEPERIDINE HYDROCHLORIDE 25 MG/ML
25 INJECTION INTRAMUSCULAR; INTRAVENOUS; SUBCUTANEOUS EVERY 30 MIN PRN
Status: CANCELLED | OUTPATIENT
Start: 2023-10-18

## 2023-10-18 RX ORDER — HEPARIN SODIUM,PORCINE 10 UNIT/ML
5-20 VIAL (ML) INTRAVENOUS DAILY PRN
Status: CANCELLED | OUTPATIENT
Start: 2023-10-18

## 2023-10-18 RX ADMIN — SODIUM CHLORIDE 2000 ML: 9 INJECTION, SOLUTION INTRAVENOUS at 08:13

## 2023-10-18 ASSESSMENT — PAIN SCALES - GENERAL: PAINLEVEL: NO PAIN (0)

## 2023-10-18 NOTE — NURSING NOTE
"Oncology Rooming Note    October 18, 2023 7:35 AM   Thierno Vega is a 38 year old male who presents for:    Chief Complaint   Patient presents with    Blood Draw     Labs drawn via PIV placed by VAT. VS taken.    Oncology Clinic Visit     Nasopharyngeal Cancer     Initial Vitals: /72 (BP Location: Right arm, Patient Position: Sitting, Cuff Size: Adult Large)   Pulse 96   Temp 98.7  F (37.1  C) (Oral)   Resp 16   Wt 97.4 kg (214 lb 11.2 oz)   SpO2 93%   BMI 29.11 kg/m   Estimated body mass index is 29.11 kg/m  as calculated from the following:    Height as of 10/9/23: 1.829 m (6' 0.01\").    Weight as of this encounter: 97.4 kg (214 lb 11.2 oz). Body surface area is 2.22 meters squared.  No Pain (0) Comment: Data Unavailable   No LMP for male patient.  Allergies reviewed: Yes  Medications reviewed: Yes    Medications: Medication refills not needed today.  Pharmacy name entered into [employer]:    [id number] - [place], [place] - [address] AT [address] PHARMACY [facility] - [place], [place] - [address] [facility] [place], [place] - [phone]    Clinical concerns: None       Radha Duarte LPN  10/18/2023              "

## 2023-10-18 NOTE — PATIENT INSTRUCTIONS
Contact Numbers  [facility]: [phone] (for symptom and scheduling needs)    Please call the [facility] Triage line if you experience a temperature greater than or equal to 100.4, shaking chills, have uncontrolled nausea, vomiting and/or diarrhea, dizziness, shortness of breath, chest pain, bleeding, unexplained bruising, or if you have any other new/concerning symptoms, questions or concerns.     If you are having any concerning symptoms or wish to speak to a provider before your next infusion visit, please call your care coordinator or triage to notify them so we can adequately serve you.     If you need a refill on a narcotic prescription or other medication, please call triage before your infusion appointment.           October 2023 Sunday Monday Tuesday Wednesday Thursday Friday Saturday   1     2    LAB PERIPHERAL   7:45 AM   (15 min.)   [facility] LAB DRAW   [facility]    RETURN ACTIVE TREATMENT   8:00 AM   (45 min.)   Janis Cordova CNP   [facility]    ONC INFUSION 2 HR (120 MIN)   9:00 AM   (120 min.)    ONC INFUSION NURSE   [facility] OUTPATIENT  11:00 AM   (15 min.)   VIDEO,    [facility]  Services    TREATMENT  11:00 AM   (15 min.)   P RAD ONC IJEOMA   [facility] Radiation Oncology 3    [email or web address] NUTRITION VISIT   9:30 AM   (30 min.)   Nae Tafoya RD   [facility] Radiation Oncology    OTV   9:30 AM   (15 min.)   Yessica Parada MD   [facility] Radiation Oncology    TREATMENT   9:30 AM   (15 min.)   [facility] RAD ONC IJEOMA   [facility] Radiation Oncology 4    [place] OUTPATIENT  11:00 AM   (15 min.)   VIDEO,    [facility]  Services    TREATMENT  11:00 AM   (15 min.)   P RAD ONC IJEOMA   [facility] Radiation Oncology    ONC INFUSION 2 HR (120 MIN)   1:30 PM   (120  min.)   [facility] ONC INFUSION NURSE   [facility] Cancer [facility] 5    [place] OUTPATIENT  11:00 AM   (15 min.)   VIDEO,    [facility]  Services    TREATMENT  11:00 AM   (15 min.)   [facility] RAD ONC IJEOMA   [facility] Radiation Oncology 6    [place] OUTPATIENT  11:00 AM   (15 min.)   VIDEO,    [facility]  Services    TREATMENT  11:00 AM   (15 min.)   [facility] RAD ONC IJEOMA   [facility] Radiation Oncology    ONC INFUSION 2 HR (120 MIN)   2:00 PM   (120 min.)   [facility] ONC INFUSION NURSE   [facility] 7       8     9    LAB PERIPHERAL   6:45 AM   (15 min.)    [facility] LAB DRAW   [facility]    RETURN ACTIVE TREATMENT   7:00 AM   (45 min.)   Janis Cordova CNP   [facility]    ONC INFUSION 2 HR (120 MIN)   8:30 AM   (120 min.)   [facility] ONC INFUSION NURSE   [facility] OUTPATIENT  11:00 AM   (15 min.)   VIDEO,    [facility]  Services    TREATMENT  11:00 AM   (15 min.)   [facility] RAD ONC IJEOMA   [facility] Radiation Oncology 10    [facility] NUTRITION VISIT  11:00 AM   (30 min.)   Nae Tafoya RD   [facility] Radiation Oncology    [place] OUTPATIENT  11:00 AM   (15 min.)   VIDEO,    [facility]  Services    TREATMENT  11:00 AM   (15 min.)   [facility] RAD ONC IJEOMA   [facility] Radiation Oncology    OTV  11:15 AM   (15 min.)   Yessica Parada MD   [facility] Radiation Oncology 11    ONC INFUSION 2 HR (120 MIN)   8:00 AM   (120 min.)   [facility] ONC INFUSION NURSE   [facility] Cancer [facility] OUTPATIENT  11:00 AM   (15 min.)   VIDEO,    [facility]  Services    TREATMENT  11:00 AM   (15 min.)   [facility] RAD ONC IJEOMA   [facility] Radiation Oncology 12    RETURN  CCSL  10:00 AM   (30 min.)   Catrachita Clark MD   [facility] OUTPATIENT  11:00 AM   (15 min.)   VIDEO,    M [facility]  Services    TREATMENT  11:00 AM   (15 min.)   [facility] RAD ONC IJEOMA   [facility] Radiation Oncology 13    ONC INFUSION 2 HR (120 MIN)   8:00 AM   (120 min.)   UC ONC INFUSION NURSE   [facility] OUTPATIENT  11:00 AM   (15 min.)   VIDEO,    M [facility]  Services    TREATMENT  11:00 AM   (15 min.)   [facility] RAD ONC IJEOMA   [facility] Radiation Oncology 14       15     16    [place] OUTPATIENT  11:00 AM   (15 min.)   VIDEO,    M [facility]  Services    TREATMENT  11:00 AM   (15 min.)   P RAD ONC IJEOMA   [facility] Radiation Oncology    OTV  11:15 AM   (15 min.)   Yessica Parada MD   [facility] Radiation Oncology    ONC INFUSION 2 HR (120 MIN)   1:00 PM   (120 min.)   [facility] ONC INFUSION NURSE   [facility] 17     18    LAB PERIPHERAL   6:45 AM   (15 min.)   [facility] LAB DRAW   [facility]    RETURN CCSL   7:00 AM   (45 min.)   Jennifer Gabriel CNP   [facility]    ONC INFUSION 2 HR (120 MIN)   8:00 AM   (120 min.)   [facility] ONC INFUSION NURSE   [facility] 19     20    LAB PERIPHERAL   6:45 AM   (15 min.)   [facility] DRAW   [facility]    ONC INFUSION 2 HR (120 MIN)   7:30 AM   (120 min.)   [facility] ONC INFUSION NURSE   [facility] 21       22     23     24     25    LAB PERIPHERAL   7:45 AM   (15 min.)   [facility] DRAW   [facility]    ONC INFUSION 2 HR (120 MIN)   8:30 AM   (120 min.)   UC ONCOLOGY INFUSION CHAIR   [place] [id number]     27    ONC INFUSION 2 HR  (120 MIN)   2:30 PM   (120 min.)   UC ONC INFUSION NURSE   [facility] 28       29     30 31 November 2023 Sunday Monday Tuesday Wednesday Thursday Friday Saturday                  1    RETURN RADIATION ONCOLOGY  11:30 AM   (30 min.)   Yessica Parada MD   [facility] Radiation Oncology 2     3     4       5     6     7     8     9     10     11       12     13     14     15    RETURN RADIATION ONCOLOGY   3:00 PM   (30 min.)   Yessica Parada MD   [facility] Radiation Oncology 16     17     18       19     20     21     22     23     24     25       26     27     28     29     30                               Lab Results:  Recent Results (from the past 12 hour(s))   Magnesium    Collection Time: 10/18/23  7:15 AM   Result Value Ref Range    Magnesium 2.0 1.7 - 2.3 mg/dL   Basic metabolic panel    Collection Time: 10/18/23  7:15 AM   Result Value Ref Range    Sodium 139 135 - 145 mmol/L    Potassium 4.6 3.4 - 5.3 mmol/L    Chloride 101 98 - 107 mmol/L    Carbon Dioxide (CO2) 26 22 - 29 mmol/L    Anion Gap 12 7 - 15 mmol/L    Urea Nitrogen 33.9 (H) 6.0 - 20.0 mg/dL    Creatinine 1.41 (H) 0.67 - 1.17 mg/dL    GFR Estimate 66 >60 mL/min/1.73m2    Calcium 9.6 8.6 - 10.0 mg/dL    Glucose 143 (H) 70 - 99 mg/dL   CBC with platelets and differential    Collection Time: 10/18/23  7:15 AM   Result Value Ref Range    WBC Count 4.1 4.0 - 11.0 10e3/uL    RBC Count 2.90 (L) 4.40 - 5.90 10e6/uL    Hemoglobin 8.9 (L) 13.3 - 17.7 g/dL    Hematocrit 26.0 (L) 40.0 - 53.0 %    MCV 90 78 - 100 fL    MCH 30.7 26.5 - 33.0 pg    MCHC 34.2 31.5 - 36.5 g/dL    RDW 13.4 10.0 - 15.0 %    Platelet Count 179 150 - 450 10e3/uL    % Neutrophils 78 %    % Lymphocytes 8 %    % Monocytes 9 %    Mids % (Monos, Eos, Basos)      % Eosinophils 5 %    % Basophils 0 %    % Immature Granulocytes 0 %    NRBCs per 100 WBC 0 <1 /100    Absolute  Neutrophils 3.2 1.6 - 8.3 10e3/uL    Absolute Lymphocytes 0.3 (L) 0.8 - 5.3 10e3/uL    Absolute Monocytes 0.4 0.0 - 1.3 10e3/uL    Mids Abs (Monos, Eos, Basos)      Absolute Eosinophils 0.2 0.0 - 0.7 10e3/uL    Absolute Basophils 0.0 0.0 - 0.2 10e3/uL    Absolute Immature Granulocytes 0.0 <=0.4 10e3/uL    Absolute NRBCs 0.0 10e3/uL

## 2023-10-18 NOTE — LETTER
10/18/2023         RE: Thierno Vega  [address]  [address]  [place] [address]        Dear Colleague,    Thank you for referring your patient, Thierno Vega, to the [facility] CAN[facility]ER [facility]. Please see a copy of my visit note below.       [facility] CANCER [facility]    PATIENT NAME: Thierno Vega  MRN # [id number]   DATE OF VISIT: October 18, 2023  YOB: 1985     Otolaryngology: Dr. Navin Fisher  Radiation Oncology:  Dr. Yessica Parada    CANCER TYPE: SCC nasopharynx, GALLITO -jeanna  STAGE: cT2N2 vs N3 (III vs MARYSOL)  ECOG PS: 1-2    SUMMARY  4/30/23  US. Bilateral necrotic nodes  5/3/23  FNA cervical adenopathy - undifferentiated carcinoma  5/4/23   CT CAP. 7 mm pulmonary nodule, no metastases  5/5/23  MRI bilateral cervical and retropharyngeal adenopathy    Came to US  6/12/23  US guided FNA L neck node in clinic (Dr. Fisher). Path: hypocellular with scant clusters of atypical squamous cells suspicious for malignancy, p40 +jeanna, GALLITO -jeanna  6/13/23  PET/CT. Intense uptake L Rosenmuller fossa with increased fullness (SUV 18.8). Skull base intact. Milder FDG uptake (SUV 6.1) by the prominent R Rosenmuller fossa, inflammatory vs malignant. Indeterminate bilateral intense palatine tonsillar uptake with fullness on CT, inflammatroy vs infectious vs tumor, discontinuous with the nasopharyngeal abnormality. Bilateral nodes, including level 2a, 2b, 3, 3/5, R 1.8 cm 2A, another level 2/3 nodes, FDG avid retropharyngeal nodes. 0.3 cm nodule on the R major fissure. 0.8 cm R inguinal node (SUV 3.9) with additional nonenlarged LNs with minimal uptake, suggestive of reactive adenopathy. Marked focal uptake along the R upper inner thigh with skin thickening (SUV 16.8), which may represent cutaneous infection/inflammation, recommend direct visualization  6/27~8/11/23 Cisplatin gemcitabine x 3 cycles. Skipped C3D8 to give a short break prior to chemorads  8/28~10/16/23 Chemoradiation with weekly  cisplatin.      SUBJECTIVE  Mr. Vega returns for follow up, finished radiation a couple days ago  Going back end of November  Using both oxycodone (about 1x per day) + gabapentin  Bowels are moving still   Also using s/s rinses  Uses nausea meds infrequently  Fatigue main SE  No problems with TF thus far  Trying to swallow water. Not eating food     PAST MEDICAL HISTORY  Nasopharyngeal carcinoma as above  HTN    CURRENT OUTPATIENT MEDICATIONS  Reviewed    ALLERGIES  No Known Allergies     PHYSICAL EXAM  /72 (BP Location: Right arm, Patient Position: Sitting, Cuff Size: Adult Large)   Pulse 96   Temp 98.7  F (37.1  C) (Oral)   Resp 16   Wt 97.4 kg (214 lb 11.2 oz)   SpO2 93%   BMI 29.11 kg/m    GEN: NAD  HEENT: EOMI, no icterus, injection or pallor. NG in place  EXT: no edema  NEURO: alert    LABORATORY AND IMAGING STUDIES    Most Recent 3 CBC's:  Recent Labs   Lab Test 10/09/23  0801 10/02/23  0830 09/25/23  0726   WBC 4.2 3.7* 3.7*   HGB 9.6* 10.2* 10.5*   MCV 87 87 87   * 208 202   ANEUTAUTO 3.1 2.5 2.6     Most Recent 3 BMP's:  Recent Labs   Lab Test 10/09/23  0801 10/02/23  0830 09/25/23  0726    139 136   POTASSIUM 4.7 4.1 4.0   CHLORIDE 99 103 100   CO2 26 25 21*   BUN 25.4* 9.7 8.9   CR 1.21* 1.12 1.01   ANIONGAP 12 11 15   SOTO 9.1 9.5 9.2   * 108* 107*   PROTTOTAL 7.2 7.1 6.8   ALBUMIN 4.3 4.4 4.3    Most Recent 3 LFT's:  Recent Labs   Lab Test 10/09/23  0801 10/02/23  0830 09/25/23  0726   AST 11 9 15   ALT 15 13 17   ALKPHOS 64 57 51   BILITOTAL 0.3 0.2 0.4    Most Recent 2 TSH and T4:  Recent Labs   Lab Test 09/23/23  1526 06/22/23  1433   TSH 0.24* 0.88     I reviewed the above labs today.        ASSESSMENT AND PLAN  Nasopharyngeal carcinoma, cT2N2 (III): Last cisplatin 10/2 and finished radiation 10/16.  Is having the expected toxicities. Leaving for home at the end of Nov. Reviewed Dr. Parada note with plans for two interval rad onc follows ups before then. Discussed  anticipated course for recovery.   -RTC with Dr. Clark 1/24 for PET/CT, labs prior    Anorexia, malnutrition, dehydration: NG, continuous TF. Weight stable today. He is aware of how to wean as able  -IVF M-W-F and W-F next week . hopefully he'll be doing ok enough then to stop.     Mucositis: Continue oxycodone 5 mg q6h; continue MMW and salt/soda rinses; continue gabapentin and wean as directed. Should improve with time which we discussed    SMILEY: persistent, less likely cisplatin effect at this point. continue IVF, recheck Fri and next Wed to help us follow trend     Hearing changes/tinnitus: No changes since starting cisplatin. Had normal hearing test prior to starting. Not discussed today.     Chemo-induced nausea: Manageable    40 minutes spent on the date of the encounter doing chart review, review of test results, interpretation of tests, patient visit, documentation, and discussion with other provider(s)    Jennifer Gabriel, CNP

## 2023-10-18 NOTE — PROGRESS NOTES
Infusion Nursing Note:  Thierno Vega presents today for IV Fluids.    Patient seen by provider today: Yes: Jennifer Gabriel NP   present during visit today: Yes, Language: Cook Islander via phone     Note: Patient presents to infusion today doing well. No new questions or concerns following his visit with Jennifer Gabriel NP.    TORLA @ 4915 Jennifer Gabriel NP/ Britta Frank RN:  - Let patient know I am adding lab appointments on Friday and next Wednesday to check kidney function  - Give 2L IVF today as ordered     Intravenous Access:  Peripheral IV placed.    Treatment Conditions:  Lab Results   Component Value Date    HGB 8.9 (L) 10/18/2023    WBC 4.1 10/18/2023    ANEUTAUTO 3.2 10/18/2023     10/18/2023        Lab Results   Component Value Date     10/18/2023    POTASSIUM 4.6 10/18/2023    MAG 2.0 10/18/2023    CR 1.41 (H) 10/18/2023    SOTO 9.6 10/18/2023    BILITOTAL 0.3 10/09/2023    ALBUMIN 4.3 10/09/2023    ALT 15 10/09/2023    AST 11 10/09/2023       Results reviewed, labs MET treatment parameters, ok to proceed with treatment.    Post Infusion Assessment:  Patient tolerated infusion without incident.  Blood return noted pre and post infusion.  Site patent and intact, free from redness, edema or discomfort.  No evidence of extravasations.  Access discontinued per protocol.     Discharge Plan:   Patient declined prescription refills.  Discharge instructions reviewed with: Patient.  Patient and/or family verbalized understanding of discharge instructions and all questions answered.  Copy of AVS reviewed with patient and/or family.  Patient will return 10/20 for next appointment.  Patient discharged in stable condition accompanied by: self.  Departure Mode: Ambulatory.      Britta Frank, RN

## 2023-10-20 ENCOUNTER — INFUSION THERAPY VISIT (OUTPATIENT)
Dept: ONCOLOGY | Facility: CLINIC | Age: 38
End: 2023-10-20
Attending: INTERNAL MEDICINE
Payer: COMMERCIAL

## 2023-10-20 ENCOUNTER — APPOINTMENT (OUTPATIENT)
Dept: LAB | Facility: CLINIC | Age: 38
End: 2023-10-20
Attending: NURSE PRACTITIONER
Payer: COMMERCIAL

## 2023-10-20 VITALS
SYSTOLIC BLOOD PRESSURE: 109 MMHG | HEART RATE: 87 BPM | OXYGEN SATURATION: 98 % | TEMPERATURE: 98.7 F | DIASTOLIC BLOOD PRESSURE: 70 MMHG | BODY MASS INDEX: 28.94 KG/M2 | RESPIRATION RATE: 16 BRPM | WEIGHT: 213.4 LBS

## 2023-10-20 DIAGNOSIS — C11.9 NASOPHARYNGEAL CANCER (H): Primary | ICD-10-CM

## 2023-10-20 LAB
ANION GAP SERPL CALCULATED.3IONS-SCNC: 10 MMOL/L (ref 7–15)
BUN SERPL-MCNC: 36.1 MG/DL (ref 6–20)
CALCIUM SERPL-MCNC: 9.7 MG/DL (ref 8.6–10)
CHLORIDE SERPL-SCNC: 101 MMOL/L (ref 98–107)
CREAT SERPL-MCNC: 1.51 MG/DL (ref 0.67–1.17)
DEPRECATED HCO3 PLAS-SCNC: 28 MMOL/L (ref 22–29)
EBV DNA # SPEC NAA+PROBE: NOT DETECTED COPIES/ML
EGFRCR SERPLBLD CKD-EPI 2021: 61 ML/MIN/1.73M2
GLUCOSE SERPL-MCNC: 110 MG/DL (ref 70–99)
POTASSIUM SERPL-SCNC: 5 MMOL/L (ref 3.4–5.3)
SODIUM SERPL-SCNC: 139 MMOL/L (ref 135–145)

## 2023-10-20 PROCEDURE — 80048 BASIC METABOLIC PNL TOTAL CA: CPT

## 2023-10-20 PROCEDURE — 258N000003 HC RX IP 258 OP 636: Performed by: NURSE PRACTITIONER

## 2023-10-20 PROCEDURE — 36415 COLL VENOUS BLD VENIPUNCTURE: CPT

## 2023-10-20 PROCEDURE — 96361 HYDRATE IV INFUSION ADD-ON: CPT

## 2023-10-20 PROCEDURE — 96374 THER/PROPH/DIAG INJ IV PUSH: CPT

## 2023-10-20 PROCEDURE — 250N000011 HC RX IP 250 OP 636: Mod: JZ | Performed by: INTERNAL MEDICINE

## 2023-10-20 RX ORDER — DIPHENHYDRAMINE HYDROCHLORIDE 50 MG/ML
50 INJECTION INTRAMUSCULAR; INTRAVENOUS
Status: CANCELLED
Start: 2023-10-20

## 2023-10-20 RX ORDER — EPINEPHRINE 1 MG/ML
0.3 INJECTION, SOLUTION, CONCENTRATE INTRAVENOUS EVERY 5 MIN PRN
Status: CANCELLED | OUTPATIENT
Start: 2023-10-20

## 2023-10-20 RX ORDER — ALBUTEROL SULFATE 0.83 MG/ML
2.5 SOLUTION RESPIRATORY (INHALATION)
Status: CANCELLED | OUTPATIENT
Start: 2023-10-20

## 2023-10-20 RX ORDER — HEPARIN SODIUM,PORCINE 10 UNIT/ML
5-20 VIAL (ML) INTRAVENOUS DAILY PRN
Status: CANCELLED | OUTPATIENT
Start: 2023-10-20

## 2023-10-20 RX ORDER — MEPERIDINE HYDROCHLORIDE 25 MG/ML
25 INJECTION INTRAMUSCULAR; INTRAVENOUS; SUBCUTANEOUS EVERY 30 MIN PRN
Status: CANCELLED | OUTPATIENT
Start: 2023-10-20

## 2023-10-20 RX ORDER — ONDANSETRON 2 MG/ML
8 INJECTION INTRAMUSCULAR; INTRAVENOUS ONCE
Status: COMPLETED | OUTPATIENT
Start: 2023-10-20 | End: 2023-10-20

## 2023-10-20 RX ORDER — HEPARIN SODIUM (PORCINE) LOCK FLUSH IV SOLN 100 UNIT/ML 100 UNIT/ML
5 SOLUTION INTRAVENOUS
Status: CANCELLED | OUTPATIENT
Start: 2023-10-20

## 2023-10-20 RX ORDER — METHYLPREDNISOLONE SODIUM SUCCINATE 125 MG/2ML
125 INJECTION, POWDER, LYOPHILIZED, FOR SOLUTION INTRAMUSCULAR; INTRAVENOUS
Status: CANCELLED
Start: 2023-10-20

## 2023-10-20 RX ORDER — ALBUTEROL SULFATE 90 UG/1
1-2 AEROSOL, METERED RESPIRATORY (INHALATION)
Status: CANCELLED
Start: 2023-10-20

## 2023-10-20 RX ADMIN — SODIUM CHLORIDE 2000 ML: 9 INJECTION, SOLUTION INTRAVENOUS at 07:46

## 2023-10-20 RX ADMIN — ONDANSETRON 8 MG: 2 INJECTION INTRAMUSCULAR; INTRAVENOUS at 08:04

## 2023-10-20 ASSESSMENT — PAIN SCALES - GENERAL: PAINLEVEL: NO PAIN (0)

## 2023-10-20 NOTE — PROGRESS NOTES
Infusion Nursing Note:  Thierno Vega presents today for 2L IVF.    Patient seen by provider today: No   present during visit today: Yes, Language: Syrian via phone .     Note: Pt presents to infusion today feeling nauseous. Pt reports continued fatigued. Pt denies any pain today - using oxycodone, MMW and mouth rinses daily.     VALERY Aguiar/Elma Terry RN on 10/20 @ 0730:  -OK to give 8mg IV Zofran for nausea today    Intravenous Access:  Peripheral IV placed.      Treatment Conditions:  Lab Results   Component Value Date     10/20/2023    POTASSIUM 5.0 10/20/2023    MAG 2.0 10/18/2023    CR 1.51 (H) 10/20/2023    SOTO 9.7 10/20/2023    BILITOTAL 0.3 10/09/2023    ALBUMIN 4.3 10/09/2023    ALT 15 10/09/2023    AST 11 10/09/2023       Results reviewed, labs MET treatment parameters, ok to proceed with treatment.    Post Infusion Assessment:  Patient tolerated infusion without incident.  Blood return noted pre and post infusion.  Site patent and intact, free from redness, edema or discomfort.  No evidence of extravasations.  Access discontinued per protocol.       Discharge Plan:   Patient declined prescription refills.  Discharge instructions reviewed with: Patient.  Patient and/or family verbalized understanding of discharge instructions and all questions answered.  AVS to patient via [employer].  Patient will return 10/22/23 for next appointment.   Patient discharged in stable condition accompanied by: self.  Departure Mode: Ambulatory.      Elma Terry, ALAN

## 2023-10-20 NOTE — NURSING NOTE
Chief Complaint   Patient presents with    Blood Draw     Labs drawn via piv by rn in lab. VS taken.     Labs drawn from PIV placed by RN. Line flushed with saline. Vitals taken. Pt checked in for appointment(s).    Matt Maher RN

## 2023-10-20 NOTE — PATIENT INSTRUCTIONS
[facility] Triage and after hours / weekends / holidays:  [phone]    Please call the triage or after hours line if you experience a temperature greater than or equal to 100.4, shaking chills, have uncontrolled nausea, vomiting and/or diarrhea, dizziness, shortness of breath, chest pain, bleeding, unexplained bruising, or if you have any other new/concerning symptoms, questions or concerns.      If you are having any concerning symptoms or wish to speak to a provider before your next infusion visit, please call triage to notify them so we can adequately serve you.     If you need a refill on a narcotic prescription or other medication, please call before your infusion appointment.                October 2023 Sunday Monday Tuesday Wednesday Thursday Friday Saturday   1     2    LAB PERIPHERAL   7:45 AM   (15 min.)   [facility] LAB DRAW   [facility]    RETURN ACTIVE TREATMENT   8:00 AM   (45 min.)   Janis Cordova CNP   [facility]    ONC INFUSION 2 HR (120 MIN)   9:00 AM   (120 min.)   CARMEN ONC INFUSION NURSE   [facility] OUTPATIENT  11:00 AM   (15 min.)   VIDEO,    M [facility]  Services    TREATMENT  11:00 AM   (15 min.)   [facility] RAD ONC IJEOMA   [facility] Radiation Oncology 3    [facility] NUTRITION VISIT   9:30 AM   (30 min.)   Nae Tafoya RD   [facility] Radiation Oncology    OTV   9:30 AM   (15 min.)   Yessica Parada MD   [facility] Radiation Oncology    TREATMENT   9:30 AM   (15 min.)   P RAD ONC IJEOMA   [facility] Radiation Oncology 4    [place] OUTPATIENT  11:00 AM   (15 min.)   VIDEO,    [facility]  Services    TREATMENT  11:00 AM   (15 min.)   P RAD ONC IJEOMA   [facility] Radiation Oncology    ONC INFUSION 2 HR (120 MIN)   1:30 PM   (120 min.)   UC ONC INFUSION NURSE   M  [facility] 5    [place] OUTPATIENT  11:00 AM   (15 min.)   VIDEO,    M [facility]  Services    TREATMENT  11:00 AM   (15 min.)   [facility] RAD ONC IJEOMA   M [facility] Radiation Oncology 6    [place] OUTPATIENT  11:00 AM   (15 min.)   VIDEO,    [facility]  Services    TREATMENT  11:00 AM   (15 min.)   [facility] RAD ONC IJEOMA   [facility] Radiation Oncology    ONC INFUSION 2 HR (120 MIN)   2:00 PM   (120 min.)   UC ONC INFUSION NURSE   [facility] 7       8     9    LAB PERIPHERAL   6:45 AM   (15 min.)   [facility] DRAW   [facility]    RETURN ACTIVE TREATMENT   7:00 AM   (45 min.)   Janis Cordova CNP   M [facility]    ONC INFUSION 2 HR (120 MIN)   8:30 AM   (120 min.)   UC ONC INFUSION NURSE   [facility] OUTPATIENT  11:00 AM   (15 min.)   VIDEO,    [facility]  Services    TREATMENT  11:00 AM   (15 min.)   [facility] RAD ONC IJEOMA   [facility] Radiation Oncology 10    [facility] NUTRITION VISIT  11:00 AM   (30 min.)   Nae Tafoya RD   [facility] Radiation Oncology    [place] OUTPATIENT  11:00 AM   (15 min.)   VIDEO,    [facility]  Services    TREATMENT  11:00 AM   (15 min.)   [facility] RAD ONC IJEOMA   [facility] Radiation Oncology    OTV  11:15 AM   (15 min.)   Yessica Parada MD   [facility] Radiation Oncology 11    ONC INFUSION 2 HR (120 MIN)   8:00 AM   (120 min.)   UC ONC INFUSION NURSE   [facility] OUTPATIENT  11:00 AM   (15 min.)   VIDEO,    [facility]  Services    TREATMENT  11:00 AM   (15 min.)   [facility] RAD ONC IJEOMA   [facility] Radiation Oncology 12    RETURN CCSL  10:00 AM   (30 min.)   Catrachita Clark,  MD   M [facility] OUTPATIENT  11:00 AM   (15 min.)   VIDEO,    M [facility]  Services    TREATMENT  11:00 AM   (15 min.)   [facility] RAD ONC IJEOMA   M [facility] Radiation Oncology 13    ONC INFUSION 2 HR (120 MIN)   8:00 AM   (120 min.)   UC ONC INFUSION NURSE   M [facility] OUTPATIENT  11:00 AM   (15 min.)   VIDEO,    M [facility]  Services    TREATMENT  11:00 AM   (15 min.)   [facility] RAD ONC IJEOMA   M [facility] Radiation Oncology 14       15     16    [place] OUTPATIENT  11:00 AM   (15 min.)   VIDEO,    M [facility]  Services    TREATMENT  11:00 AM   (15 min.)   [facility] RAD ONC IJEOMA   M [facility] Radiation Oncology    OTV  11:15 AM   (15 min.)   Yessica Parada MD   M [facility] Radiation Oncology    ONC INFUSION 2 HR (120 MIN)   1:00 PM   (120 min.)   UC ONC INFUSION NURSE   [facility] 17     18    LAB PERIPHERAL   6:45 AM   (15 min.)   [facility] DRAW   M [facility]    RETURN CCSL   7:00 AM   (45 min.)   Jennifer Gabriel CNP   M [facility]    ONC INFUSION 2 HR (120 MIN)   8:00 AM   (120 min.)   UC ONC INFUSION NURSE   [facility] 19     20    LAB PERIPHERAL   6:45 AM   (15 min.)   [facility] DRAW   M [facility]    ONC INFUSION 2 HR (120 MIN)   7:30 AM   (120 min.)   UC ONC INFUSION NURSE   M [facility] 21       22    ONC INFUSION 2 HR (120 MIN)   9:00 AM   (120 min.)   UC ONC INFUSION NURSE   M [facility] 23     24     25    LAB PERIPHERAL   7:45 AM   (15 min.)   [facility] DRAW   M [facility]    ONC INFUSION 2 HR (120 MIN)   8:30 AM   (120 min.)   UC ONCOLOGY INFUSION CHAIR   M [facility]  [facility] 26     27    ONC INFUSION 2 HR (120 MIN)   2:30 PM   (120 min.)   UC ONCOLOGY INFUSION CHAIR   [facility] 28       29     30     31 November 2023 Sunday Monday Tuesday Wednesday Thursday Friday Saturday                  1    RETURN RADIATION ONCOLOGY  11:30 AM   (30 min.)   Yessica Parada MD   [facility] Radiation Oncology 2     3     4       5     6    RETURN   4:25 PM   (20 min.)   Navin Fisher MD   [facility] [place] 7     8     9     10     11       12     13     14     15    RETURN RADIATION ONCOLOGY   3:00 PM   (30 min.)   Yessica Parada MD   [facility] Radiation Oncology 16     17     18       19     20     21     22     23     24     25       26     27     28     29     30                              Recent Results (from the past 24 hour(s))   Basic metabolic panel    Collection Time: 10/20/23  7:11 AM   Result Value Ref Range    Sodium 139 135 - 145 mmol/L    Potassium 5.0 3.4 - 5.3 mmol/L    Chloride 101 98 - 107 mmol/L    Carbon Dioxide (CO2) 28 22 - 29 mmol/L    Anion Gap 10 7 - 15 mmol/L    Urea Nitrogen 36.1 (H) 6.0 - 20.0 mg/dL    Creatinine 1.51 (H) 0.67 - 1.17 mg/dL    GFR Estimate 61 >60 mL/min/1.73m2    Calcium 9.7 8.6 - 10.0 mg/dL    Glucose 110 (H) 70 - 99 mg/dL

## 2023-10-22 ENCOUNTER — INFUSION THERAPY VISIT (OUTPATIENT)
Dept: ONCOLOGY | Facility: CLINIC | Age: 38
End: 2023-10-22
Attending: REGISTERED NURSE
Payer: COMMERCIAL

## 2023-10-22 VITALS
DIASTOLIC BLOOD PRESSURE: 74 MMHG | OXYGEN SATURATION: 100 % | RESPIRATION RATE: 18 BRPM | SYSTOLIC BLOOD PRESSURE: 107 MMHG | HEART RATE: 93 BPM | TEMPERATURE: 98.3 F

## 2023-10-22 DIAGNOSIS — C11.9 NASOPHARYNGEAL CANCER (H): Primary | ICD-10-CM

## 2023-10-22 PROCEDURE — 258N000003 HC RX IP 258 OP 636: Performed by: NURSE PRACTITIONER

## 2023-10-22 PROCEDURE — 96360 HYDRATION IV INFUSION INIT: CPT

## 2023-10-22 PROCEDURE — 96361 HYDRATE IV INFUSION ADD-ON: CPT

## 2023-10-22 RX ORDER — EPINEPHRINE 1 MG/ML
0.3 INJECTION, SOLUTION, CONCENTRATE INTRAVENOUS EVERY 5 MIN PRN
Status: CANCELLED | OUTPATIENT
Start: 2023-10-22

## 2023-10-22 RX ORDER — DIPHENHYDRAMINE HYDROCHLORIDE 50 MG/ML
50 INJECTION INTRAMUSCULAR; INTRAVENOUS
Status: CANCELLED
Start: 2023-10-22

## 2023-10-22 RX ORDER — METHYLPREDNISOLONE SODIUM SUCCINATE 125 MG/2ML
125 INJECTION, POWDER, LYOPHILIZED, FOR SOLUTION INTRAMUSCULAR; INTRAVENOUS
Status: CANCELLED
Start: 2023-10-22

## 2023-10-22 RX ORDER — HEPARIN SODIUM,PORCINE 10 UNIT/ML
5-20 VIAL (ML) INTRAVENOUS DAILY PRN
Status: CANCELLED | OUTPATIENT
Start: 2023-10-22

## 2023-10-22 RX ORDER — HEPARIN SODIUM (PORCINE) LOCK FLUSH IV SOLN 100 UNIT/ML 100 UNIT/ML
5 SOLUTION INTRAVENOUS
Status: CANCELLED | OUTPATIENT
Start: 2023-10-22

## 2023-10-22 RX ORDER — ALBUTEROL SULFATE 90 UG/1
1-2 AEROSOL, METERED RESPIRATORY (INHALATION)
Status: CANCELLED
Start: 2023-10-22

## 2023-10-22 RX ORDER — MEPERIDINE HYDROCHLORIDE 25 MG/ML
25 INJECTION INTRAMUSCULAR; INTRAVENOUS; SUBCUTANEOUS EVERY 30 MIN PRN
Status: CANCELLED | OUTPATIENT
Start: 2023-10-22

## 2023-10-22 RX ORDER — ALBUTEROL SULFATE 0.83 MG/ML
2.5 SOLUTION RESPIRATORY (INHALATION)
Status: CANCELLED | OUTPATIENT
Start: 2023-10-22

## 2023-10-22 RX ADMIN — SODIUM CHLORIDE 2000 ML: 9 INJECTION, SOLUTION INTRAVENOUS at 09:25

## 2023-10-22 ASSESSMENT — PAIN SCALES - GENERAL: PAINLEVEL: NO PAIN (0)

## 2023-10-22 NOTE — PROGRESS NOTES
Infusion Nursing Note:  Thierno Vega presents today for IVF.    Patient seen by provider today: No   present during visit today: Yes, Language: French via phone.     Note: Pt presents to infusion feeling well. He continues with tube feedings via NG, and is managing throat/mouth pain with Oxycodone as needed. He denies fevers/chills, cough/congestion, SOB, chest pain, nausea, diarrhea/constipation, pain or further concerns today.      Intravenous Access:  Peripheral IV placed.    Post Infusion Assessment:  Patient tolerated infusion without incident.  Blood return noted pre and post infusion.  Site patent and intact, free from redness, edema or discomfort.  No evidence of extravasations.  Access discontinued per protocol.       Discharge Plan:   Patient declined prescription refills.  Discharge instructions reviewed with: Patient.  Patient and/or family verbalized understanding of discharge instructions and all questions answered.  AVS to patient via [employer].  Patient will return 10/25/23 for next appointment.   Patient discharged in stable condition accompanied by: self.  Departure Mode: Ambulatory.      Leeanna Leija RN

## 2023-10-22 NOTE — PATIENT INSTRUCTIONS
[facility] Triage and after hours / weekends / holidays:  [phone]    Please call the triage or after hours line if you experience a temperature greater than or equal to 100.4, shaking chills, have uncontrolled nausea, vomiting and/or diarrhea, dizziness, shortness of breath, chest pain, bleeding, unexplained bruising, or if you have any other new/concerning symptoms, questions or concerns.      If you are having any concerning symptoms or wish to speak to a provider before your next infusion visit, please call triage to notify them so we can adequately serve you.     If you need a refill on a narcotic prescription or other medication, please call before your infusion appointment.                October 2023 Sunday Monday Tuesday Wednesday Thursday Friday Saturday   1     2    LAB PERIPHERAL   7:45 AM   (15 min.)   [facility] LAB DRAW   [facility]    RETURN ACTIVE TREATMENT   8:00 AM   (45 min.)   Janis Cordova CNP   [facility]    ONC INFUSION 2 HR (120 MIN)   9:00 AM   (120 min.)   CARMEN ONC INFUSION NURSE   [facility] OUTPATIENT  11:00 AM   (15 min.)   VIDEO,    M [facility]  Services    TREATMENT  11:00 AM   (15 min.)   [facility] RAD ONC IJEOMA   [facility] Radiation Oncology 3    [facility] NUTRITION VISIT   9:30 AM   (30 min.)   Nae Tafoya RD   [facility] Radiation Oncology    OTV   9:30 AM   (15 min.)   Yessica Parada MD   [facility] Radiation Oncology    TREATMENT   9:30 AM   (15 min.)   P RAD ONC IJEOMA   [facility] Radiation Oncology 4    [place] OUTPATIENT  11:00 AM   (15 min.)   VIDEO,    [facility]  Services    TREATMENT  11:00 AM   (15 min.)   P RAD ONC IJEOMA   [facility] Radiation Oncology    ONC INFUSION 2 HR (120 MIN)   1:30 PM   (120 min.)   UC ONC INFUSION NURSE   M  [facility] 5    [place] OUTPATIENT  11:00 AM   (15 min.)   VIDEO,    M [facility]  Services    TREATMENT  11:00 AM   (15 min.)   [facility] RAD ONC IJEOMA   M [facility] Radiation Oncology 6    [place] OUTPATIENT  11:00 AM   (15 min.)   VIDEO,    [facility]  Services    TREATMENT  11:00 AM   (15 min.)   [facility] RAD ONC IJEOMA   M [facility] Radiation Oncology    ONC INFUSION 2 HR (120 MIN)   2:00 PM   (120 min.)   UC ONC INFUSION NURSE   [facility] 7       8     9    LAB PERIPHERAL   6:45 AM   (15 min.)   [facility] DRAW   M [facility]    RETURN ACTIVE TREATMENT   7:00 AM   (45 min.)   Janis Cordova CNP   M [facility]    ONC INFUSION 2 HR (120 MIN)   8:30 AM   (120 min.)   UC ONC INFUSION NURSE   [facility] OUTPATIENT  11:00 AM   (15 min.)   VIDEO,    [facility]  Services    TREATMENT  11:00 AM   (15 min.)   UMP RAD ONC IJEOMA   [facility] Radiation Oncology 10    UMP NUTRITION VISIT  11:00 AM   (30 min.)   Nae Tafoya RD   [facility] Radiation Oncology    [place] OUTPATIENT  11:00 AM   (15 min.)   VIDEO,    [facility]  Services    TREATMENT  11:00 AM   (15 min.)   [facility] RAD ONC IJEOMA   [facility] Radiation Oncology    OTV  11:15 AM   (15 min.)   Yessica Parada MD   [facility] Radiation Oncology 11    ONC INFUSION 2 HR (120 MIN)   8:00 AM   (120 min.)   UC ONC INFUSION NURSE   [facility] OUTPATIENT  11:00 AM   (15 min.)   VIDEO,    [facility]  Services    TREATMENT  11:00 AM   (15 min.)   UMP RAD ONC IJEOMA   [facility] Radiation Oncology 12    RETURN CCSL  10:00 AM   (30 min.)   Catrachita Clark,  MD   M [facility] OUTPATIENT  11:00 AM   (15 min.)   VIDEO,    M [facility]  Services    TREATMENT  11:00 AM   (15 min.)   UMP RAD ONC IJEOMA   M [facility] Radiation Oncology 13    ONC INFUSION 2 HR (120 MIN)   8:00 AM   (120 min.)   UC ONC INFUSION NURSE   M [facility] OUTPATIENT  11:00 AM   (15 min.)   VIDEO,    M [facility]  Services    TREATMENT  11:00 AM   (15 min.)   UMP RAD ONC IJEOMA   M [facility] Radiation Oncology 14       15     16    [place] OUTPATIENT  11:00 AM   (15 min.)   VIDEO,    M [facility]  Services    TREATMENT  11:00 AM   (15 min.)   [facility] RAD ONC IJEOMA   M [facility] Radiation Oncology    OTV  11:15 AM   (15 min.)   Yessica Parada MD   M [facility] Radiation Oncology    ONC INFUSION 2 HR (120 MIN)   1:00 PM   (120 min.)   UC ONC INFUSION NURSE   [facility] 17     18    LAB PERIPHERAL   6:45 AM   (15 min.)   [facility] DRAW   M [facility]    RETURN CCSL   7:00 AM   (45 min.)   Jennifer Gabriel CNP   M [facility]    ONC INFUSION 2 HR (120 MIN)   8:00 AM   (120 min.)   UC ONC INFUSION NURSE   [facility] 19     20    LAB PERIPHERAL   6:45 AM   (15 min.)   [facility] DRAW   M [facility]    ONC INFUSION 2 HR (120 MIN)   7:30 AM   (120 min.)   UC ONC INFUSION NURSE   M [facility] 21       22    ONC INFUSION 2 HR (120 MIN)   9:00 AM   (120 min.)   UC ONC INFUSION NURSE   M [facility] 23     24     25    LAB PERIPHERAL   7:45 AM   (15 min.)   [facility] DRAW   M [facility]    ONC INFUSION 2 HR (120 MIN)   8:30 AM   (120 min.)   UC ONCOLOGY INFUSION CHAIR   M [facility]  [facility] 26     27    ONC INFUSION 2 HR (120 MIN)   2:30 PM   (120 min.)   UC ONCOLOGY INFUSION CHAIR   [facility] 28       29     30     31 November 2023 Sunday Monday Tuesday Wednesday Thursday Friday Saturday                  1    RETURN RADIATION ONCOLOGY  11:30 AM   (30 min.)   Yessica Parada MD   [facility] Radiation Oncology 2     3     4       5     6    RETURN   4:25 PM   (20 min.)   Navin Fisher MD   [facility] [place] 7     8     9     10     11       12     13     14     15    RETURN RADIATION ONCOLOGY   3:00 PM   (30 min.)   Yessica Parada MD   [facility] Radiation Oncology 16     17     18       19     20     21     22     23     24     25       26     27     28     29     30                               Lab Results:  No results found for this or any previous visit (from the past 12 hour(s)).

## 2023-10-25 ENCOUNTER — APPOINTMENT (OUTPATIENT)
Dept: LAB | Facility: CLINIC | Age: 38
End: 2023-10-25
Attending: NURSE PRACTITIONER
Payer: COMMERCIAL

## 2023-10-25 ENCOUNTER — INFUSION THERAPY VISIT (OUTPATIENT)
Dept: ONCOLOGY | Facility: CLINIC | Age: 38
End: 2023-10-25
Attending: REGISTERED NURSE
Payer: COMMERCIAL

## 2023-10-25 VITALS
HEART RATE: 88 BPM | OXYGEN SATURATION: 100 % | SYSTOLIC BLOOD PRESSURE: 119 MMHG | DIASTOLIC BLOOD PRESSURE: 71 MMHG | RESPIRATION RATE: 16 BRPM | WEIGHT: 210.5 LBS | TEMPERATURE: 98 F | BODY MASS INDEX: 28.54 KG/M2

## 2023-10-25 DIAGNOSIS — N17.9 AKI (ACUTE KIDNEY INJURY) (H): ICD-10-CM

## 2023-10-25 DIAGNOSIS — N17.9 AKI (ACUTE KIDNEY INJURY) (H): Primary | ICD-10-CM

## 2023-10-25 DIAGNOSIS — C11.9 NASOPHARYNGEAL CANCER (H): ICD-10-CM

## 2023-10-25 DIAGNOSIS — C11.9 NASOPHARYNGEAL CANCER (H): Primary | ICD-10-CM

## 2023-10-25 LAB
ALBUMIN UR-MCNC: NEGATIVE MG/DL
ANION GAP SERPL CALCULATED.3IONS-SCNC: 12 MMOL/L (ref 7–15)
APPEARANCE UR: CLEAR
BILIRUB UR QL STRIP: NEGATIVE
BUN SERPL-MCNC: 43.1 MG/DL (ref 6–20)
CALCIUM SERPL-MCNC: 9.8 MG/DL (ref 8.6–10)
CHLORIDE SERPL-SCNC: 102 MMOL/L (ref 98–107)
COLOR UR AUTO: ABNORMAL
CREAT SERPL-MCNC: 1.86 MG/DL (ref 0.67–1.17)
CREAT UR-MCNC: 70.6 MG/DL
DEPRECATED HCO3 PLAS-SCNC: 25 MMOL/L (ref 22–29)
EGFRCR SERPLBLD CKD-EPI 2021: 48 ML/MIN/1.73M2
ERYTHROCYTE [DISTWIDTH] IN BLOOD BY AUTOMATED COUNT: 14.2 % (ref 10–15)
FRACT EXCRET NA UR+SERPL-RTO: 1.5 %
GLUCOSE SERPL-MCNC: 130 MG/DL (ref 70–99)
GLUCOSE UR STRIP-MCNC: NEGATIVE MG/DL
HCT VFR BLD AUTO: 28.1 % (ref 40–53)
HGB BLD-MCNC: 9.3 G/DL (ref 13.3–17.7)
HGB UR QL STRIP: NEGATIVE
HYALINE CASTS: 2 /LPF
KETONES UR STRIP-MCNC: NEGATIVE MG/DL
LEUKOCYTE ESTERASE UR QL STRIP: NEGATIVE
MCH RBC QN AUTO: 31.2 PG (ref 26.5–33)
MCHC RBC AUTO-ENTMCNC: 33.1 G/DL (ref 31.5–36.5)
MCV RBC AUTO: 94 FL (ref 78–100)
MUCOUS THREADS #/AREA URNS LPF: PRESENT /LPF
NITRATE UR QL: NEGATIVE
PH UR STRIP: 7 [PH] (ref 5–7)
PLATELET # BLD AUTO: 368 10E3/UL (ref 150–450)
POTASSIUM SERPL-SCNC: 4.8 MMOL/L (ref 3.4–5.3)
RBC # BLD AUTO: 2.98 10E6/UL (ref 4.4–5.9)
RBC URINE: 1 /HPF
SODIUM SERPL-SCNC: 139 MMOL/L (ref 135–145)
SODIUM UR-SCNC: 77 MMOL/L
SP GR UR STRIP: 1.01 (ref 1–1.03)
SQUAMOUS EPITHELIAL: 1 /HPF
UROBILINOGEN UR STRIP-MCNC: NORMAL MG/DL
WBC # BLD AUTO: 4.6 10E3/UL (ref 4–11)
WBC URINE: 2 /HPF

## 2023-10-25 PROCEDURE — 85027 COMPLETE CBC AUTOMATED: CPT

## 2023-10-25 PROCEDURE — 258N000003 HC RX IP 258 OP 636: Performed by: NURSE PRACTITIONER

## 2023-10-25 PROCEDURE — 36415 COLL VENOUS BLD VENIPUNCTURE: CPT

## 2023-10-25 PROCEDURE — 96361 HYDRATE IV INFUSION ADD-ON: CPT

## 2023-10-25 PROCEDURE — 80048 BASIC METABOLIC PNL TOTAL CA: CPT

## 2023-10-25 PROCEDURE — 96360 HYDRATION IV INFUSION INIT: CPT

## 2023-10-25 PROCEDURE — 81001 URINALYSIS AUTO W/SCOPE: CPT

## 2023-10-25 PROCEDURE — 84300 ASSAY OF URINE SODIUM: CPT

## 2023-10-25 RX ORDER — DIPHENHYDRAMINE HYDROCHLORIDE 50 MG/ML
50 INJECTION INTRAMUSCULAR; INTRAVENOUS
Status: CANCELLED
Start: 2023-10-25

## 2023-10-25 RX ORDER — HEPARIN SODIUM,PORCINE 10 UNIT/ML
5-20 VIAL (ML) INTRAVENOUS DAILY PRN
Status: CANCELLED | OUTPATIENT
Start: 2023-10-25

## 2023-10-25 RX ORDER — ALBUTEROL SULFATE 0.83 MG/ML
2.5 SOLUTION RESPIRATORY (INHALATION)
Status: CANCELLED | OUTPATIENT
Start: 2023-10-25

## 2023-10-25 RX ORDER — EPINEPHRINE 1 MG/ML
0.3 INJECTION, SOLUTION, CONCENTRATE INTRAVENOUS EVERY 5 MIN PRN
Status: CANCELLED | OUTPATIENT
Start: 2023-10-25

## 2023-10-25 RX ORDER — MEPERIDINE HYDROCHLORIDE 25 MG/ML
25 INJECTION INTRAMUSCULAR; INTRAVENOUS; SUBCUTANEOUS EVERY 30 MIN PRN
Status: CANCELLED | OUTPATIENT
Start: 2023-10-25

## 2023-10-25 RX ORDER — ALBUTEROL SULFATE 90 UG/1
1-2 AEROSOL, METERED RESPIRATORY (INHALATION)
Status: CANCELLED
Start: 2023-10-25

## 2023-10-25 RX ORDER — HEPARIN SODIUM (PORCINE) LOCK FLUSH IV SOLN 100 UNIT/ML 100 UNIT/ML
5 SOLUTION INTRAVENOUS
Status: CANCELLED | OUTPATIENT
Start: 2023-10-25

## 2023-10-25 RX ORDER — METHYLPREDNISOLONE SODIUM SUCCINATE 125 MG/2ML
125 INJECTION, POWDER, LYOPHILIZED, FOR SOLUTION INTRAMUSCULAR; INTRAVENOUS
Status: CANCELLED
Start: 2023-10-25

## 2023-10-25 RX ADMIN — SODIUM CHLORIDE 2000 ML: 9 INJECTION, SOLUTION INTRAVENOUS at 08:43

## 2023-10-25 ASSESSMENT — PAIN SCALES - GENERAL: PAINLEVEL: NO PAIN (0)

## 2023-10-25 NOTE — PROGRESS NOTES
Infusion Nursing Note:  Thierno Vega presents today for IV fluids.    Patient seen by provider today: No   present during visit today: Yes, Language: Upper sorbian via phone.     Note: Patient reports no changes sine his last visit, has been feeling well, pain is in control and continues tube feedings. Reports not trying any food intake orally yet, but has been drinking a liter of water a day and some coffee. Encouraged him to refrain from caffinated liquids and to continue to push non caffinated beverages.       Intravenous Access:  Peripheral IV placed.    Treatment Conditions:  Lab Results   Component Value Date    HGB 9.3 (L) 10/25/2023    WBC 4.6 10/25/2023    ANEUTAUTO 3.2 10/18/2023     10/25/2023   Last Comprehensive Metabolic Panel:  Lab Results   Component Value Date     10/25/2023    POTASSIUM 4.8 10/25/2023    CHLORIDE 102 10/25/2023    CO2 25 10/25/2023    ANIONGAP 12 10/25/2023     (H) 10/25/2023    BUN 43.1 (H) 10/25/2023    CR 1.86 (H) 10/25/2023    GFRESTIMATED 48 (L) 10/25/2023    SOTO 9.8 10/25/2023       Post Infusion Assessment:  Patient tolerated infusion without incident.  Blood return noted pre and post infusion.  Access discontinued per protocol.   UA sent per Jennifer TABOR orders.       Discharge Plan:   Patient declined prescription refills.  Discharge instructions reviewed with: Patient.  AVS to patient via [employer].  Patient will return 10/27 for next appointment.   Patient discharged in stable condition accompanied by: self.  Departure Mode: Ambulatory.      Catrachita Wang RN

## 2023-10-26 ENCOUNTER — TELEPHONE (OUTPATIENT)
Dept: NEPHROLOGY | Facility: CLINIC | Age: 38
End: 2023-10-26
Payer: COMMERCIAL

## 2023-10-26 NOTE — TELEPHONE ENCOUNTER
Spoke to patient through  to scheduled follow up appointment with labs in first available [facility] spot per provider // 10.26.2023 MCE

## 2023-10-27 ENCOUNTER — INFUSION THERAPY VISIT (OUTPATIENT)
Dept: ONCOLOGY | Facility: CLINIC | Age: 38
End: 2023-10-27
Attending: REGISTERED NURSE
Payer: COMMERCIAL

## 2023-10-27 VITALS
TEMPERATURE: 99.2 F | DIASTOLIC BLOOD PRESSURE: 76 MMHG | OXYGEN SATURATION: 100 % | SYSTOLIC BLOOD PRESSURE: 126 MMHG | HEART RATE: 84 BPM | RESPIRATION RATE: 16 BRPM

## 2023-10-27 DIAGNOSIS — C11.9 NASOPHARYNGEAL CANCER (H): Primary | ICD-10-CM

## 2023-10-27 DIAGNOSIS — N17.9 AKI (ACUTE KIDNEY INJURY) (H): ICD-10-CM

## 2023-10-27 LAB
ALBUMIN SERPL BCG-MCNC: 4.3 G/DL (ref 3.5–5.2)
ALP SERPL-CCNC: 73 U/L (ref 40–129)
ALT SERPL W P-5'-P-CCNC: 16 U/L (ref 0–70)
ANION GAP SERPL CALCULATED.3IONS-SCNC: 11 MMOL/L (ref 7–15)
AST SERPL W P-5'-P-CCNC: 14 U/L (ref 0–45)
BASOPHILS # BLD AUTO: 0 10E3/UL (ref 0–0.2)
BASOPHILS NFR BLD AUTO: 1 %
BILIRUB SERPL-MCNC: 0.3 MG/DL
BUN SERPL-MCNC: 34.3 MG/DL (ref 6–20)
CALCIUM SERPL-MCNC: 9.8 MG/DL (ref 8.6–10)
CHLORIDE SERPL-SCNC: 100 MMOL/L (ref 98–107)
CREAT SERPL-MCNC: 1.62 MG/DL (ref 0.67–1.17)
DEPRECATED HCO3 PLAS-SCNC: 25 MMOL/L (ref 22–29)
EGFRCR SERPLBLD CKD-EPI 2021: 56 ML/MIN/1.73M2
EOSINOPHIL # BLD AUTO: 0.1 10E3/UL (ref 0–0.7)
EOSINOPHIL NFR BLD AUTO: 1 %
ERYTHROCYTE [DISTWIDTH] IN BLOOD BY AUTOMATED COUNT: 14.1 % (ref 10–15)
GLUCOSE SERPL-MCNC: 132 MG/DL (ref 70–99)
HCT VFR BLD AUTO: 26.8 % (ref 40–53)
HGB BLD-MCNC: 9.2 G/DL (ref 13.3–17.7)
IMM GRANULOCYTES # BLD: 0 10E3/UL
IMM GRANULOCYTES NFR BLD: 1 %
LYMPHOCYTES # BLD AUTO: 0.5 10E3/UL (ref 0.8–5.3)
LYMPHOCYTES NFR BLD AUTO: 10 %
MCH RBC QN AUTO: 31.1 PG (ref 26.5–33)
MCHC RBC AUTO-ENTMCNC: 34.3 G/DL (ref 31.5–36.5)
MCV RBC AUTO: 91 FL (ref 78–100)
MONOCYTES # BLD AUTO: 0.7 10E3/UL (ref 0–1.3)
MONOCYTES NFR BLD AUTO: 14 %
NEUTROPHILS # BLD AUTO: 3.8 10E3/UL (ref 1.6–8.3)
NEUTROPHILS NFR BLD AUTO: 73 %
NRBC # BLD AUTO: 0 10E3/UL
NRBC BLD AUTO-RTO: 0 /100
PLATELET # BLD AUTO: 416 10E3/UL (ref 150–450)
POTASSIUM SERPL-SCNC: 4.5 MMOL/L (ref 3.4–5.3)
PROT SERPL-MCNC: 7.5 G/DL (ref 6.4–8.3)
RBC # BLD AUTO: 2.96 10E6/UL (ref 4.4–5.9)
SODIUM SERPL-SCNC: 136 MMOL/L (ref 135–145)
WBC # BLD AUTO: 5.2 10E3/UL (ref 4–11)

## 2023-10-27 PROCEDURE — 36415 COLL VENOUS BLD VENIPUNCTURE: CPT

## 2023-10-27 PROCEDURE — 258N000003 HC RX IP 258 OP 636: Performed by: NURSE PRACTITIONER

## 2023-10-27 PROCEDURE — 96360 HYDRATION IV INFUSION INIT: CPT

## 2023-10-27 PROCEDURE — 82374 ASSAY BLOOD CARBON DIOXIDE: CPT

## 2023-10-27 PROCEDURE — 85025 COMPLETE CBC W/AUTO DIFF WBC: CPT

## 2023-10-27 PROCEDURE — 96361 HYDRATE IV INFUSION ADD-ON: CPT

## 2023-10-27 RX ORDER — DIPHENHYDRAMINE HYDROCHLORIDE 50 MG/ML
50 INJECTION INTRAMUSCULAR; INTRAVENOUS
Status: CANCELLED
Start: 2023-10-27

## 2023-10-27 RX ORDER — ALBUTEROL SULFATE 0.83 MG/ML
2.5 SOLUTION RESPIRATORY (INHALATION)
Status: CANCELLED | OUTPATIENT
Start: 2023-10-27

## 2023-10-27 RX ORDER — HEPARIN SODIUM,PORCINE 10 UNIT/ML
5-20 VIAL (ML) INTRAVENOUS DAILY PRN
Status: CANCELLED | OUTPATIENT
Start: 2023-10-27

## 2023-10-27 RX ORDER — MEPERIDINE HYDROCHLORIDE 25 MG/ML
25 INJECTION INTRAMUSCULAR; INTRAVENOUS; SUBCUTANEOUS EVERY 30 MIN PRN
Status: CANCELLED | OUTPATIENT
Start: 2023-10-27

## 2023-10-27 RX ORDER — ALBUTEROL SULFATE 90 UG/1
1-2 AEROSOL, METERED RESPIRATORY (INHALATION)
Status: CANCELLED
Start: 2023-10-27

## 2023-10-27 RX ORDER — METHYLPREDNISOLONE SODIUM SUCCINATE 125 MG/2ML
125 INJECTION, POWDER, LYOPHILIZED, FOR SOLUTION INTRAMUSCULAR; INTRAVENOUS
Status: CANCELLED
Start: 2023-10-27

## 2023-10-27 RX ORDER — HEPARIN SODIUM (PORCINE) LOCK FLUSH IV SOLN 100 UNIT/ML 100 UNIT/ML
5 SOLUTION INTRAVENOUS
Status: CANCELLED | OUTPATIENT
Start: 2023-10-27

## 2023-10-27 RX ORDER — EPINEPHRINE 1 MG/ML
0.3 INJECTION, SOLUTION, CONCENTRATE INTRAVENOUS EVERY 5 MIN PRN
Status: CANCELLED | OUTPATIENT
Start: 2023-10-27

## 2023-10-27 RX ADMIN — SODIUM CHLORIDE 2000 ML: 9 INJECTION, SOLUTION INTRAVENOUS at 14:47

## 2023-10-27 NOTE — PATIENT INSTRUCTIONS
[facility] Triage and after hours / weekends / holidays:  [phone]    Please call the triage or after hours line if you experience a temperature greater than or equal to 100.4, shaking chills, have uncontrolled nausea, vomiting and/or diarrhea, dizziness, shortness of breath, chest pain, bleeding, unexplained bruising, or if you have any other new/concerning symptoms, questions or concerns.      If you are having any concerning symptoms or wish to speak to a provider before your next infusion visit, please call triage to notify them so we can adequately serve you.     If you need a refill on a narcotic prescription or other medication, please call before your infusion appointment.                October 2023 Sunday Monday Tuesday Wednesday Thursday Friday Saturday   1     2    LAB PERIPHERAL   7:45 AM   (15 min.)   [facility] DRAW   [facility]    RETURN ACTIVE TREATMENT   8:00 AM   (45 min.)   Janis Cordova CNP   [facility]    ONC INFUSION 2 HR (120 MIN)   9:00 AM   (120 min.)   [facility] ONC INFUSION NURSE   [facility] OUTPATIENT  11:00 AM   (15 min.)   VIDEO,    M [facility]  Services    TREATMENT  11:00 AM   (15 min.)   [facility] RAD ONC IJEOMA   [facility] Radiation Oncology 3    [facility] NUTRITION VISIT   9:30 AM   (30 min.)   Nae Tafoya RD   [facility] Radiation Oncology    OTV   9:30 AM   (15 min.)   Yessica Parada MD   [facility] Radiation Oncology    TREATMENT   9:30 AM   (15 min.)   P RAD ONC IJEOMA   [facility] Radiation Oncology 4    [employer] OUTPATIENT  11:00 AM   (15 min.)   VIDEO,    [facility]  Services    TREATMENT  11:00 AM   (15 min.)   P RAD ONC IJEOMA   [facility] Radiation Oncology    ONC INFUSION 2 HR (120 MIN)   1:30 PM   (120 min.)   [facility] ONC INFUSION NURSE   M  [facility] 5    [place] OUTPATIENT  11:00 AM   (15 min.)   VIDEO,    M [facility]  Services    TREATMENT  11:00 AM   (15 min.)   [facility] RAD ONC IJEOMA   M [facility] Radiation Oncology 6    [place] OUTPATIENT  11:00 AM   (15 min.)   VIDEO,    [facility]  Services    TREATMENT  11:00 AM   (15 min.)   [facility] RAD ONC IJEOMA   M [facility] Radiation Oncology    ONC INFUSION 2 HR (120 MIN)   2:00 PM   (120 min.)   UC ONC INFUSION NURSE   [facility] 7       8     9    LAB PERIPHERAL   6:45 AM   (15 min.)   [facility] DRAW   [facility]    RETURN ACTIVE TREATMENT   7:00 AM   (45 min.)   Janis Cordova CNP   [facility]    ONC INFUSION 2 HR (120 MIN)   8:30 AM   (120 min.)   UC ONC INFUSION NURSE   [facility] OUTPATIENT  11:00 AM   (15 min.)   VIDEO,    M [facility]  Services    TREATMENT  11:00 AM   (15 min.)   [facility] RAD ONC IJEOMA   [facility] Radiation Oncology 10    [facility] NUTRITION VISIT  11:00 AM   (30 min.)   Nae Tafoya RD   [facility] Radiation Oncology    [place] OUTPATIENT  11:00 AM   (15 min.)   VIDEO,    [facility]  Services    TREATMENT  11:00 AM   (15 min.)   [facility] RAD ONC IJEOMA   [facility] Radiation Oncology    OTV  11:15 AM   (15 min.)   Yessica Parada MD   [facility] Radiation Oncology 11    ONC INFUSION 2 HR (120 MIN)   8:00 AM   (120 min.)   [facility] ONC INFUSION NURSE   [facility] OUTPATIENT  11:00 AM   (15 min.)   VIDEO,    M [facility]  Services    TREATMENT  11:00 AM   (15 min.)   [facility] RAD ONC IJEOMA   [facility] Radiation Oncology 12    RETURN CCSL  10:00 AM   (30 min.)   Catrachita Clark,  MD   M [facility] OUTPATIENT  11:00 AM   (15 min.)   VIDEO,    M [facility]  Services    TREATMENT  11:00 AM   (15 min.)   [facility] RAD ONC IJEOMA   M [facility] Radiation Oncology 13    ONC INFUSION 2 HR (120 MIN)   8:00 AM   (120 min.)   UC ONC INFUSION NURSE   M [facility] OUTPATIENT  11:00 AM   (15 min.)   VIDEO,    M [facility]  Services    TREATMENT  11:00 AM   (15 min.)   [facility] RAD ONC IJEOMA   M [facility] Radiation Oncology 14       15     16    [place] OUTPATIENT  11:00 AM   (15 min.)   VIDEO,    M [facility]  Services    TREATMENT  11:00 AM   (15 min.)   [facility] RAD ONC IJEOMA   [facility] Radiation Oncology    OTV  11:15 AM   (15 min.)   Yessica Parada MD   M [facility] Radiation Oncology    ONC INFUSION 2 HR (120 MIN)   1:00 PM   (120 min.)   UC ONC INFUSION NURSE   [facility] 17     18    LAB PERIPHERAL   6:45 AM   (15 min.)   [facility] DRAW   [facility]    RETURN CCSL   7:00 AM   (45 min.)   Jennifer Gabriel CNP   [facility]    ONC INFUSION 2 HR (120 MIN)   8:00 AM   (120 min.)   [facility] ONC INFUSION NURSE   [facility] 19     20    LAB PERIPHERAL   6:45 AM   (15 min.)   [facility] DRAW   [facility]    ONC INFUSION 2 HR (120 MIN)   7:30 AM   (120 min.)   UC ONC INFUSION NURSE   [facility] 21       22    ONC INFUSION 2 HR (120 MIN)   9:00 AM   (120 min.)   UC ONC INFUSION NURSE   [facility] 23     24     25    LAB PERIPHERAL   7:45 AM   (15 min.)   [facility] DRAW   [facility]    ONC INFUSION 2 HR (120 MIN)   8:30 AM   (120 min.)   UC ONC INFUSION NURSE   [facility]  [facility] 26     27    ONC INFUSION 2 HR (120 MIN)   2:30 PM   (120 min.)    ONC INFUSION NURSE   [facility] 28       29    ONC INFUSION 2 HR (120 MIN)  10:00 AM   (120 min.)    ONC INFUSION NURSE   [facility] 30     31 November 2023 Sunday Monday Tuesday Wednesday Thursday Friday Saturday                  1    RETURN RADIATION ONCOLOGY  11:30 AM   (30 min.)   Yessica Parada MD   [facility] Radiation Oncology 2     3    ONC INFUSION 2 HR (120 MIN)   8:30 AM   (120 min.)    ONC INFUSION NURSE   [facility] 4       5     6    RETURN   4:25 PM   (20 min.)   Navin Fisher MD   [facility] [place] 7     8     9     10     11       12     13    LAB   2:00 PM   (15 min.)    LAB   [facility] Lab [place]    RETURN NEPHROLOGY   2:45 PM   (30 min.)   Nata Sánchez MD   [facility] [place] 14     15    RETURN RADIATION ONCOLOGY   3:00 PM   (30 min.)   Yessica Parada MD   [facility] Radiation Oncology 16     17     18       19     20     21     22     23     24     25       26     27     28     29     30                               Lab Results:  Recent Results (from the past 12 hour(s))   Comprehensive metabolic panel    Collection Time: 10/27/23  2:49 PM   Result Value Ref Range    Sodium 136 135 - 145 mmol/L    Potassium 4.5 3.4 - 5.3 mmol/L    Carbon Dioxide (CO2) 25 22 - 29 mmol/L    Anion Gap 11 7 - 15 mmol/L    Urea Nitrogen 34.3 (H) 6.0 - 20.0 mg/dL    Creatinine 1.62 (H) 0.67 - 1.17 mg/dL    GFR Estimate 56 (L) >60 mL/min/1.73m2    Calcium 9.8 8.6 - 10.0 mg/dL    Chloride 100 98 - 107 mmol/L    Glucose 132 (H) 70 - 99 mg/dL    Alkaline Phosphatase 73 40 - 129 U/L    AST 14 0 - 45 U/L    ALT 16 0 - 70 U/L    Protein Total 7.5 6.4 - 8.3 g/dL    Albumin  4.3 3.5 - 5.2 g/dL    Bilirubin Total 0.3 <=1.2 mg/dL   CBC with platelets and differential    Collection Time: 10/27/23  2:49 PM   Result Value Ref Range    WBC Count 5.2 4.0 - 11.0 10e3/uL    RBC Count 2.96 (L) 4.40 - 5.90 10e6/uL    Hemoglobin 9.2 (L) 13.3 - 17.7 g/dL    Hematocrit 26.8 (L) 40.0 - 53.0 %    MCV 91 78 - 100 fL    MCH 31.1 26.5 - 33.0 pg    MCHC 34.3 31.5 - 36.5 g/dL    RDW 14.1 10.0 - 15.0 %    Platelet Count 416 150 - 450 10e3/uL    % Neutrophils 73 %    % Lymphocytes 10 %    % Monocytes 14 %    % Eosinophils 1 %    % Basophils 1 %    % Immature Granulocytes 1 %    NRBCs per 100 WBC 0 <1 /100    Absolute Neutrophils 3.8 1.6 - 8.3 10e3/uL    Absolute Lymphocytes 0.5 (L) 0.8 - 5.3 10e3/uL    Absolute Monocytes 0.7 0.0 - 1.3 10e3/uL    Absolute Eosinophils 0.1 0.0 - 0.7 10e3/uL    Absolute Basophils 0.0 0.0 - 0.2 10e3/uL    Absolute Immature Granulocytes 0.0 <=0.4 10e3/uL    Absolute NRBCs 0.0 10e3/uL

## 2023-10-27 NOTE — PROGRESS NOTES
"Infusion Nursing Note:  Thierno Vega presents today for 2L IVF.    Patient seen by provider today: No   present during visit today: Guinean via phone .    Note: Pt presents to infusion feeling well. He reports an improvement in his mouth/throat pain (post-radiation), only needing Oxycodone once  \"every two days\". This causes some mild constipation, but he is still having regular BMs. He continues on tube feedings with only liquids by mouth. He denies fevers/chills, cough/congestion, SOB, chest pain, nausea, bruising/bleeding, extremity swelling or further concerns today.    RN updated Jennifer Gabriel NP and Jennifer Toney RNCC via secure chat about Creatinine of 1.62 and schedule for next week. Per Jennifer Gabriel: pt should keep IVF 3x next week (pt is scheduled for Sunday, Friday and on our wait list Tuesday). Nephrology consult was placed. Thierno confirms understanding of plan.    Intravenous Access:  Peripheral IV placed.    Treatment Conditions:  Lab Results   Component Value Date    HGB 9.2 (L) 10/27/2023    WBC 5.2 10/27/2023    ANEUTAUTO 3.8 10/27/2023     10/27/2023        Lab Results   Component Value Date     10/27/2023    POTASSIUM 4.5 10/27/2023    MAG 2.0 10/18/2023    CR 1.62 (H) 10/27/2023    SOTO 9.8 10/27/2023    BILITOTAL 0.3 10/27/2023    ALBUMIN 4.3 10/27/2023    ALT 16 10/27/2023    AST 14 10/27/2023       Post Infusion Assessment:  Patient tolerated infusion without incident.  Blood return noted pre and post infusion.  Site patent and intact, free from redness, edema or discomfort.  No evidence of extravasations.  Access discontinued per protocol.       Discharge Plan:   Patient declined prescription refills.  Discharge instructions reviewed with: Patient and Family.  Patient and/or family verbalized understanding of discharge instructions and all questions answered.  AVS to patient via [employer].  Patient will return 11/29/23 for next appointment.   Patient " discharged in stable condition accompanied by: Family.  Departure Mode: Ambulatory.      Leeanna Leija RN

## 2023-10-28 NOTE — PROGRESS NOTES
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  During treatment, patient struggled with dysgeusia and difficulty with oral intake necessitating placement of a nasojejunal feeding tube.  He continues on continuous feedings.  Since completion of treatment 2 weeks ago, he has slowly improved.  He does notice some qualities of taste.  The main concern he has today is that he has nausea and some vomiting with his feedings.  He is trying to take some foods by mouth in an effort to prove that he can eat without the feeding tube but, because of the nausea, this has been difficult.  He no longer has any pain is and has no difficulty swallowing food except for the dysgeusia.  He is sleeping well.    PAST MEDICAL/SURGICAL HISTORY:   Past Medical History:   Diagnosis Date    HTN (hypertension)     No past surgical history on file.    ALLERGIES:  No Known Allergies    MEDICATIONS:   Current Outpatient Medications   Medication Sig Dispense Refill    acetaminophen (TYLENOL) 500 MG tablet Take 2 tablets (1,000 mg) by mouth every 8 hours as needed for mild pain 60 tablet 1    amLODIPine (NORVASC) 10 MG tablet Take 1 tablet (10 mg) by mouth daily 90 tablet  1    famotidine (PEPCID) 20 MG tablet Take 1 tablet (20 mg) by mouth 2 times daily 90 tablet 1    gabapentin (NEURONTIN) 300 MG capsule Take 3 capsules (900 mg) by mouth 3 times daily Taper dose up to 900 mg po tid per instructions given in Radiation Oncology clinic 270 capsule 4    magic mouthwash (ENTER INGREDIENTS IN COMMENTS) suspension Take 10 mLs by mouth every 4 hours as needed (Patient not taking: Reported on 10/22/2023) 240 mL 0    oxyCODONE (ROXICODONE) 5 MG tablet Take 1 tablet (5 mg) by mouth every 6 hours as needed for pain 20 tablet 0     REVIEW OF SYSTEMS: A 12-point review of systems was obtained. Pertinent findings are noted in the HPI and are otherwise unremarkable.     PHYSICAL EXAM:  VITALS: BP (!) 144/74   Pulse 92   Wt 98 kg (216 lb)   BMI 29.29 kg/m  (weight at end of treatment 210 pounds, weight at beginning of treatment 238 pounds)  GEN: Alert, oriented in no acute distress, pale  HEENT: Normocephalic, oral cavity and oropharynx without mucositis or thrush, tacky mucous membranes  Neck: No lymphedema, tenderness palpated along the left neck at the level of the left mastoid tip, no mass palpable  CV: Regular rate, extremities warm, well-perfused  RESP: Thin comfortably on room air, no wheezing  ABDOMEN: Nondistended  SKIN: Skin in treatment field is without residual erythema, very mild hyperpigmentation  MSK: No muscle bulk and tone  NEURO: CN II-XII grossly intact  PSYCH: Appropriate affect    LABS AND IMAGING: No new imaging    IMPRESSION/RECOMMENDATION:    Thierno Vega is a 38-year-old man with squamous cell carcinoma of the nasopharynx, GALLITO positive, 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  He is now 2 weeks out from treatment.  He presents today for evaluation of his nutritional status as he is planning to travel home on November 20 for 2 months.  He has gained some weight since completion of therapy but is  struggling with his tube feeds and advancing his diet.  He does have ondansetron at home and I have recommended that he take it 3 times daily to manage the nausea associated with eating.  Of note, I also spoke to Dr. Clark and she will call the patient regarding a proton pump inhibitor as well.    Patient will continue to work on increasing oral intake with soups, eggs and softer foods which he could find palatable.  We will see him again on Friday, November 10 to remove the feeding tube prior to his trip home.    PET scan is scheduled for 1/29/2024 with follow-up with ENT Dr. Fisher.    We appreciate the opportunity to participate in 's care.  Please call with questions.      35 minutes spent by me on the date of the encounter doing chart review, history and exam, documentation and further activities per the note.    Yessica Parada MD  Radiation Oncology      CC:  Patient Care Team:  Catrachita Clark MD as PCP - General (Hematology & Oncology)  Isaias Larsen MD as Physician (Hematology & Oncology)  Catrachita Clark MD as MD (Hematology & Oncology)  Yessica Parada MD as MD (Radiation Oncology)  Catrachita Clark MD as MD (Hematology & Oncology)  Marisa Travis AuD as Audiologist (Audiology)  Navin Fisher MD as Assigned Surgical Provider  Jennifer Toney, RN as Specialty Care Coordinator (Hematology & Oncology)  Yessica Parada MD as Assigned Cancer Care Provider     This note was dictated with voice recognition software and then edited. Please excuse any unintentional errors.

## 2023-10-29 ENCOUNTER — INFUSION THERAPY VISIT (OUTPATIENT)
Dept: ONCOLOGY | Facility: CLINIC | Age: 38
End: 2023-10-29
Attending: INTERNAL MEDICINE
Payer: COMMERCIAL

## 2023-10-29 VITALS
DIASTOLIC BLOOD PRESSURE: 76 MMHG | SYSTOLIC BLOOD PRESSURE: 118 MMHG | TEMPERATURE: 98.9 F | RESPIRATION RATE: 16 BRPM | OXYGEN SATURATION: 99 % | HEART RATE: 85 BPM

## 2023-10-29 DIAGNOSIS — C11.9 NASOPHARYNGEAL CANCER (H): Primary | ICD-10-CM

## 2023-10-29 PROCEDURE — 96361 HYDRATE IV INFUSION ADD-ON: CPT

## 2023-10-29 PROCEDURE — 258N000003 HC RX IP 258 OP 636: Performed by: NURSE PRACTITIONER

## 2023-10-29 PROCEDURE — 96360 HYDRATION IV INFUSION INIT: CPT

## 2023-10-29 RX ORDER — DIPHENHYDRAMINE HYDROCHLORIDE 50 MG/ML
50 INJECTION INTRAMUSCULAR; INTRAVENOUS
Status: CANCELLED
Start: 2023-10-29

## 2023-10-29 RX ORDER — ALBUTEROL SULFATE 0.83 MG/ML
2.5 SOLUTION RESPIRATORY (INHALATION)
Status: CANCELLED | OUTPATIENT
Start: 2023-10-29

## 2023-10-29 RX ORDER — HEPARIN SODIUM,PORCINE 10 UNIT/ML
5-20 VIAL (ML) INTRAVENOUS DAILY PRN
Status: CANCELLED | OUTPATIENT
Start: 2023-10-29

## 2023-10-29 RX ORDER — METHYLPREDNISOLONE SODIUM SUCCINATE 125 MG/2ML
125 INJECTION, POWDER, LYOPHILIZED, FOR SOLUTION INTRAMUSCULAR; INTRAVENOUS
Status: CANCELLED
Start: 2023-10-29

## 2023-10-29 RX ORDER — EPINEPHRINE 1 MG/ML
0.3 INJECTION, SOLUTION INTRAMUSCULAR; SUBCUTANEOUS EVERY 5 MIN PRN
Status: CANCELLED | OUTPATIENT
Start: 2023-10-29

## 2023-10-29 RX ORDER — ALBUTEROL SULFATE 90 UG/1
1-2 AEROSOL, METERED RESPIRATORY (INHALATION)
Status: CANCELLED
Start: 2023-10-29

## 2023-10-29 RX ORDER — HEPARIN SODIUM (PORCINE) LOCK FLUSH IV SOLN 100 UNIT/ML 100 UNIT/ML
5 SOLUTION INTRAVENOUS
Status: CANCELLED | OUTPATIENT
Start: 2023-10-29

## 2023-10-29 RX ORDER — MEPERIDINE HYDROCHLORIDE 25 MG/ML
25 INJECTION INTRAMUSCULAR; INTRAVENOUS; SUBCUTANEOUS EVERY 30 MIN PRN
Status: CANCELLED | OUTPATIENT
Start: 2023-10-29

## 2023-10-29 RX ADMIN — SODIUM CHLORIDE 2000 ML: 9 INJECTION, SOLUTION INTRAVENOUS at 10:16

## 2023-10-29 ASSESSMENT — PAIN SCALES - GENERAL: PAINLEVEL: NO PAIN (0)

## 2023-10-29 NOTE — PROGRESS NOTES
Infusion Nursing Note:  Thierno Vega presents today for IV fluids.    Patient seen by provider today: No   present during visit today: Yes, Language: Uzbek.     Note: Patient has no concerns to report today, feeling well, denies pain, tube feedings going well.      Intravenous Access:  Peripheral IV placed.    Treatment Conditions:  Not Applicable.      Post Infusion Assessment:  Patient tolerated infusion without incident.  Blood return noted pre and post infusion.  Access discontinued per protocol.       Discharge Plan:   Patient declined prescription refills.  AVS to patient via [employer].  Patient will return this week for next appointment. He is scheduled Friday and on the wait list for 11/1.    Patient discharged in stable condition accompanied by: self.  Departure Mode: Ambulatory.      Catrachita Wang RN

## 2023-10-30 ENCOUNTER — OFFICE VISIT (OUTPATIENT)
Dept: NEPHROLOGY | Facility: CLINIC | Age: 38
End: 2023-10-30
Attending: INTERNAL MEDICINE
Payer: COMMERCIAL

## 2023-10-30 ENCOUNTER — LAB (OUTPATIENT)
Dept: LAB | Facility: CLINIC | Age: 38
End: 2023-10-30
Payer: COMMERCIAL

## 2023-10-30 VITALS
OXYGEN SATURATION: 100 % | TEMPERATURE: 98.3 F | HEART RATE: 90 BPM | BODY MASS INDEX: 29.32 KG/M2 | DIASTOLIC BLOOD PRESSURE: 82 MMHG | WEIGHT: 216.2 LBS | SYSTOLIC BLOOD PRESSURE: 128 MMHG

## 2023-10-30 DIAGNOSIS — C11.9 NASOPHARYNGEAL CANCER (H): ICD-10-CM

## 2023-10-30 DIAGNOSIS — C11.9 NASOPHARYNGEAL CANCER (H): Primary | ICD-10-CM

## 2023-10-30 DIAGNOSIS — R79.89 ELEVATED SERUM CREATININE: Primary | ICD-10-CM

## 2023-10-30 DIAGNOSIS — R79.89 ELEVATED SERUM CREATININE: ICD-10-CM

## 2023-10-30 LAB
ALBUMIN MFR UR ELPH: 9.9 MG/DL
ALBUMIN SERPL BCG-MCNC: 4.4 G/DL (ref 3.5–5.2)
ALBUMIN UR-MCNC: NEGATIVE MG/DL
ALP SERPL-CCNC: 80 U/L (ref 40–129)
ALT SERPL W P-5'-P-CCNC: 17 U/L (ref 0–70)
ANION GAP SERPL CALCULATED.3IONS-SCNC: 9 MMOL/L (ref 7–15)
APPEARANCE UR: CLEAR
AST SERPL W P-5'-P-CCNC: 15 U/L (ref 0–45)
BILIRUB SERPL-MCNC: 0.2 MG/DL
BILIRUB UR QL STRIP: NEGATIVE
BUN SERPL-MCNC: 25.9 MG/DL (ref 6–20)
CALCIUM SERPL-MCNC: 9.7 MG/DL (ref 8.6–10)
CHLORIDE SERPL-SCNC: 100 MMOL/L (ref 98–107)
COLOR UR AUTO: NORMAL
CREAT SERPL-MCNC: 1.27 MG/DL (ref 0.67–1.17)
CREAT UR-MCNC: 70.9 MG/DL
CREAT UR-MCNC: 71.5 MG/DL
DEPRECATED HCO3 PLAS-SCNC: 28 MMOL/L (ref 22–29)
EGFRCR SERPLBLD CKD-EPI 2021: 75 ML/MIN/1.73M2
ERYTHROCYTE [DISTWIDTH] IN BLOOD BY AUTOMATED COUNT: 14.2 % (ref 10–15)
FERRITIN SERPL-MCNC: 449 NG/ML (ref 31–409)
GLUCOSE SERPL-MCNC: 105 MG/DL (ref 70–99)
GLUCOSE UR STRIP-MCNC: NEGATIVE MG/DL
HCT VFR BLD AUTO: 26.6 % (ref 40–53)
HGB BLD-MCNC: 8.8 G/DL (ref 13.3–17.7)
HGB UR QL STRIP: NEGATIVE
IRON BINDING CAPACITY (ROCHE): 290 UG/DL (ref 240–430)
IRON SATN MFR SERPL: 14 % (ref 15–46)
IRON SERPL-MCNC: 40 UG/DL (ref 61–157)
KETONES UR STRIP-MCNC: NEGATIVE MG/DL
LEUKOCYTE ESTERASE UR QL STRIP: NEGATIVE
MCH RBC QN AUTO: 30.8 PG (ref 26.5–33)
MCHC RBC AUTO-ENTMCNC: 33.1 G/DL (ref 31.5–36.5)
MCV RBC AUTO: 93 FL (ref 78–100)
MICROALBUMIN UR-MCNC: 38 MG/L
MICROALBUMIN/CREAT UR: 53.6 MG/G CR (ref 0–17)
NITRATE UR QL: NEGATIVE
PH UR STRIP: 7 [PH] (ref 5–7)
PLATELET # BLD AUTO: 364 10E3/UL (ref 150–450)
POTASSIUM SERPL-SCNC: 4.8 MMOL/L (ref 3.4–5.3)
PROT SERPL-MCNC: 7.5 G/DL (ref 6.4–8.3)
PROT/CREAT 24H UR: 0.14 MG/MG CR (ref 0–0.2)
PTH-INTACT SERPL-MCNC: 49 PG/ML (ref 15–65)
RBC # BLD AUTO: 2.86 10E6/UL (ref 4.4–5.9)
RBC URINE: 1 /HPF
SODIUM SERPL-SCNC: 137 MMOL/L (ref 135–145)
SP GR UR STRIP: 1.01 (ref 1–1.03)
TSH SERPL DL<=0.005 MIU/L-ACNC: 1.37 UIU/ML (ref 0.3–4.2)
UROBILINOGEN UR STRIP-MCNC: NORMAL MG/DL
VIT D+METAB SERPL-MCNC: 23 NG/ML (ref 20–50)
WBC # BLD AUTO: 4.9 10E3/UL (ref 4–11)
WBC URINE: 1 /HPF

## 2023-10-30 PROCEDURE — 81001 URINALYSIS AUTO W/SCOPE: CPT | Performed by: PATHOLOGY

## 2023-10-30 PROCEDURE — 36415 COLL VENOUS BLD VENIPUNCTURE: CPT | Performed by: PATHOLOGY

## 2023-10-30 PROCEDURE — 83550 IRON BINDING TEST: CPT | Performed by: PATHOLOGY

## 2023-10-30 PROCEDURE — 85027 COMPLETE CBC AUTOMATED: CPT | Performed by: PATHOLOGY

## 2023-10-30 PROCEDURE — 82728 ASSAY OF FERRITIN: CPT | Performed by: PATHOLOGY

## 2023-10-30 PROCEDURE — G0463 HOSPITAL OUTPT CLINIC VISIT: HCPCS | Performed by: INTERNAL MEDICINE

## 2023-10-30 PROCEDURE — 84443 ASSAY THYROID STIM HORMONE: CPT | Performed by: PATHOLOGY

## 2023-10-30 PROCEDURE — 83540 ASSAY OF IRON: CPT | Performed by: PATHOLOGY

## 2023-10-30 PROCEDURE — 99205 OFFICE O/P NEW HI 60 MIN: CPT | Performed by: INTERNAL MEDICINE

## 2023-10-30 PROCEDURE — 99000 SPECIMEN HANDLING OFFICE-LAB: CPT | Performed by: PATHOLOGY

## 2023-10-30 PROCEDURE — 82306 VITAMIN D 25 HYDROXY: CPT | Performed by: INTERNAL MEDICINE

## 2023-10-30 PROCEDURE — 82570 ASSAY OF URINE CREATININE: CPT | Performed by: INTERNAL MEDICINE

## 2023-10-30 PROCEDURE — 84156 ASSAY OF PROTEIN URINE: CPT | Performed by: PATHOLOGY

## 2023-10-30 PROCEDURE — 80053 COMPREHEN METABOLIC PANEL: CPT | Performed by: PATHOLOGY

## 2023-10-30 PROCEDURE — 83970 ASSAY OF PARATHORMONE: CPT | Performed by: PATHOLOGY

## 2023-10-30 RX ORDER — AMLODIPINE BESYLATE 10 MG/1
10 TABLET ORAL DAILY
Qty: 90 TABLET | Refills: 1 | Status: SHIPPED | OUTPATIENT
Start: 2023-10-30 | End: 2024-01-24

## 2023-10-30 RX ORDER — AMLODIPINE BESYLATE 10 MG/1
10 TABLET ORAL DAILY
Qty: 90 TABLET | Refills: 1 | Status: SHIPPED | OUTPATIENT
Start: 2023-10-30 | End: 2023-10-30

## 2023-10-30 ASSESSMENT — PAIN SCALES - GENERAL: PAINLEVEL: NO PAIN (0)

## 2023-10-30 NOTE — LETTER
10/30/2023       RE: Thierno Vega  [address]  [address]  [facility] [address]     Dear Colleague,    Thank you for referring your patient, Thierno Vega, to the [facility] [place] at . Please see a copy of my visit note below.    Nephrology Clinic    Thierno Vega MRN:[id number] YOB: 1985  Date of Service: 10/30/2023  Primary care provider: Catrachita Clark  Requesting physician: Catrachita Clark      REASON FOR CONSULT: SMILEY    HISTORY OF PRESENT ILLNESS:   Thierno Vega is a 38 year old male who presents for evaluation of an elevated creatinine at 1.27 mg/dL.   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  He noticed a left neck mass in the fall and initially had an FNA that was benign. The mass perissted and then in April underwent repeat FNA showing undifferentiated carcinoma. He travelled to the  in June 2023 for treatment. He initiated therapy on 6/27 and received cisplatin gemcitabine induction x 3 cycles, then chemoradiation with cisplatin with curative intent. He completed his therapy on October 16th.  A nasal jejunostomy tube was placed on 9/28 as the patient was having trouble keeping PO intake. He also needed IV fluid infusions three times a week. From a renal standpoint, his creatinine level was around 0.8 mg/dL up to mid-September 2023 however it went up to 1.2 mg/dL on October 2nd then to 1.86 mg/dL on 10/25 and has now been improving after he was initiated on IV fluids three times a week. The uPCR is 0.14 mg/mg Cr today. His UA shows no evidence of hematuria or leucocyturia. The patient reports that his PO intake has also started to improve.  The patient denies any dysuria, any pollakiuria, any nocturia, any LE edema, any dyspnea on exertion  .  The patient denies ever having kidney stones, urinary tract infections, gross hematuria. There is no family history of CKD.  The following portions of the patient's history were reviewed and updated as appropriate: allergies, current medications, past family history, past medical history, past social history, past surgical history and problem list.    PAST MEDICAL HISTORY:  Past Medical History:   Diagnosis Date     HTN (hypertension)      PAST SURGICAL HISTORY:  No past surgical history on file.  MEDICATIONS:  Prescription Medications as of 10/30/2023         Rx Number Disp Refills Start End Last Dispensed Date Next Fill Date Owning Pharmacy    acetaminophen (TYLENOL) 500 MG tablet  60 tablet 1 10/9/2023    [address] 1-855    Sig: Take 2 tablets (1,000 mg) by mouth every 8 hours as needed for mild pain    Class: E-Prescribe    Route: Oral    amLODIPine 5 MG TABS 5 mg, valsartan 160 MG TABS 160 mg            Sig: Take 5 mg by mouth daily    Class: Historical    Route: Oral    famotidine (PEPCID) 20 MG tablet  60 tablet 1 9/25/2023    [address] 1-504    Sig: Take 1 tablet (20 mg) by mouth 2 times daily    Class: E-Prescribe    Route: Oral    gabapentin (NEURONTIN) 300 MG capsule  270 capsule 4 8/14/2023    [place], [place] - [address] [place] [address]    Sig: Take 3 capsules (900 mg) by mouth 3 times daily Taper dose up to 900 mg po tid per instructions given in Radiation Oncology clinic    Class: E-Prescribe    Route: Oral    magic mouthwash (ENTER INGREDIENTS IN COMMENTS) suspension  240 mL 0 10/9/2023    [address] 2-545    Sig: Take 10 mLs by mouth every 4 hours as needed    Class: E-Prescribe    Route: Oral    ondansetron (ZOFRAN) 8 MG tablet  30 tablet 5 6/27/2023    [facility]  [address] 1-172    Sig: Take 1 tablet (8 mg) by mouth every 8 hours as needed for nausea (vomiting)    Class: E-Prescribe    Route: Oral    Non-formulary Exception Code: Specific indication for non-formulary alternative    oxyCODONE (ROXICODONE) 5 MG tablet  20 tablet 0 10/12/2023    [address] 1-967    Sig: Take 1 tablet (5 mg) by mouth every 6 hours as needed for pain    Class: E-Prescribe    Earliest Fill Date: 10/12/2023    Route: Oral    prochlorperazine (COMPAZINE) 10 MG tablet  30 tablet 5 6/27/2023    [address]    Sig: Take 1 tablet (10 mg) by mouth every 6 hours as needed for nausea or vomiting    Class: E-Prescribe    Route: Oral    Non-formulary Exception Code: Specific indication for non-formulary alternative           ALLERGIES:    No Known Allergies  REVIEW OF SYSTEMS:  Review Of Systems    A comprehensive review of systems was performed and found to be negative except as described here or above.  SOCIAL HISTORY:   Social History     Socioeconomic History     Marital status: Single     Spouse name: Not on file     Number of children: Not on file     Years of education: Not on file     Highest education level: Not on file   Occupational History     Not on file   Tobacco Use     Smoking status: Some Days     Types: Cigarettes     Passive exposure: Current     Smokeless tobacco: Never   Substance and Sexual Activity     Alcohol use: Not Currently     Drug use: Never     Sexual activity: Not on file   Other Topics Concern     Not on file   Social History Narrative     Not on file     Social Determinants of Health     Financial Resource Strain: Not on file   Food Insecurity: Not on file   Transportation Needs: Not on file   Physical Activity: Not on file   Stress: Not on file   Social Connections: Not on file    Interpersonal Safety: Not on file   Housing Stability: Not on file     FAMILY MEDICAL HISTORY:   Family History   Problem Relation Age of Onset     Cancer No family hx of      PHYSICAL EXAM:   There were no vitals taken for this visit.  GENERAL APPEARANCE: alert and no distress  EYES: nonicteric  HENT: mouth without ulcers or lesions  NECK: supple, no adenopathy  RESP: lungs clear to auscultation   CV: regular rhythm, normal rate, no rub  ABDOMEN: soft, nontender, normal bowel sounds, no HSM   Extremities: no clubbing, cyanosis, or edema  MS: no evidence of inflammation in joints, no muscle tenderness  SKIN: no rash  NEURO: mentation intact and speech normal  PSYCH: affect normal/bright   LABS:   Recent Results (from the past 672 hour(s))   Comprehensive metabolic panel    Collection Time: 10/09/23  8:01 AM   Result Value Ref Range    Sodium 137 135 - 145 mmol/L    Potassium 4.7 3.4 - 5.3 mmol/L    Carbon Dioxide (CO2) 26 22 - 29 mmol/L    Anion Gap 12 7 - 15 mmol/L    Urea Nitrogen 25.4 (H) 6.0 - 20.0 mg/dL    Creatinine 1.21 (H) 0.67 - 1.17 mg/dL    GFR Estimate 80 >60 mL/min/1.73m2    Calcium 9.1 8.6 - 10.0 mg/dL    Chloride 99 98 - 107 mmol/L    Glucose 122 (H) 70 - 99 mg/dL    Alkaline Phosphatase 64 40 - 129 U/L    AST 11 0 - 45 U/L    ALT 15 0 - 70 U/L    Protein Total 7.2 6.4 - 8.3 g/dL    Albumin 4.3 3.5 - 5.2 g/dL    Bilirubin Total 0.3 <=1.2 mg/dL   Magnesium    Collection Time: 10/09/23  8:01 AM   Result Value Ref Range    Magnesium 1.7 1.7 - 2.3 mg/dL   CBC with platelets and differential    Collection Time: 10/09/23  8:01 AM   Result Value Ref Range    WBC Count 4.2 4.0 - 11.0 10e3/uL    RBC Count 3.15 (L) 4.40 - 5.90 10e6/uL    Hemoglobin 9.6 (L) 13.3 - 17.7 g/dL    Hematocrit 27.4 (L) 40.0 - 53.0 %    MCV 87 78 - 100 fL    MCH 30.5 26.5 - 33.0 pg    MCHC 35.0 31.5 - 36.5 g/dL    RDW 12.9 10.0 - 15.0 %    Platelet Count 129 (L) 150 - 450 10e3/uL    % Neutrophils 75 %    % Lymphocytes 8 %    %  Monocytes 15 %    Mids % (Monos, Eos, Basos)      % Eosinophils 1 %    % Basophils 1 %    % Immature Granulocytes 0 %    NRBCs per 100 WBC 0 <1 /100    Absolute Neutrophils 3.1 1.6 - 8.3 10e3/uL    Absolute Lymphocytes 0.3 (L) 0.8 - 5.3 10e3/uL    Absolute Monocytes 0.6 0.0 - 1.3 10e3/uL    Mids Abs (Monos, Eos, Basos)      Absolute Eosinophils 0.0 0.0 - 0.7 10e3/uL    Absolute Basophils 0.0 0.0 - 0.2 10e3/uL    Absolute Immature Granulocytes 0.0 <=0.4 10e3/uL    Absolute NRBCs 0.0 10e3/uL   Magnesium    Collection Time: 10/18/23  7:15 AM   Result Value Ref Range    Magnesium 2.0 1.7 - 2.3 mg/dL   EBV DNA PCR Quantitative Whole Blood    Collection Time: 10/18/23  7:15 AM   Result Value Ref Range    EBV DNA Copies/mL Not Detected Not Detected copies/mL   Basic metabolic panel    Collection Time: 10/18/23  7:15 AM   Result Value Ref Range    Sodium 139 135 - 145 mmol/L    Potassium 4.6 3.4 - 5.3 mmol/L    Chloride 101 98 - 107 mmol/L    Carbon Dioxide (CO2) 26 22 - 29 mmol/L    Anion Gap 12 7 - 15 mmol/L    Urea Nitrogen 33.9 (H) 6.0 - 20.0 mg/dL    Creatinine 1.41 (H) 0.67 - 1.17 mg/dL    GFR Estimate 66 >60 mL/min/1.73m2    Calcium 9.6 8.6 - 10.0 mg/dL    Glucose 143 (H) 70 - 99 mg/dL   CBC with platelets and differential    Collection Time: 10/18/23  7:15 AM   Result Value Ref Range    WBC Count 4.1 4.0 - 11.0 10e3/uL    RBC Count 2.90 (L) 4.40 - 5.90 10e6/uL    Hemoglobin 8.9 (L) 13.3 - 17.7 g/dL    Hematocrit 26.0 (L) 40.0 - 53.0 %    MCV 90 78 - 100 fL    MCH 30.7 26.5 - 33.0 pg    MCHC 34.2 31.5 - 36.5 g/dL    RDW 13.4 10.0 - 15.0 %    Platelet Count 179 150 - 450 10e3/uL    % Neutrophils 78 %    % Lymphocytes 8 %    % Monocytes 9 %    Mids % (Monos, Eos, Basos)      % Eosinophils 5 %    % Basophils 0 %    % Immature Granulocytes 0 %    NRBCs per 100 WBC 0 <1 /100    Absolute Neutrophils 3.2 1.6 - 8.3 10e3/uL    Absolute Lymphocytes 0.3 (L) 0.8 - 5.3 10e3/uL    Absolute Monocytes 0.4 0.0 - 1.3 10e3/uL    Mids  Abs (Monos, Eos, Basos)      Absolute Eosinophils 0.2 0.0 - 0.7 10e3/uL    Absolute Basophils 0.0 0.0 - 0.2 10e3/uL    Absolute Immature Granulocytes 0.0 <=0.4 10e3/uL    Absolute NRBCs 0.0 10e3/uL   Basic metabolic panel    Collection Time: 10/20/23  7:11 AM   Result Value Ref Range    Sodium 139 135 - 145 mmol/L    Potassium 5.0 3.4 - 5.3 mmol/L    Chloride 101 98 - 107 mmol/L    Carbon Dioxide (CO2) 28 22 - 29 mmol/L    Anion Gap 10 7 - 15 mmol/L    Urea Nitrogen 36.1 (H) 6.0 - 20.0 mg/dL    Creatinine 1.51 (H) 0.67 - 1.17 mg/dL    GFR Estimate 61 >60 mL/min/1.73m2    Calcium 9.7 8.6 - 10.0 mg/dL    Glucose 110 (H) 70 - 99 mg/dL   CBC with platelets    Collection Time: 10/25/23  8:19 AM   Result Value Ref Range    WBC Count 4.6 4.0 - 11.0 10e3/uL    RBC Count 2.98 (L) 4.40 - 5.90 10e6/uL    Hemoglobin 9.3 (L) 13.3 - 17.7 g/dL    Hematocrit 28.1 (L) 40.0 - 53.0 %    MCV 94 78 - 100 fL    MCH 31.2 26.5 - 33.0 pg    MCHC 33.1 31.5 - 36.5 g/dL    RDW 14.2 10.0 - 15.0 %    Platelet Count 368 150 - 450 10e3/uL   Basic metabolic panel    Collection Time: 10/25/23  8:19 AM   Result Value Ref Range    Sodium 139 135 - 145 mmol/L    Potassium 4.8 3.4 - 5.3 mmol/L    Chloride 102 98 - 107 mmol/L    Carbon Dioxide (CO2) 25 22 - 29 mmol/L    Anion Gap 12 7 - 15 mmol/L    Urea Nitrogen 43.1 (H) 6.0 - 20.0 mg/dL    Creatinine 1.86 (H) 0.67 - 1.17 mg/dL    GFR Estimate 48 (L) >60 mL/min/1.73m2    Calcium 9.8 8.6 - 10.0 mg/dL    Glucose 130 (H) 70 - 99 mg/dL   Routine UA with micro reflex to culture    Collection Time: 10/25/23 11:17 AM    Specimen: Urine, Midstream   Result Value Ref Range    Color Urine Light Yellow Colorless, Straw, Light Yellow, Yellow    Appearance Urine Clear Clear    Glucose Urine Negative Negative mg/dL    Bilirubin Urine Negative Negative    Ketones Urine Negative Negative mg/dL    Specific Gravity Urine 1.013 1.003 - 1.035    Blood Urine Negative Negative    pH Urine 7.0 5.0 - 7.0    Protein Albumin  Urine Negative Negative mg/dL    Urobilinogen Urine Normal Normal, 2.0 mg/dL    Nitrite Urine Negative Negative    Leukocyte Esterase Urine Negative Negative    Mucus Urine Present (A) None Seen /LPF    RBC Urine 1 <=2 /HPF    WBC Urine 2 <=5 /HPF    Squamous Epithelials Urine 1 <=1 /HPF    Hyaline Casts Urine 2 <=2 /LPF   Fractional Excretion of Sodium    Collection Time: 10/25/23 11:17 AM   Result Value Ref Range    Creatinine Urine mg/dL 70.6 mg/dL    Sodium Urine mmol/L 77 mmol/L    %FENA 1.5 %   Comprehensive metabolic panel    Collection Time: 10/27/23  2:49 PM   Result Value Ref Range    Sodium 136 135 - 145 mmol/L    Potassium 4.5 3.4 - 5.3 mmol/L    Carbon Dioxide (CO2) 25 22 - 29 mmol/L    Anion Gap 11 7 - 15 mmol/L    Urea Nitrogen 34.3 (H) 6.0 - 20.0 mg/dL    Creatinine 1.62 (H) 0.67 - 1.17 mg/dL    GFR Estimate 56 (L) >60 mL/min/1.73m2    Calcium 9.8 8.6 - 10.0 mg/dL    Chloride 100 98 - 107 mmol/L    Glucose 132 (H) 70 - 99 mg/dL    Alkaline Phosphatase 73 40 - 129 U/L    AST 14 0 - 45 U/L    ALT 16 0 - 70 U/L    Protein Total 7.5 6.4 - 8.3 g/dL    Albumin 4.3 3.5 - 5.2 g/dL    Bilirubin Total 0.3 <=1.2 mg/dL   CBC with platelets and differential    Collection Time: 10/27/23  2:49 PM   Result Value Ref Range    WBC Count 5.2 4.0 - 11.0 10e3/uL    RBC Count 2.96 (L) 4.40 - 5.90 10e6/uL    Hemoglobin 9.2 (L) 13.3 - 17.7 g/dL    Hematocrit 26.8 (L) 40.0 - 53.0 %    MCV 91 78 - 100 fL    MCH 31.1 26.5 - 33.0 pg    MCHC 34.3 31.5 - 36.5 g/dL    RDW 14.1 10.0 - 15.0 %    Platelet Count 416 150 - 450 10e3/uL    % Neutrophils 73 %    % Lymphocytes 10 %    % Monocytes 14 %    % Eosinophils 1 %    % Basophils 1 %    % Immature Granulocytes 1 %    NRBCs per 100 WBC 0 <1 /100    Absolute Neutrophils 3.8 1.6 - 8.3 10e3/uL    Absolute Lymphocytes 0.5 (L) 0.8 - 5.3 10e3/uL    Absolute Monocytes 0.7 0.0 - 1.3 10e3/uL    Absolute Eosinophils 0.1 0.0 - 0.7 10e3/uL    Absolute Basophils 0.0 0.0 - 0.2 10e3/uL    Absolute  Immature Granulocytes 0.0 <=0.4 10e3/uL    Absolute NRBCs 0.0 10e3/uL   CBC with platelets    Collection Time: 10/30/23  1:26 PM   Result Value Ref Range    WBC Count 4.9 4.0 - 11.0 10e3/uL    RBC Count 2.86 (L) 4.40 - 5.90 10e6/uL    Hemoglobin 8.8 (L) 13.3 - 17.7 g/dL    Hematocrit 26.6 (L) 40.0 - 53.0 %    MCV 93 78 - 100 fL    MCH 30.8 26.5 - 33.0 pg    MCHC 33.1 31.5 - 36.5 g/dL    RDW 14.2 10.0 - 15.0 %    Platelet Count 364 150 - 450 10e3/uL   UA with Microscopic    Collection Time: 10/30/23  1:29 PM   Result Value Ref Range    Color Urine Light Yellow Colorless, Straw, Light Yellow, Yellow    Appearance Urine Clear Clear    Glucose Urine Negative Negative mg/dL    Bilirubin Urine Negative Negative    Ketones Urine Negative Negative mg/dL    Specific Gravity Urine 1.012 1.003 - 1.035    Blood Urine Negative Negative    pH Urine 7.0 5.0 - 7.0    Protein Albumin Urine Negative Negative mg/dL    Urobilinogen Urine Normal Normal, 2.0 mg/dL    Nitrite Urine Negative Negative    Leukocyte Esterase Urine Negative Negative    RBC Urine 1 <=2 /HPF    WBC Urine 1 <=5 /HPF     CMP  Recent Labs   Lab Test 10/27/23  1449 10/25/23  0819 10/20/23  0711 10/18/23  0715 10/09/23  0801 10/02/23  0830 09/25/23  0726    139 139 139 137 139 136   POTASSIUM 4.5 4.8 5.0 4.6 4.7 4.1 4.0   CHLORIDE 100 102 101 101 99 103 100   CO2 25 25 28 26 26 25 21*   ANIONGAP 11 12 10 12 12 11 15   * 130* 110* 143* 122* 108* 107*   BUN 34.3* 43.1* 36.1* 33.9* 25.4* 9.7 8.9   CR 1.62* 1.86* 1.51* 1.41* 1.21* 1.12 1.01   GFRESTIMATED 56* 48* 61 66 80 87 >90   SOTO 9.8 9.8 9.7 9.6 9.1 9.5 9.2   MAG  --   --   --  2.0 1.7 1.8 1.8   PROTTOTAL 7.5  --   --   --  7.2 7.1 6.8   ALBUMIN 4.3  --   --   --  4.3 4.4 4.3   BILITOTAL 0.3  --   --   --  0.3 0.2 0.4   ALKPHOS 73  --   --   --  64 57 51   AST 14  --   --   --  11 9 15   ALT 16  --   --   --  15 13 17     CBC  Recent Labs   Lab Test 10/30/23  1326 10/27/23  1449 10/25/23  0819  10/18/23  0715   HGB 8.8* 9.2* 9.3* 8.9*   WBC 4.9 5.2 4.6 4.1   RBC 2.86* 2.96* 2.98* 2.90*   HCT 26.6* 26.8* 28.1* 26.0*   MCV 93 91 94 90   MCH 30.8 31.1 31.2 30.7   MCHC 33.1 34.3 33.1 34.2   RDW 14.2 14.1 14.2 13.4    416 368 179     INR  Recent Labs   Lab Test 09/23/23  1526   INR 1.03   PTT 25     ABGNo lab results found.   URINE STUDIES  Recent Labs   Lab Test 10/30/23  1329 10/25/23  1117 09/23/23  1852   COLOR Light Yellow Light Yellow Light Yellow   APPEARANCE Clear Clear Clear   URINEGLC Negative Negative Negative   URINEBILI Negative Negative Negative   URINEKETONE Negative Negative 100*   SG 1.012 1.013 1.017   UBLD Negative Negative Negative   URINEPH 7.0 7.0 5.5   PROTEIN Negative Negative Negative   NITRITE Negative Negative Negative   LEUKEST Negative Negative Negative   RBCU 1 1 1   WBCU 1 2 3     No lab results found.    ASSESSMENT AND PLAN:   #SMILEY secondary to prerenal azotemia with possible associated cisplatin toxicity and disruption of the tubuloglomerular feedback by concomitant use of valsartan. Will stop valsartan and increase concomitantly amlodipine to 10 mg daily. His kidney function has started to improve with IV fluids and should improve further with improved PO inatke     #HTN  Essential. Management as per above     #Blood count  Hemoglobin 8.8 secondary to chemotherapy and inflammation. Management per oncology.    #Acid-base status  CO2 level 28 no need for any intervention    #Electrolytes  Na 137  K 4.8 no acute issue    #Thyroid  TSH was 0.24 on 9/23 and has now normalized     #BMD  Calcium 9.7          albumin 4.4  No acute issue      The total time of this encounter amounted to 60 minutes on the day of the encounter. This time included time spent with the patient, reviewing records, ordering tests, and performing post visit documentation.       The patient will return to follow up in January 2023    Nata Sánchez MD  Division of Renal Disease and Hypertension  October  30, 2023  1:36 PM       Again, thank you for allowing me to participate in the care of your patient.      Sincerely,    Nata Sánchez MD

## 2023-10-30 NOTE — PROGRESS NOTES
Nephrology Clinic    Thierno Vega MRN:[id number] YOB: 1985  Date of Service: 10/30/2023  Primary care provider: Catrachita Clark  Requesting physician: Catrachita Clark      REASON FOR CONSULT: SMILEY    HISTORY OF PRESENT ILLNESS:   Thierno Vega is a 38 year old male who presents for evaluation of an elevated creatinine at 1.27 mg/dL.   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  He noticed a left neck mass in the fall and initially had an FNA that was benign. The mass perissted and then in April underwent repeat FNA showing undifferentiated carcinoma. He travelled to the  in June 2023 for treatment. He initiated therapy on 6/27 and received cisplatin gemcitabine induction x 3 cycles, then chemoradiation with cisplatin with curative intent. He completed his therapy on October 16th.  A nasal jejunostomy tube was placed on 9/28 as the patient was having trouble keeping PO intake. He also needed IV fluid infusions three times a week. From a renal standpoint, his creatinine level was around 0.8 mg/dL up to mid-September 2023 however it went up to 1.2 mg/dL on October 2nd then to 1.86 mg/dL on 10/25 and has now been improving after he was initiated on IV fluids three times a week. The uPCR is 0.14 mg/mg Cr today. His UA shows no evidence of hematuria or leucocyturia. The patient reports that his PO intake has also started to improve.  The patient denies any dysuria, any pollakiuria, any nocturia, any LE edema, any dyspnea on exertion .  The patient denies ever having kidney stones, urinary tract infections, gross hematuria. There is no family history of CKD.  The following portions of the patient's history were reviewed and updated as appropriate: allergies, current medications, past family history, past medical history, past social history, past surgical  history and problem list.    PAST MEDICAL HISTORY:  Past Medical History:   Diagnosis Date    HTN (hypertension)      PAST SURGICAL HISTORY:  No past surgical history on file.  MEDICATIONS:  Prescription Medications as of 10/30/2023         Rx Number Disp Refills Start End Last Dispensed Date Next Fill Date Owning Pharmacy    acetaminophen (TYLENOL) 500 MG tablet  60 tablet 1 10/9/2023    [address] 1-978    Sig: Take 2 tablets (1,000 mg) by mouth every 8 hours as needed for mild pain    Class: E-Prescribe    Route: Oral    amLODIPine 5 MG TABS 5 mg, valsartan 160 MG TABS 160 mg            Sig: Take 5 mg by mouth daily    Class: Historical    Route: Oral    famotidine (PEPCID) 20 MG tablet  60 tablet 1 9/25/2023    [address] 1-343    Sig: Take 1 tablet (20 mg) by mouth 2 times daily    Class: E-Prescribe    Route: Oral    gabapentin (NEURONTIN) 300 MG capsule  270 capsule 4 8/14/2023    [address]    Sig: Take 3 capsules (900 mg) by mouth 3 times daily Taper dose up to 900 mg po tid per instructions given in Radiation Oncology clinic    Class: E-Prescribe    Route: Oral    magic mouthwash (ENTER INGREDIENTS IN COMMENTS) suspension  240 mL 0 10/9/2023    [address] 1-148    Sig: Take 10 mLs by mouth every 4 hours as needed    Class: E-Prescribe    Route: Oral    ondansetron (ZOFRAN) 8 MG tablet  30 tablet 5 6/27/2023    [address] 1-977    Sig: Take 1 tablet (8 mg) by mouth every 8 hours as needed for nausea (vomiting)    Class: E-Prescribe    Route: Oral    Non-formulary Exception Code: Specific indication for non-formulary alternative    oxyCODONE (ROXICODONE) 5 MG tablet  20 tablet 0 10/12/2023     [address] 5-030    Sig: Take 1 tablet (5 mg) by mouth every 6 hours as needed for pain    Class: E-Prescribe    Earliest Fill Date: 10/12/2023    Route: Oral    prochlorperazine (COMPAZINE) 10 MG tablet  30 tablet 5 6/27/2023    [address] 4-338    Sig: Take 1 tablet (10 mg) by mouth every 6 hours as needed for nausea or vomiting    Class: E-Prescribe    Route: Oral    Non-formulary Exception Code: Specific indication for non-formulary alternative           ALLERGIES:    No Known Allergies  REVIEW OF SYSTEMS:  Review Of Systems    A comprehensive review of systems was performed and found to be negative except as described here or above.  SOCIAL HISTORY:   Social History     Socioeconomic History    Marital status: Single     Spouse name: Not on file    Number of children: Not on file    Years of education: Not on file    Highest education level: Not on file   Occupational History    Not on file   Tobacco Use    Smoking status: Some Days     Types: Cigarettes     Passive exposure: Current    Smokeless tobacco: Never   Substance and Sexual Activity    Alcohol use: Not Currently    Drug use: Never    Sexual activity: Not on file   Other Topics Concern    Not on file   Social History Narrative    Not on file     Social Determinants of Health     Financial Resource Strain: Not on file   Food Insecurity: Not on file   Transportation Needs: Not on file   Physical Activity: Not on file   Stress: Not on file   Social Connections: Not on file   Interpersonal Safety: Not on file   Housing Stability: Not on file     FAMILY MEDICAL HISTORY:   Family History   Problem Relation Age of Onset    Cancer No family hx of      PHYSICAL EXAM:   There were no vitals taken for this visit.  GENERAL APPEARANCE: alert and no distress  EYES: nonicteric  HENT: mouth without ulcers or lesions  NECK: supple, no  adenopathy  RESP: lungs clear to auscultation   CV: regular rhythm, normal rate, no rub  ABDOMEN: soft, nontender, normal bowel sounds, no HSM   Extremities: no clubbing, cyanosis, or edema  MS: no evidence of inflammation in joints, no muscle tenderness  SKIN: no rash  NEURO: mentation intact and speech normal  PSYCH: affect normal/bright   LABS:   Recent Results (from the past 672 hour(s))   Comprehensive metabolic panel    Collection Time: 10/09/23  8:01 AM   Result Value Ref Range    Sodium 137 135 - 145 mmol/L    Potassium 4.7 3.4 - 5.3 mmol/L    Carbon Dioxide (CO2) 26 22 - 29 mmol/L    Anion Gap 12 7 - 15 mmol/L    Urea Nitrogen 25.4 (H) 6.0 - 20.0 mg/dL    Creatinine 1.21 (H) 0.67 - 1.17 mg/dL    GFR Estimate 80 >60 mL/min/1.73m2    Calcium 9.1 8.6 - 10.0 mg/dL    Chloride 99 98 - 107 mmol/L    Glucose 122 (H) 70 - 99 mg/dL    Alkaline Phosphatase 64 40 - 129 U/L    AST 11 0 - 45 U/L    ALT 15 0 - 70 U/L    Protein Total 7.2 6.4 - 8.3 g/dL    Albumin 4.3 3.5 - 5.2 g/dL    Bilirubin Total 0.3 <=1.2 mg/dL   Magnesium    Collection Time: 10/09/23  8:01 AM   Result Value Ref Range    Magnesium 1.7 1.7 - 2.3 mg/dL   CBC with platelets and differential    Collection Time: 10/09/23  8:01 AM   Result Value Ref Range    WBC Count 4.2 4.0 - 11.0 10e3/uL    RBC Count 3.15 (L) 4.40 - 5.90 10e6/uL    Hemoglobin 9.6 (L) 13.3 - 17.7 g/dL    Hematocrit 27.4 (L) 40.0 - 53.0 %    MCV 87 78 - 100 fL    MCH 30.5 26.5 - 33.0 pg    MCHC 35.0 31.5 - 36.5 g/dL    RDW 12.9 10.0 - 15.0 %    Platelet Count 129 (L) 150 - 450 10e3/uL    % Neutrophils 75 %    % Lymphocytes 8 %    % Monocytes 15 %    Mids % (Monos, Eos, Basos)      % Eosinophils 1 %    % Basophils 1 %    % Immature Granulocytes 0 %    NRBCs per 100 WBC 0 <1 /100    Absolute Neutrophils 3.1 1.6 - 8.3 10e3/uL    Absolute Lymphocytes 0.3 (L) 0.8 - 5.3 10e3/uL    Absolute Monocytes 0.6 0.0 - 1.3 10e3/uL    Mids Abs (Monos, Eos, Basos)      Absolute Eosinophils 0.0 0.0 - 0.7  10e3/uL    Absolute Basophils 0.0 0.0 - 0.2 10e3/uL    Absolute Immature Granulocytes 0.0 <=0.4 10e3/uL    Absolute NRBCs 0.0 10e3/uL   Magnesium    Collection Time: 10/18/23  7:15 AM   Result Value Ref Range    Magnesium 2.0 1.7 - 2.3 mg/dL   EBV DNA PCR Quantitative Whole Blood    Collection Time: 10/18/23  7:15 AM   Result Value Ref Range    EBV DNA Copies/mL Not Detected Not Detected copies/mL   Basic metabolic panel    Collection Time: 10/18/23  7:15 AM   Result Value Ref Range    Sodium 139 135 - 145 mmol/L    Potassium 4.6 3.4 - 5.3 mmol/L    Chloride 101 98 - 107 mmol/L    Carbon Dioxide (CO2) 26 22 - 29 mmol/L    Anion Gap 12 7 - 15 mmol/L    Urea Nitrogen 33.9 (H) 6.0 - 20.0 mg/dL    Creatinine 1.41 (H) 0.67 - 1.17 mg/dL    GFR Estimate 66 >60 mL/min/1.73m2    Calcium 9.6 8.6 - 10.0 mg/dL    Glucose 143 (H) 70 - 99 mg/dL   CBC with platelets and differential    Collection Time: 10/18/23  7:15 AM   Result Value Ref Range    WBC Count 4.1 4.0 - 11.0 10e3/uL    RBC Count 2.90 (L) 4.40 - 5.90 10e6/uL    Hemoglobin 8.9 (L) 13.3 - 17.7 g/dL    Hematocrit 26.0 (L) 40.0 - 53.0 %    MCV 90 78 - 100 fL    MCH 30.7 26.5 - 33.0 pg    MCHC 34.2 31.5 - 36.5 g/dL    RDW 13.4 10.0 - 15.0 %    Platelet Count 179 150 - 450 10e3/uL    % Neutrophils 78 %    % Lymphocytes 8 %    % Monocytes 9 %    Mids % (Monos, Eos, Basos)      % Eosinophils 5 %    % Basophils 0 %    % Immature Granulocytes 0 %    NRBCs per 100 WBC 0 <1 /100    Absolute Neutrophils 3.2 1.6 - 8.3 10e3/uL    Absolute Lymphocytes 0.3 (L) 0.8 - 5.3 10e3/uL    Absolute Monocytes 0.4 0.0 - 1.3 10e3/uL    Mids Abs (Monos, Eos, Basos)      Absolute Eosinophils 0.2 0.0 - 0.7 10e3/uL    Absolute Basophils 0.0 0.0 - 0.2 10e3/uL    Absolute Immature Granulocytes 0.0 <=0.4 10e3/uL    Absolute NRBCs 0.0 10e3/uL   Basic metabolic panel    Collection Time: 10/20/23  7:11 AM   Result Value Ref Range    Sodium 139 135 - 145 mmol/L    Potassium 5.0 3.4 - 5.3 mmol/L    Chloride  101 98 - 107 mmol/L    Carbon Dioxide (CO2) 28 22 - 29 mmol/L    Anion Gap 10 7 - 15 mmol/L    Urea Nitrogen 36.1 (H) 6.0 - 20.0 mg/dL    Creatinine 1.51 (H) 0.67 - 1.17 mg/dL    GFR Estimate 61 >60 mL/min/1.73m2    Calcium 9.7 8.6 - 10.0 mg/dL    Glucose 110 (H) 70 - 99 mg/dL   CBC with platelets    Collection Time: 10/25/23  8:19 AM   Result Value Ref Range    WBC Count 4.6 4.0 - 11.0 10e3/uL    RBC Count 2.98 (L) 4.40 - 5.90 10e6/uL    Hemoglobin 9.3 (L) 13.3 - 17.7 g/dL    Hematocrit 28.1 (L) 40.0 - 53.0 %    MCV 94 78 - 100 fL    MCH 31.2 26.5 - 33.0 pg    MCHC 33.1 31.5 - 36.5 g/dL    RDW 14.2 10.0 - 15.0 %    Platelet Count 368 150 - 450 10e3/uL   Basic metabolic panel    Collection Time: 10/25/23  8:19 AM   Result Value Ref Range    Sodium 139 135 - 145 mmol/L    Potassium 4.8 3.4 - 5.3 mmol/L    Chloride 102 98 - 107 mmol/L    Carbon Dioxide (CO2) 25 22 - 29 mmol/L    Anion Gap 12 7 - 15 mmol/L    Urea Nitrogen 43.1 (H) 6.0 - 20.0 mg/dL    Creatinine 1.86 (H) 0.67 - 1.17 mg/dL    GFR Estimate 48 (L) >60 mL/min/1.73m2    Calcium 9.8 8.6 - 10.0 mg/dL    Glucose 130 (H) 70 - 99 mg/dL   Routine UA with micro reflex to culture    Collection Time: 10/25/23 11:17 AM    Specimen: Urine, Midstream   Result Value Ref Range    Color Urine Light Yellow Colorless, Straw, Light Yellow, Yellow    Appearance Urine Clear Clear    Glucose Urine Negative Negative mg/dL    Bilirubin Urine Negative Negative    Ketones Urine Negative Negative mg/dL    Specific Gravity Urine 1.013 1.003 - 1.035    Blood Urine Negative Negative    pH Urine 7.0 5.0 - 7.0    Protein Albumin Urine Negative Negative mg/dL    Urobilinogen Urine Normal Normal, 2.0 mg/dL    Nitrite Urine Negative Negative    Leukocyte Esterase Urine Negative Negative    Mucus Urine Present (A) None Seen /LPF    RBC Urine 1 <=2 /HPF    WBC Urine 2 <=5 /HPF    Squamous Epithelials Urine 1 <=1 /HPF    Hyaline Casts Urine 2 <=2 /LPF   Fractional Excretion of Sodium     Collection Time: 10/25/23 11:17 AM   Result Value Ref Range    Creatinine Urine mg/dL 70.6 mg/dL    Sodium Urine mmol/L 77 mmol/L    %FENA 1.5 %   Comprehensive metabolic panel    Collection Time: 10/27/23  2:49 PM   Result Value Ref Range    Sodium 136 135 - 145 mmol/L    Potassium 4.5 3.4 - 5.3 mmol/L    Carbon Dioxide (CO2) 25 22 - 29 mmol/L    Anion Gap 11 7 - 15 mmol/L    Urea Nitrogen 34.3 (H) 6.0 - 20.0 mg/dL    Creatinine 1.62 (H) 0.67 - 1.17 mg/dL    GFR Estimate 56 (L) >60 mL/min/1.73m2    Calcium 9.8 8.6 - 10.0 mg/dL    Chloride 100 98 - 107 mmol/L    Glucose 132 (H) 70 - 99 mg/dL    Alkaline Phosphatase 73 40 - 129 U/L    AST 14 0 - 45 U/L    ALT 16 0 - 70 U/L    Protein Total 7.5 6.4 - 8.3 g/dL    Albumin 4.3 3.5 - 5.2 g/dL    Bilirubin Total 0.3 <=1.2 mg/dL   CBC with platelets and differential    Collection Time: 10/27/23  2:49 PM   Result Value Ref Range    WBC Count 5.2 4.0 - 11.0 10e3/uL    RBC Count 2.96 (L) 4.40 - 5.90 10e6/uL    Hemoglobin 9.2 (L) 13.3 - 17.7 g/dL    Hematocrit 26.8 (L) 40.0 - 53.0 %    MCV 91 78 - 100 fL    MCH 31.1 26.5 - 33.0 pg    MCHC 34.3 31.5 - 36.5 g/dL    RDW 14.1 10.0 - 15.0 %    Platelet Count 416 150 - 450 10e3/uL    % Neutrophils 73 %    % Lymphocytes 10 %    % Monocytes 14 %    % Eosinophils 1 %    % Basophils 1 %    % Immature Granulocytes 1 %    NRBCs per 100 WBC 0 <1 /100    Absolute Neutrophils 3.8 1.6 - 8.3 10e3/uL    Absolute Lymphocytes 0.5 (L) 0.8 - 5.3 10e3/uL    Absolute Monocytes 0.7 0.0 - 1.3 10e3/uL    Absolute Eosinophils 0.1 0.0 - 0.7 10e3/uL    Absolute Basophils 0.0 0.0 - 0.2 10e3/uL    Absolute Immature Granulocytes 0.0 <=0.4 10e3/uL    Absolute NRBCs 0.0 10e3/uL   CBC with platelets    Collection Time: 10/30/23  1:26 PM   Result Value Ref Range    WBC Count 4.9 4.0 - 11.0 10e3/uL    RBC Count 2.86 (L) 4.40 - 5.90 10e6/uL    Hemoglobin 8.8 (L) 13.3 - 17.7 g/dL    Hematocrit 26.6 (L) 40.0 - 53.0 %    MCV 93 78 - 100 fL    MCH 30.8 26.5 - 33.0 pg     MCHC 33.1 31.5 - 36.5 g/dL    RDW 14.2 10.0 - 15.0 %    Platelet Count 364 150 - 450 10e3/uL   UA with Microscopic    Collection Time: 10/30/23  1:29 PM   Result Value Ref Range    Color Urine Light Yellow Colorless, Straw, Light Yellow, Yellow    Appearance Urine Clear Clear    Glucose Urine Negative Negative mg/dL    Bilirubin Urine Negative Negative    Ketones Urine Negative Negative mg/dL    Specific Gravity Urine 1.012 1.003 - 1.035    Blood Urine Negative Negative    pH Urine 7.0 5.0 - 7.0    Protein Albumin Urine Negative Negative mg/dL    Urobilinogen Urine Normal Normal, 2.0 mg/dL    Nitrite Urine Negative Negative    Leukocyte Esterase Urine Negative Negative    RBC Urine 1 <=2 /HPF    WBC Urine 1 <=5 /HPF     CMP  Recent Labs   Lab Test 10/27/23  1449 10/25/23  0819 10/20/23  0711 10/18/23  0715 10/09/23  0801 10/02/23  0830 09/25/23  0726    139 139 139 137 139 136   POTASSIUM 4.5 4.8 5.0 4.6 4.7 4.1 4.0   CHLORIDE 100 102 101 101 99 103 100   CO2 25 25 28 26 26 25 21*   ANIONGAP 11 12 10 12 12 11 15   * 130* 110* 143* 122* 108* 107*   BUN 34.3* 43.1* 36.1* 33.9* 25.4* 9.7 8.9   CR 1.62* 1.86* 1.51* 1.41* 1.21* 1.12 1.01   GFRESTIMATED 56* 48* 61 66 80 87 >90   SOTO 9.8 9.8 9.7 9.6 9.1 9.5 9.2   MAG  --   --   --  2.0 1.7 1.8 1.8   PROTTOTAL 7.5  --   --   --  7.2 7.1 6.8   ALBUMIN 4.3  --   --   --  4.3 4.4 4.3   BILITOTAL 0.3  --   --   --  0.3 0.2 0.4   ALKPHOS 73  --   --   --  64 57 51   AST 14  --   --   --  11 9 15   ALT 16  --   --   --  15 13 17     CBC  Recent Labs   Lab Test 10/30/23  1326 10/27/23  1449 10/25/23  0819 10/18/23  0715   HGB 8.8* 9.2* 9.3* 8.9*   WBC 4.9 5.2 4.6 4.1   RBC 2.86* 2.96* 2.98* 2.90*   HCT 26.6* 26.8* 28.1* 26.0*   MCV 93 91 94 90   MCH 30.8 31.1 31.2 30.7   MCHC 33.1 34.3 33.1 34.2   RDW 14.2 14.1 14.2 13.4    416 368 179     INR  Recent Labs   Lab Test 09/23/23  1526   INR 1.03   PTT 25     ABGNo lab results found.   URINE STUDIES  Recent Labs    Lab Test 10/30/23  1329 10/25/23  1117 09/23/23  1852   COLOR Light Yellow Light Yellow Light Yellow   APPEARANCE Clear Clear Clear   URINEGLC Negative Negative Negative   URINEBILI Negative Negative Negative   URINEKETONE Negative Negative 100*   SG 1.012 1.013 1.017   UBLD Negative Negative Negative   URINEPH 7.0 7.0 5.5   PROTEIN Negative Negative Negative   NITRITE Negative Negative Negative   LEUKEST Negative Negative Negative   RBCU 1 1 1   WBCU 1 2 3     No lab results found.    ASSESSMENT AND PLAN:   #SMILEY secondary to prerenal azotemia with possible associated cisplatin toxicity and disruption of the tubuloglomerular feedback by concomitant use of valsartan. Will stop valsartan and increase concomitantly amlodipine to 10 mg daily. His kidney function has started to improve with IV fluids and should improve further with improved PO inatke     #HTN  Essential. Management as per above     #Blood count  Hemoglobin 8.8 secondary to chemotherapy and inflammation. Management per oncology.    #Acid-base status  CO2 level 28 no need for any intervention    #Electrolytes  Na 137  K 4.8 no acute issue    #Thyroid  TSH was 0.24 on 9/23 and has now normalized     #BMD  Calcium 9.7          albumin 4.4  No acute issue      The total time of this encounter amounted to 60 minutes on the day of the encounter. This time included time spent with the patient, reviewing records, ordering tests, and performing post visit documentation.       The patient will return to follow up in January 2023    Nata Sánchez MD  Division of Renal Disease and Hypertension  October 30, 2023  1:36 PM

## 2023-10-30 NOTE — NURSING NOTE
[facility]  [address] [address]  Phone: [phone]  Fax: [phone]                  Marychuy Bruce   2017 10:20 AM   Office Visit    Description:  Female : 1936   Provider:  Yohannes Toribio MD   Department:  [facility]           Reason for Visit     Headache           Diagnoses this Visit        Comments    Fatigue, unspecified type    -  Primary     Mild episode of recurrent major depressive disorder         Chronic bronchitis, unspecified chronic bronchitis type         Non-occlusive coronary artery disease         Disorder of arteries and arterioles                To Do List           Future Appointments        Provider Department Dept Phone    3/1/2017 10:40 AM Yohannes Toribio MD [facility] [phone]      Goals (5 Years of Data)     None       These Medications        Disp Refills Start End    zolpidem (AMBIEN) 5 MG Tab 30 tablet 0 2017    Take 1 tablet (5 mg total) by mouth nightly as needed. - Oral    Pharmacy: [facility] [place] - [address] Ph #: [phone]       duloxetine (CYMBALTA) 60 MG capsule 30 capsule 11 2017     Take 1 capsule (60 mg total) by mouth once daily. - Oral    Pharmacy: [facility] [address] Ph #: [phone]         [facility] On Call     [facility] On Call Nurse Care Line -  Assistance  Registered nurses in the [facility] On Call Center provide clinical advisement, health education, appointment booking, and other advisory services.  Call for this free service at [phone].             Medications           Message regarding Medications     Verify the changes and/or additions to your medication regime listed below are the same as discussed with your clinician today.  If any of these changes or additions are incorrect, please notify your healthcare provider.        START taking these NEW  Chief Complaint   Patient presents with    Consult       /82   Pulse 90   Temp 98.3  F (36.8  C) (Oral)   Wt 98.1 kg (216 lb 3.2 oz)   SpO2 100%   BMI 29.32 kg/m      Malachi Mcbride on 10/30/2023 at 1:46 PM     medications        Refills    zolpidem (AMBIEN) 5 MG Tab 0    Sig: Take 1 tablet (5 mg total) by mouth nightly as needed.    Class: Normal    Route: Oral      STOP taking these medications     azithromycin (Z-MIRACLE) 250 MG tablet Take 2 tablets on day 1, then 1 tablet daily on days 2 - 5.    methylPREDNISolone (MEDROL DOSEPACK) 4 mg tablet follow package directions    tramadol (ULTRAM) 50 mg tablet TAKE 1 TABLET 3 TIMES A DAY AS NEEDED FOR PAIN    LYRICA 50 mg capsule Take 50 mg by mouth 3 (three) times daily.     benzonatate (TESSALON) 100 MG capsule Take 1 capsule (100 mg total) by mouth 3 (three) times daily as needed.    isosorbide mononitrate (IMDUR) 30 MG 24 hr tablet Take 1 tablet (30 mg total) by mouth once daily.    ranolazine (RANEXA) 500 MG Tb12 Take 500 mg by mouth 2 (two) times daily.    trazodone (DESYREL) 50 MG tablet TAKE 1 TABLET IN THE EVENING           Verify that the below list of medications is an accurate representation of the medications you are currently taking.  If none reported, the list may be blank. If incorrect, please contact your healthcare provider. Carry this list with you in case of emergency.           Current Medications     ADVAIR DISKUS 100-50 mcg/dose diskus inhaler INHALE 1 PUFF INTO THE LUNGS TWICE A DAY    albuterol (PROAIR HFA) 90 mcg/actuation inhaler Inhale 2 puffs into the lungs every 6 (six) hours as needed for Wheezing.    amlodipine (NORVASC) 2.5 MG tablet TAKE 1 TABLET EVERY DAY    atorvastatin (LIPITOR) 40 MG tablet Take 40 mg by mouth once daily.     azelastine (ASTELIN) 137 mcg nasal spray 1 spray by Nasal route 2 (two) times daily.    BONIVA 150 mg tablet TAKE 1 TABLET (150 MG TOTAL) BY MOUTH EVERY 30 DAYS.    clopidogrel (PLAVIX) 75 mg tablet TAKE 1 TABLET EVERY DAY    duloxetine (CYMBALTA) 60 MG capsule Take 1 capsule (60 mg total) by mouth once daily.    hydrocodone-acetaminophen 10-325mg (NORCO)  mg Tab Take 1 tablet by mouth 2 (two) times daily as  "needed.    montelukast (SINGULAIR) 10 mg tablet TAKE 1 TABLET EVERY DAY    nitroGLYCERIN (NITROSTAT) 0.4 MG SL tablet Place 0.4 mg under the tongue every 5 (five) minutes as needed for Chest pain.    ranitidine (ZANTAC) 300 MG tablet Take 300 mg by mouth nightly.     zolpidem (AMBIEN) 5 MG Tab Take 1 tablet (5 mg total) by mouth nightly as needed.           Clinical Reference Information           Vital Signs - Last Recorded  Most recent update: 1/26/2017 10:51 AM by Brandie Alcantar MA    BP Pulse Temp Ht Wt BMI    (!) 146/88 72 97.5 °F (36.4 °C) 5' 5" (1.651 m) 69.3 kg (152 lb 12.5 oz) 25.42 kg/m2      Blood Pressure          Most Recent Value    BP  (!)  146/88      Allergies as of 1/26/2017     Codeine    Sertraline    Sulfa (Sulfonamide Antibiotics)      Immunizations Administered on Date of Encounter - 1/26/2017     Name Date Dose VIS Date Route    Pneumococcal Conjugate - 13 Valent 1/26/2017 0.5 mL 11/5/2015 Intramuscular      Orders Placed During Today's Visit      Normal Orders This Visit    Pneumococcal Conjugate Vaccine (13 Valent) (IM)       Instructions      Bring all your medications to your next appointment         "

## 2023-10-30 NOTE — PATIENT INSTRUCTIONS
Ce fut un plaisir que de faire votre connaissance:    -S'il vous plait arretez l'amlodipine/valsartan et remplacez-le par l'amlodipine 10 mg une fois par jour.  -Faites ottoniel tests de sang une fois par semaine  -[place] lobo en [place]    Cordialement,    Nata Sánchez

## 2023-11-01 ENCOUNTER — INFUSION THERAPY VISIT (OUTPATIENT)
Dept: ONCOLOGY | Facility: CLINIC | Age: 38
End: 2023-11-01
Attending: INTERNAL MEDICINE
Payer: COMMERCIAL

## 2023-11-01 ENCOUNTER — OFFICE VISIT (OUTPATIENT)
Dept: RADIATION ONCOLOGY | Facility: CLINIC | Age: 38
End: 2023-11-01
Attending: RADIOLOGY
Payer: COMMERCIAL

## 2023-11-01 VITALS
RESPIRATION RATE: 16 BRPM | DIASTOLIC BLOOD PRESSURE: 82 MMHG | HEART RATE: 88 BPM | SYSTOLIC BLOOD PRESSURE: 149 MMHG | TEMPERATURE: 98.8 F

## 2023-11-01 VITALS
WEIGHT: 216 LBS | BODY MASS INDEX: 29.29 KG/M2 | HEART RATE: 92 BPM | DIASTOLIC BLOOD PRESSURE: 74 MMHG | SYSTOLIC BLOOD PRESSURE: 144 MMHG

## 2023-11-01 DIAGNOSIS — C11.9 NASOPHARYNGEAL CANCER (H): Primary | ICD-10-CM

## 2023-11-01 PROCEDURE — 258N000003 HC RX IP 258 OP 636: Performed by: NURSE PRACTITIONER

## 2023-11-01 PROCEDURE — 96360 HYDRATION IV INFUSION INIT: CPT

## 2023-11-01 PROCEDURE — 99024 POSTOP FOLLOW-UP VISIT: CPT | Performed by: RADIOLOGY

## 2023-11-01 PROCEDURE — G0463 HOSPITAL OUTPT CLINIC VISIT: HCPCS | Mod: 25 | Performed by: RADIOLOGY

## 2023-11-01 PROCEDURE — 96361 HYDRATE IV INFUSION ADD-ON: CPT

## 2023-11-01 RX ORDER — HEPARIN SODIUM (PORCINE) LOCK FLUSH IV SOLN 100 UNIT/ML 100 UNIT/ML
5 SOLUTION INTRAVENOUS
Status: CANCELLED | OUTPATIENT
Start: 2023-11-01

## 2023-11-01 RX ORDER — FAMOTIDINE 20 MG/1
20 TABLET, FILM COATED ORAL 2 TIMES DAILY
Qty: 90 TABLET | Refills: 1 | Status: SHIPPED | OUTPATIENT
Start: 2023-11-01 | End: 2024-01-24

## 2023-11-01 RX ORDER — MEPERIDINE HYDROCHLORIDE 25 MG/ML
25 INJECTION INTRAMUSCULAR; INTRAVENOUS; SUBCUTANEOUS EVERY 30 MIN PRN
Status: CANCELLED | OUTPATIENT
Start: 2023-11-01

## 2023-11-01 RX ORDER — DIPHENHYDRAMINE HYDROCHLORIDE 50 MG/ML
50 INJECTION INTRAMUSCULAR; INTRAVENOUS
Status: CANCELLED
Start: 2023-11-01

## 2023-11-01 RX ORDER — ALBUTEROL SULFATE 0.83 MG/ML
2.5 SOLUTION RESPIRATORY (INHALATION)
Status: CANCELLED | OUTPATIENT
Start: 2023-11-01

## 2023-11-01 RX ORDER — ALBUTEROL SULFATE 90 UG/1
1-2 AEROSOL, METERED RESPIRATORY (INHALATION)
Status: CANCELLED
Start: 2023-11-01

## 2023-11-01 RX ORDER — METHYLPREDNISOLONE SODIUM SUCCINATE 125 MG/2ML
125 INJECTION, POWDER, LYOPHILIZED, FOR SOLUTION INTRAMUSCULAR; INTRAVENOUS
Status: CANCELLED
Start: 2023-11-01

## 2023-11-01 RX ORDER — HEPARIN SODIUM,PORCINE 10 UNIT/ML
5-20 VIAL (ML) INTRAVENOUS DAILY PRN
Status: CANCELLED | OUTPATIENT
Start: 2023-11-01

## 2023-11-01 RX ORDER — EPINEPHRINE 1 MG/ML
0.3 INJECTION, SOLUTION, CONCENTRATE INTRAVENOUS EVERY 5 MIN PRN
Status: CANCELLED | OUTPATIENT
Start: 2023-11-01

## 2023-11-01 RX ADMIN — SODIUM CHLORIDE 2000 ML: 9 INJECTION, SOLUTION INTRAVENOUS at 10:27

## 2023-11-01 NOTE — LETTER
2023         RE: Thierno Vega  [address]  [address]  [facility] [address]        Dear Colleague,    Thank you for referring your patient, Thierno Vega, to the [facility] RADIATION ONCOLOGY. Please see a copy of my visit note below.    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  During treatment, patient struggled with dysgeusia and difficulty with oral intake necessitating placement of a nasojejunal feeding tube.  He continues on continuous feedings.  Since completion of treatment 2 weeks ago, he has slowly improved.  He does notice some qualities of taste.  The main concern he has today is that he has nausea and some vomiting with his feedings.  He is trying to take some foods by mouth in an effort to prove that he can eat without the feeding tube but, because of the nausea, this has been difficult.  He no longer has any pain is and has no difficulty swallowing food except for the dysgeusia.  He is sleeping well.    PAST MEDICAL/SURGICAL HISTORY:   Past Medical History:   Diagnosis Date    HTN (hypertension)     No past surgical history on file.    ALLERGIES:  No Known Allergies    MEDICATIONS:   Current  Outpatient Medications   Medication Sig Dispense Refill    acetaminophen (TYLENOL) 500 MG tablet Take 2 tablets (1,000 mg) by mouth every 8 hours as needed for mild pain 60 tablet 1    amLODIPine (NORVASC) 10 MG tablet Take 1 tablet (10 mg) by mouth daily 90 tablet 1    famotidine (PEPCID) 20 MG tablet Take 1 tablet (20 mg) by mouth 2 times daily 90 tablet 1    gabapentin (NEURONTIN) 300 MG capsule Take 3 capsules (900 mg) by mouth 3 times daily Taper dose up to 900 mg po tid per instructions given in Radiation Oncology clinic 270 capsule 4    magic mouthwash (ENTER INGREDIENTS IN COMMENTS) suspension Take 10 mLs by mouth every 4 hours as needed (Patient not taking: Reported on 10/22/2023) 240 mL 0    oxyCODONE (ROXICODONE) 5 MG tablet Take 1 tablet (5 mg) by mouth every 6 hours as needed for pain 20 tablet 0     REVIEW OF SYSTEMS: A 12-point review of systems was obtained. Pertinent findings are noted in the HPI and are otherwise unremarkable.     PHYSICAL EXAM:  VITALS: BP (!) 144/74   Pulse 92   Wt 98 kg (216 lb)   BMI 29.29 kg/m  (weight at end of treatment 210 pounds, weight at beginning of treatment 238 pounds)  GEN: Alert, oriented in no acute distress, pale  HEENT: Normocephalic, oral cavity and oropharynx without mucositis or thrush, tacky mucous membranes  Neck: No lymphedema, tenderness palpated along the left neck at the level of the left mastoid tip, no mass palpable  CV: Regular rate, extremities warm, well-perfused  RESP: Thin comfortably on room air, no wheezing  ABDOMEN: Nondistended  SKIN: Skin in treatment field is without residual erythema, very mild hyperpigmentation  MSK: No muscle bulk and tone  NEURO: CN II-XII grossly intact  PSYCH: Appropriate affect    LABS AND IMAGING: No new imaging    IMPRESSION/RECOMMENDATION:    Thierno Vega is a 38-year-old man with squamous cell carcinoma of the nasopharynx, GALLITO positive, 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  He is now 2 weeks out from treatment.  He presents today for evaluation of his nutritional status as he is planning to travel home on  for 2 months.  He has gained some weight since completion of therapy but is struggling with his tube feeds and advancing his diet.  He does have ondansetron at home and I have recommended that he take it 3 times daily to manage the nausea associated with eating.  Of note, I also spoke to Dr. Clark and she will call the patient regarding a proton pump inhibitor as well.    Patient will continue to work on increasing oral intake with soups, eggs and softer foods which he could find palatable.  We will see him again on Friday, November 10 to remove the feeding tube prior to his trip home.    PET scan is scheduled for 2024 with follow-up with ENT Dr. Fisher.    We appreciate the opportunity to participate in 's care.  Please call with questions.      35 minutes spent by me on the date of the encounter doing chart review, history and exam, documentation and further activities per the note.    Yessica Parada MD  Radiation Oncology      CC:  Patient Care Team:  Catrachita Clark MD as PCP - General (Hematology & Oncology)    This note was dictated with voice recognition software and then edited. Please excuse any unintentional errors.     FOLLOW-UP VISIT    Patient Name: Thierno Vega      : 1985     Age: 38 year old        ______________________________________________________________________________     Chief Complaint   Patient presents with    Cancer     Follow up:SCC of Nasopharynx: Head and neck 7000 cGy completed 10/16/23       Pain  Denies    Labs  Other Labs: No    Imaging  None        Other Appointments:     MD Name: Dr Fisher Appointment Date: 23   MD Name: Appointment Date:   MD Name: Appointment Date:   Other Appointment Notes:     Residual Radiation side effect: Nausea,some fatigue              Again, thank you for allowing me to participate in the care of your patient.        Sincerely,        Yessica Parada MD

## 2023-11-01 NOTE — PATIENT INSTRUCTIONS
[facility] Triage and after hours / weekends / holidays:  [phone]    Please call the triage or after hours line if you experience a temperature greater than or equal to 100.4, shaking chills, have uncontrolled nausea, vomiting and/or diarrhea, dizziness, shortness of breath, chest pain, bleeding, unexplained bruising, or if you have any other new/concerning symptoms, questions or concerns.      If you are having any concerning symptoms or wish to speak to a provider before your next infusion visit, please call triage to notify them so we can adequately serve you.     If you need a refill on a narcotic prescription or other medication, please call before your infusion appointment.                November 2023 Sunday Monday Tuesday Wednesday Thursday Friday Saturday                  1    ONC INFUSION 2 HR (120 MIN)  10:00 AM   (120 min.)    ONC INFUSION NURSE   [facility]    RETURN RADIATION ONCOLOGY  11:30 AM   (30 min.)   Yessica Parada MD   [facility] Radiation Oncology 2     3    ONC INFUSION 2 HR (120 MIN)   8:30 AM   (120 min.)    ONC INFUSION NURSE   [facility] 4       5     6    RETURN   4:25 PM   (20 min.)   Navin Fisher MD   [facility] [place] 7     8     9     10     11       12     13     14     15    RETURN RADIATION ONCOLOGY   3:00 PM   (30 min.)   Yessica Parada MD   [facility] Radiation Oncology 16     17     18       19     20     21     22     23     24     25       26     27     28     29     30                           December 2023 Sunday Monday Tuesday Wednesday Thursday Friday Saturday                            1     2       3     4     5     6     7     8     9       10     11     12     13     14     15     16       17     18     19     20     21     22     23       24     25     26     27     28     29     30       31                                                    Lab Results:  No results found for this or any previous visit (from the past 12 hour(s)).

## 2023-11-01 NOTE — PROGRESS NOTES
FOLLOW-UP VISIT    Patient Name: Thierno Vega      : 1985     Age: 38 year old        ______________________________________________________________________________     Chief Complaint   Patient presents with    Cancer     Follow up:SCC of Nasopharynx: Head and neck 7000 cGy completed 10/16/23       Pain  Denies    Labs  Other Labs: No    Imaging  None        Other Appointments:     MD Name: Dr Fisher Appointment Date: 23   MD Name: Appointment Date:   MD Name: Appointment Date:   Other Appointment Notes:     Residual Radiation side effect: Nausea,some fatigue

## 2023-11-01 NOTE — PROGRESS NOTES
Infusion Nursing Note:  Thierno Vega presents today for 2L IV fluids.    Patient seen by provider today: No   present during visit today: Yes, Language: Mongolian via telephone.     Note: Pt presents today with c/o reflux which has been ongoing for him. He has been taking famotidine with good effect and states his symptoms are not worsening. His is tolerating his tube feedings.     Pt has no pain today and denies any need for intervention at this appointment. Pt has been afebrile and denies signs and symptoms of infection including: cough, SOB, sore throat, diarrhea, vomiting, rash, or pain with urination. Pt wishes to proceed with today's planned treatment.    Intravenous Access:  Peripheral IV placed.    Treatment Conditions:  Not Applicable.    Post Infusion Assessment:  Patient tolerated infusion without incident.  Blood return noted pre and post infusion.  Site patent and intact, free from redness, edema or discomfort.  No evidence of extravasations.  Access discontinued per protocol.     Discharge Plan:   Prescription refills given for famotidine.  Discharge instructions reviewed with: Patient.  Patient and/or family verbalized understanding of discharge instructions and all questions answered.  AVS to patient via [employer].  Patient will return 11/03/23 for next appointment.   Patient discharged in stable condition accompanied by: friend.  Departure Mode: Ambulatory.      Emily Meese, RN

## 2023-11-03 ENCOUNTER — TELEPHONE (OUTPATIENT)
Dept: RADIATION ONCOLOGY | Facility: CLINIC | Age: 38
End: 2023-11-03
Payer: COMMERCIAL

## 2023-11-05 ENCOUNTER — INFUSION THERAPY VISIT (OUTPATIENT)
Dept: ONCOLOGY | Facility: CLINIC | Age: 38
End: 2023-11-05
Attending: INTERNAL MEDICINE
Payer: COMMERCIAL

## 2023-11-05 VITALS
SYSTOLIC BLOOD PRESSURE: 112 MMHG | TEMPERATURE: 99 F | DIASTOLIC BLOOD PRESSURE: 76 MMHG | HEART RATE: 83 BPM | OXYGEN SATURATION: 97 % | RESPIRATION RATE: 16 BRPM

## 2023-11-05 DIAGNOSIS — C11.9 NASOPHARYNGEAL CANCER (H): Primary | ICD-10-CM

## 2023-11-05 LAB
ALBUMIN SERPL BCG-MCNC: 4.2 G/DL (ref 3.5–5.2)
ALP SERPL-CCNC: 73 U/L (ref 40–129)
ALT SERPL W P-5'-P-CCNC: 18 U/L (ref 0–70)
ANION GAP SERPL CALCULATED.3IONS-SCNC: 9 MMOL/L (ref 7–15)
AST SERPL W P-5'-P-CCNC: 14 U/L (ref 0–45)
BASOPHILS # BLD AUTO: 0 10E3/UL (ref 0–0.2)
BASOPHILS NFR BLD AUTO: 1 %
BILIRUB SERPL-MCNC: 0.2 MG/DL
BUN SERPL-MCNC: 29.5 MG/DL (ref 6–20)
CALCIUM SERPL-MCNC: 9.6 MG/DL (ref 8.6–10)
CHLORIDE SERPL-SCNC: 101 MMOL/L (ref 98–107)
CREAT SERPL-MCNC: 1.48 MG/DL (ref 0.67–1.17)
DEPRECATED HCO3 PLAS-SCNC: 27 MMOL/L (ref 22–29)
EGFRCR SERPLBLD CKD-EPI 2021: 62 ML/MIN/1.73M2
EOSINOPHIL # BLD AUTO: 0.1 10E3/UL (ref 0–0.7)
EOSINOPHIL NFR BLD AUTO: 2 %
ERYTHROCYTE [DISTWIDTH] IN BLOOD BY AUTOMATED COUNT: 13.8 % (ref 10–15)
GLUCOSE SERPL-MCNC: 114 MG/DL (ref 70–99)
HCT VFR BLD AUTO: 26.1 % (ref 40–53)
HGB BLD-MCNC: 8.8 G/DL (ref 13.3–17.7)
IMM GRANULOCYTES # BLD: 0 10E3/UL
IMM GRANULOCYTES NFR BLD: 1 %
LYMPHOCYTES # BLD AUTO: 0.4 10E3/UL (ref 0.8–5.3)
LYMPHOCYTES NFR BLD AUTO: 7 %
MCH RBC QN AUTO: 31.2 PG (ref 26.5–33)
MCHC RBC AUTO-ENTMCNC: 33.7 G/DL (ref 31.5–36.5)
MCV RBC AUTO: 93 FL (ref 78–100)
MONOCYTES # BLD AUTO: 0.7 10E3/UL (ref 0–1.3)
MONOCYTES NFR BLD AUTO: 12 %
NEUTROPHILS # BLD AUTO: 4.5 10E3/UL (ref 1.6–8.3)
NEUTROPHILS NFR BLD AUTO: 77 %
NRBC # BLD AUTO: 0 10E3/UL
NRBC BLD AUTO-RTO: 0 /100
PLATELET # BLD AUTO: 279 10E3/UL (ref 150–450)
POTASSIUM SERPL-SCNC: 4.3 MMOL/L (ref 3.4–5.3)
PROT SERPL-MCNC: 7.1 G/DL (ref 6.4–8.3)
RBC # BLD AUTO: 2.82 10E6/UL (ref 4.4–5.9)
SODIUM SERPL-SCNC: 137 MMOL/L (ref 135–145)
WBC # BLD AUTO: 5.8 10E3/UL (ref 4–11)

## 2023-11-05 PROCEDURE — 96361 HYDRATE IV INFUSION ADD-ON: CPT

## 2023-11-05 PROCEDURE — 80053 COMPREHEN METABOLIC PANEL: CPT

## 2023-11-05 PROCEDURE — 85025 COMPLETE CBC W/AUTO DIFF WBC: CPT

## 2023-11-05 PROCEDURE — 258N000003 HC RX IP 258 OP 636: Performed by: NURSE PRACTITIONER

## 2023-11-05 PROCEDURE — 36415 COLL VENOUS BLD VENIPUNCTURE: CPT

## 2023-11-05 PROCEDURE — 96360 HYDRATION IV INFUSION INIT: CPT

## 2023-11-05 RX ORDER — EPINEPHRINE 1 MG/ML
0.3 INJECTION, SOLUTION INTRAMUSCULAR; SUBCUTANEOUS EVERY 5 MIN PRN
Status: CANCELLED | OUTPATIENT
Start: 2023-11-05

## 2023-11-05 RX ORDER — DIPHENHYDRAMINE HYDROCHLORIDE 50 MG/ML
50 INJECTION INTRAMUSCULAR; INTRAVENOUS
Status: CANCELLED
Start: 2023-11-05

## 2023-11-05 RX ORDER — MEPERIDINE HYDROCHLORIDE 25 MG/ML
25 INJECTION INTRAMUSCULAR; INTRAVENOUS; SUBCUTANEOUS EVERY 30 MIN PRN
Status: CANCELLED | OUTPATIENT
Start: 2023-11-05

## 2023-11-05 RX ORDER — ALBUTEROL SULFATE 90 UG/1
1-2 AEROSOL, METERED RESPIRATORY (INHALATION)
Status: CANCELLED
Start: 2023-11-05

## 2023-11-05 RX ORDER — HEPARIN SODIUM,PORCINE 10 UNIT/ML
5-20 VIAL (ML) INTRAVENOUS DAILY PRN
Status: CANCELLED | OUTPATIENT
Start: 2023-11-05

## 2023-11-05 RX ORDER — HEPARIN SODIUM (PORCINE) LOCK FLUSH IV SOLN 100 UNIT/ML 100 UNIT/ML
5 SOLUTION INTRAVENOUS
Status: CANCELLED | OUTPATIENT
Start: 2023-11-05

## 2023-11-05 RX ORDER — ALBUTEROL SULFATE 0.83 MG/ML
2.5 SOLUTION RESPIRATORY (INHALATION)
Status: CANCELLED | OUTPATIENT
Start: 2023-11-05

## 2023-11-05 RX ORDER — METHYLPREDNISOLONE SODIUM SUCCINATE 125 MG/2ML
125 INJECTION, POWDER, LYOPHILIZED, FOR SOLUTION INTRAMUSCULAR; INTRAVENOUS
Status: CANCELLED
Start: 2023-11-05

## 2023-11-05 RX ADMIN — SODIUM CHLORIDE 2000 ML: 9 INJECTION, SOLUTION INTRAVENOUS at 08:25

## 2023-11-05 ASSESSMENT — PAIN SCALES - GENERAL: PAINLEVEL: NO PAIN (0)

## 2023-11-06 ENCOUNTER — THERAPY VISIT (OUTPATIENT)
Dept: SPEECH THERAPY | Facility: CLINIC | Age: 38
End: 2023-11-06
Payer: COMMERCIAL

## 2023-11-06 ENCOUNTER — OFFICE VISIT (OUTPATIENT)
Dept: OTOLARYNGOLOGY | Facility: CLINIC | Age: 38
End: 2023-11-06
Payer: COMMERCIAL

## 2023-11-06 VITALS
HEART RATE: 106 BPM | WEIGHT: 215 LBS | BODY MASS INDEX: 29.12 KG/M2 | DIASTOLIC BLOOD PRESSURE: 86 MMHG | HEIGHT: 72 IN | SYSTOLIC BLOOD PRESSURE: 131 MMHG

## 2023-11-06 DIAGNOSIS — R79.89 ELEVATED SERUM CREATININE: Primary | ICD-10-CM

## 2023-11-06 DIAGNOSIS — C11.9 NASOPHARYNGEAL CANCER (H): Primary | ICD-10-CM

## 2023-11-06 PROCEDURE — 92526 ORAL FUNCTION THERAPY: CPT | Mod: GN | Performed by: SPEECH-LANGUAGE PATHOLOGIST

## 2023-11-06 PROCEDURE — 99213 OFFICE O/P EST LOW 20 MIN: CPT | Performed by: STUDENT IN AN ORGANIZED HEALTH CARE EDUCATION/TRAINING PROGRAM

## 2023-11-06 PROCEDURE — 92610 EVALUATE SWALLOWING FUNCTION: CPT | Mod: GN | Performed by: SPEECH-LANGUAGE PATHOLOGIST

## 2023-11-06 ASSESSMENT — PAIN SCALES - GENERAL: PAINLEVEL: NO PAIN (0)

## 2023-11-06 NOTE — LETTER
11/6/2023       RE: Thierno Vega  [address]  [address]  [place] [facility]"" [id number]     Dear Colleague,    Thank you for referring your patient, Thierno Vega, to the [facility] EAR NOSE AND TH[facility]AT [facility]INIC [place] [facility]. Please see a copy of my visit note below.    Head and Neck Surgery  11/6/2023    Diagnosis: T2N2 GALLITO + nasopharynx cancer    Treatment: Induction gem/cis followed by concurrent chemort completed 10/16/2023    Imaging: 3 month pet anticipated mid January    Interval history: He has tolerated his treatment well.  He has an NG tube in place.    Physical examination:  Alert and in no acute distress  No palpable cervical adenopathy  No oral lesions or oropharyngeal lesions    Assessment and plan:  Seems to be doing well after his treatment.  His cervical adenopathy has resolved.  We will arrange for his follow-up PET scan to be scheduled in mid January and I will see him back then    Navin Fisher MD    25 minutes spent on the date of the encounter in chart review, patient visit, review of tests, documentation and/or discussion with other providers about the issues documented above.       Again, thank you for allowing me to participate in the care of your patient.      Sincerely,    Navin Fisher MD

## 2023-11-06 NOTE — NURSING NOTE
"Chief Complaint   Patient presents with    RECHECK       Blood pressure 131/86, pulse 106, height 1.83 m (6' 0.05\"), weight 97.5 kg (215 lb).    Briseida Velazco, EMT    "

## 2023-11-06 NOTE — PROGRESS NOTES
Head and Neck Surgery  11/6/2023    Diagnosis: T2N2 GALLITO + nasopharynx cancer    Treatment: Induction gem/cis followed by concurrent chemort completed 10/16/2023    Imaging: 3 month pet anticipated mid January    Interval history: He has tolerated his treatment well.  He has an NG tube in place.    Physical examination:  Alert and in no acute distress  No palpable cervical adenopathy  No oral lesions or oropharyngeal lesions    Assessment and plan:  Seems to be doing well after his treatment.  His cervical adenopathy has resolved.  We will arrange for his follow-up PET scan to be scheduled in mid January and I will see him back then    Navin Fisher MD    25 minutes spent on the date of the encounter in chart review, patient visit, review of tests, documentation and/or discussion with other providers about the issues documented above.

## 2023-11-06 NOTE — PATIENT INSTRUCTIONS
"You were seen in the clinic today by Dr. Fisher. If you have any questions or concerns after your appointment, please call the clinic at [phone]. Press \"1\" for scheduling, press \"3\" for nurse advice.    2.   The following has been recommended for you based upon your appointment today:   -PET scan in mid January      3.   Plan to return to the clinic on 1/22/24 for follow up with Dr. Fisher and PET same day prior to appointment    Farrah JAMISON, RN  RN Care Coordinator, ENT Clinic  [facility]  Direct: [phone]   "

## 2023-11-07 NOTE — PROGRESS NOTES
SPEECH LANGUAGE PATHOLOGY EVALUATION    See electronic medical record for Abuse and Falls Screening details.    Subjective      Presenting condition or subjective complaint:   Dysphagia following chemoradiation  Date of onset: 11/06/23    Relevant medical history:   Pt is a 38 year old man who was diagnosed with SCC of the nasopharynx. He underwent neoadjuvant chemotherapy followed by definitive chemoradiation which was completed 10/16/23. He has been using NG for most of his nutrition/hydration recently. He was seen today in conjunction with ENT clinic for evaluation. He also has a history of hypertension.  Dates & types of surgery:      Prior diagnostic imaging/testing results:     no previous swallow exams  Prior therapy history for the same diagnosis, illness or injury:    none    Living Environment  Social support:   Lives with spouse     Patient goals for therapy:   Improve swallow function    Pain assessment: Pain denied     Objective     SWALLOW EVALUTION  Dysphagia history: Pt had increased difficulty eating and drinking during chemoradiation due to dysgeusia and mucositis. He reports this has been improving over the past week or so. He has been getting most of his nutrition/hydration from his NG but he is trying to take more by mouth.   Current Diet/Method of Nutritional Intake:  naso jejunostomy tube         CLINICAL SWALLOW EVALUATION  Oral Motor Function: Dentition: natural dentition  Secretion management: WFL  Mucosal quality: good  Mandibular function: intact  Oral labial function: WFL  Lingual function: WFL  Velar function: intact   Buccal function: intact  Laryngeal function: cough, throat clear, volitional swallow, voicing WFL     Level of assist required for feeding: no assistance needed   Textures Trialed:   Clinical Swallow Eval: Thin Liquids  Mode of presentation: cup, self-fed   Volume presented: 5 oz water  Preparatory Phase: WFL  Oral Phase: WFL  Pharyngeal phase of swallow: repeated  swallows   Strategies trialed during procedure: small sips  Diagnostic statement: no overt clinical s/sx of aspiration/penetration noted on thin liquid trials. Pt intermittently demonstrated multiple swallows on thin liquid trials. He reported or demonstrated no nasal reflux.     Clinical Swallow Eval: Purees  Mode of presentation: spoon, self-fed   Volume presented: 2 oz apple sauce  Preparatory Phase: WFL  Oral Phase: WFL  Pharyngeal phase of swallow: intact   Strategies trialed during procedure: none  Diagnostic statement:  No overt clinical s/sx of aspiration/penetration noted on puree consistency trials. No oral residue after completed swallow.     Clinical Swallow Eval: Solids  Mode of presentation: self-fed   Volume presented: small bite of elke cracker  Preparatory Phase: prolonged bolus preparation  Oral Phase: WFL  Pharyngeal phase of swallow: intact   Strategies trialed during procedure: KAYLEN SLP CLINICAL EVAL STRATEGIES: sip of water with solid    Diagnostic statement: No overt clinical s/sx of aspiration/penetration noted on solid consistency trials. 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 He was encouraged to take a sip of water which helped with bolus cohesion.      ESOPHAGEAL PHASE OF SWALLOW  no observed or reported concerns related to esophageal function     SWALLOW ASSESSMENT CLINICAL IMPRESSIONS AND RATIONALE  Diet Consistency Recommendations: thin liquids (level 0), easy to chew (level 7)    Recommended Feeding/Eating Techniques: slow rate of intake, alternate food and liquid intake, increase the moisture of solids    Medication Administration Recommendations: Pills whole with liquid or puree  Instrumental Assessment Recommendations: none    Assessment & Plan   CLINICAL IMPRESSIONS   Medical Diagnosis: SCC nasopharynx    Treatment Diagnosis: Mild oral dysphagia   Impression/Assessment: Pt is a 38 year old male with dysphagia complaints. 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 Identified deficits interfere with their ability to consume an oral diet as compared to previous level of function. 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    PLAN OF CARE  Treatment Interventions: Swallowing dysfunction and/or oral function for feeding    Prognosis to achieve stated therapy goals is good   Rehab potential is impacted by: comorbidities    Long Term Goals   SLP Goal 1  Goal Identifier: PO  Goal Description: Pt will tolerate least restricitive diet while adhering to safe swallow precautions and without pulmonary compromise across 6 months time.  Rationale: To maximize safety, ease and/or independence of oral intake  Target Date: 02/03/24  SLP Goal 2  Goal Identifier: Exercise  Goal Description: Pt will improve and/or maintain ROM and strength of BOT, jaw and pharynx by completing 10 repetitions of 5/5 exercises 3 times daily with minimal written or verbal cues.  Rationale: To maximize safety, ease and/or independence of oral intake  Target Date: 02/03/24  SLP Goal 3  Goal Identifier: VPI  Goal Description: Pt will complete 10 reps of velar elevation exercises to maintain velar elevation following radiation daily.  Rationale: To maximize safety, ease and/or independence of oral intake  Target Date: 02/03/24      Frequency of Treatment: 2-4x/month  Duration of Treatment: 3 months     Recommended Referrals to Other Professionals: none  Education Assessment:   Learner/Method: Patient;No Barriers to Learning    Risks and benefits of evaluation/treatment have been explained.   Patient/Family/caregiver agrees with Plan of Care.     Evaluation Time:    SLP Eval: oral/pharyngeal swallow function, clinical minutes (03551): 16      Signing Clinician: STACEY Croft  [facility]                                                                                   OUTPATIENT SPEECH LANGUAGE PATHOLOGY      PLAN OF TREATMENT FOR OUTPATIENT REHABILITATION   Patient's Last Name, First Name, Thierno Loyola    YOB: 1985   Provider's [facility]   [facility]   Medical Record No.  [id number]     Onset Date: 11/06/23 Start of Care Date: 11/06/23     Medical Diagnosis:  SCC nasopharynx      SLP Treatment Diagnosis: Mild oral dysphagia  Plan of Treatment  Frequency/Duration: 2-4x/month  / 3 months     Certification date from 11/06/23   To 02/03/24          See note for plan of treatment details and functional goals     Azucena Aranda, SLP                         I CERTIFY THE NEED FOR THESE SERVICES FURNISHED UNDER        THIS PLAN OF TREATMENT AND WHILE UNDER MY CARE     (Physician attestation of this document indicates review and certification of the therapy plan).                Referring Provider:  Dr. Navin Fisher      Initial Assessment  See Epic Evaluation- 11/06/23

## 2023-11-08 ENCOUNTER — INFUSION THERAPY VISIT (OUTPATIENT)
Dept: ONCOLOGY | Facility: CLINIC | Age: 38
End: 2023-11-08
Attending: INTERNAL MEDICINE
Payer: COMMERCIAL

## 2023-11-08 VITALS
TEMPERATURE: 98.6 F | HEART RATE: 85 BPM | DIASTOLIC BLOOD PRESSURE: 83 MMHG | OXYGEN SATURATION: 100 % | SYSTOLIC BLOOD PRESSURE: 130 MMHG

## 2023-11-08 DIAGNOSIS — C11.9 NASOPHARYNGEAL CANCER (H): Primary | ICD-10-CM

## 2023-11-08 PROCEDURE — 96361 HYDRATE IV INFUSION ADD-ON: CPT

## 2023-11-08 PROCEDURE — 258N000003 HC RX IP 258 OP 636: Performed by: NURSE PRACTITIONER

## 2023-11-08 PROCEDURE — 96360 HYDRATION IV INFUSION INIT: CPT

## 2023-11-08 RX ORDER — METHYLPREDNISOLONE SODIUM SUCCINATE 125 MG/2ML
125 INJECTION, POWDER, LYOPHILIZED, FOR SOLUTION INTRAMUSCULAR; INTRAVENOUS
Status: CANCELLED
Start: 2023-11-08

## 2023-11-08 RX ORDER — ALBUTEROL SULFATE 90 UG/1
1-2 AEROSOL, METERED RESPIRATORY (INHALATION)
Status: CANCELLED
Start: 2023-11-08

## 2023-11-08 RX ORDER — HEPARIN SODIUM,PORCINE 10 UNIT/ML
5-20 VIAL (ML) INTRAVENOUS DAILY PRN
Status: CANCELLED | OUTPATIENT
Start: 2023-11-08

## 2023-11-08 RX ORDER — MEPERIDINE HYDROCHLORIDE 25 MG/ML
25 INJECTION INTRAMUSCULAR; INTRAVENOUS; SUBCUTANEOUS EVERY 30 MIN PRN
Status: CANCELLED | OUTPATIENT
Start: 2023-11-08

## 2023-11-08 RX ORDER — DIPHENHYDRAMINE HYDROCHLORIDE 50 MG/ML
50 INJECTION INTRAMUSCULAR; INTRAVENOUS
Status: CANCELLED
Start: 2023-11-08

## 2023-11-08 RX ORDER — EPINEPHRINE 1 MG/ML
0.3 INJECTION, SOLUTION INTRAMUSCULAR; SUBCUTANEOUS EVERY 5 MIN PRN
Status: CANCELLED | OUTPATIENT
Start: 2023-11-08

## 2023-11-08 RX ORDER — HEPARIN SODIUM (PORCINE) LOCK FLUSH IV SOLN 100 UNIT/ML 100 UNIT/ML
5 SOLUTION INTRAVENOUS
Status: CANCELLED | OUTPATIENT
Start: 2023-11-08

## 2023-11-08 RX ORDER — ALBUTEROL SULFATE 0.83 MG/ML
2.5 SOLUTION RESPIRATORY (INHALATION)
Status: CANCELLED | OUTPATIENT
Start: 2023-11-08

## 2023-11-08 RX ADMIN — SODIUM CHLORIDE 2000 ML: 9 INJECTION, SOLUTION INTRAVENOUS at 10:27

## 2023-11-08 ASSESSMENT — PAIN SCALES - GENERAL: PAINLEVEL: NO PAIN (0)

## 2023-11-08 NOTE — PROGRESS NOTES
Infusion Nursing Note:  Thierno Vega presents today for 2 L IVF.    Patient seen by provider today: No   present during visit today: Yes, Language: Bengali.     Note: Pt states he is feeling well and has no new concerns today. States he's weaning off his tube feedings, starting to eat, and will have his tube removed later this week.    Intravenous Access:  Peripheral IV placed.    Treatment Conditions:  No labs today. Reviewed last creatinine check.    Post Infusion Assessment:  Patient tolerated infusion without incident.  Blood return noted pre and post infusion.  Site patent and intact, free from redness, edema or discomfort.  No evidence of extravasations. Access discontinued per protocol.     Discharge Plan:   Patient declined prescription refills.  AVS to patient via [employer].  Patient will return 11/10 for next appointment.   Patient discharged in stable condition accompanied by: self.  Departure Mode: Ambulatory.    Akosua Mccormick RN

## 2023-11-10 ENCOUNTER — OFFICE VISIT (OUTPATIENT)
Dept: RADIATION ONCOLOGY | Facility: CLINIC | Age: 38
End: 2023-11-10
Attending: RADIOLOGY
Payer: COMMERCIAL

## 2023-11-10 ENCOUNTER — TELEPHONE (OUTPATIENT)
Dept: NEPHROLOGY | Facility: CLINIC | Age: 38
End: 2023-11-10
Payer: COMMERCIAL

## 2023-11-10 ENCOUNTER — INFUSION THERAPY VISIT (OUTPATIENT)
Dept: ONCOLOGY | Facility: CLINIC | Age: 38
End: 2023-11-10
Attending: INTERNAL MEDICINE
Payer: COMMERCIAL

## 2023-11-10 VITALS
BODY MASS INDEX: 28.99 KG/M2 | WEIGHT: 214 LBS | DIASTOLIC BLOOD PRESSURE: 74 MMHG | HEART RATE: 78 BPM | SYSTOLIC BLOOD PRESSURE: 136 MMHG

## 2023-11-10 VITALS
HEART RATE: 80 BPM | OXYGEN SATURATION: 100 % | TEMPERATURE: 99.6 F | DIASTOLIC BLOOD PRESSURE: 84 MMHG | RESPIRATION RATE: 16 BRPM | SYSTOLIC BLOOD PRESSURE: 117 MMHG

## 2023-11-10 DIAGNOSIS — C11.9 NASOPHARYNGEAL CANCER (H): Primary | ICD-10-CM

## 2023-11-10 PROCEDURE — 96361 HYDRATE IV INFUSION ADD-ON: CPT

## 2023-11-10 PROCEDURE — G0463 HOSPITAL OUTPT CLINIC VISIT: HCPCS | Performed by: RADIOLOGY

## 2023-11-10 PROCEDURE — 96360 HYDRATION IV INFUSION INIT: CPT

## 2023-11-10 PROCEDURE — 99024 POSTOP FOLLOW-UP VISIT: CPT | Performed by: RADIOLOGY

## 2023-11-10 PROCEDURE — 258N000003 HC RX IP 258 OP 636: Performed by: NURSE PRACTITIONER

## 2023-11-10 RX ORDER — METHYLPREDNISOLONE SODIUM SUCCINATE 125 MG/2ML
125 INJECTION, POWDER, LYOPHILIZED, FOR SOLUTION INTRAMUSCULAR; INTRAVENOUS
Status: CANCELLED
Start: 2023-11-10

## 2023-11-10 RX ORDER — EPINEPHRINE 1 MG/ML
0.3 INJECTION, SOLUTION INTRAMUSCULAR; SUBCUTANEOUS EVERY 5 MIN PRN
Status: CANCELLED | OUTPATIENT
Start: 2023-11-10

## 2023-11-10 RX ORDER — GABAPENTIN 300 MG/1
300 CAPSULE ORAL 3 TIMES DAILY
Qty: 50 CAPSULE | Refills: 3 | Status: SHIPPED | OUTPATIENT
Start: 2023-11-10 | End: 2024-01-24

## 2023-11-10 RX ORDER — DIPHENHYDRAMINE HYDROCHLORIDE 50 MG/ML
50 INJECTION INTRAMUSCULAR; INTRAVENOUS
Status: CANCELLED
Start: 2023-11-10

## 2023-11-10 RX ORDER — ALBUTEROL SULFATE 90 UG/1
1-2 AEROSOL, METERED RESPIRATORY (INHALATION)
Status: CANCELLED
Start: 2023-11-10

## 2023-11-10 RX ORDER — MEPERIDINE HYDROCHLORIDE 25 MG/ML
25 INJECTION INTRAMUSCULAR; INTRAVENOUS; SUBCUTANEOUS EVERY 30 MIN PRN
Status: CANCELLED | OUTPATIENT
Start: 2023-11-10

## 2023-11-10 RX ORDER — HEPARIN SODIUM,PORCINE 10 UNIT/ML
5-20 VIAL (ML) INTRAVENOUS DAILY PRN
Status: CANCELLED | OUTPATIENT
Start: 2023-11-10

## 2023-11-10 RX ORDER — ALBUTEROL SULFATE 0.83 MG/ML
2.5 SOLUTION RESPIRATORY (INHALATION)
Status: CANCELLED | OUTPATIENT
Start: 2023-11-10

## 2023-11-10 RX ORDER — HEPARIN SODIUM (PORCINE) LOCK FLUSH IV SOLN 100 UNIT/ML 100 UNIT/ML
5 SOLUTION INTRAVENOUS
Status: CANCELLED | OUTPATIENT
Start: 2023-11-10

## 2023-11-10 RX ADMIN — SODIUM CHLORIDE 2000 ML: 9 INJECTION, SOLUTION INTRAVENOUS at 14:47

## 2023-11-10 ASSESSMENT — PAIN SCALES - GENERAL: PAINLEVEL: NO PAIN (0)

## 2023-11-10 NOTE — PATIENT INSTRUCTIONS
[facility] Triage and after hours / weekends / holidays:  [phone]    Please call the triage or after hours line if you experience a temperature greater than or equal to 100.4, shaking chills, have uncontrolled nausea, vomiting and/or diarrhea, dizziness, shortness of breath, chest pain, bleeding, unexplained bruising, or if you have any other new/concerning symptoms, questions or concerns.      If you are having any concerning symptoms or wish to speak to a provider before your next infusion visit, please call your care coordinator or triage to notify them so we can adequately serve you.     If you need a refill on a narcotic prescription or other medication, please call before your infusion appointment.

## 2023-11-10 NOTE — PROGRESS NOTES
FOLLOW-UP VISIT    Patient Name: Thierno Vega      : 1985     Age: 38 year old        ______________________________________________________________________________     Chief Complaint   Patient presents with    Cancer     Follow up:SCC of Nasopharynx: Head and neck 7000 cGy completed 10/16/23       Pain  Denies    Labs  Other Labs: No    Imaging  None      Other Appointments:     MD Name: Dr Fisher Appointment Date: 24   MD Name: Appointment Date:   MD Name: Appointment Date:   Other Appointment Notes:     Residual Radiation side effect: Dysphagia, no taste , no appetitie    Additional Instructions: Removed NJ tube, instructions on Gabapentin taper

## 2023-11-10 NOTE — LETTER
11/10/2023         RE: Thierno Vega  [address]  [address]  [facility] [address]        Dear Colleague,    Thank you for referring your patient, Thierno Vega, to the [facility] RADIATION ONCOLOGY. Please see a copy of my visit note below.    FOLLOW-UP VISIT    Patient Name: Thierno Vega      : 1985     Age: 38 year old        ______________________________________________________________________________     Chief Complaint   Patient presents with     Cancer     Follow up:SCC of Nasopharynx: Head and neck 7000 cGy completed 10/16/23       Pain  Denies    Labs  Other Labs: No    Imaging  None      Other Appointments:     MD Name: Dr Fisher Appointment Date: 24   MD Name: Appointment Date:   MD Name: Appointment Date:   Other Appointment Notes:     Residual Radiation side effect: Dysphagia, no taste , no appetitie    Additional Instructions: Removed NJ tube, instructions on Gabapentin taper              Again, thank you for allowing me to participate in the care of your patient.        Sincerely,        Yessica Parada MD

## 2023-11-10 NOTE — PROGRESS NOTES
Infusion Nursing Note:  Thierno Vega presents today for IV fluids.    Patient seen by provider today: No   present during visit today: Yes, Language: Occitan via phone.     Note: Thierno presents today feeling overall okay. Denies pain or nausea/vomiting. Still has some pain with swallowing, but is able to eat/drink. Denies fevers/chills. Denies SOB, cough, chest pain, or dizziness/lightheadedness. Denies constipation/diarrhea. Denies urinary issues. Denies neuropathy. Offers no new concerns at this appointment.      Intravenous Access:  Peripheral IV placed.    Treatment Conditions:  Not Applicable.      Post Infusion Assessment:  Patient tolerated infusion without incident.  Blood return noted pre and post infusion.  Site patent and intact, free from redness, edema or discomfort.  No evidence of extravasations.  Access discontinued per protocol.       Discharge Plan:   Prescription refills given for gabapentin.  Discharge instructions reviewed with: Patient.  Patient and/or family verbalized understanding of discharge instructions and all questions answered.  AVS to patient via [employer]  Patient will return 01/24/24 for next appointment with Dr. Clark.   Patient discharged in stable condition accompanied by: self.  Departure Mode: Ambulatory.      Peg Tirado RN

## 2023-11-14 ENCOUNTER — PATIENT OUTREACH (OUTPATIENT)
Dept: ONCOLOGY | Facility: CLINIC | Age: 38
End: 2023-11-14
Payer: COMMERCIAL

## 2023-11-14 DIAGNOSIS — N17.9 AKI (ACUTE KIDNEY INJURY) (H): ICD-10-CM

## 2023-11-14 DIAGNOSIS — C11.9 NASOPHARYNGEAL CANCER (H): Primary | ICD-10-CM

## 2023-11-14 NOTE — PROGRESS NOTES
[facility]: Cancer Care                                                                                          Reached out to [place] to check on how he is doing and update him on recommendations provided by Jennifer Gabriel NP, as he is leaving for [facility] on 11/21.    Relayed that we would like to recheck his labs this week. We will get him set up for a final bolus of IVF if labs indicate prior to him leaving. Message sent to scheduling to set Thierno up for labs tomorrow.     Thierno otherwise reports he is feeling well. He is drinking 2-3 L of fluids a day and reports he is urinating adequately. He denies having any peripheral edema.     Plan for labs tomorrow.    Addendum 11/15/23 3:27 PM  Reached out to Thierno. Relayed that his creatinine is 1.50 from 1.48 10 days ago. Inquired if he's experiencing any changes in symptoms - he denies. He reports is is continuing his 2-3 L/day oral hydration.     Thierno reports he does not feel the need to get any further IVF at this time. Plan for repeat labs per Jennifer Gabriel NP early next week before he leaves on his trip.    Jennifer Toney, RN, BSN  RN Care Coordinator  [facility]

## 2023-11-15 ENCOUNTER — LAB (OUTPATIENT)
Dept: LAB | Facility: CLINIC | Age: 38
End: 2023-11-15
Payer: COMMERCIAL

## 2023-11-15 DIAGNOSIS — C11.9 NASOPHARYNGEAL CANCER (H): ICD-10-CM

## 2023-11-15 DIAGNOSIS — N17.9 AKI (ACUTE KIDNEY INJURY) (H): ICD-10-CM

## 2023-11-15 LAB
ALBUMIN SERPL BCG-MCNC: 4.5 G/DL (ref 3.5–5.2)
ALP SERPL-CCNC: 65 U/L (ref 40–150)
ALT SERPL W P-5'-P-CCNC: 21 U/L (ref 0–70)
ANION GAP SERPL CALCULATED.3IONS-SCNC: 11 MMOL/L (ref 7–15)
AST SERPL W P-5'-P-CCNC: 18 U/L (ref 0–45)
BASOPHILS # BLD AUTO: 0 10E3/UL (ref 0–0.2)
BASOPHILS NFR BLD AUTO: 1 %
BILIRUB SERPL-MCNC: 0.3 MG/DL
BUN SERPL-MCNC: 12.5 MG/DL (ref 6–20)
CALCIUM SERPL-MCNC: 9.7 MG/DL (ref 8.6–10)
CHLORIDE SERPL-SCNC: 100 MMOL/L (ref 98–107)
CREAT SERPL-MCNC: 1.5 MG/DL (ref 0.67–1.17)
DEPRECATED HCO3 PLAS-SCNC: 26 MMOL/L (ref 22–29)
EGFRCR SERPLBLD CKD-EPI 2021: 61 ML/MIN/1.73M2
EOSINOPHIL # BLD AUTO: 0.1 10E3/UL (ref 0–0.7)
EOSINOPHIL NFR BLD AUTO: 2 %
ERYTHROCYTE [DISTWIDTH] IN BLOOD BY AUTOMATED COUNT: 13.1 % (ref 10–15)
GLUCOSE SERPL-MCNC: 112 MG/DL (ref 70–99)
HCT VFR BLD AUTO: 28.9 % (ref 40–53)
HGB BLD-MCNC: 9.8 G/DL (ref 13.3–17.7)
IMM GRANULOCYTES # BLD: 0 10E3/UL
IMM GRANULOCYTES NFR BLD: 1 %
LYMPHOCYTES # BLD AUTO: 0.7 10E3/UL (ref 0.8–5.3)
LYMPHOCYTES NFR BLD AUTO: 13 %
MCH RBC QN AUTO: 31.3 PG (ref 26.5–33)
MCHC RBC AUTO-ENTMCNC: 33.9 G/DL (ref 31.5–36.5)
MCV RBC AUTO: 92 FL (ref 78–100)
MONOCYTES # BLD AUTO: 0.7 10E3/UL (ref 0–1.3)
MONOCYTES NFR BLD AUTO: 13 %
NEUTROPHILS # BLD AUTO: 3.7 10E3/UL (ref 1.6–8.3)
NEUTROPHILS NFR BLD AUTO: 70 %
NRBC # BLD AUTO: 0 10E3/UL
NRBC BLD AUTO-RTO: 0 /100
PLATELET # BLD AUTO: 262 10E3/UL (ref 150–450)
POTASSIUM SERPL-SCNC: 4 MMOL/L (ref 3.4–5.3)
PROT SERPL-MCNC: 7.6 G/DL (ref 6.4–8.3)
RBC # BLD AUTO: 3.13 10E6/UL (ref 4.4–5.9)
SODIUM SERPL-SCNC: 137 MMOL/L (ref 135–145)
WBC # BLD AUTO: 5.3 10E3/UL (ref 4–11)

## 2023-11-15 PROCEDURE — 36415 COLL VENOUS BLD VENIPUNCTURE: CPT | Performed by: PATHOLOGY

## 2023-11-15 PROCEDURE — 85025 COMPLETE CBC W/AUTO DIFF WBC: CPT | Performed by: PATHOLOGY

## 2023-11-15 PROCEDURE — 80053 COMPREHEN METABOLIC PANEL: CPT | Performed by: PATHOLOGY

## 2023-11-20 ENCOUNTER — LAB (OUTPATIENT)
Dept: LAB | Facility: CLINIC | Age: 38
End: 2023-11-20
Attending: NURSE PRACTITIONER
Payer: COMMERCIAL

## 2023-11-20 DIAGNOSIS — N17.9 AKI (ACUTE KIDNEY INJURY) (H): ICD-10-CM

## 2023-11-20 DIAGNOSIS — C11.9 NASOPHARYNGEAL CANCER (H): ICD-10-CM

## 2023-11-20 LAB
ALBUMIN SERPL BCG-MCNC: 4.6 G/DL (ref 3.5–5.2)
ALP SERPL-CCNC: 58 U/L (ref 40–150)
ALT SERPL W P-5'-P-CCNC: 18 U/L (ref 0–70)
ANION GAP SERPL CALCULATED.3IONS-SCNC: 10 MMOL/L (ref 7–15)
AST SERPL W P-5'-P-CCNC: 17 U/L (ref 0–45)
BILIRUB SERPL-MCNC: 0.4 MG/DL
BUN SERPL-MCNC: 10.1 MG/DL (ref 6–20)
CALCIUM SERPL-MCNC: 9.9 MG/DL (ref 8.6–10)
CHLORIDE SERPL-SCNC: 102 MMOL/L (ref 98–107)
CREAT SERPL-MCNC: 1.39 MG/DL (ref 0.67–1.17)
DEPRECATED HCO3 PLAS-SCNC: 26 MMOL/L (ref 22–29)
EGFRCR SERPLBLD CKD-EPI 2021: 67 ML/MIN/1.73M2
GLUCOSE SERPL-MCNC: 136 MG/DL (ref 70–99)
POTASSIUM SERPL-SCNC: 3.9 MMOL/L (ref 3.4–5.3)
PROT SERPL-MCNC: 7.5 G/DL (ref 6.4–8.3)
SODIUM SERPL-SCNC: 138 MMOL/L (ref 135–145)

## 2023-11-20 PROCEDURE — 36415 COLL VENOUS BLD VENIPUNCTURE: CPT | Performed by: PATHOLOGY

## 2023-11-20 PROCEDURE — 80053 COMPREHEN METABOLIC PANEL: CPT | Performed by: PATHOLOGY

## 2023-12-06 NOTE — PROGRESS NOTES
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  During treatment, patient struggled with dysgeusia and difficulty with oral intake necessitating placement of a nasojejunal feeding tube.  Since his last visit with us, he has been slowly weaning off of use of the nasogastric tube as he plans to travel to [facility] for 6 to 8.  He is drinking protein shakes.  Taste has still not returned to any great extent.  Pain is much improved.    PAST MEDICAL/SURGICAL HISTORY:   Past Medical History:   Diagnosis Date    HTN (hypertension)     No past surgical history on file.    ALLERGIES:  No Known Allergies    MEDICATIONS:   Current Outpatient Medications   Medication Sig Dispense Refill    gabapentin (NEURONTIN) 300 MG capsule Take 1 capsule (300 mg) by mouth 3 times daily Taper dose by reducing 1 pill a day until you have stopped taking the medication. 50 capsule 3    acetaminophen (TYLENOL) 500 MG tablet Take 2 tablets (1,000 mg) by mouth every 8 hours as needed for mild pain (Patient not taking: Reported on 2023) 60 tablet 1    amLODIPine (NORVASC) 10 MG tablet Take 1 tablet (10 mg) by mouth daily 90 tablet 1    famotidine (PEPCID) 20 MG tablet Take 1 tablet (20 mg) by mouth 2 times  daily 90 tablet 1    gabapentin (NEURONTIN) 300 MG capsule Take 3 capsules (900 mg) by mouth 3 times daily Taper dose up to 900 mg po tid per instructions given in Radiation Oncology clinic 270 capsule 4    magic mouthwash (ENTER INGREDIENTS IN COMMENTS) suspension Take 10 mLs by mouth every 4 hours as needed (Patient not taking: Reported on 11/6/2023) 240 mL 0    oxyCODONE (ROXICODONE) 5 MG tablet Take 1 tablet (5 mg) by mouth every 6 hours as needed for pain (Patient not taking: Reported on 11/6/2023) 20 tablet 0       REVIEW OF SYSTEMS: A 12-point review of systems was obtained. Pertinent findings are noted in the HPI and are otherwise unremarkable.     PHYSICAL EXAM:  VITALS: /74   Pulse 78   Wt 97.1 kg (214 lb)   BMI 28.99 kg/m    General: Alert, oriented, in no acute distress  HEENT: Mucositis has healed, tacky mucous membranes, nasogastric tube in place  Neck: No palpable adenopathy, trace submental lymphedema, residual hyperpigmentation of skin  Skin: Residual hyperpigmentation in the region of treatment without desquamation  Respiratory: Breathing comfortably on room air, no wheezing    LABS AND IMAGING:  No new studies    IMPRESSION/RECOMMENDATION:  Thierno Vega is a 38 year old male with squamous cell carcinoma of the nasopharynx, GALLITO positive, 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  He is here today for reevaluation prior to traveling to [facility].  His nasogastric tube was removed during clinic today.  He was instructed on continued self cares including salt and soda rinses, Aquaphor with some prophylaxis, aggressive hydration and intake of calories including protein shakes.    He is scheduled for PET/CT scan in mid January after he returns.  We will plan to see him them as well for continued monitoring of recovery from acute radiation side effects.    We appreciate the opportunity to participate in  Thierno Vega's care.  Please call with questions.       35 minutes spent by me on the date of the encounter doing chart review, history and exam, documentation and further activities per the note.    Yessica Parada MD  Radiation Oncology      CC:  Patient Care Team:  Catrachita Clark MD as PCP - General (Hematology & Oncology)  Isaias Larsen MD as Physician (Hematology & Oncology)  Catrachita Clark MD as MD (Hematology & Oncology)  Yessica Parada MD as MD (Radiation Oncology)  Catrachita Clark MD as MD (Hematology & Oncology)  Marisa Travis AuD as Audiologist (Audiology)  Navin Fisher MD as Assigned Surgical Provider  Jennifer Toney RN as Specialty Care Coordinator (Hematology & Oncology)  Yessica Parada MD as Assigned Cancer Care Provider  Nata Sánchez MD as Assigned Nephrology Provider     This note was dictated with voice recognition software and then edited. Please excuse any unintentional errors.

## 2024-01-22 ENCOUNTER — THERAPY VISIT (OUTPATIENT)
Dept: SPEECH THERAPY | Facility: CLINIC | Age: 39
End: 2024-01-22
Payer: COMMERCIAL

## 2024-01-22 ENCOUNTER — HOSPITAL ENCOUNTER (OUTPATIENT)
Dept: PET IMAGING | Facility: CLINIC | Age: 39
Discharge: HOME | End: 2024-01-22
Attending: STUDENT IN AN ORGANIZED HEALTH CARE EDUCATION/TRAINING PROGRAM
Payer: COMMERCIAL

## 2024-01-22 ENCOUNTER — OFFICE VISIT (OUTPATIENT)
Dept: OTOLARYNGOLOGY | Facility: CLINIC | Age: 39
End: 2024-01-22
Payer: COMMERCIAL

## 2024-01-22 VITALS
BODY MASS INDEX: 27.5 KG/M2 | WEIGHT: 196.4 LBS | OXYGEN SATURATION: 99 % | DIASTOLIC BLOOD PRESSURE: 64 MMHG | HEIGHT: 71 IN | SYSTOLIC BLOOD PRESSURE: 117 MMHG | HEART RATE: 87 BPM

## 2024-01-22 DIAGNOSIS — C11.9 NASOPHARYNGEAL CANCER (H): ICD-10-CM

## 2024-01-22 DIAGNOSIS — C11.9 NASOPHARYNGEAL CANCER (H): Primary | ICD-10-CM

## 2024-01-22 LAB
CREAT BLD-MCNC: 1.4 MG/DL (ref 0.7–1.3)
EGFRCR SERPLBLD CKD-EPI 2021: >60 ML/MIN/1.73M2

## 2024-01-22 PROCEDURE — 82565 ASSAY OF CREATININE: CPT

## 2024-01-22 PROCEDURE — 74177 CT ABD & PELVIS W/CONTRAST: CPT | Mod: 26 | Performed by: RADIOLOGY

## 2024-01-22 PROCEDURE — 70491 CT SOFT TISSUE NECK W/DYE: CPT | Mod: 26 | Performed by: RADIOLOGY

## 2024-01-22 PROCEDURE — 70491 CT SOFT TISSUE NECK W/DYE: CPT

## 2024-01-22 PROCEDURE — 71260 CT THORAX DX C+: CPT

## 2024-01-22 PROCEDURE — 78815 PET IMAGE W/CT SKULL-THIGH: CPT | Mod: 26 | Performed by: RADIOLOGY

## 2024-01-22 PROCEDURE — 31575 DIAGNOSTIC LARYNGOSCOPY: CPT | Performed by: STUDENT IN AN ORGANIZED HEALTH CARE EDUCATION/TRAINING PROGRAM

## 2024-01-22 PROCEDURE — 99213 OFFICE O/P EST LOW 20 MIN: CPT | Mod: 25 | Performed by: STUDENT IN AN ORGANIZED HEALTH CARE EDUCATION/TRAINING PROGRAM

## 2024-01-22 PROCEDURE — 250N000011 HC RX IP 250 OP 636: Performed by: STUDENT IN AN ORGANIZED HEALTH CARE EDUCATION/TRAINING PROGRAM

## 2024-01-22 PROCEDURE — 78815 PET IMAGE W/CT SKULL-THIGH: CPT | Mod: PS

## 2024-01-22 PROCEDURE — 92526 ORAL FUNCTION THERAPY: CPT | Mod: GN | Performed by: SPEECH-LANGUAGE PATHOLOGIST

## 2024-01-22 PROCEDURE — 71260 CT THORAX DX C+: CPT | Mod: 26 | Performed by: RADIOLOGY

## 2024-01-22 PROCEDURE — 343N000001 HC RX 343: Performed by: STUDENT IN AN ORGANIZED HEALTH CARE EDUCATION/TRAINING PROGRAM

## 2024-01-22 PROCEDURE — A9552 F18 FDG: HCPCS | Performed by: STUDENT IN AN ORGANIZED HEALTH CARE EDUCATION/TRAINING PROGRAM

## 2024-01-22 RX ORDER — IOPAMIDOL 755 MG/ML
10-135 INJECTION, SOLUTION INTRAVASCULAR ONCE
Status: COMPLETED | OUTPATIENT
Start: 2024-01-22 | End: 2024-01-22

## 2024-01-22 RX ADMIN — IOPAMIDOL 131 ML: 755 INJECTION, SOLUTION INTRAVENOUS at 09:32

## 2024-01-22 RX ADMIN — FLUDEOXYGLUCOSE F-18 12.78 MILLICURIE: 500 INJECTION, SOLUTION INTRAVENOUS at 08:25

## 2024-01-22 ASSESSMENT — PAIN SCALES - GENERAL: PAINLEVEL: NO PAIN (0)

## 2024-01-22 NOTE — PATIENT INSTRUCTIONS
You were seen in the ENT Clinic today by Dr. Fisher. If you have any questions or concerns after your appointment, please contact us (see below)    The following has been recommended for you based upon your appointment today:  We will call you with the results of your PET scan from today     Please return to clinic in 3 months for follow up with Dr. Fisher (4/29/24)    How to Contact Us:  Send a [employer] message to your provider. Our team will respond to you via [employer]. Occasionally, we will need to call you to get further information.  For urgent matters (Monday-Friday), call the ENT Clinic: [phone] and speak with a call center team member - they will route your call appropriately.   If you'd like to speak directly with a nurse, please find our contact information below. We do our best to check voicemail frequently throughout the day, and will work to call you back within 1-2 days. For urgent matters, please use the general clinic phone numbers listed above.    Farrah GEORGES RN, BSN  RN Care Coordinator, ENT Clinic  [facility]  Direct: [phone]

## 2024-01-22 NOTE — LETTER
1/22/2024       RE: Thierno Vega  [address]  [address]  [facility] [address]     Dear Colleague,    Thank you for referring your patient, Thierno Vega, to the [facility] [place] [facility]. Please see a copy of my visit note below.    Head and Neck Surgery  1/22/24     Diagnosis: T2N2 GALLITO + nasopharynx cancer     Treatment: Induction gem/cis followed by concurrent chemort completed 10/16/2023     Imaging: 3 month pet pending     Interval history: He has tolerated his treatment well.  No complaints today     Physical examination:  Alert and in no acute distress  No palpable cervical adenopathy  No oral lesions or oropharyngeal lesions    Procedure:  Fiberoptic laryngoscopy performed. No lesions seen in the nasopharynx. Remainder of exam normal.      Assessment and plan:  Seems to be doing well after his treatment. Exam looks good to me. Pet seems clear but we will await results. I will see him back in 3 months.      Navin Fisher MD     25 minutes spent on the date of the encounter in chart review, patient visit, review of tests, documentation and/or discussion with other providers about the issues documented above asides from time spent doing flexible laryngoscopy     Again, thank you for allowing me to participate in the care of your patient.      Sincerely,    Navin Fisher MD

## 2024-01-22 NOTE — PROGRESS NOTES
Head and Neck Surgery  1/22/24     Diagnosis: T2N2 GALLITO + nasopharynx cancer     Treatment: Induction gem/cis followed by concurrent chemort completed 10/16/2023     Imaging: 3 month pet pending     Interval history: He has tolerated his treatment well.  No complaints today     Physical examination:  Alert and in no acute distress  No palpable cervical adenopathy  No oral lesions or oropharyngeal lesions    Procedure:  Fiberoptic laryngoscopy performed. No lesions seen in the nasopharynx. Remainder of exam normal.      Assessment and plan:  Seems to be doing well after his treatment. Exam looks good to me. Pet seems clear but we will await results. I will see him back in 3 months.      Navin Fisher MD     25 minutes spent on the date of the encounter in chart review, patient visit, review of tests, documentation and/or discussion with other providers about the issues documented above asides from time spent doing flexible laryngoscopy

## 2024-01-23 ENCOUNTER — PATIENT OUTREACH (OUTPATIENT)
Dept: OTOLARYNGOLOGY | Facility: CLINIC | Age: 39
End: 2024-01-23
Payer: COMMERCIAL

## 2024-01-23 NOTE — PROGRESS NOTES
Called and left message for patient with Slovenian  regarding the following PET CT from 1/22/24:     IMPRESSION:   In this 38-year-old male with history of nasopharyngeal carcinoma:  1. No residual uptake at the primary site. NI-RADS 1.  2. Resolution of hypermetabolic enlarged nodes on the left with  residual non FDG avid nonenlarged nodes representing treating nodes.  On the right one residual normal sized right level 2A lymph node with  mild to moderate uptake is also felt to be a treated node. Neck:  NI-RADS 2. 3 months CT neck can be obtained to confirm stability of  residual nodes.   3. No evidence of new distant metastasis.    PET CT will be discussed at our tumor board conference on Friday and informed patient we will give him a call after to update him on the plan of care and any new orders. Direct call back number left in voicemail.     Farrah Maradiaga, RN, BSN  RN Care Coordinator, ENT Clinic

## 2024-01-24 ENCOUNTER — LAB (OUTPATIENT)
Dept: LAB | Facility: CLINIC | Age: 39
End: 2024-01-24
Attending: INTERNAL MEDICINE
Payer: COMMERCIAL

## 2024-01-24 ENCOUNTER — ONCOLOGY VISIT (OUTPATIENT)
Dept: ONCOLOGY | Facility: CLINIC | Age: 39
End: 2024-01-24
Attending: INTERNAL MEDICINE
Payer: COMMERCIAL

## 2024-01-24 ENCOUNTER — OFFICE VISIT (OUTPATIENT)
Dept: NEPHROLOGY | Facility: CLINIC | Age: 39
End: 2024-01-24
Attending: INTERNAL MEDICINE
Payer: COMMERCIAL

## 2024-01-24 VITALS
SYSTOLIC BLOOD PRESSURE: 118 MMHG | DIASTOLIC BLOOD PRESSURE: 76 MMHG | BODY MASS INDEX: 26.85 KG/M2 | HEART RATE: 79 BPM | OXYGEN SATURATION: 100 % | WEIGHT: 193.9 LBS

## 2024-01-24 DIAGNOSIS — I89.0 LYMPHEDEMA: ICD-10-CM

## 2024-01-24 DIAGNOSIS — C11.9 NASOPHARYNGEAL CANCER (H): ICD-10-CM

## 2024-01-24 DIAGNOSIS — C11.9 NASOPHARYNGEAL CANCER (H): Primary | ICD-10-CM

## 2024-01-24 DIAGNOSIS — Z13.29 SCREENING FOR HYPOTHYROIDISM: ICD-10-CM

## 2024-01-24 DIAGNOSIS — N17.9 AKI (ACUTE KIDNEY INJURY) (H): ICD-10-CM

## 2024-01-24 DIAGNOSIS — R79.89 ELEVATED SERUM CREATININE: Primary | ICD-10-CM

## 2024-01-24 DIAGNOSIS — E83.42 HYPOMAGNESEMIA: ICD-10-CM

## 2024-01-24 DIAGNOSIS — R79.89 ELEVATED SERUM CREATININE: ICD-10-CM

## 2024-01-24 LAB
ALBUMIN SERPL BCG-MCNC: 4.7 G/DL (ref 3.5–5.2)
ALP SERPL-CCNC: 42 U/L (ref 40–150)
ALT SERPL W P-5'-P-CCNC: 10 U/L (ref 0–70)
ANION GAP SERPL CALCULATED.3IONS-SCNC: 9 MMOL/L (ref 7–15)
AST SERPL W P-5'-P-CCNC: 13 U/L (ref 0–45)
BASOPHILS # BLD AUTO: 0.1 10E3/UL (ref 0–0.2)
BASOPHILS NFR BLD AUTO: 1 %
BILIRUB SERPL-MCNC: 0.3 MG/DL
BUN SERPL-MCNC: 10.8 MG/DL (ref 6–20)
CALCIUM SERPL-MCNC: 9.8 MG/DL (ref 8.6–10)
CHLORIDE SERPL-SCNC: 104 MMOL/L (ref 98–107)
CREAT SERPL-MCNC: 1.26 MG/DL (ref 0.67–1.17)
DEPRECATED HCO3 PLAS-SCNC: 28 MMOL/L (ref 22–29)
EGFRCR SERPLBLD CKD-EPI 2021: 76 ML/MIN/1.73M2
EOSINOPHIL # BLD AUTO: 0.2 10E3/UL (ref 0–0.7)
EOSINOPHIL NFR BLD AUTO: 3 %
ERYTHROCYTE [DISTWIDTH] IN BLOOD BY AUTOMATED COUNT: 11.9 % (ref 10–15)
GLUCOSE SERPL-MCNC: 100 MG/DL (ref 70–99)
HCT VFR BLD AUTO: 38.6 % (ref 40–53)
HGB BLD-MCNC: 13.1 G/DL (ref 13.3–17.7)
IMM GRANULOCYTES # BLD: 0 10E3/UL
IMM GRANULOCYTES NFR BLD: 0 %
LYMPHOCYTES # BLD AUTO: 1 10E3/UL (ref 0.8–5.3)
LYMPHOCYTES NFR BLD AUTO: 22 %
MAGNESIUM SERPL-MCNC: 2.1 MG/DL (ref 1.7–2.3)
MCH RBC QN AUTO: 30.2 PG (ref 26.5–33)
MCHC RBC AUTO-ENTMCNC: 33.9 G/DL (ref 31.5–36.5)
MCV RBC AUTO: 89 FL (ref 78–100)
MONOCYTES # BLD AUTO: 0.5 10E3/UL (ref 0–1.3)
MONOCYTES NFR BLD AUTO: 11 %
NEUTROPHILS # BLD AUTO: 2.8 10E3/UL (ref 1.6–8.3)
NEUTROPHILS NFR BLD AUTO: 63 %
NRBC # BLD AUTO: 0 10E3/UL
NRBC BLD AUTO-RTO: 0 /100
PHOSPHATE SERPL-MCNC: 2.4 MG/DL (ref 2.5–4.5)
PLATELET # BLD AUTO: 247 10E3/UL (ref 150–450)
POTASSIUM SERPL-SCNC: 4.1 MMOL/L (ref 3.4–5.3)
PROT SERPL-MCNC: 7.6 G/DL (ref 6.4–8.3)
RBC # BLD AUTO: 4.34 10E6/UL (ref 4.4–5.9)
SODIUM SERPL-SCNC: 141 MMOL/L (ref 135–145)
T4 FREE SERPL-MCNC: 1.14 NG/DL (ref 0.9–1.7)
TSH SERPL DL<=0.005 MIU/L-ACNC: 1.77 UIU/ML (ref 0.3–4.2)
WBC # BLD AUTO: 4.5 10E3/UL (ref 4–11)

## 2024-01-24 PROCEDURE — 80053 COMPREHEN METABOLIC PANEL: CPT | Performed by: PATHOLOGY

## 2024-01-24 PROCEDURE — 83735 ASSAY OF MAGNESIUM: CPT | Performed by: PATHOLOGY

## 2024-01-24 PROCEDURE — 85025 COMPLETE CBC W/AUTO DIFF WBC: CPT | Performed by: PATHOLOGY

## 2024-01-24 PROCEDURE — 87799 DETECT AGENT NOS DNA QUANT: CPT | Performed by: INTERNAL MEDICINE

## 2024-01-24 PROCEDURE — G0463 HOSPITAL OUTPT CLINIC VISIT: HCPCS | Performed by: INTERNAL MEDICINE

## 2024-01-24 PROCEDURE — 84443 ASSAY THYROID STIM HORMONE: CPT | Performed by: PATHOLOGY

## 2024-01-24 PROCEDURE — 84439 ASSAY OF FREE THYROXINE: CPT | Performed by: PATHOLOGY

## 2024-01-24 PROCEDURE — 99000 SPECIMEN HANDLING OFFICE-LAB: CPT | Performed by: PATHOLOGY

## 2024-01-24 PROCEDURE — 36415 COLL VENOUS BLD VENIPUNCTURE: CPT | Performed by: PATHOLOGY

## 2024-01-24 PROCEDURE — 84100 ASSAY OF PHOSPHORUS: CPT | Performed by: PATHOLOGY

## 2024-01-24 PROCEDURE — 99214 OFFICE O/P EST MOD 30 MIN: CPT | Performed by: INTERNAL MEDICINE

## 2024-01-24 RX ORDER — CHOLECALCIFEROL (VITAMIN D3) 50 MCG
1 TABLET ORAL DAILY
COMMUNITY
Start: 2024-01-24

## 2024-01-24 RX ORDER — AMLODIPINE BESYLATE 10 MG/1
10 TABLET ORAL DAILY
Qty: 90 TABLET | Refills: 1 | Status: SHIPPED | OUTPATIENT
Start: 2024-01-24 | End: 2024-04-21

## 2024-01-24 NOTE — PROGRESS NOTES
[facility]    PATIENT NAME: Thierno Vega  MRN # [id number]   DATE OF VISIT: January 24, 2024  YOB: 1985     Otolaryngology: Dr. Navin Fsiher  Radiation Oncology:  Dr. Yessica Parada    CANCER TYPE: SCC nasopharynx, GALLITO -jeanna  STAGE: cT2N2 vs N3 (III vs MARYSOL)  ECOG PS: 0    PD-L1:  NGS:     SUMMARY  4/30/23  US. Bilateral necrotic nodes  5/3/23  FNA cervical adenopathy - undifferentiated carcinoma  5/4/23   CT CAP. 7 mm pulmonary nodule, no metastases  5/5/23  MRI bilateral cervical and retropharyngeal adenopathy    Came to US  6/12/23  US guided FNA L neck node in clinic (Dr. Fisher). Path: hypocellular with scant clusters of atypical squamous cells suspicious for malignancy, p40 +jeanna, GALLITO -jeanna  6/13/23  PET/CT. Intense uptake L Rosenmuller fossa with increased fullness (SUV 18.8). Skull base intact. Milder FDG uptake (SUV 6.1) by the prominent R Rosenmuller fossa, inflammatory vs malignant. Indeterminate bilateral intense palatine tonsillar uptake with fullness on CT, inflammatroy vs infectious vs tumor, discontinuous with the nasopharyngeal abnormality. Bilateral nodes, including level 2a, 2b, 3, 3/5, R 1.8 cm 2A, another level 2/3 nodes, FDG avid retropharyngeal nodes. 0.3 cm nodule on the R major fissure. 0.8 cm R inguinal node (SUV 3.9) with additional nonenlarged LNs with minimal uptake, suggestive of reactive adenopathy. Marked focal uptake along the R upper inner thigh with skin thickening (SUV 16.8), which may represent cutaneous infection/inflammation, recommend direct visualization  6/27~8/11/23 Cisplatin gemcitabine x 3 cycles. Skipped C3D8 to give a short break prior to chemorads  8/28~10/16/23 Chemoradiation with weekly cisplatin. Nasogastric feeding tube, mild SMILEY  1/22/24  PET/CT. Resolution of primary mass and L neck adenopathy. Residual 7 mm level 2A node (SUV 4.6), NIRADS-2, repeat CT in 3 months.     ASSESSMENT AND PLAN  Nasopharyngeal carcinoma, cT2N2 (III):  Recovering as anticipated. 3 month post treatment PET/CT showing a small residual node. Doesn't look that worrisome but will get CT neck in 3 months when he's here to see Dr. Fisher. I'll see him in 6 months with labs, which he can coordinate with Dr. Fisher's appts. Survivorship visit - will try to do in April.     SMILEY-->CKD, HTN: Saw Dr. Sánchez this morning, note reviewed. Remains on amlodipine    Xerostomia: Manageable, mild    Lymphedema: referral placed, mild, will wait until he's back in the [place] in April    Dysgeusia: Improving. Discussed anticipation of ongoing recovery.     Screening for hypothyroidism: ok     [employer] Â® phone  used throughout         Catrachita Clark MD  Associate Professor of Medicine  Hematology, Oncology and Transplantation      IDRIS Pa returns for routine follow up 3 months after chemoradiation.  Doing well  Feeling much better than the last time I saw him  Neuropathy - fingers and toes -   A little dry mouth - when speaking   Taste 50%     PAST MEDICAL HISTORY  Nasopharyngeal carcinoma as above  HTN    CURRENT OUTPATIENT MEDICATIONS  Reviewed    ALLERGIES  No Known Allergies     PHYSICAL EXAM  There were no vitals taken for this visit. - taken earlier this morning and reviewed.     GEN: NAD  HEENT: EOMI, no icterus, injection or pallor  NECK: mild L neck adenopathy  EXT: no edema  NEURO: alert  SKIN: no rashes    LABORATORY AND IMAGING STUDIES    Labs today were independently reviewed and interpreted by me    CT Chest/Abdomen/Pelvis w Contrast  Narrative: Combined Report of: PET and CT on 1/22/2024 9:58 AM:    1. PET of the neck, chest, abdomen, and pelvis.  2. PET CT Fusion for Attenuation Correction and Anatomical  Localization.  3. Diagnostic CT of the neck, chest, abdomen and pelvis with  intravenous contrast obtained for diagnostic interpretation.  4. 3D MIP and PET-CT fused images were processed on an independent  workstation and archived to PACS and  reviewed by a radiologist.    Technique:    1. PET: The patient received 12.78 mCi of F-18-FDG. The serum glucose  was 97 mg/dL prior to administration. Body weight was 97 kg. Images  were evaluated in the axial, sagittal, and coronal planes as well as  the rotational whole body MIP. Images were acquired from the cranial  vertex to the distal thigh.    UPTAKE WAS MEASURED AT 67 MINUTES.     BACKGROUND: Liver SUV max = 3.20, Aorta Blood SUV max = 3.0.     2. CT: Volumetric acquisition for clinical interpretation of the neck,  chest, abdomen, and pelvis acquired at 3 mm sections. The chest,  abdomen, and pelvis were evaluated at 5 mm sections in bone, soft  tissue, and lung windows.    Contrast and Medications:  IV contrast: 131 mL of Isovue 370 intravenously.  PO contrast: 500 mL of water  Additional Medications: None.    3. 3D MIP and PET-CT fused images were processed on an independent  workstation and archived to PACS and reviewed by a radiologist.    INDICATION: nasopharyngeal cancer, s/p chemo/rads.; Nasopharyngeal  cancer (H).    ADDITIONAL INFORMATION OBTAINED FROM EMR: 38 year old male with T2N2  GALLITO + nasopharynx cancer, Treatment: Induction gem/cis followed by  concurrent chemort completed 10/16/2023    COMPARISON: 8/7/2023, 6/13/2023    FOLLOW-UP APPOINTMENT: 1/19/2024    FINDINGS:     HEAD/NECK:    Nasopharynx: Resolved FDG avid soft tissue thickening.    Lymph nodes: Resolution of FDG metabolism of previously seen all  left-sided enlarged lymph nodes. On today's exam, in the left neck non  FDG avid much smaller nodes. For example a left level IIb measures up  to 1.1 cm.   In the right side of the neck interval resolution of FDG nodes with  exception of a right level 2A node which measures 7 mm and max SUV of  4.6.   Mildly avid nonenlarged normal-appearing right level 1b node was not  involved on pretreatment PET and most likely represents a reactive  node.     There are postradiation changes in the  neck including non-FDG avid  nasopharyngeal thickening, epiglottic thickening, trace  retropharyngeal edema. Slight atrophy of the submandibular glands and  parotid glands. Thyroid gland is normal.     Diffuse mucosal thickening in the right maxillary sinus. Mastoid air  cells are clear.     Major cervical vasculature is patent.    CHEST:  No suspicious pulmonary nodules.    Central tracheobronchial tree is clear. No pleural effusion or  pneumothorax. No acute consolidation.     Normal heart size. No pericardial effusion. Normal caliber thoracic  aorta and main pulmonary artery. Esophagus is unremarkable.     ABDOMEN AND PELVIS:  No suspicious uptake in the abdomen or pelvis.    Liver is unremarkable. The gallbladder is unremarkable. No  intrahepatic or extrahepatic biliary ductal dilatation. Pancreas is  unremarkable. No pancreatic ductal dilatation. Spleen is unremarkable.  Adrenal glands are unremarkable.    No hydronephrosis. Urinary bladder is unremarkable. Reproductive  organs are unremarkable.    Normal caliber small and large bowel. No free air or fluid. Normal  caliber abdominal aorta.    LOWER EXTREMITIES:   No suspicious uptake in the visualized lower extremities.    BONES:   No suspicious uptake in the skeleton. No suspicious osseous lesions.   Impression: IMPRESSION:   In this 38-year-old male with history of nasopharyngeal carcinoma:  1. No residual uptake at the primary site. NI-RADS 1.  2. Resolution of hypermetabolic enlarged nodes on the left with  residual non FDG avid nonenlarged nodes representing treating nodes.  On the right one residual normal sized right level 2A lymph node with  mild to moderate uptake is also felt to be a treated node. Neck:  NI-RADS 2. 3 months CT neck can be obtained to confirm stability of  residual nodes.   3. No evidence of new distant metastasis.     CECT Surveillance Legend:    Primary  1: No evidence of recurrence: routine surveillance  2: Low suspicion    a)  Superficial abnormality (skin, mucosal surface): direct visual  inspection    b) Ill-defined deep abnormality: short interval follow-up* or PET  3: High suspicion (new or enlarging discrete nodule/mass): biopsy  4: Definitive recurrence (path proven or clinical progression): no  biopsy needed    Nodes  1: No evidence of recurrence: routine surveillance  2: Low suspicion (ill-defined): short interval follow-up or PET  3: High suspicion (new or enlarging lymph node): biopsy if clinically  needed  4: Definitive recurrence (path proven or clinical progression): no  biopsy needed    *short interval follow-up: 3 months at our institution    I have personally reviewed the examination and initial interpretation  and I agree with the findings.    RUBIN BUENO MD         SYSTEM ID:  [id number]  PET Oncology (Eyes to Thighs)  Narrative: Combined Report of: PET and CT on 1/22/2024 9:58 AM:    1. PET of the neck, chest, abdomen, and pelvis.  2. PET CT Fusion for Attenuation Correction and Anatomical  Localization.  3. Diagnostic CT of the neck, chest, abdomen and pelvis with  intravenous contrast obtained for diagnostic interpretation.  4. 3D MIP and PET-CT fused images were processed on an independent  workstation and archived to PACS and reviewed by a radiologist.    Technique:    1. PET: The patient received 12.78 mCi of F-18-FDG. The serum glucose  was 97 mg/dL prior to administration. Body weight was 97 kg. Images  were evaluated in the axial, sagittal, and coronal planes as well as  the rotational whole body MIP. Images were acquired from the cranial  vertex to the distal thigh.    UPTAKE WAS MEASURED AT 67 MINUTES.     BACKGROUND: Liver SUV max = 3.20, Aorta Blood SUV max = 3.0.     2. CT: Volumetric acquisition for clinical interpretation of the neck,  chest, abdomen, and pelvis acquired at 3 mm sections. The chest,  abdomen, and pelvis were evaluated at 5 mm sections in bone, soft  tissue, and lung windows.    Contrast  and Medications:  IV contrast: 131 mL of Isovue 370 intravenously.  PO contrast: 500 mL of water  Additional Medications: None.    3. 3D MIP and PET-CT fused images were processed on an independent  workstation and archived to PACS and reviewed by a radiologist.    INDICATION: nasopharyngeal cancer, s/p chemo/rads.; Nasopharyngeal  cancer (H).    ADDITIONAL INFORMATION OBTAINED FROM EMR: 38 year old male with T2N2  GALLITO + nasopharynx cancer, Treatment: Induction gem/cis followed by  concurrent chemort completed 10/16/2023    COMPARISON: 8/7/2023, 6/13/2023    FOLLOW-UP APPOINTMENT: 1/19/2024    FINDINGS:     HEAD/NECK:    Nasopharynx: Resolved FDG avid soft tissue thickening.    Lymph nodes: Resolution of FDG metabolism of previously seen all  left-sided enlarged lymph nodes. On today's exam, in the left neck non  FDG avid much smaller nodes. For example a left level IIb measures up  to 1.1 cm.   In the right side of the neck interval resolution of FDG nodes with  exception of a right level 2A node which measures 7 mm and max SUV of  4.6.   Mildly avid nonenlarged normal-appearing right level 1b node was not  involved on pretreatment PET and most likely represents a reactive  node.     There are postradiation changes in the neck including non-FDG avid  nasopharyngeal thickening, epiglottic thickening, trace  retropharyngeal edema. Slight atrophy of the submandibular glands and  parotid glands. Thyroid gland is normal.     Diffuse mucosal thickening in the right maxillary sinus. Mastoid air  cells are clear.     Major cervical vasculature is patent.    CHEST:  No suspicious pulmonary nodules.    Central tracheobronchial tree is clear. No pleural effusion or  pneumothorax. No acute consolidation.     Normal heart size. No pericardial effusion. Normal caliber thoracic  aorta and main pulmonary artery. Esophagus is unremarkable.     ABDOMEN AND PELVIS:  No suspicious uptake in the abdomen or pelvis.    Liver is  unremarkable. The gallbladder is unremarkable. No  intrahepatic or extrahepatic biliary ductal dilatation. Pancreas is  unremarkable. No pancreatic ductal dilatation. Spleen is unremarkable.  Adrenal glands are unremarkable.    No hydronephrosis. Urinary bladder is unremarkable. Reproductive  organs are unremarkable.    Normal caliber small and large bowel. No free air or fluid. Normal  caliber abdominal aorta.    LOWER EXTREMITIES:   No suspicious uptake in the visualized lower extremities.    BONES:   No suspicious uptake in the skeleton. No suspicious osseous lesions.   Impression: IMPRESSION:   In this 38-year-old male with history of nasopharyngeal carcinoma:  1. No residual uptake at the primary site. NI-RADS 1.  2. Resolution of hypermetabolic enlarged nodes on the left with  residual non FDG avid nonenlarged nodes representing treating nodes.  On the right one residual normal sized right level 2A lymph node with  mild to moderate uptake is also felt to be a treated node. Neck:  NI-RADS 2. 3 months CT neck can be obtained to confirm stability of  residual nodes.   3. No evidence of new distant metastasis.     CECT Surveillance Legend:    Primary  1: No evidence of recurrence: routine surveillance  2: Low suspicion    a) Superficial abnormality (skin, mucosal surface): direct visual  inspection    b) Ill-defined deep abnormality: short interval follow-up* or PET  3: High suspicion (new or enlarging discrete nodule/mass): biopsy  4: Definitive recurrence (path proven or clinical progression): no  biopsy needed    Nodes  1: No evidence of recurrence: routine surveillance  2: Low suspicion (ill-defined): short interval follow-up or PET  3: High suspicion (new or enlarging lymph node): biopsy if clinically  needed  4: Definitive recurrence (path proven or clinical progression): no  biopsy needed    *short interval follow-up: 3 months at our institution    I have personally reviewed the examination and initial  interpretation  and I agree with the findings.    RUBIN BUENO MD         SYSTEM ID:  [id number]  CT Soft Tissue Neck w Contrast  Narrative: Combined Report of: PET and CT on 1/22/2024 9:58 AM:    1. PET of the neck, chest, abdomen, and pelvis.  2. PET CT Fusion for Attenuation Correction and Anatomical  Localization.  3. Diagnostic CT of the neck, chest, abdomen and pelvis with  intravenous contrast obtained for diagnostic interpretation.  4. 3D MIP and PET-CT fused images were processed on an independent  workstation and archived to PACS and reviewed by a radiologist.    Technique:    1. PET: The patient received 12.78 mCi of F-18-FDG. The serum glucose  was 97 mg/dL prior to administration. Body weight was 97 kg. Images  were evaluated in the axial, sagittal, and coronal planes as well as  the rotational whole body MIP. Images were acquired from the cranial  vertex to the distal thigh.    UPTAKE WAS MEASURED AT 67 MINUTES.     BACKGROUND: Liver SUV max = 3.20, Aorta Blood SUV max = 3.0.     2. CT: Volumetric acquisition for clinical interpretation of the neck,  chest, abdomen, and pelvis acquired at 3 mm sections. The chest,  abdomen, and pelvis were evaluated at 5 mm sections in bone, soft  tissue, and lung windows.    Contrast and Medications:  IV contrast: 131 mL of Isovue 370 intravenously.  PO contrast: 500 mL of water  Additional Medications: None.    3. 3D MIP and PET-CT fused images were processed on an independent  workstation and archived to PACS and reviewed by a radiologist.    INDICATION: nasopharyngeal cancer, s/p chemo/rads.; Nasopharyngeal  cancer (H).    ADDITIONAL INFORMATION OBTAINED FROM EMR: 38 year old male with T2N2  GALLITO + nasopharynx cancer, Treatment: Induction gem/cis followed by  concurrent chemort completed 10/16/2023    COMPARISON: 8/7/2023, 6/13/2023    FOLLOW-UP APPOINTMENT: 1/19/2024    FINDINGS:     HEAD/NECK:    Nasopharynx: Resolved FDG avid soft tissue thickening.    Lymph  nodes: Resolution of FDG metabolism of previously seen all  left-sided enlarged lymph nodes. On today's exam, in the left neck non  FDG avid much smaller nodes. For example a left level IIb measures up  to 1.1 cm.   In the right side of the neck interval resolution of FDG nodes with  exception of a right level 2A node which measures 7 mm and max SUV of  4.6.   Mildly avid nonenlarged normal-appearing right level 1b node was not  involved on pretreatment PET and most likely represents a reactive  node.     There are postradiation changes in the neck including non-FDG avid  nasopharyngeal thickening, epiglottic thickening, trace  retropharyngeal edema. Slight atrophy of the submandibular glands and  parotid glands. Thyroid gland is normal.     Diffuse mucosal thickening in the right maxillary sinus. Mastoid air  cells are clear.     Major cervical vasculature is patent.    CHEST:  No suspicious pulmonary nodules.    Central tracheobronchial tree is clear. No pleural effusion or  pneumothorax. No acute consolidation.     Normal heart size. No pericardial effusion. Normal caliber thoracic  aorta and main pulmonary artery. Esophagus is unremarkable.     ABDOMEN AND PELVIS:  No suspicious uptake in the abdomen or pelvis.    Liver is unremarkable. The gallbladder is unremarkable. No  intrahepatic or extrahepatic biliary ductal dilatation. Pancreas is  unremarkable. No pancreatic ductal dilatation. Spleen is unremarkable.  Adrenal glands are unremarkable.    No hydronephrosis. Urinary bladder is unremarkable. Reproductive  organs are unremarkable.    Normal caliber small and large bowel. No free air or fluid. Normal  caliber abdominal aorta.    LOWER EXTREMITIES:   No suspicious uptake in the visualized lower extremities.    BONES:   No suspicious uptake in the skeleton. No suspicious osseous lesions.   Impression: IMPRESSION:   In this 38-year-old male with history of nasopharyngeal carcinoma:  1. No residual uptake at the  primary site. NI-RADS 1.  2. Resolution of hypermetabolic enlarged nodes on the left with  residual non FDG avid nonenlarged nodes representing treating nodes.  On the right one residual normal sized right level 2A lymph node with  mild to moderate uptake is also felt to be a treated node. Neck:  NI-RADS 2. 3 months CT neck can be obtained to confirm stability of  residual nodes.   3. No evidence of new distant metastasis.     CECT Surveillance Legend:    Primary  1: No evidence of recurrence: routine surveillance  2: Low suspicion    a) Superficial abnormality (skin, mucosal surface): direct visual  inspection    b) Ill-defined deep abnormality: short interval follow-up* or PET  3: High suspicion (new or enlarging discrete nodule/mass): biopsy  4: Definitive recurrence (path proven or clinical progression): no  biopsy needed    Nodes  1: No evidence of recurrence: routine surveillance  2: Low suspicion (ill-defined): short interval follow-up or PET  3: High suspicion (new or enlarging lymph node): biopsy if clinically  needed  4: Definitive recurrence (path proven or clinical progression): no  biopsy needed    *short interval follow-up: 3 months at our institution    I have personally reviewed the examination and initial interpretation  and I agree with the findings.    RUBIN BUENO MD         SYSTEM ID:  [id number]       Imaging was personally reviewed and interpreted by me

## 2024-01-24 NOTE — LETTER
1/24/2024       RE: Thierno Vega  [address]  [facility] [address]     Dear Colleague,    Thank you for referring your patient, Thierno Vega, to the [facility] [place] [facility]. Please see a copy of my visit note below.    Nephrology Clinic    Thierno Vega MRN:[id number] YOB: 1985  Date of Service: 01/24/2024  Primary care provider: Catrachita Clark  Requesting physician: Catrachita Clark      REASON FOR CONSULT: SMILEY    HISTORY OF PRESENT ILLNESS:   Thierno Vega is a 38 year old male from [facility] who presents for evaluation of an elevated creatinine at 1.27 mg/dL.   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  He noticed a left neck mass in the fall and initially had an FNA that was benign. The mass perissted and then in April underwent repeat FNA showing undifferentiated carcinoma. He travelled to the  in June 2023 for treatment. He initiated therapy on 6/27 and received cisplatin gemcitabine induction x 3 cycles, then chemoradiation with cisplatin with curative intent. He completed his therapy on October 16th.  A nasal jejunostomy tube was placed on 9/28 as the patient was having trouble keeping PO intake. He also needed IV fluid infusions three times a week. From a renal standpoint, his creatinine level was around 0.8 mg/dL up to mid-September 2023 however it went up to 1.2 mg/dL on October 2nd then to 1.86 mg/dL on 10/25 and  improved after he was initiated on IV fluids three times a week. He travelled back to [facility] mid-November 2023.    His creatinine level is 1.26 mg/dL today. The uPCR was 0.14 mg/mg Cr on 10/30/2023. His UA shows no evidence of hematuria or leucocyturia. The patient reports that his PO intake has significantly improved. A PET  CT scan done on  1/22 shows one residual normal sized right level 2A lymph node with mild to moderate uptake.  The patient denies any dysuria, any pollakiuria, any nocturia, any LE edema, any dyspnea on exertion .  The patient denies ever having kidney stones, urinary tract infections, gross hematuria. There is no family history of CKD.  The following portions of the patient's history were reviewed and updated as appropriate: allergies, current medications, past family history, past medical history, past social history, past surgical history and problem list.    PAST MEDICAL HISTORY:  Past Medical History:   Diagnosis Date     HTN (hypertension)      PAST SURGICAL HISTORY:  No past surgical history on file.  MEDICATIONS:  Prescription Medications as of 1/24/2024         Rx Number Disp Refills Start End Last Dispensed Date Next Fill Date Owning Pharmacy    acetaminophen (TYLENOL) 500 MG tablet  60 tablet 1 10/9/2023 --   [address] 1[address]262    Sig: Take 2 tablets (1,000 mg) by mouth every 8 hours as needed for mild pain    Class: E-Prescribe    Route: Oral    amLODIPine (NORVASC) 10 MG tablet  90 tablet 1 10/30/2023 --   [address] 1-993    Sig: Take 1 tablet (10 mg) by mouth daily    Class: E-Prescribe    Route: Oral    famotidine (PEPCID) 20 MG tablet  90 tablet 1 11/1/2023 --   [address] 1-064    Sig: Take 1 tablet (20 mg) by mouth 2 times daily    Class: E-Prescribe    Route: Oral    gabapentin (NEURONTIN) 300 MG capsule  50 capsule 3 11/10/2023 --   [address] 9[address]499    Sig: Take 1 capsule (300 mg) by mouth 3 times daily Taper dose by reducing 1 pill a day until you have stopped taking the medication.    Class: E-Prescribe    Route: Oral    gabapentin  (NEURONTIN) 300 MG capsule  270 capsule 4 8/14/2023 --   [facility] - [place], [place] - [address] [facility]    Sig: Take 3 capsules (900 mg) by mouth 3 times daily Taper dose up to 900 mg po tid per instructions given in Radiation Oncology clinic    Class: E-Prescribe    Route: Oral    magic mouthwash (ENTER INGREDIENTS IN COMMENTS) suspension  240 mL 0 10/9/2023 --   [phone]    Sig: Take 10 mLs by mouth every 4 hours as needed    Class: E-Prescribe    Route: Oral    oxyCODONE (ROXICODONE) 5 MG tablet  20 tablet 0 10/12/2023 --   [place], [place] - [address] [facility] [phone]    Sig: Take 1 tablet (5 mg) by mouth every 6 hours as needed for pain    Class: E-Prescribe    Earliest Fill Date: 10/12/2023    Route: Oral           ALLERGIES:    No Known Allergies  REVIEW OF SYSTEMS:  Review Of Systems    A comprehensive review of systems was performed and found to be negative except as described here or above.  SOCIAL HISTORY:   Social History     Socioeconomic History     Marital status: Single     Spouse name: Not on file     Number of children: Not on file     Years of education: Not on file     Highest education level: Not on file   Occupational History     Not on file   Tobacco Use     Smoking status: Some Days     Types: Cigarettes     Passive exposure: Current     Smokeless tobacco: Never   Substance and Sexual Activity     Alcohol use: Not Currently     Drug use: Never     Sexual activity: Not on file   Other Topics Concern     Not on file   Social History Narrative     Not on file     Social Determinants of Health     Financial Resource Strain: Not on file   Food Insecurity: Not on file   Transportation Needs: Not on file   Physical Activity: Not on file   Stress: Not on file   Social Connections: Not on file   Interpersonal Safety: Not on file   Housing Stability: Not on file      FAMILY MEDICAL HISTORY:   Family History   Problem Relation Age of Onset     Cancer No family hx of      PHYSICAL EXAM:   There were no vitals taken for this visit.  GENERAL APPEARANCE: alert and no distress  EYES: nonicteric  HENT: mouth without ulcers or lesions  NECK: supple, no adenopathy  RESP: lungs clear to auscultation   CV: regular rhythm, normal rate, no rub  ABDOMEN: soft, nontender, normal bowel sounds, no HSM   Extremities: no clubbing, cyanosis, or edema  MS: no evidence of inflammation in joints, no muscle tenderness  SKIN: no rash  NEURO: mentation intact and speech normal  PSYCH: affect normal/bright   LABS:   Recent Results (from the past 672 hour(s))   Creatinine POCT    Collection Time: 01/22/24  8:27 AM   Result Value Ref Range    Creatinine POCT 1.4 (H) 0.7 - 1.3 mg/dL    GFR, ESTIMATED POCT >60 >60 mL/min/1.73m2   CBC with platelets and differential    Collection Time: 01/24/24 10:11 AM   Result Value Ref Range    WBC Count 4.5 4.0 - 11.0 10e3/uL    RBC Count 4.34 (L) 4.40 - 5.90 10e6/uL    Hemoglobin 13.1 (L) 13.3 - 17.7 g/dL    Hematocrit 38.6 (L) 40.0 - 53.0 %    MCV 89 78 - 100 fL    MCH 30.2 26.5 - 33.0 pg    MCHC 33.9 31.5 - 36.5 g/dL    RDW 11.9 10.0 - 15.0 %    Platelet Count 247 150 - 450 10e3/uL    % Neutrophils 63 %    % Lymphocytes 22 %    % Monocytes 11 %    % Eosinophils 3 %    % Basophils 1 %    % Immature Granulocytes 0 %    NRBCs per 100 WBC 0 <1 /100    Absolute Neutrophils 2.8 1.6 - 8.3 10e3/uL    Absolute Lymphocytes 1.0 0.8 - 5.3 10e3/uL    Absolute Monocytes 0.5 0.0 - 1.3 10e3/uL    Absolute Eosinophils 0.2 0.0 - 0.7 10e3/uL    Absolute Basophils 0.1 0.0 - 0.2 10e3/uL    Absolute Immature Granulocytes 0.0 <=0.4 10e3/uL    Absolute NRBCs 0.0 10e3/uL     CMP  Recent Labs   Lab Test 01/22/24  0827 11/20/23  1223 11/15/23  1204 11/05/23  0822 10/30/23  1326 10/20/23  0711 10/18/23  0715 10/09/23  0801 10/02/23  0830 09/25/23  0726   NA  --  138 137 137 137   < > 139 137  139 136   POTASSIUM  --  3.9 4.0 4.3 4.8   < > 4.6 4.7 4.1 4.0   CHLORIDE  --  102 100 101 100   < > 101 99 103 100   CO2  --  26 26 27 28   < > 26 26 25 21*   ANIONGAP  --  10 11 9 9   < > 12 12 11 15   GLC  --  136* 112* 114* 105*   < > 143* 122* 108* 107*   BUN  --  10.1 12.5 29.5* 25.9*   < > 33.9* 25.4* 9.7 8.9   CR 1.4* 1.39* 1.50* 1.48* 1.27*   < > 1.41* 1.21* 1.12 1.01   GFRESTIMATED >60 67 61 62 75   < > 66 80 87 >90   SOTO  --  9.9 9.7 9.6 9.7   < > 9.6 9.1 9.5 9.2   MAG  --   --   --   --   --   --  2.0 1.7 1.8 1.8   PROTTOTAL  --  7.5 7.6 7.1 7.5   < >  --  7.2 7.1 6.8   ALBUMIN  --  4.6 4.5 4.2 4.4   < >  --  4.3 4.4 4.3   BILITOTAL  --  0.4 0.3 0.2 0.2   < >  --  0.3 0.2 0.4   ALKPHOS  --  58 65 73 80   < >  --  64 57 51   AST  --  17 18 14 15   < >  --  11 9 15   ALT  --  18 21 18 17   < >  --  15 13 17    < > = values in this interval not displayed.     CBC  Recent Labs   Lab Test 01/24/24  1011 11/15/23  1204 11/05/23  0822 10/30/23  1326   HGB 13.1* 9.8* 8.8* 8.8*   WBC 4.5 5.3 5.8 4.9   RBC 4.34* 3.13* 2.82* 2.86*   HCT 38.6* 28.9* 26.1* 26.6*   MCV 89 92 93 93   MCH 30.2 31.3 31.2 30.8   MCHC 33.9 33.9 33.7 33.1   RDW 11.9 13.1 13.8 14.2    262 279 364     INR  Recent Labs   Lab Test 09/23/23  1526   INR 1.03   PTT 25     ABGNo lab results found.   URINE STUDIES  Recent Labs   Lab Test 10/30/23  1329 10/25/23  1117 09/23/23  1852   COLOR Light Yellow Light Yellow Light Yellow   APPEARANCE Clear Clear Clear   URINEGLC Negative Negative Negative   URINEBILI Negative Negative Negative   URINEKETONE Negative Negative 100*   SG 1.012 1.013 1.017   UBLD Negative Negative Negative   URINEPH 7.0 7.0 5.5   PROTEIN Negative Negative Negative   NITRITE Negative Negative Negative   LEUKEST Negative Negative Negative   RBCU 1 1 1   WBCU 1 2 3     No lab results found.    ASSESSMENT AND PLAN:   #SMILEY secondary to prerenal azotemia with possible associated cisplatin toxicity and disruption of the  tubuloglomerular feedback by concomitant use of valsartan. Valsartan was stopped and amlodipine was increased to 10 mg daily. His kidney function has improved but has failed to return to baseline    #HTN  Essential. Management as per above     #Blood count  Hemoglobin 8.8 secondary to chemotherapy and inflammation, has improved to 13.1    #Acid-base status  CO2 level 28 no need for any intervention    #Electrolytes  Na 137  K 4.8 -> 4.1 no acute issue    #Thyroid  TSH was 0.24 -> 1.77      #BMD  Calcium 9.7          albumin 4.4  phosphorus 2.4  Vitamin D level 23 will start him on ergocalciferol 64312 U once a week      The total time of this encounter amounted to 30 minutes on the day of the encounter. This time included time spent with the patient, reviewing records, ordering tests, and performing post visit documentation.       The patient will return to follow up in 3 months    Nata Sánchez MD  Division of Renal Diseases and Hypertension      Again, thank you for allowing me to participate in the care of your patient.      Sincerely,    Nata Sánchez MD

## 2024-01-24 NOTE — LETTER
1/24/2024         RE: Thierno Vega  [address]  [address]  [facility] [address]        Dear Colleague,    Thank you for referring your patient, Thierno Vega, to the [facility]. Please see a copy of my visit note below.       [facility]    PATIENT NAME: Thierno Vega  MRN # [id number]   DATE OF VISIT: January 24, 2024  YOB: 1985     Otolaryngology: Dr. Navin Fisher  Radiation Oncology:  Dr. Yessica Parada    CANCER TYPE: SCC nasopharynx, GALLITO -jeanna  STAGE: cT2N2 vs N3 (III vs MARYSOL)  ECOG PS: 0    PD-L1:  NGS:     SUMMARY  4/30/23  US. Bilateral necrotic nodes  5/3/23  FNA cervical adenopathy - undifferentiated carcinoma  5/4/23   CT CAP. 7 mm pulmonary nodule, no metastases  5/5/23  MRI bilateral cervical and retropharyngeal adenopathy    Came to US  6/12/23  US guided FNA L neck node in clinic (Dr. Fisher). Path: hypocellular with scant clusters of atypical squamous cells suspicious for malignancy, p40 +jeanna, GALLITO -jeanna  6/13/23  PET/CT. Intense uptake L Rosenmuller fossa with increased fullness (SUV 18.8). Skull base intact. Milder FDG uptake (SUV 6.1) by the prominent R Rosenmuller fossa, inflammatory vs malignant. Indeterminate bilateral intense palatine tonsillar uptake with fullness on CT, inflammatroy vs infectious vs tumor, discontinuous with the nasopharyngeal abnormality. Bilateral nodes, including level 2a, 2b, 3, 3/5, R 1.8 cm 2A, another level 2/3 nodes, FDG avid retropharyngeal nodes. 0.3 cm nodule on the R major fissure. 0.8 cm R inguinal node (SUV 3.9) with additional nonenlarged LNs with minimal uptake, suggestive of reactive adenopathy. Marked focal uptake along the R upper inner thigh with skin thickening (SUV 16.8), which may represent cutaneous infection/inflammation, recommend direct visualization  6/27~8/11/23 Cisplatin gemcitabine x 3 cycles. Skipped C3D8 to give a short break prior to chemorads  8/28~10/16/23 Chemoradiation  with weekly cisplatin. Nasogastric feeding tube, mild SMILEY  1/22/24  PET/CT. Resolution of primary mass and L neck adenopathy. Residual 7 mm level 2A node (SUV 4.6), NIRADS-2, repeat CT in 3 months.     ASSESSMENT AND PLAN  Nasopharyngeal carcinoma, cT2N2 (III): Recovering as anticipated. 3 month post treatment PET/CT showing a small residual node. Doesn't look that worrisome but will get CT neck in 3 months when he's here to see Dr. Fisher. I'll see him in 6 months with labs, which he can coordinate with Dr. Fisher's appts. Survivorship visit - will try to do in April.     SMILEY-->CKD, HTN: Saw Dr. Sánchez this morning, note reviewed. Remains on amlodipine    Xerostomia: Manageable, mild    Lymphedema: referral placed, mild, will wait until [place]'s back in the [place] in [place]    Dysgeusia: Improving. Discussed anticipation of ongoing recovery.     Screening for hypothyroidism: ok     Professional [employer] phone  used throughout         Catrachita Clark MD  Associate Professor of Medicine  Hematology, Oncology and Transplantation      IDRIS Pa returns for routine follow up 3 months after chemoradiation.  Doing well  Feeling much better than the last time I saw him  Neuropathy - fingers and toes -   A little dry mouth - when speaking   Taste 50%     PAST MEDICAL HISTORY  Nasopharyngeal carcinoma as above  HTN    CURRENT OUTPATIENT MEDICATIONS  Reviewed    ALLERGIES  No Known Allergies     PHYSICAL EXAM  There were no vitals taken for this visit. - taken earlier this morning and reviewed.     GEN: NAD  HEENT: EOMI, no icterus, injection or pallor  NECK: mild L neck adenopathy  EXT: no edema  NEURO: alert  SKIN: no rashes    LABORATORY AND IMAGING STUDIES    Labs today were independently reviewed and interpreted by me    CT Chest/Abdomen/Pelvis w Contrast  Narrative: Combined Report of: PET and CT on 1/22/2024 9:58 AM:    1. PET of the neck, chest, abdomen, and pelvis.  2. PET CT Fusion for Attenuation Correction  and Anatomical  Localization.  3. Diagnostic CT of the neck, chest, abdomen and pelvis with  intravenous contrast obtained for diagnostic interpretation.  4. 3D MIP and PET-CT fused images were processed on an independent  workstation and archived to PACS and reviewed by a radiologist.    Technique:    1. PET: The patient received 12.78 mCi of F-18-FDG. The serum glucose  was 97 mg/dL prior to administration. Body weight was 97 kg. Images  were evaluated in the axial, sagittal, and coronal planes as well as  the rotational whole body MIP. Images were acquired from the cranial  vertex to the distal thigh.    UPTAKE WAS MEASURED AT 67 MINUTES.     BACKGROUND: Liver SUV max = 3.20, Aorta Blood SUV max = 3.0.     2. CT: Volumetric acquisition for clinical interpretation of the neck,  chest, abdomen, and pelvis acquired at 3 mm sections. The chest,  abdomen, and pelvis were evaluated at 5 mm sections in bone, soft  tissue, and lung windows.    Contrast and Medications:  IV contrast: 131 mL of Isovue 370 intravenously.  PO contrast: 500 mL of water  Additional Medications: None.    3. 3D MIP and PET-CT fused images were processed on an independent  workstation and archived to PACS and reviewed by a radiologist.    INDICATION: nasopharyngeal cancer, s/p chemo/rads.; Nasopharyngeal  cancer (H).    ADDITIONAL INFORMATION OBTAINED FROM EMR: 38 year old male with T2N2  GALLITO + nasopharynx cancer, Treatment: Induction gem/cis followed by  concurrent chemort completed 10/16/2023    COMPARISON: 8/7/2023, 6/13/2023    FOLLOW-UP APPOINTMENT: 1/19/2024    FINDINGS:     HEAD/NECK:    Nasopharynx: Resolved FDG avid soft tissue thickening.    Lymph nodes: Resolution of FDG metabolism of previously seen all  left-sided enlarged lymph nodes. On today's exam, in the left neck non  FDG avid much smaller nodes. For example a left level IIb measures up  to 1.1 cm.   In the right side of the neck interval resolution of FDG nodes  with  exception of a right level 2A node which measures 7 mm and max SUV of  4.6.   Mildly avid nonenlarged normal-appearing right level 1b node was not  involved on pretreatment PET and most likely represents a reactive  node.     There are postradiation changes in the neck including non-FDG avid  nasopharyngeal thickening, epiglottic thickening, trace  retropharyngeal edema. Slight atrophy of the submandibular glands and  parotid glands. Thyroid gland is normal.     Diffuse mucosal thickening in the right maxillary sinus. Mastoid air  cells are clear.     Major cervical vasculature is patent.    CHEST:  No suspicious pulmonary nodules.    Central tracheobronchial tree is clear. No pleural effusion or  pneumothorax. No acute consolidation.     Normal heart size. No pericardial effusion. Normal caliber thoracic  aorta and main pulmonary artery. Esophagus is unremarkable.     ABDOMEN AND PELVIS:  No suspicious uptake in the abdomen or pelvis.    Liver is unremarkable. The gallbladder is unremarkable. No  intrahepatic or extrahepatic biliary ductal dilatation. Pancreas is  unremarkable. No pancreatic ductal dilatation. Spleen is unremarkable.  Adrenal glands are unremarkable.    No hydronephrosis. Urinary bladder is unremarkable. Reproductive  organs are unremarkable.    Normal caliber small and large bowel. No free air or fluid. Normal  caliber abdominal aorta.    LOWER EXTREMITIES:   No suspicious uptake in the visualized lower extremities.    BONES:   No suspicious uptake in the skeleton. No suspicious osseous lesions.   Impression: IMPRESSION:   In this 38-year-old male with history of nasopharyngeal carcinoma:  1. No residual uptake at the primary site. NI-RADS 1.  2. Resolution of hypermetabolic enlarged nodes on the left with  residual non FDG avid nonenlarged nodes representing treating nodes.  On the right one residual normal sized right level 2A lymph node with  mild to moderate uptake is also felt to be a  treated node. Neck:  NI-RADS 2. 3 months CT neck can be obtained to confirm stability of  residual nodes.   3. No evidence of new distant metastasis.     CECT Surveillance Legend:    Primary  1: No evidence of recurrence: routine surveillance  2: Low suspicion    a) Superficial abnormality (skin, mucosal surface): direct visual  inspection    b) Ill-defined deep abnormality: short interval follow-up* or PET  3: High suspicion (new or enlarging discrete nodule/mass): biopsy  4: Definitive recurrence (path proven or clinical progression): no  biopsy needed    Nodes  1: No evidence of recurrence: routine surveillance  2: Low suspicion (ill-defined): short interval follow-up or PET  3: High suspicion (new or enlarging lymph node): biopsy if clinically  needed  4: Definitive recurrence (path proven or clinical progression): no  biopsy needed    *short interval follow-up: 3 months at our institution    I have personally reviewed the examination and initial interpretation  and I agree with the findings.    RUBIN BUENO MD         SYSTEM ID:  [id number]  PET Oncology (Eyes to Thighs)  Narrative: Combined Report of: PET and CT on 1/22/2024 9:58 AM:    1. PET of the neck, chest, abdomen, and pelvis.  2. PET CT Fusion for Attenuation Correction and Anatomical  Localization.  3. Diagnostic CT of the neck, chest, abdomen and pelvis with  intravenous contrast obtained for diagnostic interpretation.  4. 3D MIP and PET-CT fused images were processed on an independent  workstation and archived to PACS and reviewed by a radiologist.    Technique:    1. PET: The patient received 12.78 mCi of F-18-FDG. The serum glucose  was 97 mg/dL prior to administration. Body weight was 97 kg. Images  were evaluated in the axial, sagittal, and coronal planes as well as  the rotational whole body MIP. Images were acquired from the cranial  vertex to the distal thigh.    UPTAKE WAS MEASURED AT 67 MINUTES.     BACKGROUND: Liver SUV max = 3.20, Aorta  Blood SUV max = 3.0.     2. CT: Volumetric acquisition for clinical interpretation of the neck,  chest, abdomen, and pelvis acquired at 3 mm sections. The chest,  abdomen, and pelvis were evaluated at 5 mm sections in bone, soft  tissue, and lung windows.    Contrast and Medications:  IV contrast: 131 mL of Isovue 370 intravenously.  PO contrast: 500 mL of water  Additional Medications: None.    3. 3D MIP and PET-CT fused images were processed on an independent  workstation and archived to PACS and reviewed by a radiologist.    INDICATION: nasopharyngeal cancer, s/p chemo/rads.; Nasopharyngeal  cancer (H).    ADDITIONAL INFORMATION OBTAINED FROM EMR: 38 year old male with T2N2  GALLITO + nasopharynx cancer, Treatment: Induction gem/cis followed by  concurrent chemort completed 10/16/2023    COMPARISON: 8/7/2023, 6/13/2023    FOLLOW-UP APPOINTMENT: 1/19/2024    FINDINGS:     HEAD/NECK:    Nasopharynx: Resolved FDG avid soft tissue thickening.    Lymph nodes: Resolution of FDG metabolism of previously seen all  left-sided enlarged lymph nodes. On today's exam, in the left neck non  FDG avid much smaller nodes. For example a left level IIb measures up  to 1.1 cm.   In the right side of the neck interval resolution of FDG nodes with  exception of a right level 2A node which measures 7 mm and max SUV of  4.6.   Mildly avid nonenlarged normal-appearing right level 1b node was not  involved on pretreatment PET and most likely represents a reactive  node.     There are postradiation changes in the neck including non-FDG avid  nasopharyngeal thickening, epiglottic thickening, trace  retropharyngeal edema. Slight atrophy of the submandibular glands and  parotid glands. Thyroid gland is normal.     Diffuse mucosal thickening in the right maxillary sinus. Mastoid air  cells are clear.     Major cervical vasculature is patent.    CHEST:  No suspicious pulmonary nodules.    Central tracheobronchial tree is clear. No pleural effusion  or  pneumothorax. No acute consolidation.     Normal heart size. No pericardial effusion. Normal caliber thoracic  aorta and main pulmonary artery. Esophagus is unremarkable.     ABDOMEN AND PELVIS:  No suspicious uptake in the abdomen or pelvis.    Liver is unremarkable. The gallbladder is unremarkable. No  intrahepatic or extrahepatic biliary ductal dilatation. Pancreas is  unremarkable. No pancreatic ductal dilatation. Spleen is unremarkable.  Adrenal glands are unremarkable.    No hydronephrosis. Urinary bladder is unremarkable. Reproductive  organs are unremarkable.    Normal caliber small and large bowel. No free air or fluid. Normal  caliber abdominal aorta.    LOWER EXTREMITIES:   No suspicious uptake in the visualized lower extremities.    BONES:   No suspicious uptake in the skeleton. No suspicious osseous lesions.   Impression: IMPRESSION:   In this 38-year-old male with history of nasopharyngeal carcinoma:  1. No residual uptake at the primary site. NI-RADS 1.  2. Resolution of hypermetabolic enlarged nodes on the left with  residual non FDG avid nonenlarged nodes representing treating nodes.  On the right one residual normal sized right level 2A lymph node with  mild to moderate uptake is also felt to be a treated node. Neck:  NI-RADS 2. 3 months CT neck can be obtained to confirm stability of  residual nodes.   3. No evidence of new distant metastasis.     CECT Surveillance Legend:    Primary  1: No evidence of recurrence: routine surveillance  2: Low suspicion    a) Superficial abnormality (skin, mucosal surface): direct visual  inspection    b) Ill-defined deep abnormality: short interval follow-up* or PET  3: High suspicion (new or enlarging discrete nodule/mass): biopsy  4: Definitive recurrence (path proven or clinical progression): no  biopsy needed    Nodes  1: No evidence of recurrence: routine surveillance  2: Low suspicion (ill-defined): short interval follow-up or PET  3: High suspicion (new  or enlarging lymph node): biopsy if clinically  needed  4: Definitive recurrence (path proven or clinical progression): no  biopsy needed    *short interval follow-up: 3 months at our institution    I have personally reviewed the examination and initial interpretation  and I agree with the findings.    RUBIN BUENO MD         SYSTEM ID:  [id number]  CT Soft Tissue Neck w Contrast  Narrative: Combined Report of: PET and CT on 1/22/2024 9:58 AM:    1. PET of the neck, chest, abdomen, and pelvis.  2. PET CT Fusion for Attenuation Correction and Anatomical  Localization.  3. Diagnostic CT of the neck, chest, abdomen and pelvis with  intravenous contrast obtained for diagnostic interpretation.  4. 3D MIP and PET-CT fused images were processed on an independent  workstation and archived to PACS and reviewed by a radiologist.    Technique:    1. PET: The patient received 12.78 mCi of F-18-FDG. The serum glucose  was 97 mg/dL prior to administration. Body weight was 97 kg. Images  were evaluated in the axial, sagittal, and coronal planes as well as  the rotational whole body MIP. Images were acquired from the cranial  vertex to the distal thigh.    UPTAKE WAS MEASURED AT 67 MINUTES.     BACKGROUND: Liver SUV max = 3.20, Aorta Blood SUV max = 3.0.     2. CT: Volumetric acquisition for clinical interpretation of the neck,  chest, abdomen, and pelvis acquired at 3 mm sections. The chest,  abdomen, and pelvis were evaluated at 5 mm sections in bone, soft  tissue, and lung windows.    Contrast and Medications:  IV contrast: 131 mL of Isovue 370 intravenously.  PO contrast: 500 mL of water  Additional Medications: None.    3. 3D MIP and PET-CT fused images were processed on an independent  workstation and archived to PACS and reviewed by a radiologist.    INDICATION: nasopharyngeal cancer, s/p chemo/rads.; Nasopharyngeal  cancer (H).    ADDITIONAL INFORMATION OBTAINED FROM EMR: 38 year old male with T2N2  GALLITO + nasopharynx cancer,  Treatment: Induction gem/cis followed by  concurrent chemort completed 10/16/2023    COMPARISON: 8/7/2023, 6/13/2023    FOLLOW-UP APPOINTMENT: 1/19/2024    FINDINGS:     HEAD/NECK:    Nasopharynx: Resolved FDG avid soft tissue thickening.    Lymph nodes: Resolution of FDG metabolism of previously seen all  left-sided enlarged lymph nodes. On today's exam, in the left neck non  FDG avid much smaller nodes. For example a left level IIb measures up  to 1.1 cm.   In the right side of the neck interval resolution of FDG nodes with  exception of a right level 2A node which measures 7 mm and max SUV of  4.6.   Mildly avid nonenlarged normal-appearing right level 1b node was not  involved on pretreatment PET and most likely represents a reactive  node.     There are postradiation changes in the neck including non-FDG avid  nasopharyngeal thickening, epiglottic thickening, trace  retropharyngeal edema. Slight atrophy of the submandibular glands and  parotid glands. Thyroid gland is normal.     Diffuse mucosal thickening in the right maxillary sinus. Mastoid air  cells are clear.     Major cervical vasculature is patent.    CHEST:  No suspicious pulmonary nodules.    Central tracheobronchial tree is clear. No pleural effusion or  pneumothorax. No acute consolidation.     Normal heart size. No pericardial effusion. Normal caliber thoracic  aorta and main pulmonary artery. Esophagus is unremarkable.     ABDOMEN AND PELVIS:  No suspicious uptake in the abdomen or pelvis.    Liver is unremarkable. The gallbladder is unremarkable. No  intrahepatic or extrahepatic biliary ductal dilatation. Pancreas is  unremarkable. No pancreatic ductal dilatation. Spleen is unremarkable.  Adrenal glands are unremarkable.    No hydronephrosis. Urinary bladder is unremarkable. Reproductive  organs are unremarkable.    Normal caliber small and large bowel. No free air or fluid. Normal  caliber abdominal aorta.    LOWER EXTREMITIES:   No suspicious  uptake in the visualized lower extremities.    BONES:   No suspicious uptake in the skeleton. No suspicious osseous lesions.   Impression: IMPRESSION:   In this 38-year-old male with history of nasopharyngeal carcinoma:  1. No residual uptake at the primary site. NI-RADS 1.  2. Resolution of hypermetabolic enlarged nodes on the left with  residual non FDG avid nonenlarged nodes representing treating nodes.  On the right one residual normal sized right level 2A lymph node with  mild to moderate uptake is also felt to be a treated node. Neck:  NI-RADS 2. 3 months CT neck can be obtained to confirm stability of  residual nodes.   3. No evidence of new distant metastasis.     CECT Surveillance Legend:    Primary  1: No evidence of recurrence: routine surveillance  2: Low suspicion    a) Superficial abnormality (skin, mucosal surface): direct visual  inspection    b) Ill-defined deep abnormality: short interval follow-up* or PET  3: High suspicion (new or enlarging discrete nodule/mass): biopsy  4: Definitive recurrence (path proven or clinical progression): no  biopsy needed    Nodes  1: No evidence of recurrence: routine surveillance  2: Low suspicion (ill-defined): short interval follow-up or PET  3: High suspicion (new or enlarging lymph node): biopsy if clinically  needed  4: Definitive recurrence (path proven or clinical progression): no  biopsy needed    *short interval follow-up: 3 months at our institution    I have personally reviewed the examination and initial interpretation  and I agree with the findings.    RUBIN BUENO MD         SYSTEM ID:  [id number]       Imaging was personally reviewed and interpreted by christopher lCark MD

## 2024-01-24 NOTE — PROGRESS NOTES
Nephrology Clinic    Thierno Vega MRN:[id number] YOB: 1985  Date of Service: 01/24/2024  Primary care provider: Catrachita Clark  Requesting physician: Catrachita Clark      REASON FOR CONSULT: SMILEY    HISTORY OF PRESENT ILLNESS:   Thierno Vega is a 38 year old male from [facility] who presents for evaluation of an elevated creatinine at 1.27 mg/dL.   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  He noticed a left neck mass in the fall and initially had an FNA that was benign. The mass perissted and then in April underwent repeat FNA showing undifferentiated carcinoma. He travelled to the  in June 2023 for treatment. He initiated therapy on 6/27 and received cisplatin gemcitabine induction x 3 cycles, then chemoradiation with cisplatin with curative intent. He completed his therapy on October 16th.  A nasal jejunostomy tube was placed on 9/28 as the patient was having trouble keeping PO intake. He also needed IV fluid infusions three times a week. From a renal standpoint, his creatinine level was around 0.8 mg/dL up to mid-September 2023 however it went up to 1.2 mg/dL on October 2nd then to 1.86 mg/dL on 10/25 and  improved after he was initiated on IV fluids three times a week. He travelled back to [facility] mid-November 2023.    His creatinine level is 1.26 mg/dL today. The uPCR was 0.14 mg/mg Cr on 10/30/2023. His UA shows no evidence of hematuria or leucocyturia. The patient reports that his PO intake has significantly improved. A PET CT scan done on  1/22 shows one residual normal sized right level 2A lymph node with mild to moderate uptake.  The patient denies any dysuria, any pollakiuria, any nocturia, any LE edema, any dyspnea on exertion .  The patient denies ever having kidney stones, urinary tract infections, gross hematuria. There is no family  history of CKD.  The following portions of the patient's history were reviewed and updated as appropriate: allergies, current medications, past family history, past medical history, past social history, past surgical history and problem list.    PAST MEDICAL HISTORY:  Past Medical History:   Diagnosis Date    HTN (hypertension)      PAST SURGICAL HISTORY:  No past surgical history on file.  MEDICATIONS:  Prescription Medications as of 1/24/2024         Rx Number Disp Refills Start End Last Dispensed Date Next Fill Date Owning Pharmacy    acetaminophen (TYLENOL) 500 MG tablet  60 tablet 1 10/9/2023 --   [address] 1-207    Sig: Take 2 tablets (1,000 mg) by mouth every 8 hours as needed for mild pain    Class: E-Prescribe    Route: Oral    amLODIPine (NORVASC) 10 MG tablet  90 tablet 1 10/30/2023 --   [address] 1-526    Sig: Take 1 tablet (10 mg) by mouth daily    Class: E-Prescribe    Route: Oral    famotidine (PEPCID) 20 MG tablet  90 tablet 1 11/1/2023 --   [address] 1-669    Sig: Take 1 tablet (20 mg) by mouth 2 times daily    Class: E-Prescribe    Route: Oral    gabapentin (NEURONTIN) 300 MG capsule  50 capsule 3 11/10/2023 --   [address] 1-084    Sig: Take 1 capsule (300 mg) by mouth 3 times daily Taper dose by reducing 1 pill a day until you have stopped taking the medication.    Class: E-Prescribe    Route: Oral    gabapentin (NEURONTIN) 300 MG capsule  270 capsule 4 8/14/2023 --   [place], [place] - [address] [place] [address]    Sig: Take 3 capsules (900 mg) by mouth 3 times daily Taper dose up to 900 mg po tid per instructions given in Radiation Oncology clinic    Class: E-Prescribe    Route: Oral    magic mouthwash  (ENTER INGREDIENTS IN COMMENTS) suspension  240 mL 0 10/9/2023 --   [address] 8[address]492    Sig: Take 10 mLs by mouth every 4 hours as needed    Class: E-Prescribe    Route: Oral    oxyCODONE (ROXICODONE) 5 MG tablet  20 tablet 0 10/12/2023 --   [address] 1[address]482    Sig: Take 1 tablet (5 mg) by mouth every 6 hours as needed for pain    Class: E-Prescribe    Earliest Fill Date: 10/12/2023    Route: Oral           ALLERGIES:    No Known Allergies  REVIEW OF SYSTEMS:  Review Of Systems    A comprehensive review of systems was performed and found to be negative except as described here or above.  SOCIAL HISTORY:   Social History     Socioeconomic History    Marital status: Single     Spouse name: Not on file    Number of children: Not on file    Years of education: Not on file    Highest education level: Not on file   Occupational History    Not on file   Tobacco Use    Smoking status: Some Days     Types: Cigarettes     Passive exposure: Current    Smokeless tobacco: Never   Substance and Sexual Activity    Alcohol use: Not Currently    Drug use: Never    Sexual activity: Not on file   Other Topics Concern    Not on file   Social History Narrative    Not on file     Social Determinants of Health     Financial Resource Strain: Not on file   Food Insecurity: Not on file   Transportation Needs: Not on file   Physical Activity: Not on file   Stress: Not on file   Social Connections: Not on file   Interpersonal Safety: Not on file   Housing Stability: Not on file     FAMILY MEDICAL HISTORY:   Family History   Problem Relation Age of Onset    Cancer No family hx of      PHYSICAL EXAM:   There were no vitals taken for this visit.  GENERAL APPEARANCE: alert and no distress  EYES: nonicteric  HENT: mouth without ulcers or lesions  NECK: supple, no adenopathy  RESP: lungs clear to auscultation   CV:  regular rhythm, normal rate, no rub  ABDOMEN: soft, nontender, normal bowel sounds, no HSM   Extremities: no clubbing, cyanosis, or edema  MS: no evidence of inflammation in joints, no muscle tenderness  SKIN: no rash  NEURO: mentation intact and speech normal  PSYCH: affect normal/bright   LABS:   Recent Results (from the past 672 hour(s))   Creatinine POCT    Collection Time: 01/22/24  8:27 AM   Result Value Ref Range    Creatinine POCT 1.4 (H) 0.7 - 1.3 mg/dL    GFR, ESTIMATED POCT >60 >60 mL/min/1.73m2   CBC with platelets and differential    Collection Time: 01/24/24 10:11 AM   Result Value Ref Range    WBC Count 4.5 4.0 - 11.0 10e3/uL    RBC Count 4.34 (L) 4.40 - 5.90 10e6/uL    Hemoglobin 13.1 (L) 13.3 - 17.7 g/dL    Hematocrit 38.6 (L) 40.0 - 53.0 %    MCV 89 78 - 100 fL    MCH 30.2 26.5 - 33.0 pg    MCHC 33.9 31.5 - 36.5 g/dL    RDW 11.9 10.0 - 15.0 %    Platelet Count 247 150 - 450 10e3/uL    % Neutrophils 63 %    % Lymphocytes 22 %    % Monocytes 11 %    % Eosinophils 3 %    % Basophils 1 %    % Immature Granulocytes 0 %    NRBCs per 100 WBC 0 <1 /100    Absolute Neutrophils 2.8 1.6 - 8.3 10e3/uL    Absolute Lymphocytes 1.0 0.8 - 5.3 10e3/uL    Absolute Monocytes 0.5 0.0 - 1.3 10e3/uL    Absolute Eosinophils 0.2 0.0 - 0.7 10e3/uL    Absolute Basophils 0.1 0.0 - 0.2 10e3/uL    Absolute Immature Granulocytes 0.0 <=0.4 10e3/uL    Absolute NRBCs 0.0 10e3/uL     CMP  Recent Labs   Lab Test 01/22/24  0827 11/20/23  1223 11/15/23  1204 11/05/23  0822 10/30/23  1326 10/20/23  0711 10/18/23  0715 10/09/23  0801 10/02/23  0830 09/25/23  0726   NA  --  138 137 137 137   < > 139 137 139 136   POTASSIUM  --  3.9 4.0 4.3 4.8   < > 4.6 4.7 4.1 4.0   CHLORIDE  --  102 100 101 100   < > 101 99 103 100   CO2  --  26 26 27 28   < > 26 26 25 21*   ANIONGAP  --  10 11 9 9   < > 12 12 11 15   GLC  --  136* 112* 114* 105*   < > 143* 122* 108* 107*   BUN  --  10.1 12.5 29.5* 25.9*   < > 33.9* 25.4* 9.7 8.9   CR 1.4* 1.39* 1.50*  1.48* 1.27*   < > 1.41* 1.21* 1.12 1.01   GFRESTIMATED >60 67 61 62 75   < > 66 80 87 >90   SOTO  --  9.9 9.7 9.6 9.7   < > 9.6 9.1 9.5 9.2   MAG  --   --   --   --   --   --  2.0 1.7 1.8 1.8   PROTTOTAL  --  7.5 7.6 7.1 7.5   < >  --  7.2 7.1 6.8   ALBUMIN  --  4.6 4.5 4.2 4.4   < >  --  4.3 4.4 4.3   BILITOTAL  --  0.4 0.3 0.2 0.2   < >  --  0.3 0.2 0.4   ALKPHOS  --  58 65 73 80   < >  --  64 57 51   AST  --  17 18 14 15   < >  --  11 9 15   ALT  --  18 21 18 17   < >  --  15 13 17    < > = values in this interval not displayed.     CBC  Recent Labs   Lab Test 01/24/24  1011 11/15/23  1204 11/05/23  0822 10/30/23  1326   HGB 13.1* 9.8* 8.8* 8.8*   WBC 4.5 5.3 5.8 4.9   RBC 4.34* 3.13* 2.82* 2.86*   HCT 38.6* 28.9* 26.1* 26.6*   MCV 89 92 93 93   MCH 30.2 31.3 31.2 30.8   MCHC 33.9 33.9 33.7 33.1   RDW 11.9 13.1 13.8 14.2    262 279 364     INR  Recent Labs   Lab Test 09/23/23  1526   INR 1.03   PTT 25     ABGNo lab results found.   URINE STUDIES  Recent Labs   Lab Test 10/30/23  1329 10/25/23  1117 09/23/23  1852   COLOR Light Yellow Light Yellow Light Yellow   APPEARANCE Clear Clear Clear   URINEGLC Negative Negative Negative   URINEBILI Negative Negative Negative   URINEKETONE Negative Negative 100*   SG 1.012 1.013 1.017   UBLD Negative Negative Negative   URINEPH 7.0 7.0 5.5   PROTEIN Negative Negative Negative   NITRITE Negative Negative Negative   LEUKEST Negative Negative Negative   RBCU 1 1 1   WBCU 1 2 3     No lab results found.    ASSESSMENT AND PLAN:   #SMILEY secondary to prerenal azotemia with possible associated cisplatin toxicity and disruption of the tubuloglomerular feedback by concomitant use of valsartan. Valsartan was stopped and amlodipine was increased to 10 mg daily. His kidney function has improved but has failed to return to baseline    #HTN  Essential. Management as per above     #Blood count  Hemoglobin 8.8 secondary to chemotherapy and inflammation, has improved to  13.1    #Acid-base status  CO2 level 28 no need for any intervention    #Electrolytes  Na 137  K 4.8 -> 4.1 no acute issue    #Thyroid  TSH was 0.24 -> 1.77      #BMD  Calcium 9.7          albumin 4.4  phosphorus 2.4  Vitamin D level 23 will start him on ergocalciferol 60999 U once a week      The total time of this encounter amounted to 30 minutes on the day of the encounter. This time included time spent with the patient, reviewing records, ordering tests, and performing post visit documentation.       The patient will return to follow up in 3 months    Nata Sánchez MD  Division of Renal Diseases and Hypertension

## 2024-01-24 NOTE — NURSING NOTE
No labs needed for this visit. Duplicate visit. Labs drawn this morning. Patient checked into provider visit.     Jennifer Dove RN

## 2024-01-25 LAB — EBV DNA # SPEC NAA+PROBE: NOT DETECTED COPIES/ML

## 2024-01-25 NOTE — PROGRESS NOTES
Called and spoke to patient with [place]  regarding PET CT results. Informed patient that we would be talking about these results on Friday at our tumor board conference and that I would give him a call at that time for any next steps needed. Patient verbalized understanding and had no further questions or concerns.     Farrah Maradiaga, RN, BSN  RN Care Coordinator, ENT Clinic

## 2024-02-02 ENCOUNTER — TELEPHONE (OUTPATIENT)
Dept: NEPHROLOGY | Facility: CLINIC | Age: 39
End: 2024-02-02
Payer: COMMERCIAL

## 2024-02-02 NOTE — TELEPHONE ENCOUNTER
Left Voicemail (1st Attempt) for the patient to call back and schedule the following:    [facility]  Provider: IV[facility]  Return date: 04/29/2024  Specialty phone number: [phone]  Additional appointment(s) needed: LABS  Additonal Notes: N/A

## 2024-02-07 ENCOUNTER — TELEPHONE (OUTPATIENT)
Dept: NEPHROLOGY | Facility: CLINIC | Age: 39
End: 2024-02-07
Payer: COMMERCIAL

## 2024-02-07 NOTE — TELEPHONE ENCOUNTER
LVM // pt needs to schedule 3 month follow up (Return Neph) with Dr. Sánchez around 4.24.24 // second attempt, 2.7.24, AN

## 2024-02-09 ENCOUNTER — TELEPHONE (OUTPATIENT)
Dept: NEPHROLOGY | Facility: CLINIC | Age: 39
End: 2024-02-09
Payer: COMMERCIAL

## 2024-02-09 NOTE — TELEPHONE ENCOUNTER
Patient Contacted for the patient to call back and schedule the following:    [facility]  Provider: IV[facility]  Return date: 04/29/2024  Specialty phone number: [phone]  Additional appointment(s) needed: N/A  Additonal Notes: N/A

## 2024-02-13 ENCOUNTER — TELEPHONE (OUTPATIENT)
Dept: OTOLARYNGOLOGY | Facility: CLINIC | Age: 39
End: 2024-02-13
Payer: COMMERCIAL

## 2024-04-21 ENCOUNTER — MYC REFILL (OUTPATIENT)
Dept: NEPHROLOGY | Facility: CLINIC | Age: 39
End: 2024-04-21
Payer: COMMERCIAL

## 2024-04-21 DIAGNOSIS — C11.9 NASOPHARYNGEAL CANCER (H): ICD-10-CM

## 2024-04-22 RX ORDER — AMLODIPINE BESYLATE 10 MG/1
10 TABLET ORAL DAILY
Qty: 90 TABLET | Refills: 1 | Status: SHIPPED | OUTPATIENT
Start: 2024-04-22

## 2024-04-25 DIAGNOSIS — C11.9 NASOPHARYNGEAL CANCER (H): Primary | ICD-10-CM

## 2024-04-25 DIAGNOSIS — R79.89 ELEVATED SERUM CREATININE: ICD-10-CM

## 2024-04-29 ENCOUNTER — OFFICE VISIT (OUTPATIENT)
Dept: NEPHROLOGY | Facility: CLINIC | Age: 39
End: 2024-04-29
Attending: INTERNAL MEDICINE
Payer: COMMERCIAL

## 2024-04-29 ENCOUNTER — THERAPY VISIT (OUTPATIENT)
Dept: SPEECH THERAPY | Facility: CLINIC | Age: 39
End: 2024-04-29
Payer: COMMERCIAL

## 2024-04-29 ENCOUNTER — OFFICE VISIT (OUTPATIENT)
Dept: OTOLARYNGOLOGY | Facility: CLINIC | Age: 39
End: 2024-04-29
Payer: COMMERCIAL

## 2024-04-29 ENCOUNTER — THERAPY VISIT (OUTPATIENT)
Dept: OCCUPATIONAL THERAPY | Facility: CLINIC | Age: 39
End: 2024-04-29
Attending: INTERNAL MEDICINE
Payer: COMMERCIAL

## 2024-04-29 ENCOUNTER — ANCILLARY PROCEDURE (OUTPATIENT)
Dept: CT IMAGING | Facility: CLINIC | Age: 39
End: 2024-04-29
Attending: INTERNAL MEDICINE
Payer: COMMERCIAL

## 2024-04-29 ENCOUNTER — LAB (OUTPATIENT)
Dept: LAB | Facility: CLINIC | Age: 39
End: 2024-04-29
Attending: INTERNAL MEDICINE
Payer: COMMERCIAL

## 2024-04-29 VITALS
OXYGEN SATURATION: 100 % | HEIGHT: 71 IN | SYSTOLIC BLOOD PRESSURE: 132 MMHG | HEART RATE: 74 BPM | WEIGHT: 180.9 LBS | BODY MASS INDEX: 25.32 KG/M2 | DIASTOLIC BLOOD PRESSURE: 77 MMHG

## 2024-04-29 VITALS — BODY MASS INDEX: 26.85 KG/M2 | HEIGHT: 71 IN

## 2024-04-29 DIAGNOSIS — C11.9 NASOPHARYNGEAL CANCER (H): ICD-10-CM

## 2024-04-29 DIAGNOSIS — I89.0 LYMPHEDEMA: ICD-10-CM

## 2024-04-29 DIAGNOSIS — R79.89 ELEVATED SERUM CREATININE: Primary | ICD-10-CM

## 2024-04-29 DIAGNOSIS — C11.9 NASOPHARYNGEAL CANCER (H): Primary | ICD-10-CM

## 2024-04-29 LAB
ALBUMIN MFR UR ELPH: 8.7 MG/DL
ALBUMIN SERPL BCG-MCNC: 4.9 G/DL (ref 3.5–5.2)
ALBUMIN UR-MCNC: NEGATIVE MG/DL
ALBUMIN UR-MCNC: NEGATIVE MG/DL
ALP SERPL-CCNC: 48 U/L (ref 40–150)
ALT SERPL W P-5'-P-CCNC: 10 U/L (ref 0–70)
ANION GAP SERPL CALCULATED.3IONS-SCNC: 11 MMOL/L (ref 7–15)
APPEARANCE UR: CLEAR
APPEARANCE UR: CLEAR
AST SERPL W P-5'-P-CCNC: 13 U/L (ref 0–45)
BILIRUB SERPL-MCNC: 0.4 MG/DL
BILIRUB UR QL STRIP: NEGATIVE
BILIRUB UR QL STRIP: NEGATIVE
BUN SERPL-MCNC: 13.1 MG/DL (ref 6–20)
CALCIUM SERPL-MCNC: 9.8 MG/DL (ref 8.6–10)
CHLORIDE SERPL-SCNC: 103 MMOL/L (ref 98–107)
COLOR UR AUTO: NORMAL
COLOR UR AUTO: NORMAL
CREAT SERPL-MCNC: 1.16 MG/DL (ref 0.67–1.17)
CREAT UR-MCNC: 89.2 MG/DL
CREAT UR-MCNC: 89.8 MG/DL
DEPRECATED HCO3 PLAS-SCNC: 27 MMOL/L (ref 22–29)
EGFRCR SERPLBLD CKD-EPI 2021: 84 ML/MIN/1.73M2
ERYTHROCYTE [DISTWIDTH] IN BLOOD BY AUTOMATED COUNT: 12.4 % (ref 10–15)
GLUCOSE SERPL-MCNC: 90 MG/DL (ref 70–99)
GLUCOSE UR STRIP-MCNC: NEGATIVE MG/DL
GLUCOSE UR STRIP-MCNC: NEGATIVE MG/DL
HCT VFR BLD AUTO: 39.1 % (ref 40–53)
HGB BLD-MCNC: 12.8 G/DL (ref 13.3–17.7)
HGB UR QL STRIP: NEGATIVE
HGB UR QL STRIP: NEGATIVE
KETONES UR STRIP-MCNC: NEGATIVE MG/DL
KETONES UR STRIP-MCNC: NEGATIVE MG/DL
LEUKOCYTE ESTERASE UR QL STRIP: NEGATIVE
LEUKOCYTE ESTERASE UR QL STRIP: NEGATIVE
MAGNESIUM SERPL-MCNC: 2.1 MG/DL (ref 1.7–2.3)
MCH RBC QN AUTO: 29.4 PG (ref 26.5–33)
MCHC RBC AUTO-ENTMCNC: 32.7 G/DL (ref 31.5–36.5)
MCV RBC AUTO: 90 FL (ref 78–100)
MICROALBUMIN UR-MCNC: <12 MG/L
MICROALBUMIN/CREAT UR: NORMAL MG/G{CREAT}
NITRATE UR QL: NEGATIVE
NITRATE UR QL: NEGATIVE
PH UR STRIP: 5.5 [PH] (ref 5–7)
PH UR STRIP: 5.5 [PH] (ref 5–7)
PHOSPHATE SERPL-MCNC: 2.7 MG/DL (ref 2.5–4.5)
PLATELET # BLD AUTO: 222 10E3/UL (ref 150–450)
POTASSIUM SERPL-SCNC: 4.2 MMOL/L (ref 3.4–5.3)
PROT SERPL-MCNC: 7.6 G/DL (ref 6.4–8.3)
PROT/CREAT 24H UR: 0.1 MG/MG CR (ref 0–0.2)
RBC # BLD AUTO: 4.36 10E6/UL (ref 4.4–5.9)
RBC URINE: <1 /HPF
RBC URINE: <1 /HPF
SODIUM SERPL-SCNC: 141 MMOL/L (ref 135–145)
SP GR UR STRIP: 1.01 (ref 1–1.03)
SP GR UR STRIP: 1.01 (ref 1–1.03)
SQUAMOUS EPITHELIAL: <1 /HPF
SQUAMOUS EPITHELIAL: <1 /HPF
UROBILINOGEN UR STRIP-MCNC: NORMAL MG/DL
UROBILINOGEN UR STRIP-MCNC: NORMAL MG/DL
WBC # BLD AUTO: 6.5 10E3/UL (ref 4–11)
WBC URINE: <1 /HPF
WBC URINE: <1 /HPF

## 2024-04-29 PROCEDURE — 97165 OT EVAL LOW COMPLEX 30 MIN: CPT | Mod: GO | Performed by: OCCUPATIONAL THERAPIST

## 2024-04-29 PROCEDURE — 99000 SPECIMEN HANDLING OFFICE-LAB: CPT | Performed by: PATHOLOGY

## 2024-04-29 PROCEDURE — 84100 ASSAY OF PHOSPHORUS: CPT | Performed by: PATHOLOGY

## 2024-04-29 PROCEDURE — 99214 OFFICE O/P EST MOD 30 MIN: CPT | Performed by: INTERNAL MEDICINE

## 2024-04-29 PROCEDURE — 81001 URINALYSIS AUTO W/SCOPE: CPT | Performed by: PATHOLOGY

## 2024-04-29 PROCEDURE — 97535 SELF CARE MNGMENT TRAINING: CPT | Mod: GO | Performed by: OCCUPATIONAL THERAPIST

## 2024-04-29 PROCEDURE — 70491 CT SOFT TISSUE NECK W/DYE: CPT | Mod: GC | Performed by: RADIOLOGY

## 2024-04-29 PROCEDURE — 31575 DIAGNOSTIC LARYNGOSCOPY: CPT | Performed by: STUDENT IN AN ORGANIZED HEALTH CARE EDUCATION/TRAINING PROGRAM

## 2024-04-29 PROCEDURE — 82570 ASSAY OF URINE CREATININE: CPT | Performed by: INTERNAL MEDICINE

## 2024-04-29 PROCEDURE — 92526 ORAL FUNCTION THERAPY: CPT | Mod: GN | Performed by: SPEECH-LANGUAGE PATHOLOGIST

## 2024-04-29 PROCEDURE — 97110 THERAPEUTIC EXERCISES: CPT | Mod: GO | Performed by: OCCUPATIONAL THERAPIST

## 2024-04-29 PROCEDURE — 80053 COMPREHEN METABOLIC PANEL: CPT | Performed by: PATHOLOGY

## 2024-04-29 PROCEDURE — 99213 OFFICE O/P EST LOW 20 MIN: CPT | Mod: 25 | Performed by: STUDENT IN AN ORGANIZED HEALTH CARE EDUCATION/TRAINING PROGRAM

## 2024-04-29 PROCEDURE — 83735 ASSAY OF MAGNESIUM: CPT | Performed by: PATHOLOGY

## 2024-04-29 PROCEDURE — 85027 COMPLETE CBC AUTOMATED: CPT | Performed by: PATHOLOGY

## 2024-04-29 PROCEDURE — 84156 ASSAY OF PROTEIN URINE: CPT | Performed by: PATHOLOGY

## 2024-04-29 PROCEDURE — 36415 COLL VENOUS BLD VENIPUNCTURE: CPT | Performed by: PATHOLOGY

## 2024-04-29 PROCEDURE — G0463 HOSPITAL OUTPT CLINIC VISIT: HCPCS | Performed by: INTERNAL MEDICINE

## 2024-04-29 RX ORDER — IOPAMIDOL 755 MG/ML
90 INJECTION, SOLUTION INTRAVASCULAR ONCE
Status: COMPLETED | OUTPATIENT
Start: 2024-04-29 | End: 2024-04-29

## 2024-04-29 RX ADMIN — IOPAMIDOL 90 ML: 755 INJECTION, SOLUTION INTRAVASCULAR at 09:26

## 2024-04-29 ASSESSMENT — PAIN SCALES - GENERAL: PAINLEVEL: MILD PAIN (3)

## 2024-04-29 NOTE — LETTER
4/29/2024       RE: Thierno Vega  [address]  [facility] [address]     Dear Colleague,    Thank you for referring your patient, Thierno Vega, to the [facility] NEPHROLOGY [facility] [place] at [facility]. Please see a copy of my visit note below.    Nephrology Clinic    Thierno Vega MRN:[id number] YOB: 1985  Date of Service: 04/29/2024  Primary care provider: Catrachita Clark  Requesting physician: Catrachita Clark      REASON FOR CONSULT: SMILEY    HISTORY OF PRESENT ILLNESS:   Thierno Vega is a 38 year old male from [facility] who presents for evaluation of an elevated creatinine at 1.27 mg/dL.   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  He noticed a left neck mass in the fall of 2022 and initially had an FNA that was benign. The mass persisted and then in April underwent repeat FNA showing undifferentiated carcinoma. He travelled to the  in June 2023 for treatment. He initiated therapy on 6/27/2023 and received cisplatin gemcitabine induction x 3 cycles, then chemoradiation with cisplatin with curative intent. He completed his therapy on October 16th 2023.  A nasal jejunostomy tube was placed on 9/28 as the patient was having trouble keeping PO intake. He also needed IV fluid infusions three times a week. From a renal standpoint, his creatinine level was around 0.8 mg/dL up to mid-September 2023 however it went up to 1.2 mg/dL on October 2nd then to 1.86 mg/dL on 10/25 and  improved after he was initiated on IV fluids three times a week. He travelled back to [facility] mid-November 2023.    His creatinine level was 1.26 mg/dL in January 2024 and is 1.16 mg/dL  today. The uPCR was 0.14 mg/mg Cr on 10/30/2023 and is 0.1 mg/dL on 4/29/2024. His UA shows no evidence of hematuria or  leucocyturia.  A PET CT scan done on  1/22 shows one residual normal sized right level 2A lymph node with mild to moderate uptake. A repeat one is pending.  His blood pressure is currently well controlled on amlodipine 10 mg daily only.   The patient denies any dysuria, any pollakiuria, any nocturia, any LE edema, any dyspnea on exertion .  The patient denies ever having kidney stones, urinary tract infections, gross hematuria. There is no family history of CKD.  The following portions of the patient's history were reviewed and updated as appropriate: allergies, current medications, past family history, past medical history, past social history, past surgical history and problem list.    PAST MEDICAL HISTORY:  Past Medical History:   Diagnosis Date     HTN (hypertension)      PAST SURGICAL HISTORY:  No past surgical history on file.  MEDICATIONS:  Prescription Medications as of 4/29/2024         Rx Number Disp Refills Start End Last Dispensed Date Next Fill Date Owning Pharmacy    amLODIPine (NORVASC) 10 MG tablet  90 tablet 1 4/22/2024 --   [address] 6[address]853    Sig: Take 1 tablet (10 mg) by mouth daily    Class: E-Prescribe    Route: Oral    vitamin D3 (CHOLECALCIFEROL) 50 mcg (2000 units) tablet  -- -- 1/24/2024 --       Sig: Take 1 tablet (50 mcg) by mouth daily    Class: Historical    Route: Oral    acetaminophen (TYLENOL) 500 MG tablet  60 tablet 1 10/9/2023 --   [address] 2[address]390    Sig: Take 2 tablets (1,000 mg) by mouth every 8 hours as needed for mild pain    Class: E-Prescribe    Route: Oral    magic mouthwash (ENTER INGREDIENTS IN COMMENTS) suspension  240 mL 0 10/9/2023 --   [address] 0-007    Sig: Take 10 mLs by mouth every 4 hours as needed    Class: E-Prescribe    Route: Oral    oxyCODONE (ROXICODONE) 5 MG  tablet  20 tablet 0 10/12/2023 --   [facility] [place], [place] - [address] [facility] 6[address]168    Sig: Take 1 tablet (5 mg) by mouth every 6 hours as needed for pain    Class: E-Prescribe    Earliest Fill Date: 10/12/2023    Route: Oral          Clinic-Administered Medications as of 4/29/2024         Dose Frequency Start End    iopamidol (ISOVUE-370) solution 90 mL (Completed) 90 mL ONCE 4/29/2024 4/29/2024    Route: Intravenous    sodium chloride 0.9 % bag 500 mL for CT scan flush use (Completed) 100 mL ONCE 4/29/2024 4/29/2024    Admin Instructions: This entry is for use by Radiology to intermittently used as a flush in patients receiving a CT scan.    Route: As instructed           ALLERGIES:    No Known Allergies  REVIEW OF SYSTEMS:  Review Of Systems    A comprehensive review of systems was performed and found to be negative except as described here or above.  SOCIAL HISTORY:   Social History     Socioeconomic History     Marital status: Single     Spouse name: Not on file     Number of children: Not on file     Years of education: Not on file     Highest education level: Not on file   Occupational History     Not on file   Tobacco Use     Smoking status: Some Days     Types: Cigarettes     Passive exposure: Current     Smokeless tobacco: Never   Substance and Sexual Activity     Alcohol use: Not Currently     Drug use: Never     Sexual activity: Not on file   Other Topics Concern     Not on file   Social History Narrative     Not on file     Social Determinants of Health     Financial Resource Strain: Not on file   Food Insecurity: Not on file   Transportation Needs: Not on file   Physical Activity: Not on file   Stress: Not on file   Social Connections: Not on file   Interpersonal Safety: Not on file   Housing Stability: Not on file     FAMILY MEDICAL HISTORY:   Family History   Problem Relation Age of Onset     Cancer No family hx of      PHYSICAL EXAM:   There were no vitals taken  for this visit.  GENERAL APPEARANCE: alert and no distress  EYES: nonicteric  HENT: mouth without ulcers or lesions  NECK: supple, no adenopathy  RESP: lungs clear to auscultation   CV: regular rhythm, normal rate, no rub  ABDOMEN: soft, nontender, normal bowel sounds, no HSM   Extremities: no clubbing, cyanosis, or edema  MS: no evidence of inflammation in joints, no muscle tenderness  SKIN: no rash  NEURO: mentation intact and speech normal  PSYCH: affect normal/bright   LABS:   Recent Results (from the past 672 hour(s))   CBC with platelets    Collection Time: 04/29/24 10:50 AM   Result Value Ref Range    WBC Count 6.5 4.0 - 11.0 10e3/uL    RBC Count 4.36 (L) 4.40 - 5.90 10e6/uL    Hemoglobin 12.8 (L) 13.3 - 17.7 g/dL    Hematocrit 39.1 (L) 40.0 - 53.0 %    MCV 90 78 - 100 fL    MCH 29.4 26.5 - 33.0 pg    MCHC 32.7 31.5 - 36.5 g/dL    RDW 12.4 10.0 - 15.0 %    Platelet Count 222 150 - 450 10e3/uL   UA Macroscopic with reflex to Microscopic and Culture - Lab Collect    Collection Time: 04/29/24 11:02 AM    Specimen: Urine, Midstream   Result Value Ref Range    Color Urine Light Yellow Colorless, Straw, Light Yellow, Yellow    Appearance Urine Clear Clear    Glucose Urine Negative Negative mg/dL    Bilirubin Urine Negative Negative    Ketones Urine Negative Negative mg/dL    Specific Gravity Urine 1.010 1.003 - 1.035    Blood Urine Negative Negative    pH Urine 5.5 5.0 - 7.0    Protein Albumin Urine Negative Negative mg/dL    Urobilinogen Urine Normal Normal, 2.0 mg/dL    Nitrite Urine Negative Negative    Leukocyte Esterase Urine Negative Negative    RBC Urine <1 <=2 /HPF    WBC Urine <1 <=5 /HPF    Squamous Epithelials Urine <1 <=1 /HPF   UA with Microscopic    Collection Time: 04/29/24 11:02 AM   Result Value Ref Range    Color Urine Light Yellow Colorless, Straw, Light Yellow, Yellow    Appearance Urine Clear Clear    Glucose Urine Negative Negative mg/dL    Bilirubin Urine Negative Negative    Ketones Urine  Negative Negative mg/dL    Specific Gravity Urine 1.010 1.003 - 1.035    Blood Urine Negative Negative    pH Urine 5.5 5.0 - 7.0    Protein Albumin Urine Negative Negative mg/dL    Urobilinogen Urine Normal Normal, 2.0 mg/dL    Nitrite Urine Negative Negative    Leukocyte Esterase Urine Negative Negative    RBC Urine <1 <=2 /HPF    WBC Urine <1 <=5 /HPF    Squamous Epithelials Urine <1 <=1 /HPF     CMP  Recent Labs   Lab Test 01/24/24  1011 01/22/24  0827 11/20/23  1223 11/15/23  1204 11/05/23  0822 10/20/23  0711 10/18/23  0715 10/09/23  0801 10/02/23  0830     --  138 137 137   < > 139 137 139   POTASSIUM 4.1  --  3.9 4.0 4.3   < > 4.6 4.7 4.1   CHLORIDE 104  --  102 100 101   < > 101 99 103   CO2 28  --  26 26 27   < > 26 26 25   ANIONGAP 9  --  10 11 9   < > 12 12 11   *  --  136* 112* 114*   < > 143* 122* 108*   BUN 10.8  --  10.1 12.5 29.5*   < > 33.9* 25.4* 9.7   CR 1.26* 1.4* 1.39* 1.50* 1.48*   < > 1.41* 1.21* 1.12   GFRESTIMATED 76 >60 67 61 62   < > 66 80 87   SOTO 9.8  --  9.9 9.7 9.6   < > 9.6 9.1 9.5   MAG 2.1  --   --   --   --   --  2.0 1.7 1.8   PHOS 2.4*  --   --   --   --   --   --   --   --    PROTTOTAL 7.6  --  7.5 7.6 7.1   < >  --  7.2 7.1   ALBUMIN 4.7  --  4.6 4.5 4.2   < >  --  4.3 4.4   BILITOTAL 0.3  --  0.4 0.3 0.2   < >  --  0.3 0.2   ALKPHOS 42  --  58 65 73   < >  --  64 57   AST 13  --  17 18 14   < >  --  11 9   ALT 10  --  18 21 18   < >  --  15 13    < > = values in this interval not displayed.     CBC  Recent Labs   Lab Test 04/29/24  1050 01/24/24  1011 11/15/23  1204 11/05/23  0822   HGB 12.8* 13.1* 9.8* 8.8*   WBC 6.5 4.5 5.3 5.8   RBC 4.36* 4.34* 3.13* 2.82*   HCT 39.1* 38.6* 28.9* 26.1*   MCV 90 89 92 93   MCH 29.4 30.2 31.3 31.2   MCHC 32.7 33.9 33.9 33.7   RDW 12.4 11.9 13.1 13.8    247 262 279     INR  Recent Labs   Lab Test 09/23/23  1526   INR 1.03   PTT 25     ABGNo lab results found.   URINE STUDIES  Recent Labs   Lab Test 04/29/24  1102  10/30/23  1329 10/25/23  1117 09/23/23  1852   COLOR Light Yellow  Light Yellow Light Yellow Light Yellow Light Yellow   APPEARANCE Clear  Clear Clear Clear Clear   URINEGLC Negative  Negative Negative Negative Negative   URINEBILI Negative  Negative Negative Negative Negative   URINEKETONE Negative  Negative Negative Negative 100*   SG 1.010  1.010 1.012 1.013 1.017   UBLD Negative  Negative Negative Negative Negative   URINEPH 5.5  5.5 7.0 7.0 5.5   PROTEIN Negative  Negative Negative Negative Negative   NITRITE Negative  Negative Negative Negative Negative   LEUKEST Negative  Negative Negative Negative Negative   RBCU <1  <1 1 1 1   WBCU <1  <1 1 2 3     No lab results found.    ASSESSMENT AND PLAN:   #SMILEY secondary to prerenal azotemia with possible associated cisplatin toxicity and disruption of the tubuloglomerular feedback by concomitant use of valsartan. Valsartan was stopped and amlodipine was increased to 10 mg daily. His kidney function has improved but has failed to return to baseline. As he continues to smoke, he was strongly advised to stop doing so.    #HTN  Essential. Management as per above     #Blood count  Hemoglobin 8.8 secondary to chemotherapy and inflammation, has improved to 13.1    #Acid-base status  CO2 level 28 no need for any intervention    #Electrolytes  Na 137  K 4.8 -> 4.1 no acute issue    #Thyroid  TSH was 0.24 -> 1.77      #BMD  Calcium 9.7          albumin 4.4  phosphorus 2.4  Vitamin D level 23 will start him on ergocalciferol 00846 U once a week      The total time of this encounter amounted to 30 minutes on the day of the encounter. This time included time spent with the patient, reviewing records, ordering tests, and performing post visit documentation.       The patient will return to follow up in 3 months    Nata Sánchez MD  Division of Renal Diseases and Hypertension      Again, thank you for allowing me to participate in the care of your patient.       Sincerely,    Nata Sánchez MD

## 2024-04-29 NOTE — PROGRESS NOTES
04/29/24 0500   Appointment Info   Treating Provider Jess Diaz, OTR/L, MARYSE   Visits Used 1   Medical Diagnosis lymphedema   OT Tx Diagnosis H&N lymphedema   Precautions/Limitations none noted   Quick Add  Certification   Progress Note/Certification   Start Of Care Date 04/29/24   Onset of Illness/Injury or Date of Surgery 11/01/22   Therapy Frequency 1x tx   Predicted Duration eval only   Certification date from 04/29/24   Certification date to 04/29/24   KX Modifier Statement I certify the need for these services furnished under this plan of treatment and while under my care.  (Physician co-signature of this document indicates review and certification of the therapy plan)       Present Yes    Language Nepali   Goals   OT Goals 1;2   OT Goal 1   Goal Identifier education   Goal Description Pt demonstrates awareness of individualized lymphedema precautions based on personal risk factors and when to seek medical attention for assessment of exacerbation of lymphedema or onset of cellulitis of the affected region.   Rationale In order to maximize safety and independence with ADL/IADLs   Goal Progress goal met   Target Date 04/29/24   Date Met 04/29/24   OT Goal 2   Goal Identifier home program   Goal Description Pt will demonstrate understanding of long term management of edema including manual techniques, exercise, and skin care.   Rationale In order to maximize safety and independence with ADL/IADLs   Target Date 04/29/24   Date Met 04/29/24   Subjective Report   Subjective Report see eval   Treatment Interventions (OT)   Interventions Self Care/Home Management;Therapeutic Procedure/Exercise   Self Care/Home Management   Self Care 1 - Details OT educated pt on types of lymphedema (primary vs secondary) and staging (stage 1, 2, and 3). OT educated on general role of lymphatic system and lymphatic flow throughout the body. Pt instructed in strategies to manage lymphedema and  components of CDT including skin care, GCB, compression garments, MLD, and home program development. Pt engaged and asked questions throughout. OT provided pt c handout for increased carry over of learned information.   Self-Care/Home Mgmt/ADL, Compensatory, Meal Prep Minutes (32095) 10 Minutes   Skilled Intervention edu   Patient Response/Progress Pt verbalizing understanding of recommendations   Therapeutic Procedure/Exercise   Therapeutic Procedure: strength, endurance, ROM, flexibillity minutes (85099) 10   Skilled Intervention HEP   Patient Response/Progress Pt verbalizing understanding of recommendations and demonstrates appropriate tech following OT demo   Ther Proc 1 - Details OT edu on neck stretches including cervical flexion, cervical extension, lateral neck flexion, cervical rotation, and protraction/retraction. OT also edu on self MLD for head and neck involving diaphragmatic breathing, clearing SC nodes, then cervical nodes, submandibular nodes, and post auricular nodes, then reversing sequence. OT instructed pt to complete 2x15 daily.   Eval/Assessments   OT Eval, Low Complexity Minutes (50449) 15   Education   Learner/Method Patient   Education Comments Educated on role/scope of lymph therapy and POC/goals   Plan   Plan for next session discharge   Total Session Time   Timed Code Treatment Minutes 20   Total Treatment Time (sum of timed and untimed services) 35         DISCHARGE  Reason for Discharge: Patient has met all goals.    Equipment Issued: handouts    Discharge Plan: Patient to continue home program.    Referring Provider:  Catrachita Clark

## 2024-04-29 NOTE — PATIENT INSTRUCTIONS
You were seen in the ENT Clinic today by Dr. Fisher. If you have any questions or concerns after your appointment, please contact us (see below)    The following has been recommended for you based upon your appointment today:  We will call you Friday afternoon after our tumor board conference     Please return to clinic in 3 months for follow up with Dr. Fisher and CT neck and chest     How to Contact Us:  Send a [employer] message to your provider. Our team will respond to you via [employer]. Occasionally, we will need to call you to get further information.  For urgent matters (Monday-Friday), call the ENT Clinic: [phone] and speak with a call center team member - they will route your call appropriately.   If you'd like to speak directly with a nurse, please find our contact information below. We do our best to check voicemail frequently throughout the day, and will work to call you back within 1-2 days. For urgent matters, please use the general clinic phone numbers listed above.    Farrah GEORGES RN, BSN  RN Care Coordinator, ENT Clinic  [facility]  Direct: [phone]

## 2024-04-29 NOTE — PROGRESS NOTES
Nephrology Clinic    Thierno Vega MRN:[id number] YOB: 1985  Date of Service: 04/29/2024  Primary care provider: Catrachita Clark  Requesting physician: Catrachita Clark      REASON FOR CONSULT: SMILEY    HISTORY OF PRESENT ILLNESS:   Thierno Vega is a 38 year old male from [facility] who presents for evaluation of an elevated creatinine at 1.27 mg/dL.   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  He noticed a left neck mass in the fall of 2022 and initially had an FNA that was benign. The mass persisted and then in April underwent repeat FNA showing undifferentiated carcinoma. He travelled to the  in June 2023 for treatment. He initiated therapy on 6/27/2023 and received cisplatin gemcitabine induction x 3 cycles, then chemoradiation with cisplatin with curative intent. He completed his therapy on October 16th 2023.  A nasal jejunostomy tube was placed on 9/28 as the patient was having trouble keeping PO intake. He also needed IV fluid infusions three times a week. From a renal standpoint, his creatinine level was around 0.8 mg/dL up to mid-September 2023 however it went up to 1.2 mg/dL on October 2nd then to 1.86 mg/dL on 10/25 and  improved after he was initiated on IV fluids three times a week. He travelled back to [facility] mid-November 2023.    His creatinine level was 1.26 mg/dL in January 2024 and is 1.16 mg/dL  today. The uPCR was 0.14 mg/mg Cr on 10/30/2023 and is 0.1 mg/dL on 4/29/2024. His UA shows no evidence of hematuria or leucocyturia.  A PET CT scan done on  1/22 shows one residual normal sized right level 2A lymph node with mild to moderate uptake. A repeat one is pending.  His blood pressure is currently well controlled on amlodipine 10 mg daily only.   The patient denies any dysuria, any pollakiuria, any nocturia, any LE edema, any  dyspnea on exertion .  The patient denies ever having kidney stones, urinary tract infections, gross hematuria. There is no family history of CKD.  The following portions of the patient's history were reviewed and updated as appropriate: allergies, current medications, past family history, past medical history, past social history, past surgical history and problem list.    PAST MEDICAL HISTORY:  Past Medical History:   Diagnosis Date    HTN (hypertension)      PAST SURGICAL HISTORY:  No past surgical history on file.  MEDICATIONS:  Prescription Medications as of 4/29/2024         Rx Number Disp Refills Start End Last Dispensed Date Next Fill Date Owning Pharmacy    amLODIPine (NORVASC) 10 MG tablet  90 tablet 1 4/22/2024 --   [address]    Sig: Take 1 tablet (10 mg) by mouth daily    Class: E-Prescribe    Route: Oral    vitamin D3 (CHOLECALCIFEROL) 50 mcg (2000 units) tablet  -- -- 1/24/2024 --       Sig: Take 1 tablet (50 mcg) by mouth daily    Class: Historical    Route: Oral    acetaminophen (TYLENOL) 500 MG tablet  60 tablet 1 10/9/2023 --   [address]    Sig: Take 2 tablets (1,000 mg) by mouth every 8 hours as needed for mild pain    Class: E-Prescribe    Route: Oral    magic mouthwash (ENTER INGREDIENTS IN COMMENTS) suspension  240 mL 0 10/9/2023 --   [address]    Sig: Take 10 mLs by mouth every 4 hours as needed    Class: E-Prescribe    Route: Oral    oxyCODONE (ROXICODONE) 5 MG tablet  20 tablet 0 10/12/2023 --   [address]    Sig: Take 1 tablet (5 mg) by mouth every 6 hours as needed for pain    Class: E-Prescribe    Earliest Fill Date: 10/12/2023    Route: Oral          Clinic-Administered Medications as of 4/29/2024          Dose Frequency Start End    iopamidol (ISOVUE-370) solution 90 mL (Completed) 90 mL ONCE 4/29/2024 4/29/2024    Route: Intravenous    sodium chloride 0.9 % bag 500 mL for CT scan flush use (Completed) 100 mL ONCE 4/29/2024 4/29/2024    Admin Instructions: This entry is for use by Radiology to intermittently used as a flush in patients receiving a CT scan.    Route: As instructed           ALLERGIES:    No Known Allergies  REVIEW OF SYSTEMS:  Review Of Systems    A comprehensive review of systems was performed and found to be negative except as described here or above.  SOCIAL HISTORY:   Social History     Socioeconomic History    Marital status: Single     Spouse name: Not on file    Number of children: Not on file    Years of education: Not on file    Highest education level: Not on file   Occupational History    Not on file   Tobacco Use    Smoking status: Some Days     Types: Cigarettes     Passive exposure: Current    Smokeless tobacco: Never   Substance and Sexual Activity    Alcohol use: Not Currently    Drug use: Never    Sexual activity: Not on file   Other Topics Concern    Not on file   Social History Narrative    Not on file     Social Determinants of Health     Financial Resource Strain: Not on file   Food Insecurity: Not on file   Transportation Needs: Not on file   Physical Activity: Not on file   Stress: Not on file   Social Connections: Not on file   Interpersonal Safety: Not on file   Housing Stability: Not on file     FAMILY MEDICAL HISTORY:   Family History   Problem Relation Age of Onset    Cancer No family hx of      PHYSICAL EXAM:   There were no vitals taken for this visit.  GENERAL APPEARANCE: alert and no distress  EYES: nonicteric  HENT: mouth without ulcers or lesions  NECK: supple, no adenopathy  RESP: lungs clear to auscultation   CV: regular rhythm, normal rate, no rub  ABDOMEN: soft, nontender, normal bowel sounds, no HSM   Extremities: no clubbing, cyanosis, or edema  MS: no  evidence of inflammation in joints, no muscle tenderness  SKIN: no rash  NEURO: mentation intact and speech normal  PSYCH: affect normal/bright   LABS:   Recent Results (from the past 672 hour(s))   CBC with platelets    Collection Time: 04/29/24 10:50 AM   Result Value Ref Range    WBC Count 6.5 4.0 - 11.0 10e3/uL    RBC Count 4.36 (L) 4.40 - 5.90 10e6/uL    Hemoglobin 12.8 (L) 13.3 - 17.7 g/dL    Hematocrit 39.1 (L) 40.0 - 53.0 %    MCV 90 78 - 100 fL    MCH 29.4 26.5 - 33.0 pg    MCHC 32.7 31.5 - 36.5 g/dL    RDW 12.4 10.0 - 15.0 %    Platelet Count 222 150 - 450 10e3/uL   UA Macroscopic with reflex to Microscopic and Culture - Lab Collect    Collection Time: 04/29/24 11:02 AM    Specimen: Urine, Midstream   Result Value Ref Range    Color Urine Light Yellow Colorless, Straw, Light Yellow, Yellow    Appearance Urine Clear Clear    Glucose Urine Negative Negative mg/dL    Bilirubin Urine Negative Negative    Ketones Urine Negative Negative mg/dL    Specific Gravity Urine 1.010 1.003 - 1.035    Blood Urine Negative Negative    pH Urine 5.5 5.0 - 7.0    Protein Albumin Urine Negative Negative mg/dL    Urobilinogen Urine Normal Normal, 2.0 mg/dL    Nitrite Urine Negative Negative    Leukocyte Esterase Urine Negative Negative    RBC Urine <1 <=2 /HPF    WBC Urine <1 <=5 /HPF    Squamous Epithelials Urine <1 <=1 /HPF   UA with Microscopic    Collection Time: 04/29/24 11:02 AM   Result Value Ref Range    Color Urine Light Yellow Colorless, Straw, Light Yellow, Yellow    Appearance Urine Clear Clear    Glucose Urine Negative Negative mg/dL    Bilirubin Urine Negative Negative    Ketones Urine Negative Negative mg/dL    Specific Gravity Urine 1.010 1.003 - 1.035    Blood Urine Negative Negative    pH Urine 5.5 5.0 - 7.0    Protein Albumin Urine Negative Negative mg/dL    Urobilinogen Urine Normal Normal, 2.0 mg/dL    Nitrite Urine Negative Negative    Leukocyte Esterase Urine Negative Negative    RBC Urine <1 <=2 /HPF     WBC Urine <1 <=5 /HPF    Squamous Epithelials Urine <1 <=1 /HPF     CMP  Recent Labs   Lab Test 01/24/24  1011 01/22/24  0827 11/20/23  1223 11/15/23  1204 11/05/23  0822 10/20/23  0711 10/18/23  0715 10/09/23  0801 10/02/23  0830     --  138 137 137   < > 139 137 139   POTASSIUM 4.1  --  3.9 4.0 4.3   < > 4.6 4.7 4.1   CHLORIDE 104  --  102 100 101   < > 101 99 103   CO2 28  --  26 26 27   < > 26 26 25   ANIONGAP 9  --  10 11 9   < > 12 12 11   *  --  136* 112* 114*   < > 143* 122* 108*   BUN 10.8  --  10.1 12.5 29.5*   < > 33.9* 25.4* 9.7   CR 1.26* 1.4* 1.39* 1.50* 1.48*   < > 1.41* 1.21* 1.12   GFRESTIMATED 76 >60 67 61 62   < > 66 80 87   SOTO 9.8  --  9.9 9.7 9.6   < > 9.6 9.1 9.5   MAG 2.1  --   --   --   --   --  2.0 1.7 1.8   PHOS 2.4*  --   --   --   --   --   --   --   --    PROTTOTAL 7.6  --  7.5 7.6 7.1   < >  --  7.2 7.1   ALBUMIN 4.7  --  4.6 4.5 4.2   < >  --  4.3 4.4   BILITOTAL 0.3  --  0.4 0.3 0.2   < >  --  0.3 0.2   ALKPHOS 42  --  58 65 73   < >  --  64 57   AST 13  --  17 18 14   < >  --  11 9   ALT 10  --  18 21 18   < >  --  15 13    < > = values in this interval not displayed.     CBC  Recent Labs   Lab Test 04/29/24  1050 01/24/24  1011 11/15/23  1204 11/05/23  0822   HGB 12.8* 13.1* 9.8* 8.8*   WBC 6.5 4.5 5.3 5.8   RBC 4.36* 4.34* 3.13* 2.82*   HCT 39.1* 38.6* 28.9* 26.1*   MCV 90 89 92 93   MCH 29.4 30.2 31.3 31.2   MCHC 32.7 33.9 33.9 33.7   RDW 12.4 11.9 13.1 13.8    247 262 279     INR  Recent Labs   Lab Test 09/23/23  1526   INR 1.03   PTT 25     ABGNo lab results found.   URINE STUDIES  Recent Labs   Lab Test 04/29/24  1102 10/30/23  1329 10/25/23  1117 09/23/23  1852   COLOR Light Yellow  Light Yellow Light Yellow Light Yellow Light Yellow   APPEARANCE Clear  Clear Clear Clear Clear   URINEGLC Negative  Negative Negative Negative Negative   URINEBILI Negative  Negative Negative Negative Negative   URINEKETONE Negative  Negative Negative Negative 100*   SG  1.010  1.010 1.012 1.013 1.017   UBLD Negative  Negative Negative Negative Negative   URINEPH 5.5  5.5 7.0 7.0 5.5   PROTEIN Negative  Negative Negative Negative Negative   NITRITE Negative  Negative Negative Negative Negative   LEUKEST Negative  Negative Negative Negative Negative   RBCU <1  <1 1 1 1   WBCU <1  <1 1 2 3     No lab results found.    ASSESSMENT AND PLAN:   #SMILEY secondary to prerenal azotemia with possible associated cisplatin toxicity and disruption of the tubuloglomerular feedback by concomitant use of valsartan. Valsartan was stopped and amlodipine was increased to 10 mg daily. His kidney function has improved but has failed to return to baseline. As he continues to smoke, he was strongly advised to stop doing so.    #HTN  Essential. Management as per above     #Blood count  Hemoglobin 8.8 secondary to chemotherapy and inflammation, has improved to 13.1    #Acid-base status  CO2 level 28 no need for any intervention    #Electrolytes  Na 137  K 4.8 -> 4.1 no acute issue    #Thyroid  TSH was 0.24 -> 1.77      #BMD  Calcium 9.7          albumin 4.4  phosphorus 2.4  Vitamin D level 23 will start him on ergocalciferol 05897 U once a week      The total time of this encounter amounted to 30 minutes on the day of the encounter. This time included time spent with the patient, reviewing records, ordering tests, and performing post visit documentation.       The patient will return to follow up in 3 months    Nata Sánchez MD  Division of Renal Diseases and Hypertension

## 2024-04-29 NOTE — PROGRESS NOTES
[facility]                                                                                   OUTPATIENT SPEECH LANGUAGE PATHOLOGY    PLAN OF TREATMENT FOR OUTPATIENT REHABILITATION   Patient's Last Name, First Name, Thierno Loyola    YOB: 1985   Provider's Name   [facility]   Medical Record No.  [id number]     Onset Date: 11/06/23 Start of Care Date: 11/06/23     Medical Diagnosis:  SCC nasopharynx      SLP Treatment Diagnosis: Mild oral dysphagia  Plan of Treatment  Frequency/Duration: 2-4x/month  / 3 months     Certification date from 02/03/24   To 05/01/24          See note for plan of treatment details and functional goals     Azucena Aranda, SLP                         I CERTIFY THE NEED FOR THESE SERVICES FURNISHED UNDER        THIS PLAN OF TREATMENT AND WHILE UNDER MY CARE     (Physician attestation of this document indicates review and certification of the therapy plan).              Referring Provider:  Dr. Navin Fisher    Initial Assessment  See Epic Evaluation- 11/06/23          DISCHARGE  Reason for Discharge: Patient has met all goals.    Equipment Issued: None    Discharge Plan: Patient to continue home program.    Referring Provider:  Dr. Navin Fisher

## 2024-04-29 NOTE — LETTER
4/29/2024       RE: Thierno Vega  [address]  [address]  [place] [facility] [address]     Dear Colleague,    Thank you for referring your patient, Thierno Vega, to the [facility] [place] [facility]. Please see a copy of my visit note below.    Head and Neck Surgery  4/29/24     Diagnosis: T2N2 GALLITO + nasopharynx cancer     Treatment: Induction gem/cis followed by concurrent chemort completed 10/16/2023     Imaging: 3 month pet 1/22/24: [facility]. Residual non fdg avid nodes   CT neck 4/29/24: pending     Interval history: No complaints today     Physical examination:  Alert and in no acute distress  No palpable cervical adenopathy  No oral lesions or oropharyngeal lesions     Procedure:  Fiberoptic laryngoscopy performed. No lesions seen in the nasopharynx. Remainder of exam normal.      Assessment and plan:  Seems to be doing well after his treatment. Exam looks good to me. Persistent and enlarging nodes in the neck. Will await results and discuss at TB.     Navin Fisher MD     25 minutes spent on the date of the encounter in chart review, patient visit, review of tests, documentation and/or discussion with other providers about the issues documented above asides from time spent doing flexible laryngoscopy      Again, thank you for allowing me to participate in the care of your patient.      Sincerely,    Navin Fisher MD

## 2024-04-29 NOTE — PROGRESS NOTES
OCCUPATIONAL THERAPY EVALUATION  Type of Visit: Evaluation    See electronic medical record for Abuse and Falls Screening details.    Subjective   Pt reports some swelling on L side of neck which is getting better but he has some pain post auricular area. Pt underwent chemo and radiation tx ending last October.      Presenting condition or subjective complaint:    Date of onset: 11/01/22    Relevant medical history: Cancer   Dates & types of surgery:      Prior diagnostic imaging/testing results:       Prior therapy history for the same diagnosis, illness or injury:        Prior Level of Function  Transfers: Independent  Ambulation: Independent  ADL: Independent  IADL: Driving, Finances, Housekeeping, Laundry, Meal preparation, Medication management, Work    Living Environment  Pt is able to I'ly access all areas of home without difficulty. Pt lives with mom and sister at home.    Employment:    Pt works a few hours in a day; sometimes is on the computer  Hobbies/Interests:      Patient goals for therapy:  reducing pain/firmness    Pain assessment:  some pain on L side and sometimes pain when turning head/neck     Objective       EDEMA EVALUATION  Additional history:  Body part affected by edema: L side of neck  If cancer related, treatment: chemo and radiation  If not cancer related, problems with veins or cause of swelling: N/A  Distance able to walk: unlimited  Time able to stand: unlimited  Sensation problems in hands/feet: No    Edema etiology: Chemo, Radiation    FUNCTIONAL SCALES  Lower Extremity Functional Scale (score out of 80). A lower score indicates greater impairment:    Shoulder Pain and Disability Index (score out of 100).  A higher score indicates greater impairment:      Cognitive Status Examination  Orientation: Oriented to person, place and time   Level of Consciousness: Alert  Follows Commands and Answers Questions: 100% of the time  Personal Safety and Judgement: Intact  Memory:  Intact    EDEMA  Skin Condition: Dryness, Non-pitting  Scar: No  Stemmer Sign: -  Ulceration: No    GIRTH MEASUREMENTS: Refer to separate girth measurement flowsheet.     RANGE OF MOTION:   (Degrees) Left AROM    Cervical Rotation 70     Left AROM Right AROM   Shoulder Flexion 175 178   Pain: none  STRENGTH: UE Strength WFL  POSTURE: WFL  PALPATION: Slightly more dense palpation L post auricular node/lateral neck area; tension in L lateral neck and upper trap with lateral neck flexion and cervical rotation  ACTIVITIES OF DAILY LIVING: Pt is I with self cares and IADLs. Pt walks regularly for activity. Driving has been going fine, he does not feel like he is limited by swelling/pain.   BED MOBILITY: Independent  TRANSFERS: Independent  GAIT/LOCOMOTION: I  BALANCE: WFL  SENSATION: UE Sensation WNL  VASCULAR:  none note    Assessment & Plan   CLINICAL IMPRESSIONS  Medical Diagnosis: lymphedema    Treatment Diagnosis: H&N lymphedema    Impression/Assessment:  Pt is a 39 yo m referred by otolaryngology for head and neck lymphedema management. PMH includes nasopharyngeal carcinoma cT2N2 (III). Pt underwent fine needle aspiration of L neck LN 6/12/23 and chemo and radiation therapy ending 10/16/23. [place] in [facility] but [place] for [place]. Pt reports [place] leaving the [place] in 3-4 days.     Clinical Decision Making (Complexity):  Assessment of Occupational Performance: 1-3 Performance Deficits  Occupational Performance Limitations: increased risk for infeciton  Clinical Decision Making (Complexity): Low complexity    PLAN OF CARE  Treatment Interventions:  Interventions: Self-Care/Home Management, Therapeutic Exercise    Long Term Goals   OT Goal 1  Goal Identifier: education  Goal Description: Pt demonstrates awareness of individualized lymphedema precautions based on personal risk factors and when to seek medical attention for assessment of exacerbation of lymphedema or onset of  cellulitis of the affected region.  Rationale: In order to maximize safety and independence with ADL/IADLs  Goal Progress: goal met  Target Date: 04/29/24  Date Met: 04/29/24  OT Goal 2  Goal Identifier: home program  Goal Description: Pt will demonstrate understanding of long term management of edema including manual techniques, exercise, and skin care.  Rationale: In order to maximize safety and independence with ADL/IADLs  Target Date: 04/29/24  Date Met: 04/29/24      Frequency of Treatment: 1x tx  Duration of Treatment: eval only     Recommended Referrals to Other Professionals:  no additional referrals indicated at time of session; seeing SLP  Education Assessment: Learner/Method: Patient  Education Comments: Educated on role/scope of lymph therapy and POC/goals     Risks and benefits of evaluation/treatment have been explained.   Patient/Family/caregiver agrees with Plan of Care.     Evaluation Time:    OT Eval, Low Complexity Minutes (00471): 15  Signing Clinician: SAURAV Levi      [facility]                                                                                   OUTPATIENT OCCUPATIONAL THERAPY      PLAN OF TREATMENT FOR OUTPATIENT REHABILITATION   Patient's Last Name, First Name, Thierno Loyola    YOB: 1985   Provider's Name   [facility]   Medical Record No.  [id number]     Onset Date: 11/01/22 Start of Care Date: 04/29/24     Medical Diagnosis:  lymphedema      OT Treatment Diagnosis:  H&N lymphedema Plan of Treatment  Frequency/Duration:1x tx/eval only    Certification date from 04/29/24   To 04/29/24        See note for plan of treatment details and functional goals     SAURAV Levi                         I CERTIFY THE NEED FOR THESE SERVICES FURNISHED UNDER        THIS PLAN OF TREATMENT AND WHILE UNDER MY CARE     (Physician attestation of this document indicates review and certification of the  therapy plan).              Referring Provider:  Catrachita Clark    Initial Assessment  See Epic Evaluation- 04/29/24

## 2024-04-30 NOTE — PROGRESS NOTES
Head and Neck Surgery  4/29/24     Diagnosis: T2N2 GALLITO + nasopharynx cancer     Treatment: Induction gem/cis followed by concurrent chemort completed 10/16/2023     Imaging: 3 month pet 1/22/24: WINSTON. Residual non fdg avid nodes   CT neck 4/29/24: pending     Interval history: No complaints today     Physical examination:  Alert and in no acute distress  No palpable cervical adenopathy  No oral lesions or oropharyngeal lesions     Procedure:  Fiberoptic laryngoscopy performed. No lesions seen in the nasopharynx. Remainder of exam normal.      Assessment and plan:  Seems to be doing well after his treatment. Exam looks good to me. Persistent and enlarging nodes in the neck. Will await results and discuss at TB.     Navin Fisher MD     25 minutes spent on the date of the encounter in chart review, patient visit, review of tests, documentation and/or discussion with other providers about the issues documented above asides from time spent doing flexible laryngoscopy

## 2024-05-03 ENCOUNTER — TUMOR CONFERENCE (OUTPATIENT)
Dept: ONCOLOGY | Facility: CLINIC | Age: 39
End: 2024-05-03
Payer: COMMERCIAL

## 2024-05-03 DIAGNOSIS — C11.9 NASOPHARYNGEAL CANCER (H): Primary | ICD-10-CM

## 2024-05-03 NOTE — TUMOR CONFERENCE
Head & Neck Tumor Conference Note   Status: Established    Staff: Dr. Fisher    Diagnosis: T2N2 GALLITO + nasopharynx cancer  Treatment: Induction gem/cis followed by concurrent chemort completed 10/16/2023  Imaging: 3 month pet 1/22/24: WINSTON. Residual non fdg avid nodes      Reason for Review: Review imaging, path, and POC     Brief History: 38 year old male with a diagnosis of nasopharyngeal carcinoma in [facility] who presented for a second opinion.  Scope exam showed submucosal fullness in left fossa of rosenmuller. Found to have T2N2 GALLITO + nasopharynx cancer. Underwent induction gem/cis followed by concurrent chemort completed 10/16/2023. 3 month PET 1/22/24 had no evidence of disease but residual non fdg avid nodes. Today we are reviewing his most recent CT.     Pertinent PMH:   Past Medical History:   Diagnosis Date    HTN (hypertension)       Smoking Hx:   Social History     Tobacco Use    Smoking status: Some Days     Types: Cigarettes     Passive exposure: Current    Smokeless tobacco: Never   Substance Use Topics    Alcohol use: Not Currently    Drug use: Never     Imaging:   Results for orders placed during the hospital encounter of 01/22/24    PET Oncology (Eyes to Thighs)    Narrative  Combined Report of: PET and CT on 1/22/2024 9:58 AM:    1. PET of the neck, chest, abdomen, and pelvis.  2. PET CT Fusion for Attenuation Correction and Anatomical  Localization.  3. Diagnostic CT of the neck, chest, abdomen and pelvis with  intravenous contrast obtained for diagnostic interpretation.  4. 3D MIP and PET-CT fused images were processed on an independent  workstation and archived to PACS and reviewed by a radiologist.    Technique:    1. PET: The patient received 12.78 mCi of F-18-FDG. The serum glucose  was 97 mg/dL prior to administration. Body weight was 97 kg. Images  were evaluated in the axial, sagittal, and coronal planes as well as  the rotational whole body MIP. Images were acquired from the  cranial  vertex to the distal thigh.    UPTAKE WAS MEASURED AT 67 MINUTES.    BACKGROUND: Liver SUV max = 3.20, Aorta Blood SUV max = 3.0.    2. CT: Volumetric acquisition for clinical interpretation of the neck,  chest, abdomen, and pelvis acquired at 3 mm sections. The chest,  abdomen, and pelvis were evaluated at 5 mm sections in bone, soft  tissue, and lung windows.    Contrast and Medications:  IV contrast: 131 mL of Isovue 370 intravenously.  PO contrast: 500 mL of water  Additional Medications: None.    3. 3D MIP and PET-CT fused images were processed on an independent  workstation and archived to PACS and reviewed by a radiologist.      INDICATION: nasopharyngeal cancer, s/p chemo/rads.; Nasopharyngeal  cancer (H).    ADDITIONAL INFORMATION OBTAINED FROM EMR: 38 year old male with T2N2  GALLITO + nasopharynx cancer, Treatment: Induction gem/cis followed by  concurrent chemort completed 10/16/2023    COMPARISON: 8/7/2023, 6/13/2023    FOLLOW-UP APPOINTMENT: 1/19/2024    FINDINGS:    HEAD/NECK:    Nasopharynx: Resolved FDG avid soft tissue thickening.    Lymph nodes: Resolution of FDG metabolism of previously seen all  left-sided enlarged lymph nodes. On today's exam, in the left neck non  FDG avid much smaller nodes. For example a left level IIb measures up  to 1.1 cm.  In the right side of the neck interval resolution of FDG nodes with  exception of a right level 2A node which measures 7 mm and max SUV of  4.6.  Mildly avid nonenlarged normal-appearing right level 1b node was not  involved on pretreatment PET and most likely represents a reactive  node.    There are postradiation changes in the neck including non-FDG avid  nasopharyngeal thickening, epiglottic thickening, trace  retropharyngeal edema. Slight atrophy of the submandibular glands and  parotid glands. Thyroid gland is normal.    Diffuse mucosal thickening in the right maxillary sinus. Mastoid air  cells are clear.    Major cervical vasculature is  patent.    CHEST:  No suspicious pulmonary nodules.    Central tracheobronchial tree is clear. No pleural effusion or  pneumothorax. No acute consolidation.    Normal heart size. No pericardial effusion. Normal caliber thoracic  aorta and main pulmonary artery. Esophagus is unremarkable.    ABDOMEN AND PELVIS:  No suspicious uptake in the abdomen or pelvis.    Liver is unremarkable. The gallbladder is unremarkable. No  intrahepatic or extrahepatic biliary ductal dilatation. Pancreas is  unremarkable. No pancreatic ductal dilatation. Spleen is unremarkable.  Adrenal glands are unremarkable.    No hydronephrosis. Urinary bladder is unremarkable. Reproductive  organs are unremarkable.    Normal caliber small and large bowel. No free air or fluid. Normal  caliber abdominal aorta.    LOWER EXTREMITIES:  No suspicious uptake in the visualized lower extremities.    BONES:  No suspicious uptake in the skeleton. No suspicious osseous lesions.    Impression  IMPRESSION:  In this 38-year-old male with history of nasopharyngeal carcinoma:  1. No residual uptake at the primary site. NI-RADS 1.  2. Resolution of hypermetabolic enlarged nodes on the left with  residual non FDG avid nonenlarged nodes representing treating nodes.  On the right one residual normal sized right level 2A lymph node with  mild to moderate uptake is also felt to be a treated node. Neck:  NI-RADS 2. 3 months CT neck can be obtained to confirm stability of  residual nodes.  3. No evidence of new distant metastasis.          CECT Surveillance Legend:    Primary  1: No evidence of recurrence: routine surveillance  2: Low suspicion  a) Superficial abnormality (skin, mucosal surface): direct visual  inspection  b) Ill-defined deep abnormality: short interval follow-up* or PET  3: High suspicion (new or enlarging discrete nodule/mass): biopsy  4: Definitive recurrence (path proven or clinical progression): no  biopsy needed    Nodes  1: No evidence of  recurrence: routine surveillance  2: Low suspicion (ill-defined): short interval follow-up or PET  3: High suspicion (new or enlarging lymph node): biopsy if clinically  needed  4: Definitive recurrence (path proven or clinical progression): no  biopsy needed    *short interval follow-up: 3 months at our institution    I have personally reviewed the examination and initial interpretation  and I agree with the findings.    RUBIN BUENO MD    Results for orders placed in visit on 04/29/24    CT Soft Tissue Neck w Contrast    Narrative  CT SOFT TISSUE NECK W CONTRAST 4/29/2024 9:40 AM    History:  restage nasopharyngeal carcinoma. Questionable node on 3  month post treatment PET R IIA; Nasopharyngeal cancer (H) status post  chemoradiation.  ICD-10: Nasopharyngeal cancer (H)    Comparison:  Neck PET CT1/22/2024, neck CT 8/7/2023, 6/13/2023    Technique: Following intravenous administration of nonionic iodinated  contrast medium, thin section helical CT images were obtained from the  skull base down to the level of the aortic arch.  Axial, coronal and  sagittal reformations were performed with 2-3 mm slice thickness  reconstruction. Images were reviewed in soft tissue, lung and bone  windows.    Contrast: Isovue 370 90cc    Findings:  No evidence of recurrent disease at the primary site.    Increase in size of previously mild FDG avid right level 2A lymph node  measuring up to 1.4 x 1 cm (series 2 image 75), previously measuring  up to 1 cm. Also increase in size of a left level 2A lymph node  measuring up to 1.7 x 0.8 cm, previously measuring up to 1.3 cm  (series 2 image 73). Multiple additional bilateral ill-defined level  2b lymph nodes with adjacent fat stranding, largest on the left, which  were not previously FDG avid.    Expected post-treatment changes are noted including effacement of fat  planes, supraglottic mucosal edema and fat stranding in the  subcutaneous soft tissues.    Evaluation of the visualized  portions of the brain, orbits, spine, and  lungs show no aggressive lesion suspicious for metastatic involvement.    The major salivary glands are unremarkable. The thyroid gland appears  normal.    The major vascular structures in the neck are unremarkable.    Mild left maxillary sinus mucosal thickening. Polypoid mucosal  thickening in the right maxillary and left sphenoid sinuses. The  mastoid air cells are clear.    The visualized lung apices are clear.    Impression  Impression:  Primary: NI-RADS 1} Expected post-treatment changes in the neck  without evidence of recurrent disease at the primary site.  Neck: NI-RADS 3} Increased size of bilateral level 2A lymph nodes,  most hypermetabolic on the right on the prior PET CT.    NI-RADS CECT Surveillance Legend:    Primary  1: No evidence of recurrence: routine surveillance  2: Low suspicion  a) Superficial abnormality (skin, mucosal surface): direct visual  inspection  b) Ill-defined deep abnormality: short interval follow-up* or PET  3: High suspicion (new or enlarging discrete nodule/mass): biopsy  4: Definitive recurrence (path proven or clinical progression): no  biopsy needed    Nodes  1: No evidence of recurrence: routine surveillance  2: Low suspicion (ill-defined): short interval follow-up or PET  3: High suspicion (new or enlarging lymph node): biopsy if clinically  needed  4: Definitive recurrence (path proven or clinical progression): no  biopsy needed    *short interval follow-up: 3 months at our institution    I have personally reviewed the examination and initial interpretation  and I agree with the findings.    RANJAN TA MD      SYSTEM ID:  [id number]    Pathology:   Not reviewed today    Tumor Board Recommendation:   Discussion: 38 year old male [facility] who [facility].  Scope exam showed submucosal fullness in left fossa of rosenmuller. Found to have T2N2 GALLITO + nasopharynx  cancer. Underwent induction gem/cis followed by concurrent chemort completed 10/16/2023. 3 month PET 1/22/24 had no evidence of disease but residual non fdg avid nodes. CT neck reviewed today with fatty hilum still preserved. Right IIa slightly larger but overall appearance is not very suspicious. Will continue with observation   Plan:   - Repeat PET in 3 months    Documentation / Disclaimer Cancer Tumor Board Note: Cancer tumor board recommendations do not override what is determined to be reasonable care and treatment, which is dependent on the circumstances of a patient's case; the patient's medical, social, and personal concerns; and the clinical judgment of the oncologist [physician].

## 2024-05-07 NOTE — TUMOR CONFERENCE
Called and spoke to patient with [facility]  regarding tumor board recommendation. Recommendation is for PET CT in 3 months same day as clinic appointment with Dr. Fisher on 7/29/24. Patient agreeable and had no further questions or concerns. CT scans from 7/29/24 canceled and PET scan scheduled prior to appointment.     Farrah Maradiaga, RN, BSN  RN Care Coordinator, ENT Clinic

## 2024-06-06 ENCOUNTER — TELEPHONE (OUTPATIENT)
Dept: NEPHROLOGY | Facility: CLINIC | Age: 39
End: 2024-06-06
Payer: COMMERCIAL

## 2024-06-06 NOTE — TELEPHONE ENCOUNTER
Left Voicemail (1st Attempt) and Sent Mychart (1st Attempt) for the patient to schedule:    Appointment type: Return Nephrology  Provider: Dr. Sánchez  Return date: Approx. 7/29  Phone number: [phone]  Add'l appt(s) needed: Lab 1-hour prior to clinic visit (or 1-week if video visit)  Notes:

## 2024-06-19 ENCOUNTER — TELEPHONE (OUTPATIENT)
Dept: NEPHROLOGY | Facility: CLINIC | Age: 39
End: 2024-06-19
Payer: COMMERCIAL

## 2024-06-19 NOTE — TELEPHONE ENCOUNTER
Left Voicemail (2nd Attempt) & sent letter for the patient to schedule:    Appointment type: Return Nephrology  Provider: Dr. Sánchez  Return date: First available (Original request for 7/29)  Phone number: [phone]  Add'l appt(s) needed: Lab 1-hour prior to clinic visit (or 1-week if video visit)  Notes: Left voicemail with family member at home number, as the patient it currently traveling.

## 2024-07-25 DIAGNOSIS — R79.89 ELEVATED SERUM CREATININE: Primary | ICD-10-CM

## 2024-07-29 ENCOUNTER — OFFICE VISIT (OUTPATIENT)
Dept: OTOLARYNGOLOGY | Facility: CLINIC | Age: 39
End: 2024-07-29
Attending: STUDENT IN AN ORGANIZED HEALTH CARE EDUCATION/TRAINING PROGRAM
Payer: COMMERCIAL

## 2024-07-29 ENCOUNTER — HOSPITAL ENCOUNTER (OUTPATIENT)
Dept: PET IMAGING | Facility: CLINIC | Age: 39
Discharge: HOME | End: 2024-07-29
Attending: STUDENT IN AN ORGANIZED HEALTH CARE EDUCATION/TRAINING PROGRAM
Payer: COMMERCIAL

## 2024-07-29 VITALS
BODY MASS INDEX: 24.22 KG/M2 | HEART RATE: 85 BPM | SYSTOLIC BLOOD PRESSURE: 131 MMHG | OXYGEN SATURATION: 100 % | WEIGHT: 173 LBS | HEIGHT: 71 IN | DIASTOLIC BLOOD PRESSURE: 82 MMHG

## 2024-07-29 DIAGNOSIS — C11.9 NASOPHARYNGEAL CANCER (H): ICD-10-CM

## 2024-07-29 DIAGNOSIS — R59.1 LYMPHADENOPATHY: Primary | ICD-10-CM

## 2024-07-29 PROCEDURE — 78815 PET IMAGE W/CT SKULL-THIGH: CPT | Mod: PS

## 2024-07-29 PROCEDURE — 250N000011 HC RX IP 250 OP 636: Performed by: STUDENT IN AN ORGANIZED HEALTH CARE EDUCATION/TRAINING PROGRAM

## 2024-07-29 PROCEDURE — 74177 CT ABD & PELVIS W/CONTRAST: CPT | Mod: 26 | Performed by: RADIOLOGY

## 2024-07-29 PROCEDURE — 70491 CT SOFT TISSUE NECK W/DYE: CPT | Mod: 26 | Performed by: RADIOLOGY

## 2024-07-29 PROCEDURE — 71260 CT THORAX DX C+: CPT

## 2024-07-29 PROCEDURE — A9552 F18 FDG: HCPCS | Performed by: STUDENT IN AN ORGANIZED HEALTH CARE EDUCATION/TRAINING PROGRAM

## 2024-07-29 PROCEDURE — 71260 CT THORAX DX C+: CPT | Mod: 26 | Performed by: RADIOLOGY

## 2024-07-29 PROCEDURE — 31575 DIAGNOSTIC LARYNGOSCOPY: CPT | Performed by: STUDENT IN AN ORGANIZED HEALTH CARE EDUCATION/TRAINING PROGRAM

## 2024-07-29 PROCEDURE — 70491 CT SOFT TISSUE NECK W/DYE: CPT

## 2024-07-29 PROCEDURE — 78815 PET IMAGE W/CT SKULL-THIGH: CPT | Mod: 26 | Performed by: RADIOLOGY

## 2024-07-29 PROCEDURE — 343N000001 HC RX 343: Performed by: STUDENT IN AN ORGANIZED HEALTH CARE EDUCATION/TRAINING PROGRAM

## 2024-07-29 PROCEDURE — 99213 OFFICE O/P EST LOW 20 MIN: CPT | Mod: 25 | Performed by: STUDENT IN AN ORGANIZED HEALTH CARE EDUCATION/TRAINING PROGRAM

## 2024-07-29 RX ORDER — IOPAMIDOL 755 MG/ML
10-135 INJECTION, SOLUTION INTRAVASCULAR ONCE
Status: COMPLETED | OUTPATIENT
Start: 2024-07-29 | End: 2024-07-29

## 2024-07-29 RX ORDER — FLUDEOXYGLUCOSE F 18 200 MCI/ML
10-18 INJECTION, SOLUTION INTRAVENOUS ONCE
Status: COMPLETED | OUTPATIENT
Start: 2024-07-29 | End: 2024-07-29

## 2024-07-29 RX ADMIN — IOPAMIDOL 111 ML: 755 INJECTION, SOLUTION INTRAVENOUS at 08:24

## 2024-07-29 RX ADMIN — FLUDEOXYGLUCOSE F 18 10.78 MILLICURIE: 200 INJECTION, SOLUTION INTRAVENOUS at 07:20

## 2024-07-29 ASSESSMENT — PAIN SCALES - GENERAL: PAINLEVEL: NO PAIN (0)

## 2024-07-29 NOTE — PATIENT INSTRUCTIONS
You were seen in the ENT Clinic today by Dr. Fisher. If you have any questions or concerns after your appointment, please contact us (see below)    The following has been recommended for you based upon your appointment today:  Physical medicine rehab referral (Dr. Courtney Gutierrez)   We will call you with the results of your PET scan from today     Please return to clinic in 3 months for follow up with Dr. Fisher     How to Contact Us:  Send a [employer] message to your provider. Our team will respond to you via [employer]. Occasionally, we will need to call you to get further information.  For urgent matters (Monday-Friday), call the ENT Clinic: [phone] and speak with a call center team member - they will route your call appropriately.   If you'd like to speak directly with a nurse, please find our contact information below. We do our best to check voicemail frequently throughout the day, and will work to call you back within 1-2 days. For urgent matters, please use the general clinic phone numbers listed above.    Farrah GEORGES RN, BSN  RN Care Coordinator, ENT Clinic  [facility]  Direct: [phone]

## 2024-07-29 NOTE — NURSING NOTE
"Chief Complaint   Patient presents with    RECHECK     3 month follow up     Blood pressure 131/82, pulse 85, height 1.803 m (5' 11\"), weight 78.5 kg (173 lb), SpO2 100%.  Artemio Hampton LPN    "

## 2024-07-29 NOTE — LETTER
7/29/2024       RE: Thierno Vega  [address]  [address]  [facility] [facility] [place] [address]     Dear Colleague,    Thank you for referring your patient, Thierno Vega, to the [facility] [place] [facility]. Please see a copy of my visit note below.    Head and Neck Surgery  7/29/24     Diagnosis: T2N2 GALLITO + nasopharynx cancer     Treatment: Induction gem/cis followed by concurrent chemort completed 10/16/2023     Imaging: 3 month pet 1/22/24: WINSTON. Residual non fdg avid nodes   CT neck 4/29/24: bilateral lvl 2a nodes. Discussed at , felt to be reactive  PET/CT 7/29/24: pending    Interval history: No complaints today     Physical examination:  Alert and in no acute distress  No palpable cervical adenopathy  No oral lesions or oropharyngeal lesions     Procedure:  Fiberoptic laryngoscopy performed. No lesions seen in the nasopharynx. Remainder of exam normal.      Assessment and plan:  No evidence of disease on exam today. We will follow up on pet results. I will see him back in 3 months.      Navin Fisher MD     25 minutes spent on the date of the encounter in chart review, patient visit, review of tests, documentation and/or discussion with other providers about the issues documented above asides from time spent doing flexible laryngoscopy      Again, thank you for allowing me to participate in the care of your patient.      Sincerely,    Navin Fisher MD

## 2024-07-30 NOTE — PROGRESS NOTES
Head and Neck Surgery  7/29/24     Diagnosis: T2N2 GALLITO + nasopharynx cancer     Treatment: Induction gem/cis followed by concurrent chemort completed 10/16/2023     Imaging: 3 month pet 1/22/24: WINSTON. Residual non fdg avid nodes   CT neck 4/29/24: bilateral lvl 2a nodes. Discussed at TB, felt to be reactive  PET/CT 7/29/24: pending    Interval history: No complaints today     Physical examination:  Alert and in no acute distress  No palpable cervical adenopathy  No oral lesions or oropharyngeal lesions     Procedure:  Fiberoptic laryngoscopy performed. No lesions seen in the nasopharynx. Remainder of exam normal.      Assessment and plan:  No evidence of disease on exam today. We will follow up on pet results. I will see him back in 3 months.      Navin Fisher MD     25 minutes spent on the date of the encounter in chart review, patient visit, review of tests, documentation and/or discussion with other providers about the issues documented above asides from time spent doing flexible laryngoscopy

## 2024-07-30 NOTE — PROGRESS NOTES
[facility]  [address] [facility] [address]  Phone: [phone]  Fax: [phone]    PATIENT NAME: Thierno Vega  MRN # [id number]   DATE OF VISIT: July 31, 2024  YOB: 1985     Otolaryngology: Dr. Navin Fisher  Radiation Oncology:  Dr. Yessica Parada    CANCER TYPE: SCC nasopharynx, GALLITO -jeanna  STAGE: cT2N2 vs N3 (III vs MARYSOL)  ECOG PS: 0    PD-L1:  NGS:     SUMMARY  4/30/23  US. Bilateral necrotic nodes  5/3/23  FNA cervical adenopathy - undifferentiated carcinoma  5/4/23   CT CAP. 7 mm pulmonary nodule, no metastases  5/5/23  MRI bilateral cervical and retropharyngeal adenopathy    Came to US  6/12/23  US guided FNA L neck node in clinic (Dr. Fisher). Path: hypocellular with scant clusters of atypical squamous cells suspicious for malignancy, p40 +jeanna, GALLITO -jeanna  6/13/23  PET/CT. Intense uptake L Rosenmuller fossa with increased fullness (SUV 18.8). Skull base intact. Milder FDG uptake (SUV 6.1) by the prominent R Rosenmuller fossa, inflammatory vs malignant. Indeterminate bilateral intense palatine tonsillar uptake with fullness on CT, inflammatroy vs infectious vs tumor, discontinuous with the nasopharyngeal abnormality. Bilateral nodes, including level 2a, 2b, 3, 3/5, R 1.8 cm 2A, another level 2/3 nodes, FDG avid retropharyngeal nodes. 0.3 cm nodule on the R major fissure. 0.8 cm R inguinal node (SUV 3.9) with additional nonenlarged LNs with minimal uptake, suggestive of reactive adenopathy. Marked focal uptake along the R upper inner thigh with skin thickening (SUV 16.8), which may represent cutaneous infection/inflammation, recommend direct visualization  6/27~8/11/23 Cisplatin gemcitabine x 3 cycles. Skipped C3D8 to give a short break prior to chemorads  8/28~10/16/23 Chemoradiation with weekly cisplatin. Nasogastric feeding tube, mild SMILEY  1/22/24  PET/CT. Resolution of primary mass and L neck adenopathy. Residual 7 mm level 2A node (SUV  4.6), NIRADS-2, repeat CT in 3 months.   7/29/24  PET/CT. Mildly prominent L level 2A node, 0.7 x 1 cm (SUV 3.28), similar to prior. Mildly prominent 2.4 x 1.2 cm R level 2A node (SUV 4.21) similar to prior, not suspicious. Unchanged non enlarged bilateral inguinal nodes, however now demonstating mild FDG uptake slightly above baseline (R SUV 3.97, L SUV 4.20), nonenlarged mildly FDG avid bilateral axillary nodes (R SUV 3.48, L 4.54), new from prior exam, likely reactive, attention on follow up    ASSESSMENT AND PLAN  Nasopharyngeal carcinoma, cT2N2 (III): Doing well now 9 months after chemoradiation. Residual cervical nodes stable and Dr. Gillespie read as not worrisome. Has follow up with Dr. Fisher in Oct. I will see him in about 6 months at the time of the next scan, sooner if another scan is done in 3 months rather than 6. Discussed survivorship visit, will schedule for same trip to see me next. Confirmed he's fine coming back to [place] for follow up.     Axillary and inguinal LNs: Has some skin irritation in the R inguinal area, so those nodes are c/w reactive nodes. Monitor.    SMILEY-->CKD, HTN: Cr unchanged. No meaningful proteinuria. Renewed amlodipine.     Xerostomia: Manageable, mild    Neuropathy: Unchanged, mild - more numbness    Lymphedema: Mild     Subclinical hypothyroidism: Recheck in 3 months when he's here to see Dr. Fisher next. Discussed anticipation he'll have to start replacement in the coming months to year.     Professional Turkmen phone  used throughout    The longitudinal plan of care for the condition(s) below were addressed during this visit. Due to the added complexity in care, I will continue to support Thierno in the subsequent management of this condition(s) and with the ongoing continuity of care of this condition(s): SCC oropharynx      40 minutes spent by me on the date of the encounter doing chart review, review of test results, interpretation of tests, patient visit,  documentation, orders, discussion with other provider(s), discussion with family.     Catrachita Clark MD  Associate Professor of Medicine  Hematology, Oncology and Transplantation      SUBJECTIVE  Thierno returns for routine follow up 9 months after chemoradiation  Doing well  Energy good  Working full time  No limitations on food, good appetite  No dysphagia  No neck swelling  Neck feels tight sometimes though - some discomfort on the L side occasionally  Neuropathy unchanged  Mouth dryness improving, fairly mild   No other new problems  Hasn't seen a dentist     PAST MEDICAL HISTORY  Nasopharyngeal carcinoma as above  HTN    CURRENT OUTPATIENT MEDICATIONS  Reviewed    ALLERGIES  No Known Allergies     PHYSICAL EXAM  /74   Pulse 66   Temp 99.3  F (37.4  C)   Resp 16   Wt 78.5 kg (173 lb)   SpO2 100%   BMI 24.13 kg/m    GEN: NAD  HEENT: EOMI, no icterus, injection or pallor  EXT: no edema  NEURO: alert  SKIN: no rashes    LABORATORY AND IMAGING STUDIES    Labs today were independently reviewed and interpreted by me  CMP, CBC pd acceptable, unremarkable  TFTs as above  EBV PCR pending - ADDENDUM - negaive     PET/CT was personally reviewed and interpreted by me  I don't see any pulm nodules  No mediastinal or hilar adenopathy  The axillary nodes look small and the morphology doesn't look worrisome - same with the inguinal nodes   Cervical nodes we were watching look a little smaller

## 2024-07-31 ENCOUNTER — ONCOLOGY VISIT (OUTPATIENT)
Dept: ONCOLOGY | Facility: CLINIC | Age: 39
End: 2024-07-31
Attending: INTERNAL MEDICINE
Payer: COMMERCIAL

## 2024-07-31 ENCOUNTER — OFFICE VISIT (OUTPATIENT)
Dept: NEPHROLOGY | Facility: CLINIC | Age: 39
End: 2024-07-31
Payer: COMMERCIAL

## 2024-07-31 ENCOUNTER — LAB (OUTPATIENT)
Dept: LAB | Facility: CLINIC | Age: 39
End: 2024-07-31
Payer: COMMERCIAL

## 2024-07-31 ENCOUNTER — APPOINTMENT (OUTPATIENT)
Dept: LAB | Facility: CLINIC | Age: 39
End: 2024-07-31
Attending: INTERNAL MEDICINE
Payer: COMMERCIAL

## 2024-07-31 VITALS
HEART RATE: 66 BPM | SYSTOLIC BLOOD PRESSURE: 113 MMHG | DIASTOLIC BLOOD PRESSURE: 74 MMHG | WEIGHT: 173 LBS | OXYGEN SATURATION: 100 % | BODY MASS INDEX: 24.13 KG/M2 | RESPIRATION RATE: 16 BRPM | TEMPERATURE: 99.3 F

## 2024-07-31 VITALS
HEART RATE: 66 BPM | DIASTOLIC BLOOD PRESSURE: 74 MMHG | SYSTOLIC BLOOD PRESSURE: 116 MMHG | WEIGHT: 173 LBS | TEMPERATURE: 99.3 F | OXYGEN SATURATION: 100 % | BODY MASS INDEX: 24.13 KG/M2

## 2024-07-31 DIAGNOSIS — E55.9 VITAMIN D DEFICIENCY: ICD-10-CM

## 2024-07-31 DIAGNOSIS — Z13.29 SCREENING FOR HYPOTHYROIDISM: ICD-10-CM

## 2024-07-31 DIAGNOSIS — E83.42 HYPOMAGNESEMIA: ICD-10-CM

## 2024-07-31 DIAGNOSIS — R79.89 ELEVATED SERUM CREATININE: ICD-10-CM

## 2024-07-31 DIAGNOSIS — I10 HYPERTENSION, ESSENTIAL: ICD-10-CM

## 2024-07-31 DIAGNOSIS — C11.9 NASOPHARYNGEAL CANCER (H): Primary | ICD-10-CM

## 2024-07-31 DIAGNOSIS — C11.9 NASOPHARYNGEAL CANCER (H): ICD-10-CM

## 2024-07-31 DIAGNOSIS — N18.2 CHRONIC RENAL DISEASE, STAGE II: Primary | ICD-10-CM

## 2024-07-31 LAB
ALBUMIN MFR UR ELPH: 8.4 MG/DL
ALBUMIN SERPL BCG-MCNC: 4.6 G/DL (ref 3.5–5.2)
ALBUMIN UR-MCNC: NEGATIVE MG/DL
ALP SERPL-CCNC: 45 U/L (ref 40–150)
ALT SERPL W P-5'-P-CCNC: 13 U/L (ref 0–70)
ANION GAP SERPL CALCULATED.3IONS-SCNC: 10 MMOL/L (ref 7–15)
APPEARANCE UR: CLEAR
AST SERPL W P-5'-P-CCNC: 17 U/L (ref 0–45)
BASOPHILS # BLD AUTO: 0.1 10E3/UL (ref 0–0.2)
BASOPHILS NFR BLD AUTO: 2 %
BILIRUB SERPL-MCNC: 0.4 MG/DL
BILIRUB UR QL STRIP: NEGATIVE
BUN SERPL-MCNC: 14.9 MG/DL (ref 6–20)
CALCIUM SERPL-MCNC: 9.7 MG/DL (ref 8.8–10.4)
CHLORIDE SERPL-SCNC: 105 MMOL/L (ref 98–107)
COLOR UR AUTO: ABNORMAL
CREAT SERPL-MCNC: 1.24 MG/DL (ref 0.67–1.17)
CREAT UR-MCNC: 94.8 MG/DL
CREAT UR-MCNC: 97 MG/DL
EBV DNA SERPL NAA+PROBE-ACNC: NOT DETECTED IU/ML
EGFRCR SERPLBLD CKD-EPI 2021: 77 ML/MIN/1.73M2
EOSINOPHIL # BLD AUTO: 0.2 10E3/UL (ref 0–0.7)
EOSINOPHIL NFR BLD AUTO: 4 %
ERYTHROCYTE [DISTWIDTH] IN BLOOD BY AUTOMATED COUNT: 12.6 % (ref 10–15)
GLUCOSE SERPL-MCNC: 88 MG/DL (ref 70–99)
GLUCOSE UR STRIP-MCNC: NEGATIVE MG/DL
HCO3 SERPL-SCNC: 26 MMOL/L (ref 22–29)
HCT VFR BLD AUTO: 37.5 % (ref 40–53)
HGB BLD-MCNC: 12.5 G/DL (ref 13.3–17.7)
HGB UR QL STRIP: NEGATIVE
IMM GRANULOCYTES # BLD: 0 10E3/UL
IMM GRANULOCYTES NFR BLD: 0 %
KETONES UR STRIP-MCNC: NEGATIVE MG/DL
LEUKOCYTE ESTERASE UR QL STRIP: ABNORMAL
LYMPHOCYTES # BLD AUTO: 0.9 10E3/UL (ref 0.8–5.3)
LYMPHOCYTES NFR BLD AUTO: 21 %
MAGNESIUM SERPL-MCNC: 1.9 MG/DL (ref 1.7–2.3)
MCH RBC QN AUTO: 30 PG (ref 26.5–33)
MCHC RBC AUTO-ENTMCNC: 33.3 G/DL (ref 31.5–36.5)
MCV RBC AUTO: 90 FL (ref 78–100)
MICROALBUMIN UR-MCNC: <12 MG/L
MICROALBUMIN/CREAT UR: NORMAL MG/G{CREAT}
MONOCYTES # BLD AUTO: 0.5 10E3/UL (ref 0–1.3)
MONOCYTES NFR BLD AUTO: 12 %
MUCOUS THREADS #/AREA URNS LPF: PRESENT /LPF
NEUTROPHILS # BLD AUTO: 2.5 10E3/UL (ref 1.6–8.3)
NEUTROPHILS NFR BLD AUTO: 61 %
NITRATE UR QL: NEGATIVE
NRBC # BLD AUTO: 0 10E3/UL
NRBC BLD AUTO-RTO: 0 /100
PH UR STRIP: 7 [PH] (ref 5–7)
PHOSPHATE SERPL-MCNC: 3 MG/DL (ref 2.5–4.5)
PLATELET # BLD AUTO: 199 10E3/UL (ref 150–450)
POTASSIUM SERPL-SCNC: 4.3 MMOL/L (ref 3.4–5.3)
PROT SERPL-MCNC: 7.1 G/DL (ref 6.4–8.3)
PROT/CREAT 24H UR: 0.09 MG/MG CR (ref 0–0.2)
RBC # BLD AUTO: 4.16 10E6/UL (ref 4.4–5.9)
RBC URINE: 1 /HPF
SODIUM SERPL-SCNC: 141 MMOL/L (ref 135–145)
SP GR UR STRIP: 1.01 (ref 1–1.03)
SQUAMOUS EPITHELIAL: <1 /HPF
T4 FREE SERPL-MCNC: 1.07 NG/DL (ref 0.9–1.7)
TSH SERPL DL<=0.005 MIU/L-ACNC: 4.36 UIU/ML (ref 0.3–4.2)
UROBILINOGEN UR STRIP-MCNC: NORMAL MG/DL
WBC # BLD AUTO: 4.1 10E3/UL (ref 4–11)
WBC URINE: 7 /HPF

## 2024-07-31 PROCEDURE — G2211 COMPLEX E/M VISIT ADD ON: HCPCS | Performed by: INTERNAL MEDICINE

## 2024-07-31 PROCEDURE — G0463 HOSPITAL OUTPT CLINIC VISIT: HCPCS | Performed by: INTERNAL MEDICINE

## 2024-07-31 PROCEDURE — 83735 ASSAY OF MAGNESIUM: CPT | Performed by: PATHOLOGY

## 2024-07-31 PROCEDURE — G0463 HOSPITAL OUTPT CLINIC VISIT: HCPCS | Mod: 27

## 2024-07-31 PROCEDURE — 84156 ASSAY OF PROTEIN URINE: CPT | Performed by: PATHOLOGY

## 2024-07-31 PROCEDURE — 84443 ASSAY THYROID STIM HORMONE: CPT | Performed by: PATHOLOGY

## 2024-07-31 PROCEDURE — 82043 UR ALBUMIN QUANTITATIVE: CPT

## 2024-07-31 PROCEDURE — 80053 COMPREHEN METABOLIC PANEL: CPT | Performed by: PATHOLOGY

## 2024-07-31 PROCEDURE — 99215 OFFICE O/P EST HI 40 MIN: CPT | Performed by: INTERNAL MEDICINE

## 2024-07-31 PROCEDURE — 99214 OFFICE O/P EST MOD 30 MIN: CPT

## 2024-07-31 PROCEDURE — 36415 COLL VENOUS BLD VENIPUNCTURE: CPT | Performed by: PATHOLOGY

## 2024-07-31 PROCEDURE — 99000 SPECIMEN HANDLING OFFICE-LAB: CPT | Performed by: PATHOLOGY

## 2024-07-31 PROCEDURE — 84439 ASSAY OF FREE THYROXINE: CPT | Performed by: PATHOLOGY

## 2024-07-31 PROCEDURE — 87799 DETECT AGENT NOS DNA QUANT: CPT

## 2024-07-31 PROCEDURE — 81001 URINALYSIS AUTO W/SCOPE: CPT | Performed by: PATHOLOGY

## 2024-07-31 PROCEDURE — 85025 COMPLETE CBC W/AUTO DIFF WBC: CPT | Performed by: PATHOLOGY

## 2024-07-31 PROCEDURE — 84100 ASSAY OF PHOSPHORUS: CPT | Performed by: PATHOLOGY

## 2024-07-31 ASSESSMENT — PAIN SCALES - GENERAL
PAINLEVEL: NO PAIN (0)
PAINLEVEL: NO PAIN (0)

## 2024-07-31 NOTE — NURSING NOTE
Chief Complaint   Patient presents with    RECHECK     RETURN NEPHROLOGY - f/u per chart notes. referred by Dr. Sánchez. records in epic. per pt. Please use iPad or phones [phone] to reach  and follow prompts         Vitals:    07/31/24 0939 07/31/24 0946 07/31/24 0947   BP: 120/78 121/76 116/74   BP Location: Right arm Right arm Right arm   Patient Position: Sitting Sitting Sitting   Cuff Size: Adult Regular Adult Regular Adult Regular   Pulse: 66     Temp: 99.3  F (37.4  C)     TempSrc: Oral     SpO2: 100%     Weight: 78.5 kg (173 lb)         BP Readings from Last 3 Encounters:   07/31/24 116/74   07/29/24 131/82   04/29/24 132/77       /74 (BP Location: Right arm, Patient Position: Sitting, Cuff Size: Adult Regular)   Pulse 66   Temp 99.3  F (37.4  C) (Oral)   Wt 78.5 kg (173 lb)   SpO2 100%   BMI 24.13 kg/m       Jagdish [facility]

## 2024-07-31 NOTE — LETTER
7/31/2024      Thierno Vega  [address]  [place] [address]      Dear Colleague,    Thank you for referring your patient, Thierno Vega, to the [facility]CER [facility]. Please see a copy of my visit note below.        [facility]CER [facility]  [address] [facility] [address]  Phone: [phone]  Fax: [phone]    PATIENT NAME: Thierno Vega  MRN # [id number]   DATE OF VISIT: July 31, 2024  YOB: 1985     Otolaryngology: Dr. Navin Fisher  Radiation Oncology:  Dr. Yessica Parada    CANCER TYPE: SCC nasopharynx, GALLITO -jeanna  STAGE: cT2N2 vs N3 (III vs MARYSOL)  ECOG PS: 0    PD-L1:  NGS:     SUMMARY  4/30/23  US. Bilateral necrotic nodes  5/3/23  FNA cervical adenopathy - undifferentiated carcinoma  5/4/23   CT CAP. 7 mm pulmonary nodule, no metastases  5/5/23  MRI bilateral cervical and retropharyngeal adenopathy    Came to US  6/12/23  US guided FNA L neck node in clinic (Dr. Fisher). Path: hypocellular with scant clusters of atypical squamous cells suspicious for malignancy, p40 +jeanna, GALLITO -jeanna  6/13/23  PET/CT. Intense uptake L Rosenmuller fossa with increased fullness (SUV 18.8). Skull base intact. Milder FDG uptake (SUV 6.1) by the prominent R Rosenmuller fossa, inflammatory vs malignant. Indeterminate bilateral intense palatine tonsillar uptake with fullness on CT, inflammatroy vs infectious vs tumor, discontinuous with the nasopharyngeal abnormality. Bilateral nodes, including level 2a, 2b, 3, 3/5, R 1.8 cm 2A, another level 2/3 nodes, FDG avid retropharyngeal nodes. 0.3 cm nodule on the R major fissure. 0.8 cm R inguinal node (SUV 3.9) with additional nonenlarged LNs with minimal uptake, suggestive of reactive adenopathy. Marked focal uptake along the R upper inner thigh with skin thickening (SUV 16.8), which may represent cutaneous infection/inflammation, recommend direct visualization  6/27~8/11/23 Cisplatin gemcitabine x 3  cycles. Skipped C3D8 to give a short break prior to chemorads  8/28~10/16/23 Chemoradiation with weekly cisplatin. Nasogastric feeding tube, mild SMILEY  1/22/24  PET/CT. Resolution of primary mass and L neck adenopathy. Residual 7 mm level 2A node (SUV 4.6), NIRADS-2, repeat CT in 3 months.   7/29/24  PET/CT. Mildly prominent L level 2A node, 0.7 x 1 cm (SUV 3.28), similar to prior. Mildly prominent 2.4 x 1.2 cm R level 2A node (SUV 4.21) similar to prior, not suspicious. Unchanged non enlarged bilateral inguinal nodes, however now demonstating mild FDG uptake slightly above baseline (R SUV 3.97, L SUV 4.20), nonenlarged mildly FDG avid bilateral axillary nodes (R SUV 3.48, L 4.54), new from prior exam, likely reactive, attention on follow up    ASSESSMENT AND PLAN  Nasopharyngeal carcinoma, cT2N2 (III): Doing well now 9 months after chemoradiation. Residual cervical nodes stable and Dr. Gillespie read as not worrisome. Has follow up with Dr. Fisher in Oct. I will see him in about 6 months at the time of the next scan, sooner if another scan is done in 3 months rather than 6. Discussed survivorship visit, will schedule for same trip to see me next. Confirmed he's fine coming back to [place] for follow up.     Axillary and inguinal LNs: Has some skin irritation in the R inguinal area, so those nodes are c/w reactive nodes. Monitor.    SMILEY-->CKD, HTN: Cr unchanged. No meaningful proteinuria. Renewed amlodipine.     Xerostomia: Manageable, mild    Neuropathy: Unchanged, mild - more numbness    Lymphedema: Mild     Subclinical hypothyroidism: Recheck in 3 months when he's here to see Dr. Fisher next. Discussed anticipation he'll have to start replacement in the coming months to year.     Professional Welsh phone  used throughout    The longitudinal plan of care for the condition(s) below were addressed during this visit. Due to the added complexity in care, I will continue to support Thierno in the subsequent  management of this condition(s) and with the ongoing continuity of care of this condition(s): SCC oropharynx      40 minutes spent by me on the date of the encounter doing chart review, review of test results, interpretation of tests, patient visit, documentation, orders, discussion with other provider(s), discussion with family.     Catrachita Clark MD  Associate Professor of Medicine  Hematology, Oncology and Transplantation      IDRIS Pa returns for routine follow up 9 months after chemoradiation  Doing well  Energy good  Working full time  No limitations on food, good appetite  No dysphagia  No neck swelling  Neck feels tight sometimes though - some discomfort on the L side occasionally  Neuropathy unchanged  Mouth dryness improving, fairly mild   No other new problems  Hasn't seen a dentist     PAST MEDICAL HISTORY  Nasopharyngeal carcinoma as above  HTN    CURRENT OUTPATIENT MEDICATIONS  Reviewed    ALLERGIES  No Known Allergies     PHYSICAL EXAM  /74   Pulse 66   Temp 99.3  F (37.4  C)   Resp 16   Wt 78.5 kg (173 lb)   SpO2 100%   BMI 24.13 kg/m    GEN: NAD  HEENT: EOMI, no icterus, injection or pallor  EXT: no edema  NEURO: alert  SKIN: no rashes    LABORATORY AND IMAGING STUDIES    Labs today were independently reviewed and interpreted by me  CMP, CBC pd acceptable, unremarkable  TFTs as above  EBV PCR pending - ADDENDUM - negaive     PET/CT was personally reviewed and interpreted by me  I don't see any pulm nodules  No mediastinal or hilar adenopathy  The axillary nodes look small and the morphology doesn't look worrisome - same with the inguinal nodes   Cervical nodes we were watching look a little smaller               Again, thank you for allowing me to participate in the care of your patient.        Sincerely,        Catrachita Clark MD

## 2024-07-31 NOTE — LETTER
7/31/2024       RE: Thierno Vega  [address]  [address]  [place] [address]     Dear Colleague,    Thank you for referring your patient, Thierno Vega, to the [facility] [place] [facility]. Please see a copy of my visit note below.    Nephrology Clinic Visit 7/31/24    Assessment and Plan:    CKD2 w/o albuminuria - Creat 1.2. Creat had declined to 1.1 on 4/29/24 from peak of 1.8 on 10/25/23.   Baseline creat 0.8 but started rising into the low 1's by mid Sept 2023 and has never returned to baseline.    Etiology for his CKD is recurrent contrast exposure ( 6 tests since 6/23), Possible Cisplatin toxicity with Valsartan, Pre renal azotemia and unresolved SMILEY   Valsartan has been discontinued   Has completed chemo regimen 10/23   Blood pressure controlled    2. HTN/Volume - BP well controlled w/o edema. Home and clinic b/ps teens - 120/. HR 66, Wt 173#. Albumin 4.6  Current regimen: Amlodipine 10 mg every day    3. Tobacco abuse - Patient using for stress management around his divorce. He will try to incorporate healthy stress relieving measures and taper off tobacco. Reviewed the importance of this given his cancer history and HTN    4. Electrolytes - No acute concerns. K 4.3 Na 141    5. BMD - Ca 9.7 Phos 3.0 albumin 4.6   Vit D 23 PTH 49 ( 10/23)   Currently on Vit D 50 mcg every day   Recheck next visit    6. SCC Nasopharynx treated with [place]/Cisplatin. Finished 10/23. Refer to tumor conference summary 5/3/24    7. Dispo - RTC 1/31/25 w/labs prior    Assessment and plan was discussed with patient and he voiced his understanding and agreement.    Reason for Visit:  SMILEY/CKD2    HPI:  Mr Vega is a 37 yo male with PMH significant for SCC of Nasopharynx s/p chemo ( [place]/Cisplatin with end date 10/23), HTN, Tobacco use d/o, CKD2, present today for routine SMILEY/CKD follow up.   Last seen in clinic by Dr Sánchez 4/29/24  Baseline creat  0.8    ROS:   No acute renal concerns  Visit conducted with telephone interpretor  Home b/ps teens - 120's/  Had CT with contrast 7/29  The remainder of the complete ROS is neg    Chronic Health Problems:    SCC of Nasopharynx s/p chemo ( [place]/Cisplatin with end date 10/23)  HTN  Tobacco use d/o  CKD2  Vit D def    Family Hx:   Family History   Problem Relation Age of Onset     Cancer No family hx of      Personal Hx:   . 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 Smokes 1/2 to 1 ppd ETOH occ    Allergies:  No Known Allergies    Medications:  Current Outpatient Medications   Medication Sig Dispense Refill     amLODIPine (NORVASC) 10 MG tablet Take 1 tablet (10 mg) by mouth daily 90 tablet 1     vitamin D3 (CHOLECALCIFEROL) 50 mcg (2000 units) tablet Take 1 tablet (50 mcg) by mouth daily       No current facility-administered medications for this visit.      Vitals:  /74 (BP Location: Right arm, Patient Position: Sitting, Cuff Size: Adult Regular)   Pulse 66   Temp 99.3  F (37.4  C) (Oral)   Wt 78.5 kg (173 lb)   SpO2 100%   BMI 24.13 kg/m      Exam:  GENERAL APPEARANCE: alert and no distress  RESP: lungs clear to auscultation   CV: regular rhythm, normal rate  EDEMA: no LE edema bilaterally  ABDOMEN: soft, nondistended  MS: extremities normal - no gross deformities noted  SKIN: no visible rash  NEURO: A/O    LABS:   CMP  Recent Labs   Lab Test 07/31/24  0920 04/29/24  1050 01/24/24  1011 01/22/24  0827 11/20/23  1223    141 141  --  138   POTASSIUM 4.3 4.2 4.1  --  3.9   CHLORIDE 105 103 104  --  102   CO2 26 27 28  --  26   ANIONGAP 10 11 9  --  10   GLC 88 90 100*  --  136*   BUN 14.9 13.1 10.8  --  10.1   CR 1.24* 1.16 1.26* 1.4* 1.39*   GFRESTIMATED 77 84 76 >60 67   SOTO 9.7 9.8 9.8  --  9.9     Recent Labs   Lab Test 07/31/24  0920 04/29/24  1050 01/24/24  1011 11/20/23  1223   BILITOTAL 0.4 0.4 0.3 0.4   ALKPHOS 45 48 42 58   ALT 13 10 10 18   AST 17 13 13 17     CBC  Recent Labs   Lab Test  07/31/24  0920 04/29/24  1050 01/24/24  1011 11/15/23  1204   HGB 12.5* 12.8* 13.1* 9.8*   WBC 4.1 6.5 4.5 5.3   RBC 4.16* 4.36* 4.34* 3.13*   HCT 37.5* 39.1* 38.6* 28.9*   MCV 90 90 89 92   MCH 30.0 29.4 30.2 31.3   MCHC 33.3 32.7 33.9 33.9   RDW 12.6 12.4 11.9 13.1    222 247 262     URINE STUDIES  Recent Labs   Lab Test 07/31/24  0928 04/29/24  1102 10/30/23  1329 10/25/23  1117   COLOR Light Yellow Light Yellow  Light Yellow Light Yellow Light Yellow   APPEARANCE Clear Clear  Clear Clear Clear   URINEGLC Negative Negative  Negative Negative Negative   URINEBILI Negative Negative  Negative Negative Negative   URINEKETONE Negative Negative  Negative Negative Negative   SG 1.015 1.010  1.010 1.012 1.013   UBLD Negative Negative  Negative Negative Negative   URINEPH 7.0 5.5  5.5 7.0 7.0   PROTEIN Negative Negative  Negative Negative Negative   NITRITE Negative Negative  Negative Negative Negative   LEUKEST Trace* Negative  Negative Negative Negative   RBCU 1 <1  <1 1 1   WBCU 7* <1  <1 1 2     No lab results found.  PTH  Recent Labs   Lab Test 10/30/23  1326   PTHI 49     IRON STUDIES  Recent Labs   Lab Test 10/30/23  1326   IRON 40*      IRONSAT 14*   KAMRAN 449*       Jennifer Alexander NP        Again, thank you for allowing me to participate in the care of your patient.      Sincerely,    Jennifer Alexander NP

## 2024-07-31 NOTE — NURSING NOTE
"Oncology Rooming Note    July 31, 2024 12:09 PM   Thierno Vega is a 38 year old male who presents for:    Chief Complaint   Patient presents with    Oncology Clinic Visit     Nasopharyngeal cancer      Initial Vitals: /74   Pulse 66   Temp 99.3  F (37.4  C)   Resp 16   Wt 78.5 kg (173 lb)   SpO2 100%   BMI 24.13 kg/m   Estimated body mass index is 24.13 kg/m  as calculated from the following:    Height as of 7/29/24: 1.803 m (5' 11\").    Weight as of this encounter: 78.5 kg (173 lb). Body surface area is 1.98 meters squared.  No Pain (0) Comment: Data Unavailable   No LMP for male patient.  Allergies reviewed: Yes  Medications reviewed: Yes    Medications: Medication refills not needed today.  Pharmacy name entered into [employer]:    [id number] - [place], [place] - [address] AT [address] [facility] [place], [place] - [address] [facility] [place], [place] - [address] [facility] [id number]    Frailty Screening:   Is the patient here for a new oncology consult visit in cancer care? 2. No      Clinical concerns: no other complaints      Ramesh Dalton"

## 2024-08-01 ENCOUNTER — PATIENT OUTREACH (OUTPATIENT)
Dept: OTOLARYNGOLOGY | Facility: CLINIC | Age: 39
End: 2024-08-01
Payer: COMMERCIAL

## 2024-08-01 DIAGNOSIS — C11.9 NASOPHARYNGEAL CANCER (H): Primary | ICD-10-CM

## 2024-08-01 NOTE — PROGRESS NOTES
Called and spoke to patient with Thai  regarding the following PET scan results from 7/29/24:     IMPRESSION:   In this patient with a history of primary nasopharyngeal carcinoma:  1. No recurrent disease at the primary nasopharyngeal mucosa.    2. No suspicious cervical nodes.   3. New mildly FDG avid, nonenlarged bilateral inguinal and axillary lymph nodes, these are favored reactive. Attention on follow-up.  4. No evidence of new distant metastasis.     Patient is scheduled for follow up appointment in clinic with Dr. Fisher in 3 months and will plan to have repeat CT neck, chest, abdomen, pelvis in 6 months. Patient agreeable to this plan and had no further questions or concerns at this time.     Farrah Maradiaga, RN, BSN  RN Care Coordinator, ENT Clinic

## 2024-08-02 NOTE — PROGRESS NOTES
Nephrology Clinic Visit 7/31/24    Assessment and Plan:    CKD2 w/o albuminuria - Creat 1.2. Creat had declined to 1.1 on 4/29/24 from peak of 1.8 on 10/25/23.   Baseline creat 0.8 but started rising into the low 1's by mid Sept 2023 and has never returned to baseline.    Etiology for his CKD is recurrent contrast exposure ( 6 tests since 6/23), Possible Cisplatin toxicity with Valsartan, Pre renal azotemia and unresolved SMILEY   Valsartan has been discontinued   Has completed chemo regimen 10/23   Blood pressure controlled    2. HTN/Volume - BP well controlled w/o edema. Home and clinic b/ps teens - 120/. HR 66, Wt 173#. Albumin 4.6  Current regimen: Amlodipine 10 mg every day    3. Tobacco abuse - Patient using for stress management around his divorce. He will try to incorporate healthy stress relieving measures and taper off tobacco. Reviewed the importance of this given his cancer history and HTN    4. Electrolytes - No acute concerns. K 4.3 Na 141    5. BMD - Ca 9.7 Phos 3.0 albumin 4.6   Vit D 23 PTH 49 ( 10/23)   Currently on Vit D 50 mcg every day   Recheck next visit    6. SCC Nasopharynx treated with [place]/Cisplatin. Finished 10/23. Refer to tumor conference summary 5/3/24    7. Dispo - RTC 1/31/25 w/labs prior    Assessment and plan was discussed with patient and he voiced his understanding and agreement.    Reason for Visit:  SMILEY/CKD2    HPI:  Mr Vega is a 39 yo male with PMH significant for SCC of Nasopharynx s/p chemo ( [place]/Cisplatin with end date 10/23), HTN, Tobacco use d/o, CKD2, present today for routine SMILEY/CKD follow up.   Last seen in clinic by Dr Sánchez 4/29/24  Baseline creat 0.8    ROS:   No acute renal concerns  Visit conducted with telephone interpretor  Home b/ps teens - 120's/  Had CT with contrast 7/29  The remainder of the complete ROS is neg    Chronic Health Problems:    SCC of Nasopharynx s/p chemo ( [place]/Cisplatin with end date 10/23)  HTN  Tobacco use d/o  CKD2  Vit D def    Family Hx:    Family History   Problem Relation Age of Onset    Cancer No family hx of      Personal Hx:   . 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      Vitals:  /74 (BP Location: Right arm, Patient Position: Sitting, Cuff Size: Adult Regular)   Pulse 66   Temp 99.3  F (37.4  C) (Oral)   Wt 78.5 kg (173 lb)   SpO2 100%   BMI 24.13 kg/m      Exam:  GENERAL APPEARANCE: alert and no distress  RESP: lungs clear to auscultation   CV: regular rhythm, normal rate  EDEMA: no LE edema bilaterally  ABDOMEN: soft, nondistended  MS: extremities normal - no gross deformities noted  SKIN: no visible rash  NEURO: A/O    LABS:   CMP  Recent Labs   Lab Test 07/31/24  0920 04/29/24  1050 01/24/24  1011 01/22/24  0827 11/20/23  1223    141 141  --  138   POTASSIUM 4.3 4.2 4.1  --  3.9   CHLORIDE 105 103 104  --  102   CO2 26 27 28  --  26   ANIONGAP 10 11 9  --  10   GLC 88 90 100*  --  136*   BUN 14.9 13.1 10.8  --  10.1   CR 1.24* 1.16 1.26* 1.4* 1.39*   GFRESTIMATED 77 84 76 >60 67   SOTO 9.7 9.8 9.8  --  9.9     Recent Labs   Lab Test 07/31/24  0920 04/29/24  1050 01/24/24  1011 11/20/23  1223   BILITOTAL 0.4 0.4 0.3 0.4   ALKPHOS 45 48 42 58   ALT 13 10 10 18   AST 17 13 13 17     CBC  Recent Labs   Lab Test 07/31/24  0920 04/29/24  1050 01/24/24  1011 11/15/23  1204   HGB 12.5* 12.8* 13.1* 9.8*   WBC 4.1 6.5 4.5 5.3   RBC 4.16* 4.36* 4.34* 3.13*   HCT 37.5* 39.1* 38.6* 28.9*   MCV 90 90 89 92   MCH 30.0 29.4 30.2 31.3   MCHC 33.3 32.7 33.9 33.9   RDW 12.6 12.4 11.9 13.1    222 247 262     URINE STUDIES  Recent Labs   Lab  Test 07/31/24  0928 04/29/24  1102 10/30/23  1329 10/25/23  1117   COLOR Light Yellow Light Yellow  Light Yellow Light Yellow Light Yellow   APPEARANCE Clear Clear  Clear Clear Clear   URINEGLC Negative Negative  Negative Negative Negative   URINEBILI Negative Negative  Negative Negative Negative   URINEKETONE Negative Negative  Negative Negative Negative   SG 1.015 1.010  1.010 1.012 1.013   UBLD Negative Negative  Negative Negative Negative   URINEPH 7.0 5.5  5.5 7.0 7.0   PROTEIN Negative Negative  Negative Negative Negative   NITRITE Negative Negative  Negative Negative Negative   LEUKEST Trace* Negative  Negative Negative Negative   RBCU 1 <1  <1 1 1   WBCU 7* <1  <1 1 2     No lab results found.  PTH  Recent Labs   Lab Test 10/30/23  1326   PTHI 49     IRON STUDIES  Recent Labs   Lab Test 10/30/23  1326   IRON 40*      IRONSAT 14*   KAMRAN 449*       Jennifer Alexander, NP

## 2024-08-05 ENCOUNTER — TELEPHONE (OUTPATIENT)
Dept: OTOLARYNGOLOGY | Facility: CLINIC | Age: 39
End: 2024-08-05
Payer: COMMERCIAL

## 2024-08-05 NOTE — TELEPHONE ENCOUNTER
Patient confirmed scheduled appointment:  Date: 2/3/25  Time: 9:00am  Visit type: CT neck and CT chest/abd/pelvis   Provider:   Location: [facility]  Testing/imaging:   Additional notes:

## 2024-08-08 ENCOUNTER — TELEPHONE (OUTPATIENT)
Dept: ONCOLOGY | Facility: CLINIC | Age: 39
End: 2024-08-08

## 2024-08-08 NOTE — TELEPHONE ENCOUNTER
Patient needs to be rescheduled for their virtual visit due to Reason for Reschedule: Out-of-State    Scheduling team, please refer to service line late cancellation/no-show policies and reach out to patient at a later date for rescheduling.    Appointment mode: Video  Provider: Courtney Gutierrez MD

## 2024-10-06 ENCOUNTER — HEALTH MAINTENANCE LETTER (OUTPATIENT)
Age: 39
End: 2024-10-06

## 2025-01-27 DIAGNOSIS — N18.2 CHRONIC RENAL DISEASE, STAGE II: Primary | ICD-10-CM

## 2025-01-31 ENCOUNTER — LAB (OUTPATIENT)
Dept: LAB | Facility: CLINIC | Age: 40
End: 2025-01-31
Payer: COMMERCIAL

## 2025-01-31 DIAGNOSIS — C11.9 NASOPHARYNGEAL CANCER (H): ICD-10-CM

## 2025-01-31 DIAGNOSIS — I10 HYPERTENSION, ESSENTIAL: ICD-10-CM

## 2025-01-31 DIAGNOSIS — E55.9 VITAMIN D DEFICIENCY: ICD-10-CM

## 2025-01-31 DIAGNOSIS — Z13.29 SCREENING FOR HYPOTHYROIDISM: ICD-10-CM

## 2025-01-31 DIAGNOSIS — E83.42 HYPOMAGNESEMIA: ICD-10-CM

## 2025-01-31 DIAGNOSIS — N18.2 CHRONIC RENAL DISEASE, STAGE II: ICD-10-CM

## 2025-01-31 LAB
ALBUMIN MFR UR ELPH: 11.6 MG/DL
ALBUMIN SERPL BCG-MCNC: 4.5 G/DL (ref 3.5–5.2)
ALBUMIN UR-MCNC: NEGATIVE MG/DL
ALP SERPL-CCNC: 50 U/L (ref 40–150)
ALT SERPL W P-5'-P-CCNC: 13 U/L (ref 0–70)
ANION GAP SERPL CALCULATED.3IONS-SCNC: 8 MMOL/L (ref 7–15)
APPEARANCE UR: CLEAR
AST SERPL W P-5'-P-CCNC: 19 U/L (ref 0–45)
BASOPHILS # BLD AUTO: 0.1 10E3/UL (ref 0–0.2)
BASOPHILS NFR BLD AUTO: 2 %
BILIRUB SERPL-MCNC: 0.5 MG/DL
BILIRUB UR QL STRIP: NEGATIVE
BUN SERPL-MCNC: 17.3 MG/DL (ref 6–20)
CALCIUM SERPL-MCNC: 9.4 MG/DL (ref 8.8–10.4)
CHLORIDE SERPL-SCNC: 105 MMOL/L (ref 98–107)
COLOR UR AUTO: ABNORMAL
CREAT SERPL-MCNC: 1.17 MG/DL (ref 0.67–1.17)
CREAT UR-MCNC: 130 MG/DL
CREAT UR-MCNC: 133 MG/DL
EGFRCR SERPLBLD CKD-EPI 2021: 82 ML/MIN/1.73M2
EOSINOPHIL # BLD AUTO: 0.2 10E3/UL (ref 0–0.7)
EOSINOPHIL NFR BLD AUTO: 3 %
ERYTHROCYTE [DISTWIDTH] IN BLOOD BY AUTOMATED COUNT: 12 % (ref 10–15)
GLUCOSE SERPL-MCNC: 109 MG/DL (ref 70–99)
GLUCOSE UR STRIP-MCNC: NEGATIVE MG/DL
HCO3 SERPL-SCNC: 28 MMOL/L (ref 22–29)
HCT VFR BLD AUTO: 39.1 % (ref 40–53)
HGB BLD-MCNC: 13.1 G/DL (ref 13.3–17.7)
HGB UR QL STRIP: NEGATIVE
IMM GRANULOCYTES # BLD: 0 10E3/UL
IMM GRANULOCYTES NFR BLD: 0 %
KETONES UR STRIP-MCNC: NEGATIVE MG/DL
LEUKOCYTE ESTERASE UR QL STRIP: NEGATIVE
LYMPHOCYTES # BLD AUTO: 1.2 10E3/UL (ref 0.8–5.3)
LYMPHOCYTES NFR BLD AUTO: 26 %
MAGNESIUM SERPL-MCNC: 1.9 MG/DL (ref 1.7–2.3)
MCH RBC QN AUTO: 29.2 PG (ref 26.5–33)
MCHC RBC AUTO-ENTMCNC: 33.5 G/DL (ref 31.5–36.5)
MCV RBC AUTO: 87 FL (ref 78–100)
MICROALBUMIN UR-MCNC: 21.4 MG/L
MICROALBUMIN/CREAT UR: 16.46 MG/G CR (ref 0–17)
MONOCYTES # BLD AUTO: 0.3 10E3/UL (ref 0–1.3)
MONOCYTES NFR BLD AUTO: 7 %
MUCOUS THREADS #/AREA URNS LPF: PRESENT /LPF
NEUTROPHILS # BLD AUTO: 2.8 10E3/UL (ref 1.6–8.3)
NEUTROPHILS NFR BLD AUTO: 62 %
NITRATE UR QL: NEGATIVE
NRBC # BLD AUTO: 0 10E3/UL
NRBC BLD AUTO-RTO: 0 /100
PH UR STRIP: 6 [PH] (ref 5–7)
PHOSPHATE SERPL-MCNC: 2.7 MG/DL (ref 2.5–4.5)
PLATELET # BLD AUTO: 249 10E3/UL (ref 150–450)
POTASSIUM SERPL-SCNC: 3.8 MMOL/L (ref 3.4–5.3)
PROT SERPL-MCNC: 7.1 G/DL (ref 6.4–8.3)
PROT/CREAT 24H UR: 0.09 MG/MG CR (ref 0–0.2)
PTH-INTACT SERPL-MCNC: 24 PG/ML (ref 15–65)
RBC # BLD AUTO: 4.49 10E6/UL (ref 4.4–5.9)
RBC URINE: <1 /HPF
SODIUM SERPL-SCNC: 141 MMOL/L (ref 135–145)
SP GR UR STRIP: 1.02 (ref 1–1.03)
T4 FREE SERPL-MCNC: 1.06 NG/DL (ref 0.9–1.7)
TSH SERPL DL<=0.005 MIU/L-ACNC: 5.09 UIU/ML (ref 0.3–4.2)
UROBILINOGEN UR STRIP-MCNC: NORMAL MG/DL
VIT D+METAB SERPL-MCNC: 42 NG/ML (ref 20–50)
WBC # BLD AUTO: 4.6 10E3/UL (ref 4–11)
WBC URINE: 1 /HPF

## 2025-01-31 PROCEDURE — 80053 COMPREHEN METABOLIC PANEL: CPT | Performed by: PATHOLOGY

## 2025-01-31 PROCEDURE — 84100 ASSAY OF PHOSPHORUS: CPT | Performed by: PATHOLOGY

## 2025-01-31 PROCEDURE — 84156 ASSAY OF PROTEIN URINE: CPT | Performed by: PATHOLOGY

## 2025-01-31 PROCEDURE — 85025 COMPLETE CBC W/AUTO DIFF WBC: CPT | Performed by: PATHOLOGY

## 2025-01-31 PROCEDURE — 82306 VITAMIN D 25 HYDROXY: CPT

## 2025-01-31 PROCEDURE — 83970 ASSAY OF PARATHORMONE: CPT | Performed by: PATHOLOGY

## 2025-01-31 PROCEDURE — 36415 COLL VENOUS BLD VENIPUNCTURE: CPT | Performed by: PATHOLOGY

## 2025-01-31 PROCEDURE — 83735 ASSAY OF MAGNESIUM: CPT | Performed by: PATHOLOGY

## 2025-01-31 PROCEDURE — 99000 SPECIMEN HANDLING OFFICE-LAB: CPT | Performed by: PATHOLOGY

## 2025-01-31 PROCEDURE — 84443 ASSAY THYROID STIM HORMONE: CPT | Performed by: PATHOLOGY

## 2025-01-31 PROCEDURE — 81001 URINALYSIS AUTO W/SCOPE: CPT | Performed by: PATHOLOGY

## 2025-01-31 PROCEDURE — 82043 UR ALBUMIN QUANTITATIVE: CPT

## 2025-01-31 PROCEDURE — 84439 ASSAY OF FREE THYROXINE: CPT | Performed by: PATHOLOGY

## 2025-02-03 ENCOUNTER — ANCILLARY PROCEDURE (OUTPATIENT)
Dept: CT IMAGING | Facility: CLINIC | Age: 40
End: 2025-02-03
Attending: STUDENT IN AN ORGANIZED HEALTH CARE EDUCATION/TRAINING PROGRAM
Payer: COMMERCIAL

## 2025-02-03 ENCOUNTER — ONCOLOGY VISIT (OUTPATIENT)
Dept: ONCOLOGY | Facility: CLINIC | Age: 40
End: 2025-02-03
Attending: INTERNAL MEDICINE
Payer: COMMERCIAL

## 2025-02-03 ENCOUNTER — OFFICE VISIT (OUTPATIENT)
Dept: OTOLARYNGOLOGY | Facility: CLINIC | Age: 40
End: 2025-02-03
Payer: COMMERCIAL

## 2025-02-03 VITALS
HEIGHT: 72 IN | OXYGEN SATURATION: 100 % | HEART RATE: 58 BPM | SYSTOLIC BLOOD PRESSURE: 124 MMHG | BODY MASS INDEX: 24.5 KG/M2 | WEIGHT: 180.9 LBS | DIASTOLIC BLOOD PRESSURE: 76 MMHG

## 2025-02-03 VITALS
TEMPERATURE: 98.9 F | DIASTOLIC BLOOD PRESSURE: 78 MMHG | HEART RATE: 65 BPM | WEIGHT: 181 LBS | RESPIRATION RATE: 16 BRPM | BODY MASS INDEX: 24.51 KG/M2 | OXYGEN SATURATION: 100 % | SYSTOLIC BLOOD PRESSURE: 118 MMHG

## 2025-02-03 DIAGNOSIS — C11.9 NASOPHARYNGEAL CANCER (H): Primary | ICD-10-CM

## 2025-02-03 DIAGNOSIS — C11.9 NASOPHARYNGEAL CANCER (H): ICD-10-CM

## 2025-02-03 PROCEDURE — G0463 HOSPITAL OUTPT CLINIC VISIT: HCPCS | Performed by: INTERNAL MEDICINE

## 2025-02-03 PROCEDURE — 70491 CT SOFT TISSUE NECK W/DYE: CPT | Performed by: RADIOLOGY

## 2025-02-03 PROCEDURE — 31575 DIAGNOSTIC LARYNGOSCOPY: CPT | Performed by: STUDENT IN AN ORGANIZED HEALTH CARE EDUCATION/TRAINING PROGRAM

## 2025-02-03 PROCEDURE — G2211 COMPLEX E/M VISIT ADD ON: HCPCS | Performed by: INTERNAL MEDICINE

## 2025-02-03 PROCEDURE — 99213 OFFICE O/P EST LOW 20 MIN: CPT | Mod: 25 | Performed by: STUDENT IN AN ORGANIZED HEALTH CARE EDUCATION/TRAINING PROGRAM

## 2025-02-03 PROCEDURE — 99215 OFFICE O/P EST HI 40 MIN: CPT | Performed by: INTERNAL MEDICINE

## 2025-02-03 PROCEDURE — 74177 CT ABD & PELVIS W/CONTRAST: CPT | Mod: GC | Performed by: RADIOLOGY

## 2025-02-03 PROCEDURE — 71260 CT THORAX DX C+: CPT | Mod: GC | Performed by: RADIOLOGY

## 2025-02-03 RX ORDER — IOPAMIDOL 755 MG/ML
90 INJECTION, SOLUTION INTRAVASCULAR ONCE
Status: COMPLETED | OUTPATIENT
Start: 2025-02-03 | End: 2025-02-03

## 2025-02-03 RX ADMIN — IOPAMIDOL 90 ML: 755 INJECTION, SOLUTION INTRAVASCULAR at 08:44

## 2025-02-03 ASSESSMENT — PAIN SCALES - GENERAL
PAINLEVEL_OUTOF10: NO PAIN (0)
PAINLEVEL_OUTOF10: NO PAIN (0)

## 2025-02-03 NOTE — LETTER
2/3/2025       RE: Thierno Vega  [address]  [address]  [facility] [address]     Dear Colleague,    Thank you for referring your patient, Thierno Vega, to the [facility] [place] at [facility]. Please see a copy of my visit note below.    Head and Neck Surgery  2/3/25     Diagnosis: T2N2 GALLITO + nasopharynx cancer     Treatment: Induction gem/cis followed by concurrent chemort completed 10/16/2023     Imaging: 3 month pet 1/22/24: [facility]. Residual non fdg avid nodes   CT neck 4/29/24: bilateral lvl 2a nodes. Discussed at , felt to be reactive  PET/CT 7/29/24: [facility]  CT neck and chest 2/3/25: pending     Interval history: No complaints today     Physical examination:  Alert and in no acute distress  No palpable cervical adenopathy  No oral lesions or oropharyngeal lesions     Procedure:  Fiberoptic laryngoscopy performed. No lesions seen in the nasopharynx. Remainder of exam normal.      Assessment and plan:  No evidence of disease on exam today. We will follow up on CT results. I will see him back in 4 months     Navin Fisher MD     25 minutes spent on the date of the encounter in chart review, patient visit, review of tests, documentation and/or discussion with other providers about the issues documented above asides from time spent doing flexible laryngoscopy      Again, thank you for allowing me to participate in the care of your patient.      Sincerely,    Navin Fisher MD

## 2025-02-03 NOTE — PROGRESS NOTES
Head and Neck Surgery  2/3/25     Diagnosis: T2N2 GALLITO + nasopharynx cancer     Treatment: Induction gem/cis followed by concurrent chemort completed 10/16/2023     Imaging: 3 month pet 1/22/24: [facility]ON. Residual non fdg avid nodes   CT neck 4/29/24: bilateral lvl 2a nodes. Discussed at , felt to be reactive  PET/CT 7/29/24: [facility]  CT neck and chest 2/3/25: pending     Interval history: No complaints today     Physical examination:  Alert and in no acute distress  No palpable cervical adenopathy  No oral lesions or oropharyngeal lesions     Procedure:  Fiberoptic laryngoscopy performed. No lesions seen in the nasopharynx. Remainder of exam normal.      Assessment and plan:  No evidence of disease on exam today. We will follow up on CT results. I will see him back in 4 months     Naivn Fisher MD     25 minutes spent on the date of the encounter in chart review, patient visit, review of tests, documentation and/or discussion with other providers about the issues documented above asides from time spent doing flexible laryngoscopy

## 2025-02-03 NOTE — DISCHARGE INSTRUCTIONS

## 2025-02-03 NOTE — PATIENT INSTRUCTIONS
You were seen in the ENT Clinic today by Dr. Fisher. If you have any questions or concerns after your appointment, please contact us (see below)    The following has been recommended for you based upon your appointment today:  We will call you with the results of your CT scan from today     Please return to clinic in 4 months for follow up with Dr. Fisher (6/16/25)     How to Contact Us:  Send a T[employer]St[employer] message to your provider. Our team will respond to you via T[employer]Stars. Occasionally, we will need to call you to get further information.  For urgent matters (Monday-Friday), call the ENT Clinic: [phone] and speak with a call center team member - they will route your call appropriately.   If you'd like to speak directly with a nurse, please find our contact information below. We do our best to check voicemail frequently throughout the day, and will work to call you back within 1-2 days. For urgent matters, please use the general clinic phone numbers listed above.    Farrah GEORGES RN, BSN  RN Care Coordinator, ENT Clinic  [facility]  Direct: [phone]

## 2025-02-03 NOTE — NURSING NOTE
"Oncology Rooming Note    February 3, 2025 11:02 AM   Thierno Vega is a 39 year old male who presents for:    Chief Complaint   Patient presents with    Oncology Clinic Visit     nasopharyngeal cancer     Initial Vitals: /78 (BP Location: Right arm, Patient Position: Sitting, Cuff Size: Adult Regular)   Pulse 65   Temp 98.9  F (37.2  C) (Oral)   Resp 16   Wt 82.1 kg (181 lb)   SpO2 100%   BMI 24.51 kg/m   Estimated body mass index is 24.51 kg/m  as calculated from the following:    Height as of an earlier encounter on 2/3/25: 1.83 m (6' 0.05\").    Weight as of this encounter: 82.1 kg (181 lb). Body surface area is 2.04 meters squared.  No Pain (0) Comment: Data Unavailable   No LMP for male patient.  Allergies reviewed: Yes  Medications reviewed: Yes    Medications: Medication refills not needed today.  Pharmacy name entered into Intell[employer]:    [id number] - [place], [place] - [address] AT [address] PHARMACY [place], [place] - [address] [place] [address] PHARMACY [place], [place] - [address] [place] [address] [phone]    Frailty Screening:   Is the patient here for a new oncology consult visit in cancer care? 2. No      Clinical concerns: none      Mayra Fisher, EMT  2/3/2025              "

## 2025-02-03 NOTE — PROGRESS NOTES
[facility]  [address] [facility] [address]  Phone: [phone]  Fax: [phone]    PATIENT NAME: Thierno Vega  MRN # [id number]   DATE OF VISIT: February 3, 2025  YOB: 1985     Otolaryngology: Dr. Navin Fisher  Radiation Oncology:  Dr. Yessica Parada    CANCER TYPE: SCC nasopharynx, GALLITO -jeanna  STAGE: cT2N2 vs N3 (III vs MARYSOL)  ECOG PS: 0    PD-L1:  NGS:     SUMMARY  4/30/23  US. Bilateral necrotic nodes  5/3/23  FNA cervical adenopathy - undifferentiated carcinoma  5/4/23   CT CAP. 7 mm pulmonary nodule, no metastases  5/5/23  MRI bilateral cervical and retropharyngeal adenopathy    Came to US  6/12/23  US guided FNA L neck node in clinic (Dr. Fisher). Path: hypocellular with scant clusters of atypical squamous cells suspicious for malignancy, p40 +jeanna, GALLITO -jeanna  6/13/23  PET/CT. Intense uptake L Rosenmuller fossa with increased fullness (SUV 18.8). Skull base intact. Milder FDG uptake (SUV 6.1) by the prominent R Rosenmuller fossa, inflammatory vs malignant. Indeterminate bilateral intense palatine tonsillar uptake with fullness on CT, inflammatroy vs infectious vs tumor, discontinuous with the nasopharyngeal abnormality. Bilateral nodes, including level 2a, 2b, 3, 3/5, R 1.8 cm 2A, another level 2/3 nodes, FDG avid retropharyngeal nodes. 0.3 cm nodule on the R major fissure. 0.8 cm R inguinal node (SUV 3.9) with additional nonenlarged LNs with minimal uptake, suggestive of reactive adenopathy. Marked focal uptake along the R upper inner thigh with skin thickening (SUV 16.8), which may represent cutaneous infection/inflammation, recommend direct visualization  6/27~8/11/23 Cisplatin gemcitabine x 3 cycles. Skipped C3D8 to give a short break prior to chemorads  8/28~10/16/23 Chemoradiation with weekly cisplatin. Nasogastric feeding tube, mild SMILEY  1/22/24  PET/CT. Resolution of primary mass and L neck adenopathy. Residual 7 mm level 2A node (SUV  4.6), NIRADS-2, repeat CT in 3 months.   7/29/24  PET/CT. Mildly prominent L level 2A node, 0.7 x 1 cm (SUV 3.28), similar to prior. Mildly prominent 2.4 x 1.2 cm R level 2A node (SUV 4.21) similar to prior, not suspicious. Unchanged non enlarged bilateral inguinal nodes, however now demonstating mild FDG uptake slightly above baseline (R SUV 3.97, L SUV 4.20), nonenlarged mildly FDG avid bilateral axillary nodes (R SUV 3.48, L 4.54), new from prior exam, likely reactive, attention on follow up    ASSESSMENT AND PLAN  Nasopharyngeal carcinoma, cT2N2 (III): About 15 months post-chemoradiation. CT read pending but the L level 2 nodes look about the same as on the PET/CT in July 2024. The axillary and inguinal nodes are really small and unchanged, so not worrisome. Will let him know if anything changes. No longer requires follow up with me in medical oncology. He says that he has a physician who can check TFTs in [place], if he decides to no longer come to the [place] for medical care/surveillance. See below. I encouraged him to reach out to me if any needs arise. He will continue routine follow up with Dr. Fisher. Has survivorship visit tomorrow with Janis GEORGES Discussed importance of this. Needs to see a dentist and continue this every 6 months     Subclinical hypothyroidism: About the same as in July 2024, FT4 still ok. Discussed. Asked him to have it checked at home in 6 months. If he's here, we can also check here. Will be flexible.     Axillary and inguinal LNs: Unchanged since July on CT - see above.    SMILEY-->CKD, HTN: Cr unchanged. No meaningful proteinuria. Met with NP Benjamin in Nephrology last Fri, note reviewed.    Xerostomia: Very mild.    Neuropathy: Unchanged, G1, numbness, unchanged. Not affecting ADLs, etc.     Lymphedema: Mild. Getting some neck tightness. Encouraged him to continue stretches so as to avoid more chronic problems in the not-so-distant and distant future.     Professional [employer] ipad  nterpreter used throughout    The longitudinal plan of care for the condition(s) below were addressed during this visit. Due to the added complexity in care, I will continue to support Thierno in the subsequent management of this condition(s) and with the ongoing continuity of care of this condition(s): SCC oropharynx      40 minutes spent by me on the date of the encounter doing chart review, review of test results, interpretation of tests, patient visit, documentation, orders    Catrachita Clark MD  Associate Professor of Medicine  Hematology, Oncology and Transplantation      SUBJECTIVE  Thierno returns for routine follow up   Neck tightness  Some residual numbness in fingers  Not dropping anything   Working full time - manager in construction. Very busy   Energy ok  No dentist yet. No problems he's noted   Nov - URI x 3 days  Some R shoulder pain - now resolved, x 2 weeks     PAST MEDICAL HISTORY  Nasopharyngeal carcinoma as above  HTN    CURRENT OUTPATIENT MEDICATIONS  Reviewed    ALLERGIES  No Known Allergies     PHYSICAL EXAM  /78 (BP Location: Right arm, Patient Position: Sitting, Cuff Size: Adult Regular)   Pulse 65   Temp 98.9  F (37.2  C) (Oral)   Resp 16   Wt 82.1 kg (181 lb)   SpO2 100%   BMI 24.51 kg/m    GEN: NAD  HEENT: EOMI, no icterus, injection or pallor  EXT: no edema  NEURO: alert    LABORATORY AND IMAGING STUDIES    Labs 1/31/25 were independently reviewed and interpreted by me  TSH/FT4 - subclinical hypothyroidism  CBC pd - a little anemic but acceptable, o/w nl  CMP ok   Mg ok   Not vitamin D deficient  No proteinuria    CT neck and CT CAP were personally reviewed and interpreted by me  L level 2 nodes - the largest one is slightly larger in the long axis, but unchanged in the short axis  Axillary nodes look the same to me as compared to July 2024  Small inguinal nodes also look the same  No pulm nodules  No mediastinal adenopathy

## 2025-02-03 NOTE — PROGRESS NOTES
Virtual Visit Details    Type of service:  Video Visit   Video Start Time: 4:19 PM  Video End Time: 4:37 PM    Originating Location (pt. Location): Home    Distant Location (provider location):  On-site  Platform used for Video Visit: [facility]    Oncology/Hematology Survivorship Visit Note  Feb 4, 2025    Reason for Visit: Survivorship Visit for Head and Neck Cancer    History of Present Illness: Thierno Vega is a 39 year old male with a history of SCC nasopharynx, GALLITO negative. His oncologic history is as follows:    4/30/23  US. Bilateral necrotic nodes  5/3/23  FNA cervical adenopathy - undifferentiated carcinoma  5/4/23   CT CAP. 7 mm pulmonary nodule, no metastases  5/5/23  MRI bilateral cervical and retropharyngeal adenopathy    Came to [place]  6/12/23  US guided FNA L neck node in clinic (Dr. Fisher). Path: hypocellular with scant clusters of atypical squamous cells suspicious for malignancy, p40 +jeanna, GALLITO -jeanna  6/13/23  PET/CT. Intense uptake L Rosenmuller fossa with increased fullness (SUV 18.8). Skull base intact. Milder FDG uptake (SUV 6.1) by the prominent R Rosenmuller fossa, inflammatory vs malignant. Indeterminate bilateral intense palatine tonsillar uptake with fullness on CT, inflammatroy vs infectious vs tumor, discontinuous with the nasopharyngeal abnormality. Bilateral nodes, including level 2a, 2b, 3, 3/5, R 1.8 cm 2A, another level 2/3 nodes, FDG avid retropharyngeal nodes. 0.3 cm nodule on the R major fissure. 0.8 cm R inguinal node (SUV 3.9) with additional nonenlarged LNs with minimal uptake, suggestive of reactive adenopathy. Marked focal uptake along the R upper inner thigh with skin thickening (SUV 16.8), which may represent cutaneous infection/inflammation, recommend direct visualization  6/27~8/11/23 Cisplatin gemcitabine x 3 cycles. Skipped C3D8 to give a short break prior to chemorads  8/28~10/16/23 Chemoradiation with weekly cisplatin. Nasogastric feeding tube, mild  SMILEY  1/22/24  PET/CT. Resolution of primary mass and L neck adenopathy. Residual 7 mm level 2A node (SUV 4.6), NIRADS-2, repeat CT in 3 months.   7/29/24  PET/CT. Mildly prominent L level 2A node, 0.7 x 1 cm (SUV 3.28), similar to prior. Mildly prominent 2.4 x 1.2 cm R level 2A node (SUV 4.21) similar to prior, not suspicious. Unchanged non enlarged bilateral inguinal nodes, however now demonstating mild FDG uptake slightly above baseline (R SUV 3.97, L SUV 4.20), nonenlarged mildly FDG avid bilateral axillary nodes (R SUV 3.48, L 4.54), new from prior exam, likely reactive, attention on follow up    Interval History:  Patient seen today for a survivorship visit. Feels well. Notes some tightness in the neck bilaterally. No swelling. Denies difficulty with swallowing. Energy levels stable.     Physical Examination:  There were no vitals taken for this visit.  Wt Readings from Last 10 Encounters:   02/03/25 82.1 kg (181 lb)   02/03/25 82.1 kg (180 lb 14.4 oz)   01/31/25 82.3 kg (181 lb 7 oz)   07/31/24 78.5 kg (173 lb)   07/31/24 78.5 kg (173 lb)   07/29/24 78.5 kg (173 lb)   04/29/24 82.1 kg (180 lb 14.4 oz)   01/24/24 88 kg (193 lb 14.4 oz)   01/22/24 89.1 kg (196 lb 6.4 oz)   11/10/23 97.1 kg (214 lb)     Constitutional: Well-appearing male in no acute distress. No formal exam performed as majority of visit spent in counseling and visit performed virtually.     Assessment and Plan:    1. Treatment Review  Discussed treatment summary with patient including his diagnosis, surgery/radiation, chemotherapy. Treatment summary reviewed with patient encouraged to review and call with questions.    Follow-Up Provider Visits:    Medical Oncologist: No longer needs to follow  ENT: 4 month follow-up  Radiation Oncologist: As directed  Surgeon: As directed  Speech-: If having trouble with swallowing, unexplained weight loss, or recurrent lung infections  Lymphedema: As clinically indicated  Nutrition: As  clinically indicated  Dentist: Every 6 months if treatment included radiation    Follow-Up Imaging:    PETCT: 3 months after completing treatment     2. Coping  Patient feels he is coping well following completion of his/her treatment. Patient would declines referral for counseling at this time.    3. Energy  Patient states that energy levels are good following treatment. Patient would declines referral for cancer rehab at this time. Encouraged at least 150 minutes of cardiovascular exercise per week.    4. Work  Patient is currently back to work. Feels this is going well. No need for disability paperwork at this time.    5. Treatment Side Effects and Long Term Recommendations  This patient received treatment with cisplatin chemotherapy and radiation to the head and neck region. We discussed the following long term risks associated with treatment and recommendations for monitoring:    Cisplatin Chemotherapy:    -Peripheral Neuropathy: Monitor for neuropathy after treatment. Assess for limitations in function and consider OT/PT referral. Consider pain management with gabapentin, Pregabalin, or TCA if needed.  -Risk of Kidney Problems: Monitor blood pressure and consider lab monitoring at annual physical if signs of renal insuffiencey after treatment  -Hearing Loss: Evaluate for hearing loss as part of annual physical exam. Consider audiogram if concerns arise  -Elevated cholesterol levels: Monitor cholesterol levels annually and treat accordingly  -Risk of Osteoporosis: Calcium intake 1000-1200mg plus Vit D 800-1000 international unit(s) per day, ideally from food sources but can supplement if needed. Smoking cessation and minimal or no caffeine or alcohol intake. Weight brearing exercises.  -Chemo-Brain: Evaluate for cognitive changes and rule out other causes.  -Fertility/Sexuality Concerns: Can occur months to years after treatment. Refer to urologist or fertility specialist if needed  -Risk of Cardiac Problems:  Aggressively manage cardiac risk factors and encourage healthy lifestyle. Annual blood pressure and cholesterol monitoring. Work-up with echocardiogram with any signs of cardiac dysfunction  -Risk of Secondary Cancer: Low risk of developing MDS, leukemia, or lymphoma. This typically occurs 4-10 years after therapy but can by as soon as 1-3 years. Recommend annual CBC. Monitoring for consitutional symptoms fevers, night sweats, fatigue, unexplained weight loss.    Radiation to Head and Neck:    -Dysphagia: Encourage swallowing exercises and consider follow-up with speech pathologist with any concerns  -Lymphedema: Consider evaluation and management with lymphedema specialist. Early referral can improve outcomes  -Trismus: Jaw exercises can help prevent or improve trismus. Consider evaluation by dentist if needed.  -Osteonecrosis of the Jaw: Ensure dentist is aware for radiation treatments. Need dental cleaning every 6 months, annual dental exam with x-rays and fluoride treatment  -Thyroid Disorders: Risk of developing hypo or hyperthyroidism, usually 2-5 years after treatment but can by up to 10 or more years after. Annual TSH testing needed along with monitoring for signs or symptoms of thyroid disorders  -Skin: Radiation skin fields more sensitive to the sun. Need diligent sunscreen use.  -Carotid Stenosis: Need carotid US 5-10 years after treatment    6. Health Maintenance   Discussed importance of continuing age-appropriate cancer screenings for males and the importance of reestablishing care with PCP. Patient has not met with their primary care provider since completing treatment. Smoking cessation and alcohol cessation discussed. Stressed the importance of healthy diet and daily exercise. Recommended yearly influenza vaccination along with annual eye and dental appointments.     Final Patient Recommendations:  -Annual physical exam with blood pressure and cholesterol checks  -Annual lab check CBC and  TSH  -Dentist every 6 months  -Continue swallowing exercises  -Carotid ultrasound 5-10 years after treatment  -No smoking and minimize alcohol and caffeine use  -Ensure good calcium and vit D in diet or take supplements  -Weight bearing and cardiovascular activity 150 min per week  -Consistent sunscreen use  -Eye doctor annually  -Flu shot annually  -Monitoring for concerning symptoms that could be related to risks from treatment: Hearing loss, numbness/tingling in hands and feet, cognitive changes, sexuality/fertility concerns, chest pain, SOB, leg swelling, night sweats, extreme fatigue, unexplained weight loss, trouble swallowing, stiffness or swelling in neck, trouble opening mouth.     Janis Cordova, CNP

## 2025-02-03 NOTE — LETTER
2/3/2025      Thierno Vega  [address]  [place] [address]      Dear Colleague,    Thank you for referring your patient, Thierno Vega, to the [facility]. Please see a copy of my visit note below.        [facility]  [address] [address]  Phone: [phone]  Fax: [phone]    PATIENT NAME: Thierno Vega  MRN # [id number]   DATE OF VISIT: February 3, 2025  YOB: 1985     Otolaryngology: Dr. Navin Fisher  Radiation Oncology:  Dr. Yessica Parada    CANCER TYPE: SCC nasopharynx, GALLITO -jeanna  STAGE: cT2N2 vs N3 (III vs MARYSOL)  ECOG PS: 0    PD-L1:  NGS:     SUMMARY  4/30/23  US. Bilateral necrotic nodes  5/3/23  FNA cervical adenopathy - undifferentiated carcinoma  5/4/23   CT CAP. 7 mm pulmonary nodule, no metastases  5/5/23  MRI bilateral cervical and retropharyngeal adenopathy    Came to US  6/12/23  US guided FNA L neck node in clinic (Dr. Fisher). Path: hypocellular with scant clusters of atypical squamous cells suspicious for malignancy, p40 +jeanna, GALLITO -jeanna  6/13/23  PET/CT. Intense uptake L Rosenmuller fossa with increased fullness (SUV 18.8). Skull base intact. Milder FDG uptake (SUV 6.1) by the prominent R Rosenmuller fossa, inflammatory vs malignant. Indeterminate bilateral intense palatine tonsillar uptake with fullness on CT, inflammatroy vs infectious vs tumor, discontinuous with the nasopharyngeal abnormality. Bilateral nodes, including level 2a, 2b, 3, 3/5, R 1.8 cm 2A, another level 2/3 nodes, FDG avid retropharyngeal nodes. 0.3 cm nodule on the R major fissure. 0.8 cm R inguinal node (SUV 3.9) with additional nonenlarged LNs with minimal uptake, suggestive of reactive adenopathy. Marked focal uptake along the R upper inner thigh with skin thickening (SUV 16.8), which may represent cutaneous infection/inflammation, recommend direct visualization  6/27~8/11/23 Cisplatin gemcitabine x 3  cycles. Skipped C3D8 to give a short break prior to chemorads  8/28~10/16/23 Chemoradiation with weekly cisplatin. Nasogastric feeding tube, mild SMILEY  1/22/24  PET/CT. Resolution of primary mass and L neck adenopathy. Residual 7 mm level 2A node (SUV 4.6), NIRADS-2, repeat CT in 3 months.   7/29/24  PET/CT. Mildly prominent L level 2A node, 0.7 x 1 cm (SUV 3.28), similar to prior. Mildly prominent 2.4 x 1.2 cm R level 2A node (SUV 4.21) similar to prior, not suspicious. Unchanged non enlarged bilateral inguinal nodes, however now demonstating mild FDG uptake slightly above baseline (R SUV 3.97, L SUV 4.20), nonenlarged mildly FDG avid bilateral axillary nodes (R SUV 3.48, L 4.54), new from prior exam, likely reactive, attention on follow up    ASSESSMENT AND PLAN  Nasopharyngeal carcinoma, cT2N2 (III): About 15 months post-chemoradiation. CT read pending but the L level 2 nodes look about the same as on the PET/CT in July 2024. The axillary and inguinal nodes are really small and unchanged, so not worrisome. Will let him know if anything changes. No longer requires follow up with me in medical oncology. He says that he has a physician who can check TFTs in [facility], if he decides to no longer come to the [place] for medical care/surveillance. See below. I encouraged him to reach out to me if any needs arise. He will continue routine follow up with Dr. Fisher. Has survivorship visit tomorrow with Janis GEORGES Discussed importance of this. Needs to see a dentist and continue this every 6 months     Subclinical hypothyroidism: About the same as in July 2024, FT4 still ok. Discussed. Asked him to have it checked at home in 6 months. If he's here, we can also check here. Will be flexible.     Axillary and inguinal LNs: Unchanged since July on CT - see above.    SMILEY-->CKD, HTN: Cr unchanged. No meaningful proteinuria. Met with NP Benjamin in Nephrology last Fri, note reviewed.    Xerostomia: Very mild.    Neuropathy: Unchanged, G1,  numbness, unchanged. Not affecting ADLs, etc.     Lymphedema: Mild. Getting some neck tightness. Encouraged him to continue stretches so as to avoid more chronic problems in the not-so-distant and distant future.     Professional Italian ipad nterpreter used throughout    The longitudinal plan of care for the condition(s) below were addressed during this visit. Due to the added complexity in care, I will continue to support Thierno in the subsequent management of this condition(s) and with the ongoing continuity of care of this condition(s): SCC oropharynx      40 minutes spent by me on the date of the encounter doing chart review, review of test results, interpretation of tests, patient visit, documentation, orders    Catrachita Clark MD  Associate Professor of Medicine  Hematology, Oncology and Transplantation      SUBJECTIVE  Thierno returns for routine follow up   Neck tightness  Some residual numbness in fingers  Not dropping anything   Working full time - manager in construction. Very busy   Energy ok  No dentist yet. No problems he's noted   Nov - URI x 3 days  Some R shoulder pain - now resolved, x 2 weeks     PAST MEDICAL HISTORY  Nasopharyngeal carcinoma as above  HTN    CURRENT OUTPATIENT MEDICATIONS  Reviewed    ALLERGIES  No Known Allergies     PHYSICAL EXAM  /78 (BP Location: Right arm, Patient Position: Sitting, Cuff Size: Adult Regular)   Pulse 65   Temp 98.9  F (37.2  C) (Oral)   Resp 16   Wt 82.1 kg (181 lb)   SpO2 100%   BMI 24.51 kg/m    GEN: NAD  HEENT: EOMI, no icterus, injection or pallor  EXT: no edema  NEURO: alert    LABORATORY AND IMAGING STUDIES    Labs 1/31/25 were independently reviewed and interpreted by me  TSH/FT4 - subclinical hypothyroidism  CBC pd - a little anemic but acceptable, o/w nl  CMP ok   Mg ok   Not vitamin D deficient  No proteinuria    CT neck and CT CAP were personally reviewed and interpreted by me  L level 2 nodes - the largest one is slightly larger in the  long axis, but unchanged in the short axis  Axillary nodes look the same to me as compared to July 2024  Small inguinal nodes also look the same  No pulm nodules  No mediastinal adenopathy               Again, thank you for allowing me to participate in the care of your patient.        Sincerely,        Catrachita Clark MD    Electronically signed

## 2025-02-04 ENCOUNTER — VIRTUAL VISIT (OUTPATIENT)
Dept: ONCOLOGY | Facility: CLINIC | Age: 40
End: 2025-02-04
Attending: REGISTERED NURSE

## 2025-02-04 DIAGNOSIS — C11.9 NASOPHARYNGEAL CANCER (H): Primary | ICD-10-CM

## 2025-02-04 NOTE — Clinical Note
2/4/2025      Thierno Vega  [address]  [address]  [facility] [address]      Dear Colleague,    Thank you for referring your patient, Thierno Vega, to the [facility]. Please see a copy of my visit note below.    Virtual Visit Details    Type of service:  Video Visit   Video Start Time: 4:19 PM  Video End Time: 4:37 PM    Originating Location (pt. Location): Home  {PROVIDER LOCATION On-site should be selected for visits conducted from your clinic location or adjoining [facility] hospital, academic office, or other nearby [facility] building. Off-site should be selected for all other provider locations, including home:[id number]}  Distant Location (provider location):  On-site  Platform used for Video Visit: Purch    Oncology/Hematology Survivorship Visit Note  Feb 4, 2025    Reason for Visit: Survivorship Visit for Head and Neck Cancer    History of Present Illness: Thierno Vega is a 39 year old male with a history of SCC nasopharynx, GALLITO negative. His oncologic history is as follows:    4/30/23  US. Bilateral necrotic nodes  5/3/23  FNA cervical adenopathy - undifferentiated carcinoma  5/4/23   CT CAP. 7 mm pulmonary nodule, no metastases  5/5/23  MRI bilateral cervical and retropharyngeal adenopathy    Came to US  6/12/23  US guided FNA L neck node in clinic (Dr. Fisher). Path: hypocellular with scant clusters of atypical squamous cells suspicious for malignancy, p40 +jeanna, GALLITO -jeanna  6/13/23  PET/CT. Intense uptake L Rosenmuller fossa with increased fullness (SUV 18.8). Skull base intact. Milder FDG uptake (SUV 6.1) by the prominent R Rosenmuller fossa, inflammatory vs malignant. Indeterminate bilateral intense palatine tonsillar uptake with fullness on CT, inflammatroy vs infectious vs tumor, discontinuous with the nasopharyngeal abnormality. Bilateral nodes, including level 2a, 2b, 3, 3/5, R 1.8 cm 2A, another level 2/3 nodes, FDG avid retropharyngeal nodes. 0.3 cm nodule on the R major  fissure. 0.8 cm R inguinal node (SUV 3.9) with additional nonenlarged LNs with minimal uptake, suggestive of reactive adenopathy. Marked focal uptake along the R upper inner thigh with skin thickening (SUV 16.8), which may represent cutaneous infection/inflammation, recommend direct visualization  6/27~8/11/23 Cisplatin gemcitabine x 3 cycles. Skipped C3D8 to give a short break prior to chemorads  8/28~10/16/23 Chemoradiation with weekly cisplatin. Nasogastric feeding tube, mild SMILEY  1/22/24  PET/CT. Resolution of primary mass and L neck adenopathy. Residual 7 mm level 2A node (SUV 4.6), NIRADS-2, repeat CT in 3 months.   7/29/24  PET/CT. Mildly prominent L level 2A node, 0.7 x 1 cm (SUV 3.28), similar to prior. Mildly prominent 2.4 x 1.2 cm R level 2A node (SUV 4.21) similar to prior, not suspicious. Unchanged non enlarged bilateral inguinal nodes, however now demonstating mild FDG uptake slightly above baseline (R SUV 3.97, L SUV 4.20), nonenlarged mildly FDG avid bilateral axillary nodes (R SUV 3.48, L 4.54), new from prior exam, likely reactive, attention on follow up    Interval History:  Patient seen today for a survivorship visit. Feels well. Notes some tightness in the neck bilaterally. No swelling. Denies difficulty with swallowing. Energy levels stable.     Physical Examination:  There were no vitals taken for this visit.  Wt Readings from Last 10 Encounters:   02/03/25 82.1 kg (181 lb)   02/03/25 82.1 kg (180 lb 14.4 oz)   01/31/25 82.3 kg (181 lb 7 oz)   07/31/24 78.5 kg (173 lb)   07/31/24 78.5 kg (173 lb)   07/29/24 78.5 kg (173 lb)   04/29/24 82.1 kg (180 lb 14.4 oz)   01/24/24 88 kg (193 lb 14.4 oz)   01/22/24 89.1 kg (196 lb 6.4 oz)   11/10/23 97.1 kg (214 lb)     Constitutional: Well-appearing male in no acute distress. No formal exam performed as majority of visit spent in counseling and visit performed virtually.     Assessment and Plan:    1. Treatment Review  Discussed treatment summary with  patient including his diagnosis, surgery/radiation, chemotherapy. Treatment summary reviewed with patient encouraged to review and call with questions.    Follow-Up Provider Visits:    Medical Oncologist: No longer needs to follow  ENT: 4 month follow-up  Radiation Oncologist: As directed  Surgeon: As directed  Speech-: If having trouble with swallowing, unexplained weight loss, or recurrent lung infections  Lymphedema: As clinically indicated  Nutrition: As clinically indicated  Dentist: Every 6 months if treatment included radiation    Follow-Up Imaging:    PETCT: 3 months after completing treatment     2. Coping  Patient feels he is coping well following completion of his/her treatment. Patient would declines referral for counseling at this time.    3. Energy  Patient states that energy levels are good following treatment. Patient would declines referral for cancer rehab at this time. Encouraged at least 150 minutes of cardiovascular exercise per week.    4. Work  Patient is currently back to work. Feels this is going well. No need for disability paperwork at this time.    5. Treatment Side Effects and Long Term Recommendations  This patient received treatment with cisplatin chemotherapy and radiation to the head and neck region. We discussed the following long term risks associated with treatment and recommendations for monitoring:    Cisplatin Chemotherapy:    -Peripheral Neuropathy: Monitor for neuropathy after treatment. Assess for limitations in function and consider OT/PT referral. Consider pain management with gabapentin, Pregabalin, or TCA if needed.  -Risk of Kidney Problems: Monitor blood pressure and consider lab monitoring at annual physical if signs of renal insuffiencey after treatment  -Hearing Loss: Evaluate for hearing loss as part of annual physical exam. Consider audiogram if concerns arise  -Elevated cholesterol levels: Monitor cholesterol levels annually and treat  accordingly  -Risk of Osteoporosis: Calcium intake 1000-1200mg plus Vit D 800-1000 international unit(s) per day, ideally from food sources but can supplement if needed. Smoking cessation and minimal or no caffeine or alcohol intake. Weight brearing exercises.  -Chemo-Brain: Evaluate for cognitive changes and rule out other causes.  -Fertility/Sexuality Concerns: Can occur months to years after treatment. Refer to urologist or fertility specialist if needed  -Risk of Cardiac Problems: Aggressively manage cardiac risk factors and encourage healthy lifestyle. Annual blood pressure and cholesterol monitoring. Work-up with echocardiogram with any signs of cardiac dysfunction  -Risk of Secondary Cancer: Low risk of developing MDS, leukemia, or lymphoma. This typically occurs 4-10 years after therapy but can by as soon as 1-3 years. Recommend annual CBC. Monitoring for consitutional symptoms fevers, night sweats, fatigue, unexplained weight loss.    Radiation to Head and Neck:    -Dysphagia: Encourage swallowing exercises and consider follow-up with speech pathologist with any concerns  -Lymphedema: Consider evaluation and management with lymphedema specialist. Early referral can improve outcomes  -Trismus: Jaw exercises can help prevent or improve trismus. Consider evaluation by dentist if needed.  -Osteonecrosis of the Jaw: Ensure dentist is aware for radiation treatments. Need dental cleaning every 6 months, annual dental exam with x-rays and fluoride treatment  -Thyroid Disorders: Risk of developing hypo or hyperthyroidism, usually 2-5 years after treatment but can by up to 10 or more years after. Annual TSH testing needed along with monitoring for signs or symptoms of thyroid disorders  -Skin: Radiation skin fields more sensitive to the sun. Need diligent sunscreen use.  -Carotid Stenosis: Need carotid US 5-10 years after treatment    6. Health Maintenance   Discussed importance of continuing age-appropriate cancer  screenings for males and the importance of reestablishing care with PCP. Patient has not met with their primary care provider since completing treatment. Smoking cessation and alcohol cessation discussed. Stressed the importance of healthy diet and daily exercise. Recommended yearly influenza vaccination along with annual eye and dental appointments.     Final Patient Recommendations:  -Annual physical exam with blood pressure and cholesterol checks  -Annual lab check CBC and TSH  -Dentist every 6 months  -Continue swallowing exercises  -Carotid ultrasound 5-10 years after treatment  -No smoking and minimize alcohol and caffeine use  -Ensure good calcium and vit D in diet or take supplements  -Weight bearing and cardiovascular activity 150 min per week  -Consistent sunscreen use  -Eye doctor annually  -Flu shot annually  -Monitoring for concerning symptoms that could be related to risks from treatment: Hearing loss, numbness/tingling in hands and feet, cognitive changes, sexuality/fertility concerns, chest pain, SOB, leg swelling, night sweats, extreme fatigue, unexplained weight loss, trouble swallowing, stiffness or swelling in neck, trouble opening mouth.     Janis Cordova CNP        Again, thank you for allowing me to participate in the care of your patient.        Sincerely,        Janis Cordova CNP    Electronically signed

## 2025-02-04 NOTE — NURSING NOTE
Current patient location: Patient declined to provide     Is the patient currently in the state of [place]? YES    Visit mode: VIDEO    If the visit is dropped, the patient can be reconnected by:VIDEO VISIT: Text to cell phone:   Telephone Information:   Mobile [phone]       Will anyone else be joining the visit? NO  (If patient encounters technical issues they should call [phone])    Are changes needed to the allergy or medication list? Pt stated no changes to allergies and Pt stated no med changes    Are refills needed on medications prescribed by this physician? NO    Rooming Documentation:  Unable to complete questionnaire(s) due to time    Reason for visit: RECHECK     ID # (if video/phone): [id number]  Language: Urdu  Agency: [employer]  Phone Number: [phone]  Method of Interpretation: Video  Patient agrees to use  Services: Yes     Diana AVILA

## 2025-02-05 ENCOUNTER — PATIENT OUTREACH (OUTPATIENT)
Dept: OTOLARYNGOLOGY | Facility: CLINIC | Age: 40
End: 2025-02-05
Payer: COMMERCIAL

## 2025-02-05 NOTE — PROGRESS NOTES
Called and spoke to patient with Italian  regarding the following CT neck and CAP from 2/3/25:     Impression:  No findings to suggest local or alex recurrence. Findings suggestive of acute sinusitis in the correct clinical setting.     IMPRESSION:  1. Decreased size of the prominent inguinal and axillary lymph nodes, likely reactive.  2. No evidence of metastatic disease in the chest, abdomen or pelvis.    Patient scheduled for follow up with Dr. Fisher in 4 months and nothing further is needed prior.     Farrah Maradiaga, RN, BSN  RN Care Coordinator, ENT Clinic

## 2025-05-28 ENCOUNTER — TELEPHONE (OUTPATIENT)
Dept: OTOLARYNGOLOGY | Facility: CLINIC | Age: 40
End: 2025-05-28
Payer: MEDICAID

## 2025-05-28 NOTE — TELEPHONE ENCOUNTER
Left Voicemail (1st Attempt) and Sent Mychart (1st Attempt) for the patient to call back and schedule the following:    Appointment Type: Return ENT  Provider: Dr. Navin Fisher   Appt date: R/S 6/16 to next open  Specialty phone number: [phone]   Additional appointment(s) needed:   Additional Notes:

## 2025-08-25 ENCOUNTER — OFFICE VISIT (OUTPATIENT)
Dept: OTOLARYNGOLOGY | Facility: CLINIC | Age: 40
End: 2025-08-25
Payer: MEDICAID

## 2025-08-25 VITALS
TEMPERATURE: 97.3 F | HEART RATE: 64 BPM | OXYGEN SATURATION: 100 % | WEIGHT: 192 LBS | BODY MASS INDEX: 26.01 KG/M2 | HEIGHT: 72 IN | DIASTOLIC BLOOD PRESSURE: 83 MMHG | SYSTOLIC BLOOD PRESSURE: 123 MMHG

## 2025-08-25 DIAGNOSIS — C11.9 NASOPHARYNGEAL CANCER (H): Primary | ICD-10-CM

## 2025-08-25 PROCEDURE — 1126F AMNT PAIN NOTED NONE PRSNT: CPT | Performed by: STUDENT IN AN ORGANIZED HEALTH CARE EDUCATION/TRAINING PROGRAM

## 2025-08-25 PROCEDURE — 3079F DIAST BP 80-89 MM HG: CPT | Performed by: STUDENT IN AN ORGANIZED HEALTH CARE EDUCATION/TRAINING PROGRAM

## 2025-08-25 PROCEDURE — 3074F SYST BP LT 130 MM HG: CPT | Performed by: STUDENT IN AN ORGANIZED HEALTH CARE EDUCATION/TRAINING PROGRAM

## 2025-08-25 PROCEDURE — 99213 OFFICE O/P EST LOW 20 MIN: CPT | Mod: 25 | Performed by: STUDENT IN AN ORGANIZED HEALTH CARE EDUCATION/TRAINING PROGRAM

## 2025-08-25 PROCEDURE — 31575 DIAGNOSTIC LARYNGOSCOPY: CPT | Performed by: STUDENT IN AN ORGANIZED HEALTH CARE EDUCATION/TRAINING PROGRAM

## 2025-08-25 ASSESSMENT — PAIN SCALES - GENERAL: PAINLEVEL_OUTOF10: NO PAIN (0)

## (undated) RX ORDER — LIDOCAINE HYDROCHLORIDE 20 MG/ML
SOLUTION OROPHARYNGEAL
Status: DISPENSED
Start: 2023-09-28